# Patient Record
Sex: FEMALE | Race: WHITE | Employment: OTHER | URBAN - NONMETROPOLITAN AREA
[De-identification: names, ages, dates, MRNs, and addresses within clinical notes are randomized per-mention and may not be internally consistent; named-entity substitution may affect disease eponyms.]

---

## 2017-01-10 ENCOUNTER — HOSPITAL ENCOUNTER (OUTPATIENT)
Dept: WOMENS IMAGING | Age: 77
Discharge: HOME | End: 2017-01-10
Payer: MEDICARE

## 2017-01-10 ENCOUNTER — HOSPITAL ENCOUNTER (OUTPATIENT)
Dept: ULTRASOUND IMAGING | Age: 77
Discharge: HOME | End: 2017-01-10
Payer: MEDICARE

## 2017-01-10 DIAGNOSIS — R92.8 ABNORMAL MAMMOGRAM: ICD-10-CM

## 2017-01-10 DIAGNOSIS — R92.8 ABNORMAL MAMMOGRAM, UNSPECIFIED: ICD-10-CM

## 2017-01-10 DIAGNOSIS — Z12.31 VISIT FOR SCREENING MAMMOGRAM: ICD-10-CM

## 2017-01-10 PROCEDURE — G0279 TOMOSYNTHESIS, MAMMO: HCPCS

## 2017-01-10 PROCEDURE — 76642 ULTRASOUND BREAST LIMITED: CPT

## 2017-02-06 ENCOUNTER — LAB REQUISITION (OUTPATIENT)
Dept: LAB | Facility: HOSPITAL | Age: 77
End: 2017-02-06

## 2017-02-06 DIAGNOSIS — Z00.00 ENCOUNTER FOR GENERAL ADULT MEDICAL EXAMINATION WITHOUT ABNORMAL FINDINGS: ICD-10-CM

## 2017-02-06 PROCEDURE — 87086 URINE CULTURE/COLONY COUNT: CPT | Performed by: INTERNAL MEDICINE

## 2017-02-08 LAB — BACTERIA SPEC AEROBE CULT: ABNORMAL

## 2017-05-16 LAB
ALBUMIN SERPL-MCNC: 4.5 G/DL (ref 3.5–5.2)
ALP BLD-CCNC: 58 U/L (ref 35–104)
ALT SERPL-CCNC: 11 U/L (ref 5–33)
ANION GAP SERPL CALCULATED.3IONS-SCNC: 15 MMOL/L (ref 7–19)
AST SERPL-CCNC: 14 U/L (ref 5–32)
BASOPHILS ABSOLUTE: 0.1 K/UL (ref 0–0.2)
BASOPHILS RELATIVE PERCENT: 1.9 % (ref 0–1)
BILIRUB SERPL-MCNC: 0.6 MG/DL (ref 0.2–1.2)
BUN BLDV-MCNC: 30 MG/DL (ref 8–23)
CALCIUM SERPL-MCNC: 10 MG/DL (ref 8.8–10.2)
CHLORIDE BLD-SCNC: 105 MMOL/L (ref 98–111)
CHOLESTEROL, TOTAL: 150 MG/DL (ref 160–199)
CO2: 24 MMOL/L (ref 22–29)
CREAT SERPL-MCNC: 1.5 MG/DL (ref 0.5–0.9)
EOSINOPHILS ABSOLUTE: 0.2 K/UL (ref 0–0.6)
EOSINOPHILS RELATIVE PERCENT: 3 % (ref 0–5)
GFR NON-AFRICAN AMERICAN: 34
GLOBULIN: 2.5 G/DL
GLUCOSE BLD-MCNC: 92 MG/DL (ref 74–109)
HCT VFR BLD CALC: 44.3 % (ref 37–47)
HDLC SERPL-MCNC: 57 MG/DL (ref 65–121)
HEMOGLOBIN: 13.6 G/DL (ref 12–16)
LDL CHOLESTEROL CALCULATED: 65 MG/DL
LYMPHOCYTES ABSOLUTE: 1.7 K/UL (ref 1.1–4.5)
LYMPHOCYTES RELATIVE PERCENT: 26.3 % (ref 20–40)
MCH RBC QN AUTO: 27.8 PG (ref 27–31)
MCHC RBC AUTO-ENTMCNC: 30.7 G/DL (ref 33–37)
MCV RBC AUTO: 90.6 FL (ref 81–99)
MONOCYTES ABSOLUTE: 0.6 K/UL (ref 0–0.9)
MONOCYTES RELATIVE PERCENT: 8.8 % (ref 0–10)
NEUTROPHILS ABSOLUTE: 3.8 K/UL (ref 1.5–7.5)
NEUTROPHILS RELATIVE PERCENT: 59.5 % (ref 50–65)
PDW BLD-RTO: 13.8 % (ref 11.5–14.5)
PLATELET # BLD: 224 K/UL (ref 130–400)
PMV BLD AUTO: 12 FL (ref 7.4–10.4)
POTASSIUM SERPL-SCNC: 5 MMOL/L (ref 3.5–5)
RBC # BLD: 4.89 M/UL (ref 4.2–5.4)
SODIUM BLD-SCNC: 144 MMOL/L (ref 136–145)
TOTAL PROTEIN: 7 G/DL (ref 6.6–8.7)
TRIGL SERPL-MCNC: 141 MG/DL (ref 150–199)
TSH SERPL DL<=0.05 MIU/L-ACNC: 1.45 UIU/ML (ref 0.27–4.2)
VITAMIN D 25-HYDROXY: 45 NG/ML
WBC # BLD: 6.4 K/UL (ref 4.8–10.8)

## 2017-09-28 RX ORDER — PANTOPRAZOLE SODIUM 40 MG/1
TABLET, DELAYED RELEASE ORAL
Qty: 90 TABLET | Refills: 3 | Status: ON HOLD | OUTPATIENT
Start: 2017-09-28 | End: 2018-07-30 | Stop reason: HOSPADM

## 2017-10-04 LAB
ANION GAP SERPL CALCULATED.3IONS-SCNC: 11 MMOL/L (ref 7–19)
BUN BLDV-MCNC: 36 MG/DL (ref 8–23)
CALCIUM SERPL-MCNC: 9.9 MG/DL (ref 8.8–10.2)
CHLORIDE BLD-SCNC: 103 MMOL/L (ref 98–111)
CO2: 27 MMOL/L (ref 22–29)
CREAT SERPL-MCNC: 1.5 MG/DL (ref 0.5–0.9)
GFR NON-AFRICAN AMERICAN: 34
GLUCOSE BLD-MCNC: 89 MG/DL (ref 74–109)
POTASSIUM SERPL-SCNC: 4.3 MMOL/L (ref 3.5–5)
SODIUM BLD-SCNC: 141 MMOL/L (ref 136–145)

## 2017-10-16 RX ORDER — OXYBUTYNIN CHLORIDE 5 MG/1
TABLET ORAL
Qty: 60 TABLET | Refills: 2 | Status: SHIPPED | OUTPATIENT
Start: 2017-10-16 | End: 2018-02-15 | Stop reason: SDUPTHER

## 2017-11-21 DIAGNOSIS — E78.00 HIGH CHOLESTEROL: ICD-10-CM

## 2017-11-21 DIAGNOSIS — Z00.00 ROUTINE GENERAL MEDICAL EXAMINATION AT A HEALTH CARE FACILITY: Primary | ICD-10-CM

## 2017-11-21 DIAGNOSIS — E55.9 AVITAMINOSIS D: ICD-10-CM

## 2017-11-21 DIAGNOSIS — Z00.00 ROUTINE GENERAL MEDICAL EXAMINATION AT A HEALTH CARE FACILITY: ICD-10-CM

## 2017-11-21 LAB
ALBUMIN SERPL-MCNC: 4.5 G/DL (ref 3.5–5.2)
ALP BLD-CCNC: 66 U/L (ref 35–104)
ALT SERPL-CCNC: 11 U/L (ref 5–33)
ANION GAP SERPL CALCULATED.3IONS-SCNC: 12 MMOL/L (ref 7–19)
AST SERPL-CCNC: 16 U/L (ref 5–32)
BACTERIA: ABNORMAL /HPF
BASOPHILS ABSOLUTE: 0.1 K/UL (ref 0–0.2)
BASOPHILS RELATIVE PERCENT: 1.5 % (ref 0–1)
BILIRUB SERPL-MCNC: 0.6 MG/DL (ref 0.2–1.2)
BILIRUBIN URINE: NEGATIVE
BLOOD, URINE: NEGATIVE
BUN BLDV-MCNC: 35 MG/DL (ref 8–23)
CALCIUM SERPL-MCNC: 10 MG/DL (ref 8.8–10.2)
CHLORIDE BLD-SCNC: 100 MMOL/L (ref 98–111)
CHOLESTEROL, TOTAL: 164 MG/DL (ref 160–199)
CLARITY: ABNORMAL
CO2: 28 MMOL/L (ref 22–29)
COLOR: YELLOW
CREAT SERPL-MCNC: 1.5 MG/DL (ref 0.5–0.9)
EOSINOPHILS ABSOLUTE: 0.2 K/UL (ref 0–0.6)
EOSINOPHILS RELATIVE PERCENT: 2.1 % (ref 0–5)
EPITHELIAL CELLS, UA: 12 /HPF (ref 0–5)
GFR NON-AFRICAN AMERICAN: 34
GLUCOSE BLD-MCNC: 109 MG/DL (ref 74–109)
GLUCOSE URINE: NEGATIVE MG/DL
HCT VFR BLD CALC: 45.6 % (ref 37–47)
HDLC SERPL-MCNC: 58 MG/DL (ref 65–121)
HEMOGLOBIN: 14.3 G/DL (ref 12–16)
HYALINE CASTS: 1 /HPF (ref 0–8)
KETONES, URINE: NEGATIVE MG/DL
LDL CHOLESTEROL CALCULATED: 75 MG/DL
LEUKOCYTE ESTERASE, URINE: ABNORMAL
LYMPHOCYTES ABSOLUTE: 1.5 K/UL (ref 1.1–4.5)
LYMPHOCYTES RELATIVE PERCENT: 21.1 % (ref 20–40)
MCH RBC QN AUTO: 28.4 PG (ref 27–31)
MCHC RBC AUTO-ENTMCNC: 31.4 G/DL (ref 33–37)
MCV RBC AUTO: 90.7 FL (ref 81–99)
MONOCYTES ABSOLUTE: 0.5 K/UL (ref 0–0.9)
MONOCYTES RELATIVE PERCENT: 7.4 % (ref 0–10)
NEUTROPHILS ABSOLUTE: 4.9 K/UL (ref 1.5–7.5)
NEUTROPHILS RELATIVE PERCENT: 67.5 % (ref 50–65)
NITRITE, URINE: NEGATIVE
PDW BLD-RTO: 13.7 % (ref 11.5–14.5)
PH UA: 6.5
PLATELET # BLD: 266 K/UL (ref 130–400)
PMV BLD AUTO: 11.9 FL (ref 9.4–12.3)
POTASSIUM SERPL-SCNC: 4.4 MMOL/L (ref 3.5–5)
PROTEIN UA: NEGATIVE MG/DL
RBC # BLD: 5.03 M/UL (ref 4.2–5.4)
RBC UA: 10 /HPF (ref 0–4)
SODIUM BLD-SCNC: 140 MMOL/L (ref 136–145)
SPECIFIC GRAVITY UA: 1.01
TOTAL PROTEIN: 7.3 G/DL (ref 6.6–8.7)
TRIGL SERPL-MCNC: 157 MG/DL (ref 0–149)
TSH SERPL DL<=0.05 MIU/L-ACNC: 2.41 UIU/ML (ref 0.27–4.2)
UROBILINOGEN, URINE: 0.2 E.U./DL
VITAMIN D 25-HYDROXY: 41 NG/ML
WBC # BLD: 7.3 K/UL (ref 4.8–10.8)
WBC UA: 20 /HPF (ref 0–5)

## 2017-11-21 RX ORDER — CEFDINIR 300 MG/1
300 CAPSULE ORAL 2 TIMES DAILY
Qty: 20 CAPSULE | Refills: 0 | Status: SHIPPED | OUTPATIENT
Start: 2017-11-21 | End: 2017-11-30

## 2017-11-22 ENCOUNTER — TELEPHONE (OUTPATIENT)
Dept: INTERNAL MEDICINE | Age: 77
End: 2017-11-22

## 2017-11-23 LAB — URINE CULTURE, ROUTINE: NORMAL

## 2017-11-30 ENCOUNTER — OFFICE VISIT (OUTPATIENT)
Dept: INTERNAL MEDICINE | Age: 77
End: 2017-11-30
Payer: MEDICARE

## 2017-11-30 VITALS
OXYGEN SATURATION: 98 % | HEIGHT: 65 IN | DIASTOLIC BLOOD PRESSURE: 82 MMHG | TEMPERATURE: 97.6 F | BODY MASS INDEX: 29.49 KG/M2 | WEIGHT: 177 LBS | SYSTOLIC BLOOD PRESSURE: 128 MMHG | HEART RATE: 66 BPM

## 2017-11-30 DIAGNOSIS — I10 ESSENTIAL HYPERTENSION: Chronic | ICD-10-CM

## 2017-11-30 DIAGNOSIS — N30.00 ACUTE CYSTITIS WITHOUT HEMATURIA: Chronic | ICD-10-CM

## 2017-11-30 PROBLEM — N39.0 UTI (URINARY TRACT INFECTION): Chronic | Status: ACTIVE | Noted: 2017-11-30

## 2017-11-30 PROCEDURE — 1090F PRES/ABSN URINE INCON ASSESS: CPT | Performed by: NURSE PRACTITIONER

## 2017-11-30 PROCEDURE — 4040F PNEUMOC VAC/ADMIN/RCVD: CPT | Performed by: NURSE PRACTITIONER

## 2017-11-30 PROCEDURE — G8399 PT W/DXA RESULTS DOCUMENT: HCPCS | Performed by: NURSE PRACTITIONER

## 2017-11-30 PROCEDURE — G8419 CALC BMI OUT NRM PARAM NOF/U: HCPCS | Performed by: NURSE PRACTITIONER

## 2017-11-30 PROCEDURE — 99214 OFFICE O/P EST MOD 30 MIN: CPT | Performed by: NURSE PRACTITIONER

## 2017-11-30 PROCEDURE — G8484 FLU IMMUNIZE NO ADMIN: HCPCS | Performed by: NURSE PRACTITIONER

## 2017-11-30 PROCEDURE — G8427 DOCREV CUR MEDS BY ELIG CLIN: HCPCS | Performed by: NURSE PRACTITIONER

## 2017-11-30 PROCEDURE — 1036F TOBACCO NON-USER: CPT | Performed by: NURSE PRACTITIONER

## 2017-11-30 PROCEDURE — 1123F ACP DISCUSS/DSCN MKR DOCD: CPT | Performed by: NURSE PRACTITIONER

## 2017-11-30 RX ORDER — MULTIVIT-MIN/IRON/FOLIC ACID/K 18-600-40
CAPSULE ORAL DAILY
Status: ON HOLD | COMMUNITY
End: 2022-01-01 | Stop reason: HOSPADM

## 2017-11-30 RX ORDER — ASPIRIN 81 MG/1
81 TABLET ORAL DAILY
COMMUNITY
End: 2021-01-01

## 2017-11-30 RX ORDER — OXYBUTYNIN CHLORIDE 5 MG/1
5 TABLET ORAL 2 TIMES DAILY
COMMUNITY
End: 2019-03-19 | Stop reason: SDUPTHER

## 2017-11-30 ASSESSMENT — ENCOUNTER SYMPTOMS
PHOTOPHOBIA: 0
BACK PAIN: 0
COLOR CHANGE: 0
VOMITING: 0
NAUSEA: 0
RHINORRHEA: 0
VOICE CHANGE: 0
SHORTNESS OF BREATH: 0
COUGH: 0

## 2017-11-30 NOTE — PROGRESS NOTES
Jose Catinald INTERNAL MEDICINE  [facility]  [address] [place] [address]  Dept: [phone]  Dept Fax: [phone]  Loc: [phone]    Mitch Bateman is a 65 y.o. female who presents today for her medical conditions/complaints as noted below. Mitch Bateman is c/o of 6 Month Follow-Up        HPI:     HPI   Chief Complaint   Patient presents with    6 Month Follow-Up     Patient presents today for routine follow-up. She recently had labs which showed a UTI. Dr. Karime Diehl called in [place] for her. She does have an elevated creatinine of 1.5 this is stable for her and she sees Dr. Froy Downing for this. She denies complaints today. However, she is very unhappy with our  as she states they told her she had an appointment today and it was rescheduled for Dec 12. Dr. Karime Diehl is not in office today and the patient agreed to see me. Her blood pressure is well controlled, as well as lipids.      Lab Results   Component Value Date     11/21/2017    K 4.4 11/21/2017     11/21/2017    CO2 28 11/21/2017    BUN 35 (H) 11/21/2017    CREATININE 1.5 (H) 11/21/2017    GLUCOSE 109 11/21/2017    CALCIUM 10.0 11/21/2017    PROT 7.3 11/21/2017    LABALBU 4.5 11/21/2017    BILITOT 0.6 11/21/2017    ALKPHOS 66 11/21/2017    AST 16 11/21/2017    ALT 11 11/21/2017    LABGLOM 34 (A) 11/21/2017    GLOB 2.5 05/16/2017       Lab Results   Component Value Date    WBC 7.3 11/21/2017    HGB 14.3 11/21/2017    HCT 45.6 11/21/2017    MCV 90.7 11/21/2017     11/21/2017     Lab Results   Component Value Date    CHOL 164 11/21/2017    CHOL 150 (L) 05/16/2017     Lab Results   Component Value Date    TRIG 157 (H) 11/21/2017    TRIG 141 (L) 05/16/2017     Lab Results   Component Value Date    HDL 58 (L) 11/21/2017    HDL 57 (L) 05/16/2017     Lab Results   Component Value Date    LDLCALC 75 11/21/2017    LDLCALC 65 05/16/2017     Lab Results   Component Value Date    TSH 2.410 11/21/2017 rhinorrhea and voice change. Eyes: Negative for photophobia and visual disturbance. Respiratory: Negative for cough and shortness of breath. Cardiovascular: Negative for chest pain and palpitations. Gastrointestinal: Negative for nausea and vomiting. Endocrine: Negative. Negative for cold intolerance and heat intolerance. Genitourinary: Negative for difficulty urinating and flank pain. Musculoskeletal: Negative for back pain and neck pain. Skin: Negative for color change and rash. Allergic/Immunologic: Negative for environmental allergies and food allergies. Neurological: Negative for dizziness, speech difficulty and headaches. Hematological: Does not bruise/bleed easily. Psychiatric/Behavioral: Negative for sleep disturbance and suicidal ideas. Objective:     Physical Exam   Constitutional: She is oriented to person, place, and time. She appears well-developed and well-nourished. HENT:   Head: Atraumatic. Right Ear: External ear normal.   Left Ear: External ear normal.   Nose: Nose normal.   Mouth/Throat: Oropharynx is clear and moist.   Eyes: Conjunctivae are normal. Pupils are equal, round, and reactive to light. Neck: Normal range of motion. Neck supple. Cardiovascular: Normal rate, regular rhythm, S1 normal, S2 normal and normal heart sounds. Pulmonary/Chest: Effort normal and breath sounds normal.   Abdominal: Soft. Bowel sounds are normal.   Musculoskeletal: Normal range of motion. Neurological: She is alert and oriented to person, place, and time. Skin: Skin is warm and dry. Psychiatric: She has a normal mood and affect. Her behavior is normal.   Nursing note and vitals reviewed. /82 (Site: Left Arm, Position: Sitting)   Pulse 66   Temp 97.6 °F (36.4 °C)   Ht 5' 5\" (1.651 m)   Wt 177 lb (80.3 kg)   SpO2 98%   BMI 29.45 kg/m²     Assessment:      1. Essential hypertension     2. Acute cystitis without hematuria         Plan:     1.  Continue all

## 2018-02-01 RX ORDER — PRAVASTATIN SODIUM 80 MG/1
TABLET ORAL
Qty: 90 TABLET | Refills: 1 | Status: SHIPPED | OUTPATIENT
Start: 2018-02-01 | End: 2018-11-29 | Stop reason: SDUPTHER

## 2018-02-15 RX ORDER — OXYBUTYNIN CHLORIDE 5 MG/1
TABLET ORAL
Qty: 60 TABLET | Refills: 2 | Status: ON HOLD | OUTPATIENT
Start: 2018-02-15 | End: 2018-07-30 | Stop reason: HOSPADM

## 2018-04-12 PROBLEM — N39.0 UTI (URINARY TRACT INFECTION): Chronic | Status: RESOLVED | Noted: 2017-11-30 | Resolved: 2018-04-12

## 2018-06-01 DIAGNOSIS — I10 ESSENTIAL HYPERTENSION: Primary | Chronic | ICD-10-CM

## 2018-06-01 DIAGNOSIS — Z00.00 ROUTINE GENERAL MEDICAL EXAMINATION AT A HEALTH CARE FACILITY: ICD-10-CM

## 2018-06-01 DIAGNOSIS — E78.00 HYPERCHOLESTEREMIA: ICD-10-CM

## 2018-06-01 DIAGNOSIS — I10 ESSENTIAL HYPERTENSION: Chronic | ICD-10-CM

## 2018-06-01 LAB
ALBUMIN SERPL-MCNC: 4.2 G/DL (ref 3.5–5.2)
ALP BLD-CCNC: 61 U/L (ref 35–104)
ALT SERPL-CCNC: 9 U/L (ref 5–33)
ANION GAP SERPL CALCULATED.3IONS-SCNC: 17 MMOL/L (ref 7–19)
AST SERPL-CCNC: 13 U/L (ref 5–32)
BACTERIA: ABNORMAL /HPF
BILIRUB SERPL-MCNC: 0.5 MG/DL (ref 0.2–1.2)
BILIRUBIN URINE: NEGATIVE
BLOOD, URINE: NEGATIVE
BUN BLDV-MCNC: 34 MG/DL (ref 8–23)
CALCIUM SERPL-MCNC: 9.8 MG/DL (ref 8.8–10.2)
CHLORIDE BLD-SCNC: 104 MMOL/L (ref 98–111)
CHOLESTEROL, TOTAL: 154 MG/DL (ref 160–199)
CLARITY: ABNORMAL
CO2: 22 MMOL/L (ref 22–29)
COLOR: YELLOW
CREAT SERPL-MCNC: 1.5 MG/DL (ref 0.5–0.9)
EPITHELIAL CELLS, UA: 7 /HPF (ref 0–5)
GFR NON-AFRICAN AMERICAN: 34
GLUCOSE BLD-MCNC: 93 MG/DL (ref 74–109)
GLUCOSE URINE: NEGATIVE MG/DL
HCT VFR BLD CALC: 43.3 % (ref 37–47)
HDLC SERPL-MCNC: 53 MG/DL (ref 65–121)
HEMOGLOBIN: 13.4 G/DL (ref 12–16)
HYALINE CASTS: 7 /HPF (ref 0–8)
KETONES, URINE: NEGATIVE MG/DL
LDL CHOLESTEROL CALCULATED: 73 MG/DL
LEUKOCYTE ESTERASE, URINE: ABNORMAL
MCH RBC QN AUTO: 27.7 PG (ref 27–31)
MCHC RBC AUTO-ENTMCNC: 30.9 G/DL (ref 33–37)
MCV RBC AUTO: 89.5 FL (ref 81–99)
NITRITE, URINE: NEGATIVE
PDW BLD-RTO: 13.4 % (ref 11.5–14.5)
PH UA: 7.5
PLATELET # BLD: 220 K/UL (ref 130–400)
PMV BLD AUTO: 12.2 FL (ref 9.4–12.3)
POTASSIUM SERPL-SCNC: 4.2 MMOL/L (ref 3.5–5)
PROTEIN UA: NEGATIVE MG/DL
RBC # BLD: 4.84 M/UL (ref 4.2–5.4)
RBC UA: 4 /HPF (ref 0–4)
SODIUM BLD-SCNC: 143 MMOL/L (ref 136–145)
SPECIFIC GRAVITY UA: 1.02
TOTAL PROTEIN: 6.7 G/DL (ref 6.6–8.7)
TRIGL SERPL-MCNC: 139 MG/DL (ref 0–149)
URINE REFLEX TO CULTURE: YES
UROBILINOGEN, URINE: 0.2 E.U./DL
WBC # BLD: 6 K/UL (ref 4.8–10.8)
WBC UA: 9 /HPF (ref 0–5)

## 2018-06-03 LAB — URINE CULTURE, ROUTINE: NORMAL

## 2018-06-04 ENCOUNTER — OFFICE VISIT (OUTPATIENT)
Dept: INTERNAL MEDICINE | Age: 78
End: 2018-06-04
Payer: MEDICARE

## 2018-06-04 VITALS
DIASTOLIC BLOOD PRESSURE: 80 MMHG | OXYGEN SATURATION: 97 % | HEART RATE: 57 BPM | SYSTOLIC BLOOD PRESSURE: 120 MMHG | HEIGHT: 65 IN | BODY MASS INDEX: 30.07 KG/M2 | WEIGHT: 180.5 LBS

## 2018-06-04 DIAGNOSIS — I10 ESSENTIAL HYPERTENSION: Primary | ICD-10-CM

## 2018-06-04 DIAGNOSIS — Z12.31 SCREENING MAMMOGRAM, ENCOUNTER FOR: ICD-10-CM

## 2018-06-04 DIAGNOSIS — G47.00 INSOMNIA, UNSPECIFIED TYPE: ICD-10-CM

## 2018-06-04 DIAGNOSIS — K21.9 GASTROESOPHAGEAL REFLUX DISEASE WITHOUT ESOPHAGITIS: ICD-10-CM

## 2018-06-04 DIAGNOSIS — E78.5 HYPERLIPIDEMIA, UNSPECIFIED HYPERLIPIDEMIA TYPE: ICD-10-CM

## 2018-06-04 DIAGNOSIS — N32.81 OVERACTIVE BLADDER: ICD-10-CM

## 2018-06-04 DIAGNOSIS — R79.89 ELEVATED SERUM CREATININE: ICD-10-CM

## 2018-06-04 PROCEDURE — G8399 PT W/DXA RESULTS DOCUMENT: HCPCS | Performed by: INTERNAL MEDICINE

## 2018-06-04 PROCEDURE — 99214 OFFICE O/P EST MOD 30 MIN: CPT | Performed by: INTERNAL MEDICINE

## 2018-06-04 PROCEDURE — 1090F PRES/ABSN URINE INCON ASSESS: CPT | Performed by: INTERNAL MEDICINE

## 2018-06-04 PROCEDURE — 1036F TOBACCO NON-USER: CPT | Performed by: INTERNAL MEDICINE

## 2018-06-04 PROCEDURE — G8427 DOCREV CUR MEDS BY ELIG CLIN: HCPCS | Performed by: INTERNAL MEDICINE

## 2018-06-04 PROCEDURE — G8417 CALC BMI ABV UP PARAM F/U: HCPCS | Performed by: INTERNAL MEDICINE

## 2018-06-04 PROCEDURE — 4040F PNEUMOC VAC/ADMIN/RCVD: CPT | Performed by: INTERNAL MEDICINE

## 2018-06-04 PROCEDURE — 1123F ACP DISCUSS/DSCN MKR DOCD: CPT | Performed by: INTERNAL MEDICINE

## 2018-06-04 RX ORDER — TETANUS AND DIPHTHERIA TOXOIDS ADSORBED 2; 2 [LF]/.5ML; [LF]/.5ML
0.5 INJECTION INTRAMUSCULAR ONCE
Qty: 0.5 ML | Refills: 0 | Status: CANCELLED | OUTPATIENT
Start: 2018-06-04 | End: 2018-06-04

## 2018-06-04 RX ORDER — TRAZODONE HYDROCHLORIDE 50 MG/1
50 TABLET ORAL NIGHTLY
Qty: 15 TABLET | Refills: 1 | Status: SHIPPED | OUTPATIENT
Start: 2018-06-04 | End: 2018-11-09

## 2018-06-04 ASSESSMENT — ENCOUNTER SYMPTOMS
VOMITING: 0
EYE REDNESS: 0
SINUS PRESSURE: 0
TROUBLE SWALLOWING: 0
ABDOMINAL DISTENTION: 0
PHOTOPHOBIA: 0
WHEEZING: 0
SINUS PAIN: 0
RECTAL PAIN: 0
EYE ITCHING: 0
ABDOMINAL PAIN: 0
COLOR CHANGE: 0
BLOOD IN STOOL: 0
CONSTIPATION: 0
VOICE CHANGE: 0
CHEST TIGHTNESS: 0
COUGH: 0
DIARRHEA: 0
RHINORRHEA: 0
ANAL BLEEDING: 0
EYE DISCHARGE: 0
BACK PAIN: 0
SHORTNESS OF BREATH: 0
EYE PAIN: 0
NAUSEA: 0
SORE THROAT: 0

## 2018-06-04 ASSESSMENT — PATIENT HEALTH QUESTIONNAIRE - PHQ9
2. FEELING DOWN, DEPRESSED OR HOPELESS: 0
SUM OF ALL RESPONSES TO PHQ QUESTIONS 1-9: 0
SUM OF ALL RESPONSES TO PHQ9 QUESTIONS 1 & 2: 0
1. LITTLE INTEREST OR PLEASURE IN DOING THINGS: 0

## 2018-06-07 ENCOUNTER — HOSPITAL ENCOUNTER (OUTPATIENT)
Dept: WOMENS IMAGING | Age: 78
Discharge: HOME | End: 2018-06-07
Payer: MEDICARE

## 2018-06-07 DIAGNOSIS — Z12.31 SCREENING MAMMOGRAM, ENCOUNTER FOR: ICD-10-CM

## 2018-06-07 PROCEDURE — 77067 SCR MAMMO BI INCL CAD: CPT

## 2018-07-24 ENCOUNTER — OFFICE VISIT (OUTPATIENT)
Dept: URGENT CARE | Age: 78
End: 2018-07-24
Payer: MEDICARE

## 2018-07-24 ENCOUNTER — HOSPITAL ENCOUNTER (OUTPATIENT)
Dept: GENERAL RADIOLOGY | Age: 78
Discharge: HOME | End: 2018-07-24
Payer: MEDICARE

## 2018-07-24 VITALS
OXYGEN SATURATION: 98 % | WEIGHT: 169 LBS | HEIGHT: 65 IN | HEART RATE: 72 BPM | DIASTOLIC BLOOD PRESSURE: 79 MMHG | BODY MASS INDEX: 28.16 KG/M2 | RESPIRATION RATE: 22 BRPM | TEMPERATURE: 97.5 F | SYSTOLIC BLOOD PRESSURE: 146 MMHG

## 2018-07-24 DIAGNOSIS — J18.9 PNEUMONIA OF RIGHT UPPER LOBE DUE TO INFECTIOUS ORGANISM: Primary | ICD-10-CM

## 2018-07-24 DIAGNOSIS — R06.02 SHORTNESS OF BREATH: ICD-10-CM

## 2018-07-24 DIAGNOSIS — R11.0 NAUSEA: ICD-10-CM

## 2018-07-24 PROCEDURE — 71046 X-RAY EXAM CHEST 2 VIEWS: CPT

## 2018-07-24 PROCEDURE — 1036F TOBACCO NON-USER: CPT | Performed by: NURSE PRACTITIONER

## 2018-07-24 PROCEDURE — G8399 PT W/DXA RESULTS DOCUMENT: HCPCS | Performed by: NURSE PRACTITIONER

## 2018-07-24 PROCEDURE — 4040F PNEUMOC VAC/ADMIN/RCVD: CPT | Performed by: NURSE PRACTITIONER

## 2018-07-24 PROCEDURE — 1123F ACP DISCUSS/DSCN MKR DOCD: CPT | Performed by: NURSE PRACTITIONER

## 2018-07-24 PROCEDURE — G8417 CALC BMI ABV UP PARAM F/U: HCPCS | Performed by: NURSE PRACTITIONER

## 2018-07-24 PROCEDURE — 1101F PT FALLS ASSESS-DOCD LE1/YR: CPT | Performed by: NURSE PRACTITIONER

## 2018-07-24 PROCEDURE — 99213 OFFICE O/P EST LOW 20 MIN: CPT | Performed by: NURSE PRACTITIONER

## 2018-07-24 PROCEDURE — 1090F PRES/ABSN URINE INCON ASSESS: CPT | Performed by: NURSE PRACTITIONER

## 2018-07-24 PROCEDURE — G8428 CUR MEDS NOT DOCUMENT: HCPCS | Performed by: NURSE PRACTITIONER

## 2018-07-24 RX ORDER — LEVOFLOXACIN 750 MG/1
750 TABLET ORAL DAILY
Qty: 5 TABLET | Refills: 0 | Status: ON HOLD | OUTPATIENT
Start: 2018-07-24 | End: 2018-07-30 | Stop reason: HOSPADM

## 2018-07-24 RX ORDER — ONDANSETRON 4 MG/1
4 TABLET, ORALLY DISINTEGRATING ORAL EVERY 8 HOURS PRN
Qty: 30 TABLET | Refills: 0 | Status: SHIPPED | OUTPATIENT
Start: 2018-07-24 | End: 2018-11-09

## 2018-07-24 ASSESSMENT — ENCOUNTER SYMPTOMS
SORE THROAT: 0
COUGH: 1
ABDOMINAL PAIN: 0
RHINORRHEA: 0
DIARRHEA: 1
NAUSEA: 1
VOMITING: 0
SHORTNESS OF BREATH: 1
WHEEZING: 0

## 2018-07-24 NOTE — PROGRESS NOTES
15 tablet 1    oxybutynin (DITROPAN) 5 MG tablet TAKE 1 TABLET BY MOUTH TWO TIMES A DAY 60 tablet 2    pravastatin (PRAVACHOL) 80 MG tablet TAKE 1 TABLET BY MOUTH AT BEDTIME 90 tablet 1    aspirin 81 MG EC tablet Take 81 mg by mouth daily      Cholecalciferol (VITAMIN D) 2000 units CAPS capsule Take by mouth      oxybutynin (DITROPAN) 5 MG tablet Take 5 mg by mouth 2 times daily      pantoprazole (PROTONIX) 40 MG tablet TAKE 1 TABLET BY MOUTH EVERY DAY 90 tablet 3    pantoprazole (PROTONIX) 40 MG tablet Take 80 mg by mouth daily       triamterene-hydrochlorothiazide (MAXZIDE-25) 37.5-25 MG per tablet Take 1 tablet by mouth daily Indications: 1/2 tablet daily      losartan (COZAAR) 25 MG tablet Take 25 mg by mouth daily      meclizine (ANTIVERT) 25 MG tablet Take 25 mg by mouth 3 times daily as needed        No current facility-administered medications for this visit. No Known Allergies    Health Maintenance   Topic Date Due    DTaP/Tdap/Td vaccine (1 - Tdap) 11/05/1959    Pneumococcal low/med risk (2 of 2 - PPSV23) 07/13/2016    Shingles Vaccine (1 of 2 - 2 Dose Series) 02/09/2017    Flu vaccine (1) 09/01/2018    Potassium monitoring  06/01/2019    Creatinine monitoring  06/01/2019    DEXA (modify frequency per FRAX score)  Completed       Subjective:      Review of Systems   Constitutional: Negative for chills, fatigue and fever. HENT: Positive for congestion. Negative for ear pain, rhinorrhea and sore throat. Respiratory: Positive for cough and shortness of breath. Negative for wheezing. Gastrointestinal: Positive for diarrhea and nausea. Negative for abdominal pain and vomiting. Skin: Negative for rash. Neurological: Negative for light-headedness and headaches. All other systems reviewed and are negative. Objective:     Physical Exam   Constitutional: She is oriented to person, place, and time. She appears well-developed and well-nourished. No distress.    HENT:   Head: Normocephalic and atraumatic. Right Ear: Hearing, tympanic membrane, external ear and ear canal normal.   Left Ear: Hearing, tympanic membrane, external ear and ear canal normal.   Nose: Nose normal.   Mouth/Throat: Uvula is midline, oropharynx is clear and moist and mucous membranes are normal.   Eyes: Pupils are equal, round, and reactive to light. Neck: Normal range of motion. Cardiovascular: Normal rate, regular rhythm and normal heart sounds. No murmur heard. Pulmonary/Chest: Effort normal. No respiratory distress. She has no decreased breath sounds. She has wheezes in the right upper field, the right lower field, the left upper field and the left lower field. Neurological: She is alert and oriented to person, place, and time. Skin: Skin is warm and dry. No rash noted. She is not diaphoretic. Psychiatric: She has a normal mood and affect. Her behavior is normal.   Nursing note and vitals reviewed. BP (!) 146/79   Pulse 72   Temp 97.5 °F (36.4 °C) (Oral)   Resp 22   Ht 5' 5\" (1.651 m)   Wt 169 lb (76.7 kg)   SpO2 98%   BMI 28.12 kg/m²     Assessment:       Diagnosis Orders   1. Pneumonia of right upper lobe due to infectious organism ([address]  levofloxacin (LEVAQUIN) 750 MG tablet   2. Shortness of breath  XR CHEST STANDARD (2 VW)   3. Nausea  ondansetron (ZOFRAN ODT) 4 MG disintegrating tablet       Plan:   Xray:   Patchy right upper lobe airspace disease suggesting a  bronchopneumonia. Lungs are otherwise clear. 1. Take antibiotic as prescribed. Once a day for 5 days. 2. This medication has a risk for tendon rupture. Avoid heavy lifting and strenuous exercise. 3. Zofran as needed for nausea  4. Stop z pack  5. Monitor for fever and worsening symptoms  6. If patient is not improving or developing any new/worsening symptoms then RTC, prn or go to ER. Patient is to follow up with PCP as needed. Patient verbalizes understanding.     Orders Placed This Encounter   Procedures    XR medicines you take. How can you care for yourself at home? · Take your antibiotics exactly as directed. Do not stop taking the medicine just because you are feeling better. You need to take the full course of antibiotics. · Take your medicines exactly as prescribed. Call your doctor if you think you are having a problem with your medicine. · Get plenty of rest and sleep. You may feel weak and tired for a while, but your energy level will improve with time. · To prevent dehydration, drink plenty of fluids, enough so that your urine is light yellow or clear like water. Choose water and other caffeine-free clear liquids until you feel better. If you have kidney, heart, or liver disease and have to limit fluids, talk with your doctor before you increase the amount of fluids you drink. · Take care of your cough so you can rest. A cough that brings up mucus from your lungs is common with pneumonia. It is one way your body gets rid of the infection. But if coughing keeps you from resting or causes severe fatigue and chest-wall pain, talk to your doctor. He or she may suggest that you take a medicine to reduce the cough. · Use a vaporizer or humidifier to add moisture to your bedroom. Follow the directions for cleaning the machine. · Do not smoke or allow others to smoke around you. Smoke will make your cough last longer. If you need help quitting, talk to your doctor about stop-smoking programs and medicines. These can increase your chances of quitting for good. · Take an over-the-counter pain medicine, such as acetaminophen (Tylenol), ibuprofen (Advil, Motrin), or naproxen (Aleve). Read and follow all instructions on the label. · Do not take two or more pain medicines at the same time unless the doctor told you to. Many pain medicines have acetaminophen, which is Tylenol. Too much acetaminophen (Tylenol) can be harmful.   · If you were given a spirometer to measure how well your lungs are working, use it as

## 2018-07-24 NOTE — PATIENT INSTRUCTIONS
infection. But if coughing keeps you from resting or causes severe fatigue and chest-wall pain, talk to your doctor. He or she may suggest that you take a medicine to reduce the cough. · Use a vaporizer or humidifier to add moisture to your bedroom. Follow the directions for cleaning the machine. · Do not smoke or allow others to smoke around you. Smoke will make your cough last longer. If you need help quitting, talk to your doctor about stop-smoking programs and medicines. These can increase your chances of quitting for good. · Take an over-the-counter pain medicine, such as acetaminophen (Tylenol), ibuprofen (Advil, Motrin), or naproxen (Aleve). Read and follow all instructions on the label. · Do not take two or more pain medicines at the same time unless the doctor told you to. Many pain medicines have acetaminophen, which is Tylenol. Too much acetaminophen (Tylenol) can be harmful. · If you were given a spirometer to measure how well your lungs are working, use it as instructed. This can help your doctor tell how your recovery is going. · To prevent pneumonia in the future, talk to your doctor about getting a flu vaccine (once a year) and a pneumococcal vaccine (one time only for most people). When should you call for help? Call 911 anytime you think you may need emergency care. For example, call if:    · You have severe trouble breathing.    Call your doctor now or seek immediate medical care if:    · You cough up dark brown or bloody mucus (sputum).     · You have new or worse trouble breathing.     · You are dizzy or lightheaded, or you feel like you may faint.    Watch closely for changes in your health, and be sure to contact your doctor if:    · You have a new or higher fever.     · You are coughing more deeply or more often.     · You are not getting better after 2 days (48 hours).     · You do not get better as expected. Where can you learn more? Go to [email or web address]://[facility]ovanny[facility]. org and sign in to your [employer] account. Enter D336 in the [employer] box to learn more about \"Pneumonia: Care Instructions. \"     If you do not have an account, please click on the \"Sign Up Now\" link. Current as of: December 6, 2017  Content Version: 11.6  © 3173-6969 [employer]. Care instructions adapted under license by [facility] ([facility]). If you have questions about a medical condition or this instruction, always ask your healthcare professional. [address] any warranty or liability for your use of this information.

## 2018-07-28 ENCOUNTER — APPOINTMENT (OUTPATIENT)
Dept: GENERAL RADIOLOGY | Age: 78
End: 2018-07-28
Payer: MEDICARE

## 2018-07-28 ENCOUNTER — HOSPITAL ENCOUNTER (OUTPATIENT)
Age: 78
Setting detail: OBSERVATION
Discharge: HOME | End: 2018-07-30
Attending: EMERGENCY MEDICINE | Admitting: INTERNAL MEDICINE
Payer: MEDICARE

## 2018-07-28 DIAGNOSIS — N17.9 ACUTE KIDNEY INJURY (HCC): ICD-10-CM

## 2018-07-28 DIAGNOSIS — J18.9 PNEUMONIA DUE TO ORGANISM: Primary | ICD-10-CM

## 2018-07-28 PROBLEM — E87.29 METABOLIC ACIDOSIS, INCREASED ANION GAP (IAG): Status: ACTIVE | Noted: 2018-07-28

## 2018-07-28 LAB
ALBUMIN SERPL-MCNC: 4.4 G/DL (ref 3.5–5.2)
ALP BLD-CCNC: 61 U/L (ref 35–104)
ALT SERPL-CCNC: 10 U/L (ref 5–33)
ANION GAP SERPL CALCULATED.3IONS-SCNC: 22 MMOL/L (ref 7–19)
AST SERPL-CCNC: 16 U/L (ref 5–32)
BACTERIA: NEGATIVE /HPF
BASOPHILS ABSOLUTE: 0.1 K/UL (ref 0–0.2)
BASOPHILS RELATIVE PERCENT: 0.9 % (ref 0–1)
BILIRUB SERPL-MCNC: 0.8 MG/DL (ref 0.2–1.2)
BILIRUBIN URINE: NEGATIVE
BLOOD, URINE: NEGATIVE
BUN BLDV-MCNC: 37 MG/DL (ref 8–23)
CALCIUM SERPL-MCNC: 10.3 MG/DL (ref 8.8–10.2)
CHLORIDE BLD-SCNC: 101 MMOL/L (ref 98–111)
CLARITY: CLEAR
CO2: 19 MMOL/L (ref 22–29)
COLOR: YELLOW
CREAT SERPL-MCNC: 2.1 MG/DL (ref 0.5–0.9)
CREATININE URINE: 165.9 MG/DL (ref 4.2–622)
EOSINOPHILS ABSOLUTE: 0.1 K/UL (ref 0–0.6)
EOSINOPHILS RELATIVE PERCENT: 0.7 % (ref 0–5)
EPITHELIAL CELLS, UA: 8 /HPF (ref 0–5)
GFR NON-AFRICAN AMERICAN: 23
GLUCOSE BLD-MCNC: 103 MG/DL (ref 74–109)
GLUCOSE URINE: NEGATIVE MG/DL
HCT VFR BLD CALC: 44.6 % (ref 37–47)
HEMOGLOBIN: 14.1 G/DL (ref 12–16)
HYALINE CASTS: 1 /HPF (ref 0–8)
KETONES, URINE: 15 MG/DL
LACTIC ACID: 1.7 MMOL/L (ref 0.5–1.9)
LEUKOCYTE ESTERASE, URINE: ABNORMAL
LYMPHOCYTES ABSOLUTE: 1.6 K/UL (ref 1.1–4.5)
LYMPHOCYTES RELATIVE PERCENT: 19 % (ref 20–40)
MCH RBC QN AUTO: 27.2 PG (ref 27–31)
MCHC RBC AUTO-ENTMCNC: 31.6 G/DL (ref 33–37)
MCV RBC AUTO: 85.9 FL (ref 81–99)
MONOCYTES ABSOLUTE: 0.6 K/UL (ref 0–0.9)
MONOCYTES RELATIVE PERCENT: 7.5 % (ref 0–10)
NEUTROPHILS ABSOLUTE: 6.1 K/UL (ref 1.5–7.5)
NEUTROPHILS RELATIVE PERCENT: 71.5 % (ref 50–65)
NITRITE, URINE: NEGATIVE
PDW BLD-RTO: 13.6 % (ref 11.5–14.5)
PH UA: 7
PLATELET # BLD: 235 K/UL (ref 130–400)
PMV BLD AUTO: 12 FL (ref 9.4–12.3)
POTASSIUM SERPL-SCNC: 4.1 MMOL/L (ref 3.5–5)
PROTEIN UA: NEGATIVE MG/DL
RBC # BLD: 5.19 M/UL (ref 4.2–5.4)
RBC UA: 2 /HPF (ref 0–4)
SODIUM BLD-SCNC: 142 MMOL/L (ref 136–145)
SODIUM URINE: 108 MMOL/L
SPECIFIC GRAVITY UA: 1.02
TOTAL PROTEIN: 7.3 G/DL (ref 6.6–8.7)
TROPONIN: <0.01 NG/ML (ref 0–0.03)
URINE REFLEX TO CULTURE: YES
UROBILINOGEN, URINE: 0.2 E.U./DL
WBC # BLD: 8.5 K/UL (ref 4.8–10.8)
WBC UA: 8 /HPF (ref 0–5)

## 2018-07-28 PROCEDURE — 83605 ASSAY OF LACTIC ACID: CPT

## 2018-07-28 PROCEDURE — 94640 AIRWAY INHALATION TREATMENT: CPT

## 2018-07-28 PROCEDURE — G0378 HOSPITAL OBSERVATION PER HR: HCPCS

## 2018-07-28 PROCEDURE — 6360000002 HC RX W HCPCS: Performed by: PHYSICIAN ASSISTANT

## 2018-07-28 PROCEDURE — 87086 URINE CULTURE/COLONY COUNT: CPT

## 2018-07-28 PROCEDURE — 2580000003 HC RX 258: Performed by: PHYSICIAN ASSISTANT

## 2018-07-28 PROCEDURE — 81001 URINALYSIS AUTO W/SCOPE: CPT

## 2018-07-28 PROCEDURE — 84300 ASSAY OF URINE SODIUM: CPT

## 2018-07-28 PROCEDURE — 93005 ELECTROCARDIOGRAM TRACING: CPT

## 2018-07-28 PROCEDURE — 36415 COLL VENOUS BLD VENIPUNCTURE: CPT

## 2018-07-28 PROCEDURE — 96372 THER/PROPH/DIAG INJ SC/IM: CPT

## 2018-07-28 PROCEDURE — 99220 PR INITIAL OBSERVATION CARE/DAY 70 MINUTES: CPT | Performed by: FAMILY MEDICINE

## 2018-07-28 PROCEDURE — 80053 COMPREHEN METABOLIC PANEL: CPT

## 2018-07-28 PROCEDURE — 2580000003 HC RX 258: Performed by: CLINICAL NURSE SPECIALIST

## 2018-07-28 PROCEDURE — 99285 EMERGENCY DEPT VISIT HI MDM: CPT | Performed by: EMERGENCY MEDICINE

## 2018-07-28 PROCEDURE — 6360000002 HC RX W HCPCS: Performed by: CLINICAL NURSE SPECIALIST

## 2018-07-28 PROCEDURE — 99285 EMERGENCY DEPT VISIT HI MDM: CPT

## 2018-07-28 PROCEDURE — 87070 CULTURE OTHR SPECIMN AEROBIC: CPT

## 2018-07-28 PROCEDURE — 2500000003 HC RX 250 WO HCPCS: Performed by: CLINICAL NURSE SPECIALIST

## 2018-07-28 PROCEDURE — 87040 BLOOD CULTURE FOR BACTERIA: CPT

## 2018-07-28 PROCEDURE — 71046 X-RAY EXAM CHEST 2 VIEWS: CPT

## 2018-07-28 PROCEDURE — 6370000000 HC RX 637 (ALT 250 FOR IP): Performed by: CLINICAL NURSE SPECIALIST

## 2018-07-28 PROCEDURE — 84484 ASSAY OF TROPONIN QUANT: CPT

## 2018-07-28 PROCEDURE — 96367 TX/PROPH/DG ADDL SEQ IV INF: CPT

## 2018-07-28 PROCEDURE — 96374 THER/PROPH/DIAG INJ IV PUSH: CPT

## 2018-07-28 PROCEDURE — 96365 THER/PROPH/DIAG IV INF INIT: CPT

## 2018-07-28 PROCEDURE — 85025 COMPLETE CBC W/AUTO DIFF WBC: CPT

## 2018-07-28 PROCEDURE — 82570 ASSAY OF URINE CREATININE: CPT

## 2018-07-28 RX ORDER — SODIUM CHLORIDE 0.9 % (FLUSH) 0.9 %
10 SYRINGE (ML) INJECTION EVERY 12 HOURS SCHEDULED
Status: DISCONTINUED | OUTPATIENT
Start: 2018-07-28 | End: 2018-07-30 | Stop reason: HOSPADM

## 2018-07-28 RX ORDER — ACETAMINOPHEN 325 MG/1
650 TABLET ORAL EVERY 4 HOURS PRN
Status: DISCONTINUED | OUTPATIENT
Start: 2018-07-28 | End: 2018-07-30 | Stop reason: HOSPADM

## 2018-07-28 RX ORDER — AMOXICILLIN 500 MG/1
CAPSULE ORAL
Refills: 0 | Status: ON HOLD | COMMUNITY
Start: 2018-06-15 | End: 2018-07-30 | Stop reason: HOSPADM

## 2018-07-28 RX ORDER — ONDANSETRON 2 MG/ML
4 INJECTION INTRAMUSCULAR; INTRAVENOUS EVERY 6 HOURS PRN
Status: DISCONTINUED | OUTPATIENT
Start: 2018-07-28 | End: 2018-07-30 | Stop reason: HOSPADM

## 2018-07-28 RX ORDER — HEPARIN SODIUM 5000 [USP'U]/ML
5000 INJECTION, SOLUTION INTRAVENOUS; SUBCUTANEOUS EVERY 8 HOURS SCHEDULED
Status: DISCONTINUED | OUTPATIENT
Start: 2018-07-28 | End: 2018-07-30 | Stop reason: HOSPADM

## 2018-07-28 RX ORDER — ASPIRIN 81 MG/1
81 TABLET ORAL DAILY
Status: DISCONTINUED | OUTPATIENT
Start: 2018-07-28 | End: 2018-07-30 | Stop reason: HOSPADM

## 2018-07-28 RX ORDER — SODIUM CHLORIDE 450 MG/100ML
INJECTION, SOLUTION INTRAVENOUS CONTINUOUS
Status: DISCONTINUED | OUTPATIENT
Start: 2018-07-28 | End: 2018-07-30 | Stop reason: HOSPADM

## 2018-07-28 RX ORDER — PANTOPRAZOLE SODIUM 40 MG/1
40 TABLET, DELAYED RELEASE ORAL DAILY
Status: DISCONTINUED | OUTPATIENT
Start: 2018-07-28 | End: 2018-07-30 | Stop reason: HOSPADM

## 2018-07-28 RX ORDER — LOSARTAN POTASSIUM 25 MG/1
25 TABLET ORAL DAILY
Status: DISCONTINUED | OUTPATIENT
Start: 2018-07-28 | End: 2018-07-30 | Stop reason: HOSPADM

## 2018-07-28 RX ORDER — ALBUTEROL SULFATE 2.5 MG/3ML
2.5 SOLUTION RESPIRATORY (INHALATION) ONCE
Status: COMPLETED | OUTPATIENT
Start: 2018-07-28 | End: 2018-07-28

## 2018-07-28 RX ORDER — OXYBUTYNIN CHLORIDE 5 MG/1
5 TABLET ORAL 2 TIMES DAILY
Status: DISCONTINUED | OUTPATIENT
Start: 2018-07-28 | End: 2018-07-30 | Stop reason: HOSPADM

## 2018-07-28 RX ORDER — PRAVASTATIN SODIUM 20 MG
80 TABLET ORAL NIGHTLY
Status: DISCONTINUED | OUTPATIENT
Start: 2018-07-28 | End: 2018-07-30 | Stop reason: HOSPADM

## 2018-07-28 RX ORDER — AZITHROMYCIN 250 MG/1
TABLET, FILM COATED ORAL
Refills: 0 | Status: ON HOLD | COMMUNITY
Start: 2018-07-23 | End: 2018-07-30 | Stop reason: HOSPADM

## 2018-07-28 RX ORDER — MECLIZINE HYDROCHLORIDE 25 MG/1
25 TABLET ORAL 3 TIMES DAILY PRN
Status: DISCONTINUED | OUTPATIENT
Start: 2018-07-28 | End: 2018-07-30 | Stop reason: HOSPADM

## 2018-07-28 RX ORDER — SODIUM CHLORIDE 0.9 % (FLUSH) 0.9 %
10 SYRINGE (ML) INJECTION PRN
Status: DISCONTINUED | OUTPATIENT
Start: 2018-07-28 | End: 2018-07-30 | Stop reason: HOSPADM

## 2018-07-28 RX ORDER — TRAZODONE HYDROCHLORIDE 50 MG/1
50 TABLET ORAL NIGHTLY
Status: DISCONTINUED | OUTPATIENT
Start: 2018-07-28 | End: 2018-07-30 | Stop reason: HOSPADM

## 2018-07-28 RX ORDER — 0.9 % SODIUM CHLORIDE 0.9 %
1000 INTRAVENOUS SOLUTION INTRAVENOUS ONCE
Status: COMPLETED | OUTPATIENT
Start: 2018-07-28 | End: 2018-07-28

## 2018-07-28 RX ORDER — IPRATROPIUM BROMIDE AND ALBUTEROL SULFATE 2.5; .5 MG/3ML; MG/3ML
1 SOLUTION RESPIRATORY (INHALATION)
Status: DISCONTINUED | OUTPATIENT
Start: 2018-07-28 | End: 2018-07-30 | Stop reason: HOSPADM

## 2018-07-28 RX ORDER — ONDANSETRON 4 MG/1
TABLET, FILM COATED ORAL
Refills: 0 | COMMUNITY
Start: 2018-07-24 | End: 2018-08-14

## 2018-07-28 RX ADMIN — OXYBUTYNIN CHLORIDE 5 MG: 5 TABLET ORAL at 21:57

## 2018-07-28 RX ADMIN — HEPARIN SODIUM 5000 UNITS: 5000 INJECTION, SOLUTION INTRAVENOUS; SUBCUTANEOUS at 14:43

## 2018-07-28 RX ADMIN — IPRATROPIUM BROMIDE AND ALBUTEROL SULFATE 1 AMPULE: .5; 3 SOLUTION RESPIRATORY (INHALATION) at 18:15

## 2018-07-28 RX ADMIN — SODIUM CHLORIDE 1000 ML: 9 INJECTION, SOLUTION INTRAVENOUS at 07:17

## 2018-07-28 RX ADMIN — SODIUM CHLORIDE: 4.5 INJECTION, SOLUTION INTRAVENOUS at 12:55

## 2018-07-28 RX ADMIN — PRAVASTATIN SODIUM 80 MG: 20 TABLET ORAL at 20:56

## 2018-07-28 RX ADMIN — IPRATROPIUM BROMIDE AND ALBUTEROL SULFATE 1 AMPULE: .5; 3 SOLUTION RESPIRATORY (INHALATION) at 14:07

## 2018-07-28 RX ADMIN — SODIUM BICARBONATE 50 MEQ: 84 INJECTION INTRAVENOUS at 17:25

## 2018-07-28 RX ADMIN — Medication 10 ML: at 20:58

## 2018-07-28 RX ADMIN — CEFTRIAXONE 1 G: 1 INJECTION, POWDER, FOR SOLUTION INTRAMUSCULAR; INTRAVENOUS at 09:20

## 2018-07-28 RX ADMIN — TRAZODONE HYDROCHLORIDE 50 MG: 50 TABLET ORAL at 21:57

## 2018-07-28 RX ADMIN — ALBUTEROL SULFATE 2.5 MG: 2.5 SOLUTION RESPIRATORY (INHALATION) at 07:22

## 2018-07-28 RX ADMIN — HEPARIN SODIUM 5000 UNITS: 5000 INJECTION, SOLUTION INTRAVENOUS; SUBCUTANEOUS at 20:59

## 2018-07-28 RX ADMIN — AZITHROMYCIN MONOHYDRATE 500 MG: 500 INJECTION, POWDER, LYOPHILIZED, FOR SOLUTION INTRAVENOUS at 14:43

## 2018-07-28 ASSESSMENT — ENCOUNTER SYMPTOMS
EYE PAIN: 0
SINUS PRESSURE: 0
NAUSEA: 0
COUGH: 0
ABDOMINAL DISTENTION: 0
RHINORRHEA: 0
GASTROINTESTINAL NEGATIVE: 1
CONSTIPATION: 0
ABDOMINAL PAIN: 0
VOMITING: 0
DIARRHEA: 0
APNEA: 0
EYES NEGATIVE: 1
CHEST TIGHTNESS: 0
WHEEZING: 0
SHORTNESS OF BREATH: 1
SORE THROAT: 0

## 2018-07-28 NOTE — ED PROVIDER NOTES
eMERGENCY dEPARTMENT eNCOUnter      Pt Name: Raudel Moses  MRN: [id number]  [place] 1940  Date of evaluation: 7/28/2018  Provider: Candida Holloway, [address] [place] [address]       Chief Complaint   Patient presents with    Shortness of Breath     dx with pnuemonia         HISTORY OF PRESENT ILLNESS  (Location/Symptom, Timing/Onset, Context/Setting, Quality, Duration, Modifying Factors, Severity.)   Raudel Moses is a 65 y.o. female who presents to the emergency department With shortness of breath. Patient states that she's been treated for pneumonia for the past one week. She is currently on Levaquin. States she continues to feel badly and short of breath. Denies any chest pain. Shortness of breath with exertion. No significant cough, slight nasal congestion. No fevers at home. No nausea vomiting syncope or diaphoresis. No prior history of MI. No prior history of blood clots. Denies abdominal pain. She quit smoking 15 years ago. Nursing Notes were reviewed and I agree. REVIEW OF SYSTEMS    (2-9 systems for level 4, 10 or more for level 5)     Review of Systems   Constitutional: Negative for activity change, chills, fatigue and fever. HENT: Positive for congestion. Negative for ear pain, hearing loss, postnasal drip, rhinorrhea, sinus pressure and sore throat. Eyes: Negative for pain and visual disturbance. Respiratory: Positive for shortness of breath. Negative for apnea, cough, chest tightness and wheezing. Cardiovascular: Negative for chest pain. Gastrointestinal: Negative for abdominal distention, abdominal pain, constipation, diarrhea, nausea and vomiting. Genitourinary: Negative for difficulty urinating, dysuria, flank pain and hematuria. Musculoskeletal: Negative for arthralgias and joint swelling. Skin: Negative for rash. Neurological: Negative for dizziness, syncope, light-headedness and headaches. Psychiatric/Behavioral: Negative for agitation and confusion. Except as noted above the remainder of the review of systems was reviewed and negative.        PAST MEDICAL HISTORY     Past Medical History:   Diagnosis Date    Acid reflux     HIGH CHOLESTEROL     Hypertension     Urinary incontinence     UTI (urinary tract infection)     Vertigo          SURGICAL HISTORY       Past Surgical History:   Procedure Laterality Date    CHOLECYSTECTOMY      HYSTERECTOMY      VARICOSE VEIN SURGERY      laser          CURRENT MEDICATIONS       Current Discharge Medication List      CONTINUE these medications which have NOT CHANGED    Details   ondansetron (ZOFRAN ODT) 4 MG disintegrating tablet Take 1 tablet by mouth every 8 hours as needed for Nausea or Vomiting  Qty: 30 tablet, Refills: 0    Associated Diagnoses: Nausea      traZODone (DESYREL) 50 MG tablet Take 1 tablet by mouth nightly As needed  Qty: 15 tablet, Refills: 1      !! oxybutynin (DITROPAN) 5 MG tablet TAKE 1 TABLET BY MOUTH TWO TIMES A DAY  Qty: 60 tablet, Refills: 2      pravastatin (PRAVACHOL) 80 MG tablet TAKE 1 TABLET BY MOUTH AT BEDTIME  Qty: 90 tablet, Refills: 1      aspirin 81 MG EC tablet Take 81 mg by mouth daily      Cholecalciferol (VITAMIN D) 2000 units CAPS capsule Take by mouth      !! pantoprazole (PROTONIX) 40 MG tablet Take 80 mg by mouth daily       triamterene-hydrochlorothiazide (MAXZIDE-25) 37.5-25 MG per tablet Take 1 tablet by mouth daily Indications: 1/2 tablet daily      losartan (COZAAR) 25 MG tablet Take 25 mg by mouth daily      amoxicillin (AMOXIL) 500 MG capsule TAKE 1 CAPSULE BY MOUTH EVERY 8 HOURS  Refills: 0      azithromycin (ZITHROMAX) 250 MG tablet TAKE 2 TABLETS TODAY, THEN TAKE 1 TABLET DAILY UNTIL GONE  Refills: 0      ondansetron (ZOFRAN) 4 MG tablet TAKE 1 TABLET BY MOUTH EVERY 8 HOURS AS NEEDED FOR NAUSEA OR VOMITING  Refills: 0      levofloxacin (LEVAQUIN) 750 MG tablet Take 1 tablet by mouth daily for 5 days  Qty: 5 tablet, Refills: 0    Associated Diagnoses: Weight:       Height:               MDM  Number of Diagnoses or Management Options  Acute kidney injury [facility]):   Pneumonia due to organism:   Diagnosis management comments: EKG NSR. Chest x-ray shows decreased right upper lobe opacity compared to previous chest x-ray. Patient does have an AK I, most likely due to her administration of Levaquin for her pneumonia. PSI score is 98. Case discussed with Dr. Virgil Mcgowan as well as Dr. Jeffery Biggs, hospitalist who is agreeable to admission for pneumonia  And LORA. No signs of acute respiratory distress, she is resting comfortably. Stable ready for admission. PROCEDURES:    Procedures      FINAL IMPRESSION      1. Pneumonia due to organism    2.  Acute kidney injury [facility])          DISPOSITION/PLAN   DISPOSITION Admitted 07/28/2018 09:20:35 AM        PATIENT REFERRED TO:  Lottie Pruett MD  [address] [place] [address] Dr Ashlyn Newman [facility] [phone]            DISCHARGE MEDICATIONS:  Current Discharge Medication List          (Please note that portions of this note were completed with a voice recognition program.  Efforts were made to edit the dictations but occasionally words are mis-transcribed.)    ILA [place]  07/28/18 [id number]

## 2018-07-28 NOTE — H&P
Take 1 tablet by mouth daily Indications: 1/2 tablet daily  losartan (COZAAR) 25 MG tablet, Take 25 mg by mouth daily  amoxicillin (AMOXIL) 500 MG capsule, TAKE 1 CAPSULE BY MOUTH EVERY 8 HOURS  azithromycin (ZITHROMAX) 250 MG tablet, TAKE 2 TABLETS TODAY, THEN TAKE 1 TABLET DAILY UNTIL GONE  ondansetron (ZOFRAN) 4 MG tablet, TAKE 1 TABLET BY MOUTH EVERY 8 HOURS AS NEEDED FOR NAUSEA OR VOMITING  levofloxacin (LEVAQUIN) 750 MG tablet, Take 1 tablet by mouth daily for 5 days  oxybutynin (DITROPAN) 5 MG tablet, Take 5 mg by mouth 2 times daily  pantoprazole (PROTONIX) 40 MG tablet, TAKE 1 TABLET BY MOUTH EVERY DAY  meclizine (ANTIVERT) 25 MG tablet, Take 25 mg by mouth 3 times daily as needed       Social History:    reports that she has quit smoking. She has never used smokeless tobacco. She reports that she does not drink alcohol or use drugs. Family History:   Family History   Problem Relation Age of Onset    Cancer Mother     Heart Disease Father     Stroke Brother        REVIEW OF SYSTEMS:    Review of Systems   Constitutional: Positive for malaise/fatigue. HENT: Negative. Eyes: Negative. Respiratory: Positive for shortness of breath. Cardiovascular: Negative. Gastrointestinal: Negative. Genitourinary: Negative. Musculoskeletal: Negative. Skin: Negative for itching and rash. Neurological: Positive for weakness. Endo/Heme/Allergies: Negative. Psychiatric/Behavioral: Negative. PHYSICAL EXAM:  Vital Signs: BP (!) 140/99   Pulse 72   Temp 98 °F (36.7 °C) (Oral)   Resp 18   Ht 5' 5\" (1.651 m)   Wt 160 lb (72.6 kg)   SpO2 96%   BMI 26.63 kg/m²     Physical Exam   Constitutional: She is oriented to person, place, and time. She appears well-developed and well-nourished. She is cooperative. She appears ill. No distress. HENT:   Head: Normocephalic and atraumatic.    Right Ear: External ear normal.   Left Ear: External ear normal.   Nose: Nose normal.   Mouth/Throat:

## 2018-07-28 NOTE — ED PROVIDER NOTES
Attending Supervisory Note/Shared Visit   I have personally performed a face to face diagnostic evaluation on this patient. I have reviewed the mid-levels findings and agree. Ms. Ivy Dash is a 78-year-old female presents emergency department for shortness of breath. Patient states that she began having shortness of breath approximately 9 days ago. She was seen on Monday for a clinic near her home and Tuesday was seen at [facility] urgent care and diagnosed with pneumonia and started on Levaquin. She has had ongoing shortness of breath and mild cough. Nonproductive. She denies chest pain or leg swelling. No history of heart failure. She's had no fevers. Vital signs stable, nontoxic appearing, GCS 15, regular rate and rhythm, lungs clear decreased at bilateral bases, no wheezes or rhonchi or rales noted, abdomen soft and benign, no pitting edema, for full exam please refer to advance providers note    Improving pneumonia on chest x-ray however patient states she still is not improving and does not feel well, also has acute kidney injury, PSI score of 98, feel patient will benefit from fluids and IV antibiotic treatment, Regina Shoemaker spoke with hospitalist service who will admit      FINAL IMPRESSION      1. Pneumonia due to organism    2.  Acute kidney injury ([facility] [address]          Nereyda Pozo MD  Attending Emergency Physician       Gareth Gamez MD  07/28/18 3223

## 2018-07-29 ENCOUNTER — APPOINTMENT (OUTPATIENT)
Dept: GENERAL RADIOLOGY | Age: 78
End: 2018-07-29
Payer: MEDICARE

## 2018-07-29 LAB
ANION GAP SERPL CALCULATED.3IONS-SCNC: 16 MMOL/L (ref 7–19)
BUN BLDV-MCNC: 27 MG/DL (ref 8–23)
CALCIUM SERPL-MCNC: 8.3 MG/DL (ref 8.8–10.2)
CHLORIDE BLD-SCNC: 107 MMOL/L (ref 98–111)
CO2: 21 MMOL/L (ref 22–29)
CREAT SERPL-MCNC: 1.7 MG/DL (ref 0.5–0.9)
GFR NON-AFRICAN AMERICAN: 29
GLUCOSE BLD-MCNC: 93 MG/DL (ref 74–109)
HCT VFR BLD CALC: 39.6 % (ref 37–47)
HEMOGLOBIN: 12.3 G/DL (ref 12–16)
MAGNESIUM: 1.8 MG/DL (ref 1.6–2.4)
MCH RBC QN AUTO: 27.6 PG (ref 27–31)
MCHC RBC AUTO-ENTMCNC: 31.1 G/DL (ref 33–37)
MCV RBC AUTO: 88.8 FL (ref 81–99)
PDW BLD-RTO: 13.7 % (ref 11.5–14.5)
PLATELET # BLD: 180 K/UL (ref 130–400)
PMV BLD AUTO: 12.5 FL (ref 9.4–12.3)
POTASSIUM REFLEX MAGNESIUM: 3 MMOL/L (ref 3.5–5)
RBC # BLD: 4.46 M/UL (ref 4.2–5.4)
SODIUM BLD-SCNC: 144 MMOL/L (ref 136–145)
WBC # BLD: 7.2 K/UL (ref 4.8–10.8)

## 2018-07-29 PROCEDURE — 99225 PR SBSQ OBSERVATION CARE/DAY 25 MINUTES: CPT | Performed by: FAMILY MEDICINE

## 2018-07-29 PROCEDURE — 6370000000 HC RX 637 (ALT 250 FOR IP): Performed by: CLINICAL NURSE SPECIALIST

## 2018-07-29 PROCEDURE — 96376 TX/PRO/DX INJ SAME DRUG ADON: CPT

## 2018-07-29 PROCEDURE — 6360000002 HC RX W HCPCS: Performed by: CLINICAL NURSE SPECIALIST

## 2018-07-29 PROCEDURE — 6370000000 HC RX 637 (ALT 250 FOR IP): Performed by: FAMILY MEDICINE

## 2018-07-29 PROCEDURE — 83735 ASSAY OF MAGNESIUM: CPT

## 2018-07-29 PROCEDURE — 96372 THER/PROPH/DIAG INJ SC/IM: CPT

## 2018-07-29 PROCEDURE — 96366 THER/PROPH/DIAG IV INF ADDON: CPT

## 2018-07-29 PROCEDURE — 71046 X-RAY EXAM CHEST 2 VIEWS: CPT

## 2018-07-29 PROCEDURE — 2580000003 HC RX 258: Performed by: CLINICAL NURSE SPECIALIST

## 2018-07-29 PROCEDURE — 80048 BASIC METABOLIC PNL TOTAL CA: CPT

## 2018-07-29 PROCEDURE — 6360000002 HC RX W HCPCS: Performed by: FAMILY MEDICINE

## 2018-07-29 PROCEDURE — 85027 COMPLETE CBC AUTOMATED: CPT

## 2018-07-29 PROCEDURE — G0378 HOSPITAL OBSERVATION PER HR: HCPCS

## 2018-07-29 PROCEDURE — 96375 TX/PRO/DX INJ NEW DRUG ADDON: CPT

## 2018-07-29 PROCEDURE — 94640 AIRWAY INHALATION TREATMENT: CPT

## 2018-07-29 PROCEDURE — 36415 COLL VENOUS BLD VENIPUNCTURE: CPT

## 2018-07-29 RX ORDER — POTASSIUM CHLORIDE 20 MEQ/1
40 TABLET, EXTENDED RELEASE ORAL ONCE
Status: COMPLETED | OUTPATIENT
Start: 2018-07-29 | End: 2018-07-29

## 2018-07-29 RX ORDER — POTASSIUM CHLORIDE 7.45 MG/ML
10 INJECTION INTRAVENOUS
Status: COMPLETED | OUTPATIENT
Start: 2018-07-29 | End: 2018-07-29

## 2018-07-29 RX ADMIN — IPRATROPIUM BROMIDE AND ALBUTEROL SULFATE 1 AMPULE: .5; 3 SOLUTION RESPIRATORY (INHALATION) at 06:11

## 2018-07-29 RX ADMIN — ASPIRIN 81 MG: 81 TABLET, COATED ORAL at 09:05

## 2018-07-29 RX ADMIN — IPRATROPIUM BROMIDE AND ALBUTEROL SULFATE 1 AMPULE: .5; 3 SOLUTION RESPIRATORY (INHALATION) at 18:42

## 2018-07-29 RX ADMIN — IPRATROPIUM BROMIDE AND ALBUTEROL SULFATE 1 AMPULE: .5; 3 SOLUTION RESPIRATORY (INHALATION) at 10:08

## 2018-07-29 RX ADMIN — AZITHROMYCIN MONOHYDRATE 250 MG: 500 INJECTION, POWDER, LYOPHILIZED, FOR SOLUTION INTRAVENOUS at 18:06

## 2018-07-29 RX ADMIN — POTASSIUM CHLORIDE 40 MEQ: 20 TABLET, EXTENDED RELEASE ORAL at 11:24

## 2018-07-29 RX ADMIN — HEPARIN SODIUM 5000 UNITS: 5000 INJECTION, SOLUTION INTRAVENOUS; SUBCUTANEOUS at 13:49

## 2018-07-29 RX ADMIN — POTASSIUM CHLORIDE 10 MEQ: 7.46 INJECTION, SOLUTION INTRAVENOUS at 11:24

## 2018-07-29 RX ADMIN — OXYBUTYNIN CHLORIDE 5 MG: 5 TABLET ORAL at 21:04

## 2018-07-29 RX ADMIN — PRAVASTATIN SODIUM 80 MG: 20 TABLET ORAL at 21:04

## 2018-07-29 RX ADMIN — PANTOPRAZOLE SODIUM 40 MG: 40 TABLET, DELAYED RELEASE ORAL at 09:06

## 2018-07-29 RX ADMIN — IPRATROPIUM BROMIDE AND ALBUTEROL SULFATE 1 AMPULE: .5; 3 SOLUTION RESPIRATORY (INHALATION) at 15:04

## 2018-07-29 RX ADMIN — Medication 10 ML: at 09:08

## 2018-07-29 RX ADMIN — Medication 10 ML: at 21:04

## 2018-07-29 RX ADMIN — LOSARTAN POTASSIUM 25 MG: 25 TABLET ORAL at 09:06

## 2018-07-29 RX ADMIN — OXYBUTYNIN CHLORIDE 5 MG: 5 TABLET ORAL at 09:06

## 2018-07-29 RX ADMIN — POTASSIUM CHLORIDE 10 MEQ: 7.46 INJECTION, SOLUTION INTRAVENOUS at 11:25

## 2018-07-29 RX ADMIN — HEPARIN SODIUM 5000 UNITS: 5000 INJECTION, SOLUTION INTRAVENOUS; SUBCUTANEOUS at 21:08

## 2018-07-29 RX ADMIN — Medication 1 G: at 09:06

## 2018-07-29 RX ADMIN — VITAMIN D, TAB 1000IU (100/BT) 2000 UNITS: 25 TAB at 09:05

## 2018-07-29 RX ADMIN — HEPARIN SODIUM 5000 UNITS: 5000 INJECTION, SOLUTION INTRAVENOUS; SUBCUTANEOUS at 06:43

## 2018-07-29 RX ADMIN — SODIUM CHLORIDE: 4.5 INJECTION, SOLUTION INTRAVENOUS at 10:31

## 2018-07-29 RX ADMIN — TRAZODONE HYDROCHLORIDE 50 MG: 50 TABLET ORAL at 21:04

## 2018-07-29 ASSESSMENT — ENCOUNTER SYMPTOMS
SHORTNESS OF BREATH: 1
EYES NEGATIVE: 1
COUGH: 1
GASTROINTESTINAL NEGATIVE: 1

## 2018-07-30 VITALS
DIASTOLIC BLOOD PRESSURE: 70 MMHG | TEMPERATURE: 98.6 F | OXYGEN SATURATION: 95 % | BODY MASS INDEX: 26.66 KG/M2 | SYSTOLIC BLOOD PRESSURE: 140 MMHG | RESPIRATION RATE: 18 BRPM | WEIGHT: 160 LBS | HEIGHT: 65 IN | HEART RATE: 80 BPM

## 2018-07-30 LAB
ANION GAP SERPL CALCULATED.3IONS-SCNC: 11 MMOL/L (ref 7–19)
BUN BLDV-MCNC: 14 MG/DL (ref 8–23)
CALCIUM SERPL-MCNC: 8.8 MG/DL (ref 8.8–10.2)
CHLORIDE BLD-SCNC: 112 MMOL/L (ref 98–111)
CO2: 24 MMOL/L (ref 22–29)
CREAT SERPL-MCNC: 1.3 MG/DL (ref 0.5–0.9)
GFR NON-AFRICAN AMERICAN: 40
GLUCOSE BLD-MCNC: 104 MG/DL (ref 74–109)
HCT VFR BLD CALC: 39.9 % (ref 37–47)
HEMOGLOBIN: 12.3 G/DL (ref 12–16)
MCH RBC QN AUTO: 27.5 PG (ref 27–31)
MCHC RBC AUTO-ENTMCNC: 30.8 G/DL (ref 33–37)
MCV RBC AUTO: 89.3 FL (ref 81–99)
PDW BLD-RTO: 14.5 % (ref 11.5–14.5)
PLATELET # BLD: 177 K/UL (ref 130–400)
PMV BLD AUTO: 11.6 FL (ref 9.4–12.3)
POTASSIUM SERPL-SCNC: 3.9 MMOL/L (ref 3.5–5)
RBC # BLD: 4.47 M/UL (ref 4.2–5.4)
SODIUM BLD-SCNC: 147 MMOL/L (ref 136–145)
THROAT CULTURE: NORMAL
URINE CULTURE, ROUTINE: NORMAL
WBC # BLD: 5.8 K/UL (ref 4.8–10.8)

## 2018-07-30 PROCEDURE — 6370000000 HC RX 637 (ALT 250 FOR IP): Performed by: CLINICAL NURSE SPECIALIST

## 2018-07-30 PROCEDURE — G0378 HOSPITAL OBSERVATION PER HR: HCPCS

## 2018-07-30 PROCEDURE — 99217 PR OBSERVATION CARE DISCHARGE MANAGEMENT: CPT | Performed by: INTERNAL MEDICINE

## 2018-07-30 PROCEDURE — 6360000002 HC RX W HCPCS: Performed by: CLINICAL NURSE SPECIALIST

## 2018-07-30 PROCEDURE — 87205 SMEAR GRAM STAIN: CPT

## 2018-07-30 PROCEDURE — 87070 CULTURE OTHR SPECIMN AEROBIC: CPT

## 2018-07-30 PROCEDURE — 80048 BASIC METABOLIC PNL TOTAL CA: CPT

## 2018-07-30 PROCEDURE — 94640 AIRWAY INHALATION TREATMENT: CPT

## 2018-07-30 PROCEDURE — 96372 THER/PROPH/DIAG INJ SC/IM: CPT

## 2018-07-30 PROCEDURE — 36415 COLL VENOUS BLD VENIPUNCTURE: CPT

## 2018-07-30 PROCEDURE — 85027 COMPLETE CBC AUTOMATED: CPT

## 2018-07-30 RX ORDER — DOXYCYCLINE HYCLATE 100 MG
100 TABLET ORAL 2 TIMES DAILY
Qty: 10 TABLET | Refills: 0 | Status: SHIPPED | OUTPATIENT
Start: 2018-07-30 | End: 2018-08-04

## 2018-07-30 RX ADMIN — PANTOPRAZOLE SODIUM 40 MG: 40 TABLET, DELAYED RELEASE ORAL at 08:26

## 2018-07-30 RX ADMIN — HEPARIN SODIUM 5000 UNITS: 5000 INJECTION, SOLUTION INTRAVENOUS; SUBCUTANEOUS at 06:05

## 2018-07-30 RX ADMIN — VITAMIN D, TAB 1000IU (100/BT) 2000 UNITS: 25 TAB at 08:26

## 2018-07-30 RX ADMIN — ASPIRIN 81 MG: 81 TABLET, COATED ORAL at 08:26

## 2018-07-30 RX ADMIN — OXYBUTYNIN CHLORIDE 5 MG: 5 TABLET ORAL at 08:26

## 2018-07-30 RX ADMIN — LOSARTAN POTASSIUM 25 MG: 25 TABLET ORAL at 08:26

## 2018-07-30 RX ADMIN — IPRATROPIUM BROMIDE AND ALBUTEROL SULFATE 1 AMPULE: .5; 3 SOLUTION RESPIRATORY (INHALATION) at 07:02

## 2018-07-30 NOTE — DISCHARGE SUMMARY
Discharge summary      Date of admission: 7/28/18     Date of discharge : 7/30/18     Admitting diagnosis: pneumonia , acute kidney injury     Discharge diagnoses : pneumonia , and acute kidney injury now resolved , hypertension     History: 71-year-old female who presented with complaint of shortness of breath and difficulty taking deep breaths. Please see admitting history and physical for details. Hospital course: patient was treated with intravenous antibiotics including Rocephin and Zithromax. Initial chest x-ray was unimpressive follow-up chest x-ray showed left lower lobe infiltrate. Clinically the patient has improved and wishes to go home. Her mild acute kidney injury on admission resolved with IV fluids. The plan is for her to be discharged on doxycycline and to follow-up with her primary care provider in one week. Laboratories: As above. Also well documented in chart. Disposition: the patient returns to her home. Activity as tolerated. Diet regular. Follow-up will be with her primary care provider in one week. Discharge medications are documented in the discharge medication reconciliation form and are unchanged from prior to admission other than the addition of doxycycline 100 mg twice a day ×5 days.

## 2018-07-30 NOTE — PROGRESS NOTES
Andrzej Mcmahon is a 65 y.o. female patient.     Current Facility-Administered Medications   Medication Dose Route Frequency Provider Last Rate Last Dose    aspirin EC tablet 81 mg  81 mg Oral Daily Mabelene Leonard, APRN   81 mg at 07/30/18 3232    traZODone (DESYREL) tablet 50 mg  50 mg Oral Nightly Mabelene [place], APRN   50 mg at 07/29/18 2104    meclizine (ANTIVERT) tablet 25 mg  25 mg Oral TID PRN Mabelene Leonard, APRN        pravastatin (PRAVACHOL) tablet 80 mg  80 mg Oral Nightly Mabelene [place], APRN   80 mg at 07/29/18 2104    losartan (COZAAR) tablet 25 mg  25 mg Oral Daily Mabelene [place], APRN   25 mg at 07/30/18 0516    pantoprazole (PROTONIX) tablet 40 mg  40 mg Oral Daily Mabelene Leonard, APRN   40 mg at 07/30/18 3242    oxybutynin (DITROPAN) tablet 5 mg  5 mg Oral BID Mabelene Leonard, APRN   5 mg at 07/30/18 6103    vitamin D (CHOLECALCIFEROL) tablet 2,000 Units  2,000 Units Oral Daily Mabelene [place], APRN   2,000 Units at 07/30/18 8735    sodium chloride flush 0.9 % injection 10 mL  10 mL Intravenous 2 times per day Tomáse Leonard, APRN   10 mL at 07/29/18 2104    sodium chloride flush 0.9 % injection 10 mL  10 mL Intravenous PRN Tomáse [place], APRN        magnesium hydroxide (MILK OF MAGNESIA) 400 MG/5ML suspension 30 mL  30 mL Oral Daily PRN Mabelene [place], APRN        ondansetron [facility]) injection 4 mg  4 mg Intravenous Q6H PRN Tomáse [place], APRN        0.45 % sodium chloride infusion   Intravenous Continuous Ava Gallagher MD 50 mL/hr at 07/29/18 1806      ipratropium-albuterol (DUONEB) nebulizer solution 1 ampule  1 ampule Inhalation Q4H WA Tomáse Leonard, APRN   1 ampule at 07/30/18 5520    acetaminophen (TYLENOL) tablet 650 mg  650 mg Oral Q4H PRN Tomáse [place], APRN        azithromycin (ZITHROMAX) 250 mg in dextrose 5 % 250 mL IVPB  250 mg Intravenous Q24H DAVIDSON Mack   Stopped at 07/29/18 1910    And    cefTRIAXone (ROCEPHIN) 1 g in sodium chloride (PF) 10 mL IV syringe  1 g Intravenous Q24H Jonathan Reynolds

## 2018-07-31 ENCOUNTER — TELEPHONE (OUTPATIENT)
Dept: INTERNAL MEDICINE | Age: 78
End: 2018-07-31

## 2018-07-31 DIAGNOSIS — J18.9 PNEUMONIA OF RIGHT UPPER LOBE DUE TO INFECTIOUS ORGANISM: Primary | ICD-10-CM

## 2018-08-02 LAB
BLOOD CULTURE, ROUTINE: NORMAL
CULTURE, BLOOD 2: NORMAL

## 2018-08-06 ENCOUNTER — OFFICE VISIT (OUTPATIENT)
Dept: INTERNAL MEDICINE | Age: 78
End: 2018-08-06
Payer: MEDICARE

## 2018-08-06 VITALS
OXYGEN SATURATION: 98 % | SYSTOLIC BLOOD PRESSURE: 138 MMHG | HEIGHT: 65 IN | BODY MASS INDEX: 28.32 KG/M2 | DIASTOLIC BLOOD PRESSURE: 80 MMHG | WEIGHT: 170 LBS | HEART RATE: 61 BPM

## 2018-08-06 DIAGNOSIS — J18.9 PNEUMONIA OF LEFT LOWER LOBE DUE TO INFECTIOUS ORGANISM: Primary | ICD-10-CM

## 2018-08-06 DIAGNOSIS — N28.9 RENAL INSUFFICIENCY: ICD-10-CM

## 2018-08-06 DIAGNOSIS — I10 ESSENTIAL HYPERTENSION: ICD-10-CM

## 2018-08-06 DIAGNOSIS — J18.9 PNEUMONIA OF LEFT LOWER LOBE DUE TO INFECTIOUS ORGANISM: ICD-10-CM

## 2018-08-06 LAB
ANION GAP SERPL CALCULATED.3IONS-SCNC: 18 MMOL/L (ref 7–19)
BUN BLDV-MCNC: 21 MG/DL (ref 8–23)
CALCIUM SERPL-MCNC: 9.8 MG/DL (ref 8.8–10.2)
CHLORIDE BLD-SCNC: 104 MMOL/L (ref 98–111)
CO2: 22 MMOL/L (ref 22–29)
CREAT SERPL-MCNC: 1.4 MG/DL (ref 0.5–0.9)
GFR NON-AFRICAN AMERICAN: 36
GLUCOSE BLD-MCNC: 75 MG/DL (ref 74–109)
POTASSIUM SERPL-SCNC: 4.3 MMOL/L (ref 3.5–5)
SODIUM BLD-SCNC: 144 MMOL/L (ref 136–145)

## 2018-08-06 PROCEDURE — 99495 TRANSJ CARE MGMT MOD F2F 14D: CPT | Performed by: INTERNAL MEDICINE

## 2018-08-06 PROCEDURE — 1111F DSCHRG MED/CURRENT MED MERGE: CPT | Performed by: INTERNAL MEDICINE

## 2018-08-06 RX ORDER — TETANUS AND DIPHTHERIA TOXOIDS ADSORBED 2; 2 [LF]/.5ML; [LF]/.5ML
0.5 INJECTION INTRAMUSCULAR ONCE
Qty: 0.5 ML | Refills: 0 | Status: CANCELLED | OUTPATIENT
Start: 2018-08-06 | End: 2018-08-06

## 2018-08-06 RX ORDER — DOXYCYCLINE HYCLATE 100 MG
100 TABLET ORAL 2 TIMES DAILY
Qty: 14 TABLET | Refills: 0 | Status: SHIPPED | OUTPATIENT
Start: 2018-08-06 | End: 2018-08-13

## 2018-08-06 NOTE — PATIENT INSTRUCTIONS
license by [facility] ([place] [facility]). If you have questions about a medical condition or this instruction, always ask your healthcare professional. [place] [address] any warranty or liability for your use of this information.

## 2018-08-07 LAB
CULTURE, RESPIRATORY: ABNORMAL
CULTURE, RESPIRATORY: ABNORMAL
GRAM STAIN RESULT: ABNORMAL
ORGANISM: ABNORMAL

## 2018-08-07 ASSESSMENT — ENCOUNTER SYMPTOMS
EYE REDNESS: 0
SORE THROAT: 0
SHORTNESS OF BREATH: 1
BLOOD IN STOOL: 0
BACK PAIN: 0
RECTAL PAIN: 0
VOMITING: 0
SINUS PAIN: 0
VOICE CHANGE: 0
ANAL BLEEDING: 0
ABDOMINAL DISTENTION: 0
CHEST TIGHTNESS: 0
ABDOMINAL PAIN: 0
COUGH: 1
COLOR CHANGE: 0
EYE ITCHING: 0
NAUSEA: 0
SINUS PRESSURE: 0
PHOTOPHOBIA: 0
EYE DISCHARGE: 0
RHINORRHEA: 0
EYE PAIN: 0
DIARRHEA: 0
WHEEZING: 0
CONSTIPATION: 0
TROUBLE SWALLOWING: 0

## 2018-08-07 NOTE — PROGRESS NOTES
Post-Discharge Transitional Care Management Services      Eleni SHAVER [facility]   YOB: 1940    Date of Office Visit:  8/6/2018  Date of Hospital Admission: 7/28/18  Date of Hospital Discharge: 7/30/18  Readmission Risk Score(high >=14%.  Medium >=10%):Readmission Risk Score: 6    Care management risk score Rising risk (score 2-5) and Complex Care (Scores >=6): 0     Non face to face  following discharge, date last encounter closed (first attempt may have been earlier): 7/31/2018  2:37 PM 7/31/2018  2:37 PM    Call initiated 2 business days of discharge: Yes     Patient Active Problem List   Diagnosis    Wrist pain, right    Distal radius fracture    Fall from other slipping, tripping, or stumbling    Hypertension    Pneumonia    [facility]) ([facility] [address]    Metabolic acidosis, increased anion gap (IAG)       No Known Allergies    Medications listed as [facility]   Home Medication Instructions MONIKA:    Printed on:08/07/18 0800   Medication Information                      aspirin 81 MG EC tablet  Take 81 mg by mouth daily             Cholecalciferol (VITAMIN D) 2000 units CAPS capsule  Take by mouth             doxycycline hyclate (VIBRA-TABS) 100 MG tablet  Take 1 tablet by mouth 2 times daily for 7 days             losartan (COZAAR) 25 MG tablet  Take 25 mg by mouth daily             meclizine (ANTIVERT) 25 MG tablet  Take 25 mg by mouth 3 times daily as needed              ondansetron (ZOFRAN ODT) 4 MG disintegrating tablet  Take 1 tablet by mouth every 8 hours as needed for Nausea or Vomiting             ondansetron (ZOFRAN) 4 MG tablet  TAKE 1 TABLET BY MOUTH EVERY 8 HOURS AS NEEDED FOR NAUSEA OR VOMITING             oxybutynin (DITROPAN) 5 MG tablet  Take 5 mg by mouth 2 times daily             pantoprazole (PROTONIX) 40 MG tablet  Take 80 mg by mouth daily              pravastatin (PRAVACHOL) 80 MG tablet  TAKE 1 TABLET BY MOUTH AT

## 2018-08-13 ENCOUNTER — HOSPITAL ENCOUNTER (OUTPATIENT)
Dept: GENERAL RADIOLOGY | Age: 78
Discharge: HOME | End: 2018-08-13
Payer: MEDICARE

## 2018-08-13 DIAGNOSIS — J18.9 PNEUMONIA OF LEFT LOWER LOBE DUE TO INFECTIOUS ORGANISM: ICD-10-CM

## 2018-08-13 PROCEDURE — 71046 X-RAY EXAM CHEST 2 VIEWS: CPT

## 2018-08-14 ENCOUNTER — OFFICE VISIT (OUTPATIENT)
Dept: INTERNAL MEDICINE | Age: 78
End: 2018-08-14
Payer: MEDICARE

## 2018-08-14 VITALS
WEIGHT: 166.8 LBS | BODY MASS INDEX: 27.79 KG/M2 | HEART RATE: 84 BPM | TEMPERATURE: 96.4 F | OXYGEN SATURATION: 97 % | HEIGHT: 65 IN | SYSTOLIC BLOOD PRESSURE: 132 MMHG | DIASTOLIC BLOOD PRESSURE: 80 MMHG

## 2018-08-14 DIAGNOSIS — J18.9 PNEUMONIA OF RIGHT UPPER LOBE DUE TO INFECTIOUS ORGANISM: Primary | ICD-10-CM

## 2018-08-14 DIAGNOSIS — R06.02 SHORTNESS OF BREATH: ICD-10-CM

## 2018-08-14 DIAGNOSIS — I10 ESSENTIAL HYPERTENSION: Chronic | ICD-10-CM

## 2018-08-14 DIAGNOSIS — R05.9 COUGHING: ICD-10-CM

## 2018-08-14 DIAGNOSIS — R11.0 NAUSEA: ICD-10-CM

## 2018-08-14 PROCEDURE — 99213 OFFICE O/P EST LOW 20 MIN: CPT | Performed by: PHYSICIAN ASSISTANT

## 2018-08-14 PROCEDURE — G8399 PT W/DXA RESULTS DOCUMENT: HCPCS | Performed by: PHYSICIAN ASSISTANT

## 2018-08-14 PROCEDURE — 1123F ACP DISCUSS/DSCN MKR DOCD: CPT | Performed by: PHYSICIAN ASSISTANT

## 2018-08-14 PROCEDURE — 1036F TOBACCO NON-USER: CPT | Performed by: PHYSICIAN ASSISTANT

## 2018-08-14 PROCEDURE — G8417 CALC BMI ABV UP PARAM F/U: HCPCS | Performed by: PHYSICIAN ASSISTANT

## 2018-08-14 PROCEDURE — 1101F PT FALLS ASSESS-DOCD LE1/YR: CPT | Performed by: PHYSICIAN ASSISTANT

## 2018-08-14 PROCEDURE — G8427 DOCREV CUR MEDS BY ELIG CLIN: HCPCS | Performed by: PHYSICIAN ASSISTANT

## 2018-08-14 PROCEDURE — 4040F PNEUMOC VAC/ADMIN/RCVD: CPT | Performed by: PHYSICIAN ASSISTANT

## 2018-08-14 PROCEDURE — 1090F PRES/ABSN URINE INCON ASSESS: CPT | Performed by: PHYSICIAN ASSISTANT

## 2018-08-14 ASSESSMENT — ENCOUNTER SYMPTOMS
ABDOMINAL PAIN: 0
EYE REDNESS: 0
COUGH: 1
DIARRHEA: 0
CHEST TIGHTNESS: 0
NAUSEA: 1
EYE PAIN: 0
SINUS PRESSURE: 0
PHOTOPHOBIA: 0
COLOR CHANGE: 0
RHINORRHEA: 0
SORE THROAT: 0
SHORTNESS OF BREATH: 1
VOMITING: 0
WHEEZING: 0
CONSTIPATION: 0

## 2018-08-14 NOTE — PROGRESS NOTES
[facility]  [address]  [address] [place] [address]  Dept: [phone]  Dept Fax: [phone]  Loc: [phone]      [facility] INTERNAL MEDICINE  OFFICE NOTE      Chief Complaint   Patient presents with    Other     1 week f/u , pt states she isnt any better still has nausea, shortness or breath and weakness        Katharine Sharp is a 65y.o. year old female who is seen for A one-week follow-up for pneumonia. Patient states that she still has some shortness of breath at times when she is doing anything. Patient states yesterday she did feel bad but still a little bit better today. Patient states since she went to the hospital she's lost approximately 12 pounds. Patient states she has been eating due to some nausea. Patient states has been taken some Zofran. Patient states she did have a fried egg this morning and was able to use it all. Patient's chest x-ray seems to be improved her to the last one. The infiltrate in the left lung is completely resolve and one on the right is decreasing in size. Patient states she hasn't really had any fever. Patient states she hasn't been coughing anything up. Patient denies any chest pain. Patient states that she's finished all the doxycycline but it did irritate her stomach.       Active Ambulatory Problems     Diagnosis Date Noted    Wrist pain, right 05/16/2011    Distal radius fracture 06/01/2011    Fall from other slipping, tripping, or stumbling 06/01/2011    Hypertension 11/30/2017    Pneumonia 07/28/2018    LORA (acute kidney injury) ([facility] [address] 78/44/6027    Metabolic acidosis, increased anion gap (IAG) 07/28/2018     Resolved Ambulatory Problems     Diagnosis Date Noted    Injury of wrist, right 05/16/2011    UTI (urinary tract infection) 11/30/2017     Past Medical History:   Diagnosis Date    Acid reflux     LORA (acute kidney injury) ([facility] [address] 7/28/2018    HIGH CHOLESTEROL     Hypertension status: Former Smoker     Packs/day: 0.50     Years: 40.00     Types: Cigarettes     Start date: 1964     Quit date: 2003    Smokeless tobacco: Never Used    Alcohol use No    Drug use: No    Sexual activity: Not on file     Other Topics Concern    Not on file     Social History Narrative    No narrative on file       Review of Systems  Review of Systems   Constitutional: Positive for fatigue. Negative for activity change, appetite change, chills and fever. HENT: Negative for congestion, ear pain, postnasal drip, rhinorrhea, sinus pressure, sneezing and sore throat. Eyes: Negative for photophobia, pain and redness. Respiratory: Positive for cough and shortness of breath (periodic). Negative for chest tightness and wheezing. Cardiovascular: Negative for chest pain, palpitations and leg swelling. Gastrointestinal: Positive for nausea. Negative for abdominal pain, constipation, diarrhea and vomiting. Genitourinary: Negative for dysuria, frequency, hematuria and urgency. Musculoskeletal: Negative for arthralgias, gait problem, joint swelling and myalgias. Skin: Negative for color change, pallor and wound. Neurological: Positive for weakness. Negative for dizziness, facial asymmetry, speech difficulty and light-headedness. Hematological: Negative for adenopathy. Does not bruise/bleed easily. Psychiatric/Behavioral: Negative for confusion. The patient is not nervous/anxious.             Current Outpatient Prescriptions   Medication Sig Dispense Refill    ondansetron (ZOFRAN ODT) 4 MG disintegrating tablet Take 1 tablet by mouth every 8 hours as needed for Nausea or Vomiting 30 tablet 0    traZODone (DESYREL) 50 MG tablet Take 1 tablet by mouth nightly As needed 15 tablet 1    pravastatin (PRAVACHOL) 80 MG tablet TAKE 1 TABLET BY MOUTH AT BEDTIME 90 tablet 1    aspirin 81 MG EC tablet Take 81 mg by mouth daily      Cholecalciferol (VITAMIN D) 2000 units CAPS capsule Take by mouth      oxybutynin (DITROPAN) 5 MG tablet Take 5 mg by mouth 2 times daily      pantoprazole (PROTONIX) 40 MG tablet Take 80 mg by mouth daily       triamterene-hydrochlorothiazide (MAXZIDE-25) 37.5-25 MG per tablet Take 1 tablet by mouth daily Indications: 1/2 tablet daily      losartan (COZAAR) 25 MG tablet Take 25 mg by mouth daily      meclizine (ANTIVERT) 25 MG tablet Take 25 mg by mouth 3 times daily as needed        No current facility-administered medications for this visit. /80 Comment: recheck  Pulse 84   Temp 96.4 °F (35.8 °C)   Ht 5' 5\" (1.651 m)   Wt 166 lb 12.8 oz (75.7 kg)   SpO2 97%   BMI 27.76 kg/m²     PHYSICAL EXAM  Physical Exam   Constitutional: Vital signs are normal. She appears well-developed and well-nourished. HENT:   Head: Normocephalic and atraumatic. Right Ear: External ear normal.   Left Ear: External ear normal.   Nose: Nose normal.   Mouth/Throat: Oropharynx is clear and moist. No oropharyngeal exudate. Eyes: Pupils are equal, round, and reactive to light. Right eye exhibits no discharge. Left eye exhibits no discharge. Neck: Normal range of motion. No tracheal deviation present. Cardiovascular: Normal rate, regular rhythm and normal heart sounds. Exam reveals no gallop and no friction rub. No murmur heard. Pulmonary/Chest: Effort normal and breath sounds normal. No respiratory distress. She has no wheezes. She has no rales. She exhibits no tenderness. Abdominal: Soft. There is no tenderness. There is no rebound and no guarding. Musculoskeletal: Normal range of motion. She exhibits no tenderness or deformity. Lymphadenopathy:     She has no cervical adenopathy. Neurological: She is alert. Skin: Skin is warm, dry and intact. No lesion and no rash noted. No erythema. Psychiatric: She has a normal mood and affect. Her mood appears not anxious. She does not exhibit a depressed mood. Nursing note and vitals reviewed.       Exam:   XR CHEST (2 VW)

## 2018-08-20 ENCOUNTER — TELEPHONE (OUTPATIENT)
Dept: INTERNAL MEDICINE | Age: 78
End: 2018-08-20

## 2018-08-20 NOTE — TELEPHONE ENCOUNTER
Patient called wanting to know if you thought she needed to go ahead and follow up sooner than a month she states she is still having symptoms. Brennan Stratton told her at her last visit to just follow up in a month. I let her know she would most likely need to be seen again and she wanted us to ask if she is ok to just wait until her next apt.

## 2018-09-12 ENCOUNTER — HOSPITAL ENCOUNTER (OUTPATIENT)
Dept: GENERAL RADIOLOGY | Age: 78
Discharge: HOME | End: 2018-09-12
Payer: MEDICARE

## 2018-09-12 DIAGNOSIS — J18.9 PNEUMONIA OF RIGHT UPPER LOBE DUE TO INFECTIOUS ORGANISM: ICD-10-CM

## 2018-09-12 PROCEDURE — 71046 X-RAY EXAM CHEST 2 VIEWS: CPT

## 2018-11-07 DIAGNOSIS — I10 ESSENTIAL HYPERTENSION: ICD-10-CM

## 2018-11-07 LAB
ALBUMIN SERPL-MCNC: 4.2 G/DL (ref 3.5–5.2)
ALP BLD-CCNC: 69 U/L (ref 35–104)
ALT SERPL-CCNC: 9 U/L (ref 5–33)
ANION GAP SERPL CALCULATED.3IONS-SCNC: 14 MMOL/L (ref 7–19)
AST SERPL-CCNC: 14 U/L (ref 5–32)
BASOPHILS ABSOLUTE: 0.1 K/UL (ref 0–0.2)
BASOPHILS RELATIVE PERCENT: 1.6 % (ref 0–1)
BILIRUB SERPL-MCNC: 0.5 MG/DL (ref 0.2–1.2)
BUN BLDV-MCNC: 30 MG/DL (ref 8–23)
CALCIUM SERPL-MCNC: 9.9 MG/DL (ref 8.8–10.2)
CHLORIDE BLD-SCNC: 106 MMOL/L (ref 98–111)
CHOLESTEROL, TOTAL: 157 MG/DL (ref 160–199)
CO2: 25 MMOL/L (ref 22–29)
CREAT SERPL-MCNC: 1.3 MG/DL (ref 0.5–0.9)
EOSINOPHILS ABSOLUTE: 0.1 K/UL (ref 0–0.6)
EOSINOPHILS RELATIVE PERCENT: 2.2 % (ref 0–5)
GFR NON-AFRICAN AMERICAN: 40
GLUCOSE BLD-MCNC: 97 MG/DL (ref 74–109)
HCT VFR BLD CALC: 43.9 % (ref 37–47)
HDLC SERPL-MCNC: 60 MG/DL (ref 65–121)
HEMOGLOBIN: 13.4 G/DL (ref 12–16)
LDL CHOLESTEROL CALCULATED: 77 MG/DL
LYMPHOCYTES ABSOLUTE: 1.5 K/UL (ref 1.1–4.5)
LYMPHOCYTES RELATIVE PERCENT: 22.8 % (ref 20–40)
MCH RBC QN AUTO: 28 PG (ref 27–31)
MCHC RBC AUTO-ENTMCNC: 30.5 G/DL (ref 33–37)
MCV RBC AUTO: 91.6 FL (ref 81–99)
MONOCYTES ABSOLUTE: 0.5 K/UL (ref 0–0.9)
MONOCYTES RELATIVE PERCENT: 8 % (ref 0–10)
NEUTROPHILS ABSOLUTE: 4.1 K/UL (ref 1.5–7.5)
NEUTROPHILS RELATIVE PERCENT: 65.1 % (ref 50–65)
PDW BLD-RTO: 14.1 % (ref 11.5–14.5)
PLATELET # BLD: 246 K/UL (ref 130–400)
PMV BLD AUTO: 12.3 FL (ref 9.4–12.3)
POTASSIUM SERPL-SCNC: 4 MMOL/L (ref 3.5–5)
RBC # BLD: 4.79 M/UL (ref 4.2–5.4)
SODIUM BLD-SCNC: 145 MMOL/L (ref 136–145)
TOTAL PROTEIN: 7 G/DL (ref 6.6–8.7)
TRIGL SERPL-MCNC: 100 MG/DL (ref 0–149)
TSH SERPL DL<=0.05 MIU/L-ACNC: 1.7 UIU/ML (ref 0.27–4.2)
WBC # BLD: 6.4 K/UL (ref 4.8–10.8)

## 2018-11-09 ENCOUNTER — OFFICE VISIT (OUTPATIENT)
Dept: INTERNAL MEDICINE | Age: 78
End: 2018-11-09
Payer: MEDICARE

## 2018-11-09 VITALS
DIASTOLIC BLOOD PRESSURE: 60 MMHG | HEIGHT: 65 IN | BODY MASS INDEX: 27.57 KG/M2 | OXYGEN SATURATION: 98 % | WEIGHT: 165.5 LBS | HEART RATE: 65 BPM | SYSTOLIC BLOOD PRESSURE: 104 MMHG

## 2018-11-09 DIAGNOSIS — Z78.0 MENOPAUSE: ICD-10-CM

## 2018-11-09 DIAGNOSIS — K21.9 GASTROESOPHAGEAL REFLUX DISEASE WITHOUT ESOPHAGITIS: ICD-10-CM

## 2018-11-09 DIAGNOSIS — Z12.11 SCREENING FOR COLON CANCER: ICD-10-CM

## 2018-11-09 DIAGNOSIS — I10 ESSENTIAL HYPERTENSION: ICD-10-CM

## 2018-11-09 DIAGNOSIS — N32.81 OAB (OVERACTIVE BLADDER): ICD-10-CM

## 2018-11-09 DIAGNOSIS — Z00.00 ROUTINE ADULT HEALTH MAINTENANCE: Primary | ICD-10-CM

## 2018-11-09 DIAGNOSIS — N28.9 RENAL INSUFFICIENCY: ICD-10-CM

## 2018-11-09 PROCEDURE — 4040F PNEUMOC VAC/ADMIN/RCVD: CPT | Performed by: INTERNAL MEDICINE

## 2018-11-09 PROCEDURE — 90662 IIV NO PRSV INCREASED AG IM: CPT | Performed by: INTERNAL MEDICINE

## 2018-11-09 PROCEDURE — G0439 PPPS, SUBSEQ VISIT: HCPCS | Performed by: INTERNAL MEDICINE

## 2018-11-09 PROCEDURE — G8482 FLU IMMUNIZE ORDER/ADMIN: HCPCS | Performed by: INTERNAL MEDICINE

## 2018-11-09 PROCEDURE — G0008 ADMIN INFLUENZA VIRUS VAC: HCPCS | Performed by: INTERNAL MEDICINE

## 2018-11-09 RX ORDER — TETANUS AND DIPHTHERIA TOXOIDS ADSORBED 2; 2 [LF]/.5ML; [LF]/.5ML
0.5 INJECTION INTRAMUSCULAR ONCE
Qty: 0.5 ML | Refills: 0 | Status: CANCELLED | OUTPATIENT
Start: 2018-11-09 | End: 2018-11-09

## 2018-11-09 RX ORDER — ALLOPURINOL 100 MG/1
100 TABLET ORAL DAILY
Status: ON HOLD | COMMUNITY
End: 2022-01-01 | Stop reason: HOSPADM

## 2018-11-09 ASSESSMENT — ENCOUNTER SYMPTOMS
RHINORRHEA: 0
TROUBLE SWALLOWING: 0
VOMITING: 0
CONSTIPATION: 0
COUGH: 0
EYE DISCHARGE: 0
EYE PAIN: 0
ABDOMINAL DISTENTION: 0
DIARRHEA: 0
CHEST TIGHTNESS: 0
ABDOMINAL PAIN: 0
SINUS PAIN: 0
VOICE CHANGE: 0
SINUS PRESSURE: 0
BACK PAIN: 0
SHORTNESS OF BREATH: 0
NAUSEA: 0
RECTAL PAIN: 0
PHOTOPHOBIA: 0
EYE REDNESS: 0
COLOR CHANGE: 0
ANAL BLEEDING: 0
SORE THROAT: 0
BLOOD IN STOOL: 0
WHEEZING: 0
EYE ITCHING: 0

## 2018-11-09 ASSESSMENT — LIFESTYLE VARIABLES: HOW OFTEN DO YOU HAVE A DRINK CONTAINING ALCOHOL: 0

## 2018-11-09 ASSESSMENT — ANXIETY QUESTIONNAIRES: GAD7 TOTAL SCORE: 0

## 2018-11-09 ASSESSMENT — PATIENT HEALTH QUESTIONNAIRE - PHQ9
SUM OF ALL RESPONSES TO PHQ QUESTIONS 1-9: 0
SUM OF ALL RESPONSES TO PHQ QUESTIONS 1-9: 0

## 2018-11-09 NOTE — PROGRESS NOTES
(PRAVACHOL) 80 MG tablet TAKE 1 TABLET BY MOUTH AT BEDTIME 90 tablet 1    aspirin 81 MG EC tablet Take 81 mg by mouth daily      Cholecalciferol (VITAMIN D) 2000 units CAPS capsule Take by mouth      oxybutynin (DITROPAN) 5 MG tablet Take 5 mg by mouth 2 times daily      pantoprazole (PROTONIX) 40 MG tablet Take 80 mg by mouth daily       triamterene-hydrochlorothiazide (MAXZIDE-25) 37.5-25 MG per tablet Take 1 tablet by mouth daily Indications: 1/2 tablet daily      losartan (COZAAR) 25 MG tablet Take 25 mg by mouth daily       No current facility-administered medications for this visit. Patient Active Problem List   Diagnosis    Wrist pain, right    Distal radius fracture    Fall from other slipping, tripping, or stumbling    Hypertension    Pneumonia    [facility]) ([facility] [address]    Metabolic acidosis, increased anion gap (IAG)        Review of Systems   Constitutional: Negative for activity change, appetite change, chills, diaphoresis, fatigue, fever and unexpected weight change. HENT: Negative for congestion, ear pain, hearing loss, mouth sores, nosebleeds, postnasal drip, rhinorrhea, sinus pain, sinus pressure, sneezing, sore throat, tinnitus, trouble swallowing and voice change. Eyes: Negative for photophobia, pain, discharge, redness, itching and visual disturbance. Respiratory: Negative for cough, chest tightness, shortness of breath and wheezing. Cardiovascular: Negative for chest pain, palpitations and leg swelling. Gastrointestinal: Negative for abdominal distention, abdominal pain, anal bleeding, blood in stool, constipation, diarrhea, nausea, rectal pain and vomiting. Endocrine: Negative for cold intolerance, heat intolerance, polydipsia, polyphagia and polyuria. Genitourinary: Negative for difficulty urinating, dysuria, flank pain, frequency, hematuria and urgency.    Musculoskeletal: Negative for arthralgias, back pain, gait problem, joint swelling, myalgias, Cancel: Blood Occult Stool Screen #1; Future  -     Cancel: Pneumococcal conjugate vaccine 13-valent PCV13  -     Cancel: diptheria-tetanus toxoids (DECAVAC) 2-2 LF/0.5ML injection; Inject 0.5 mLs into the muscle once for 1 dose  -     Cancel: zoster recombinant adjuvanted vaccine (SHINGRIX) 50 MCG/0.5ML SUSR injection; 50 MCG IM then repeat 2-6 months.  -     INFLUENZA, HIGH DOSE, 65 YRS +, IM, PF, PREFILL SYR, 0.5ML (FLUZONE HD)          Return in about 6 months (around 5/9/2019) for hypertension.      Orders Placed This Encounter   Procedures    DEXA BONE DENSITY 2 SITES     Standing Status:   Future     Standing Expiration Date:   11/9/2019    INFLUENZA, HIGH DOSE, 65 YRS +, IM, PF, PREFILL SYR, 0.5ML (FLUZONE HD)    CBC Auto Differential     Fast 12 hours     Standing Status:   Future     Standing Expiration Date:   11/9/2019    Lipid Panel     Standing Status:   Future     Standing Expiration Date:   11/9/2019     Order Specific Question:   Is Patient Fasting?/# of Hours     Answer:   12    TSH without Reflex     Fast 12 hours     Standing Status:   Future     Standing Expiration Date:   11/9/2019    Comprehensive Metabolic Panel     Fasting 12 hours     Standing Status:   Future     Standing Expiration Date:   11/9/2019   [address] [facility]- Rody Little MD     Referral Priority:   Routine     Referral Type:   Eval and Treat     Referral Reason:   Specialty Services Required     Referred to Provider:   Navdeep Vaca MD     Requested Specialty:   Gastroenterology     Number of Visits Requested:   Jeniffer Souza MD

## 2018-11-09 NOTE — PATIENT INSTRUCTIONS
using beans and peas. Add garbanzo or kidney beans to salads. Make burritos and tacos with mashed dang beans or black beans. Where can you learn more? Go to [email or web address]://chluis [email or web address] org and sign in to your kidthing account. Enter Z470 in the Synthox box to learn more about \"DASH Diet: Care Instructions. \"     If you do not have an account, please click on the \"Sign Up Now\" link. Current as of: December 6, 2017  Content Version: 11.8  © 6891-6131 [employer]. Care instructions adapted under license by [facility] ([facility]). If you have questions about a medical condition or this instruction, always ask your healthcare professional. [address] any warranty or liability for your use of this information.

## 2018-11-29 RX ORDER — PANTOPRAZOLE SODIUM 40 MG/1
TABLET, DELAYED RELEASE ORAL
Qty: 90 TABLET | Refills: 3 | Status: SHIPPED | OUTPATIENT
Start: 2018-11-29 | End: 2019-03-19 | Stop reason: SDUPTHER

## 2018-11-29 RX ORDER — PRAVASTATIN SODIUM 80 MG/1
TABLET ORAL
Qty: 90 TABLET | Refills: 2 | Status: SHIPPED | OUTPATIENT
Start: 2018-11-29 | End: 2019-05-17 | Stop reason: SDUPTHER

## 2018-12-03 ENCOUNTER — HOSPITAL ENCOUNTER (OUTPATIENT)
Dept: WOMENS IMAGING | Age: 78
Discharge: HOME | End: 2018-12-03
Payer: MEDICARE

## 2018-12-03 DIAGNOSIS — Z78.0 MENOPAUSE: ICD-10-CM

## 2018-12-03 PROCEDURE — 77080 DXA BONE DENSITY AXIAL: CPT

## 2018-12-04 ENCOUNTER — TELEPHONE (OUTPATIENT)
Dept: INTERNAL MEDICINE | Age: 78
End: 2018-12-04

## 2018-12-27 RX ORDER — OXYBUTYNIN CHLORIDE 5 MG/1
TABLET ORAL
Qty: 60 TABLET | Refills: 3 | Status: SHIPPED | OUTPATIENT
Start: 2018-12-27 | End: 2019-05-17 | Stop reason: SDUPTHER

## 2019-01-02 ENCOUNTER — TELEPHONE (OUTPATIENT)
Dept: INTERNAL MEDICINE | Age: 79
End: 2019-01-02

## 2019-01-02 RX ORDER — LOSARTAN POTASSIUM 25 MG/1
25 TABLET ORAL DAILY
Qty: 30 TABLET | Refills: 3 | Status: SHIPPED | OUTPATIENT
Start: 2019-01-02 | End: 2019-05-17 | Stop reason: SDUPTHER

## 2019-03-19 ENCOUNTER — OFFICE VISIT (OUTPATIENT)
Dept: GASTROENTEROLOGY | Age: 79
End: 2019-03-19
Payer: MEDICARE

## 2019-03-19 VITALS
WEIGHT: 168 LBS | SYSTOLIC BLOOD PRESSURE: 135 MMHG | BODY MASS INDEX: 27.99 KG/M2 | OXYGEN SATURATION: 97 % | HEART RATE: 65 BPM | DIASTOLIC BLOOD PRESSURE: 80 MMHG | HEIGHT: 65 IN

## 2019-03-19 DIAGNOSIS — R13.10 DYSPHAGIA, UNSPECIFIED TYPE: ICD-10-CM

## 2019-03-19 DIAGNOSIS — Z90.49 S/P CHOLECYSTECTOMY: ICD-10-CM

## 2019-03-19 DIAGNOSIS — Z12.11 ENCOUNTER FOR SCREENING COLONOSCOPY: Primary | ICD-10-CM

## 2019-03-19 DIAGNOSIS — R19.7 DIARRHEA, UNSPECIFIED TYPE: ICD-10-CM

## 2019-03-19 DIAGNOSIS — Z80.0 FAMILY HISTORY OF COLON CANCER: ICD-10-CM

## 2019-03-19 DIAGNOSIS — Z86.010 PERSONAL HISTORY OF COLONIC POLYPS: ICD-10-CM

## 2019-03-19 DIAGNOSIS — R12 CHRONIC HEARTBURN: ICD-10-CM

## 2019-03-19 PROCEDURE — G8399 PT W/DXA RESULTS DOCUMENT: HCPCS | Performed by: NURSE PRACTITIONER

## 2019-03-19 PROCEDURE — G8417 CALC BMI ABV UP PARAM F/U: HCPCS | Performed by: NURSE PRACTITIONER

## 2019-03-19 PROCEDURE — G8482 FLU IMMUNIZE ORDER/ADMIN: HCPCS | Performed by: NURSE PRACTITIONER

## 2019-03-19 PROCEDURE — 1123F ACP DISCUSS/DSCN MKR DOCD: CPT | Performed by: NURSE PRACTITIONER

## 2019-03-19 PROCEDURE — G8427 DOCREV CUR MEDS BY ELIG CLIN: HCPCS | Performed by: NURSE PRACTITIONER

## 2019-03-19 PROCEDURE — 99213 OFFICE O/P EST LOW 20 MIN: CPT | Performed by: NURSE PRACTITIONER

## 2019-03-19 PROCEDURE — 4040F PNEUMOC VAC/ADMIN/RCVD: CPT | Performed by: NURSE PRACTITIONER

## 2019-03-19 PROCEDURE — 1101F PT FALLS ASSESS-DOCD LE1/YR: CPT | Performed by: NURSE PRACTITIONER

## 2019-03-19 PROCEDURE — 1036F TOBACCO NON-USER: CPT | Performed by: NURSE PRACTITIONER

## 2019-03-19 PROCEDURE — 1090F PRES/ABSN URINE INCON ASSESS: CPT | Performed by: NURSE PRACTITIONER

## 2019-03-19 ASSESSMENT — ENCOUNTER SYMPTOMS
TROUBLE SWALLOWING: 1
ABDOMINAL PAIN: 0
DIARRHEA: 1
RECTAL PAIN: 0
COUGH: 0
VOMITING: 0
BACK PAIN: 0
SORE THROAT: 0
ANAL BLEEDING: 0
CONSTIPATION: 0
BLOOD IN STOOL: 0
VOICE CHANGE: 0
ABDOMINAL DISTENTION: 0
NAUSEA: 0
SHORTNESS OF BREATH: 0

## 2019-04-05 LAB
ALBUMIN SERPL-MCNC: 4.4 G/DL (ref 3.5–5.2)
ANION GAP SERPL CALCULATED.3IONS-SCNC: 12 MMOL/L (ref 7–19)
BACTERIA: NEGATIVE /HPF
BASOPHILS ABSOLUTE: 0.1 K/UL (ref 0–0.2)
BASOPHILS RELATIVE PERCENT: 1.2 % (ref 0–1)
BILIRUBIN URINE: NEGATIVE
BLOOD, URINE: ABNORMAL
BUN BLDV-MCNC: 30 MG/DL (ref 8–23)
CALCIUM SERPL-MCNC: 10.3 MG/DL (ref 8.8–10.2)
CHLORIDE BLD-SCNC: 104 MMOL/L (ref 98–111)
CLARITY: ABNORMAL
CO2: 27 MMOL/L (ref 22–29)
COLOR: YELLOW
CREAT SERPL-MCNC: 1.4 MG/DL (ref 0.5–0.9)
CREATININE URINE: 122.3 MG/DL (ref 4.2–622)
EOSINOPHILS ABSOLUTE: 0.2 K/UL (ref 0–0.6)
EOSINOPHILS RELATIVE PERCENT: 2.8 % (ref 0–5)
EPITHELIAL CELLS, UA: 8 /HPF (ref 0–5)
GFR NON-AFRICAN AMERICAN: 36
GLUCOSE BLD-MCNC: 91 MG/DL (ref 74–109)
GLUCOSE URINE: NEGATIVE MG/DL
HCT VFR BLD CALC: 46 % (ref 37–47)
HEMOGLOBIN: 14.2 G/DL (ref 12–16)
HYALINE CASTS: 12 /HPF (ref 0–8)
KETONES, URINE: NEGATIVE MG/DL
LEUKOCYTE ESTERASE, URINE: ABNORMAL
LYMPHOCYTES ABSOLUTE: 1.6 K/UL (ref 1.1–4.5)
LYMPHOCYTES RELATIVE PERCENT: 27.8 % (ref 20–40)
MAGNESIUM: 2.3 MG/DL (ref 1.6–2.4)
MCH RBC QN AUTO: 28.4 PG (ref 27–31)
MCHC RBC AUTO-ENTMCNC: 30.9 G/DL (ref 33–37)
MCV RBC AUTO: 92 FL (ref 81–99)
MONOCYTES ABSOLUTE: 0.5 K/UL (ref 0–0.9)
MONOCYTES RELATIVE PERCENT: 8.1 % (ref 0–10)
NEUTROPHILS ABSOLUTE: 3.4 K/UL (ref 1.5–7.5)
NEUTROPHILS RELATIVE PERCENT: 59.9 % (ref 50–65)
NITRITE, URINE: NEGATIVE
PARATHYROID HORMONE INTACT: 41.5 PG/ML (ref 15–65)
PDW BLD-RTO: 13.6 % (ref 11.5–14.5)
PH UA: 7 (ref 5–8)
PHOSPHORUS: 4.1 MG/DL (ref 2.5–4.5)
PLATELET # BLD: 221 K/UL (ref 130–400)
PMV BLD AUTO: 12.2 FL (ref 9.4–12.3)
POTASSIUM SERPL-SCNC: 4.6 MMOL/L (ref 3.5–5)
PROTEIN PROTEIN: 25 MG/DL (ref 15–45)
PROTEIN UA: ABNORMAL MG/DL
RBC # BLD: 5 M/UL (ref 4.2–5.4)
RBC UA: 4 /HPF (ref 0–4)
SODIUM BLD-SCNC: 143 MMOL/L (ref 136–145)
SPECIFIC GRAVITY UA: 1.02 (ref 1–1.03)
URIC ACID, SERUM: 6.4 MG/DL (ref 2.4–5.7)
UROBILINOGEN, URINE: 0.2 E.U./DL
VITAMIN D 25-HYDROXY: 59.7 NG/ML
WBC # BLD: 5.7 K/UL (ref 4.8–10.8)
WBC UA: 128 /HPF (ref 0–5)

## 2019-05-03 DIAGNOSIS — I10 ESSENTIAL HYPERTENSION: ICD-10-CM

## 2019-05-03 LAB
ALBUMIN SERPL-MCNC: 4.4 G/DL (ref 3.5–5.2)
ALP BLD-CCNC: 69 U/L (ref 35–104)
ALT SERPL-CCNC: 9 U/L (ref 5–33)
ANION GAP SERPL CALCULATED.3IONS-SCNC: 15 MMOL/L (ref 7–19)
AST SERPL-CCNC: 14 U/L (ref 5–32)
BASOPHILS ABSOLUTE: 0.1 K/UL (ref 0–0.2)
BASOPHILS RELATIVE PERCENT: 1.8 % (ref 0–1)
BILIRUB SERPL-MCNC: 0.6 MG/DL (ref 0.2–1.2)
BUN BLDV-MCNC: 33 MG/DL (ref 8–23)
CALCIUM SERPL-MCNC: 10.2 MG/DL (ref 8.8–10.2)
CHLORIDE BLD-SCNC: 104 MMOL/L (ref 98–111)
CHOLESTEROL, TOTAL: 183 MG/DL (ref 160–199)
CO2: 26 MMOL/L (ref 22–29)
CREAT SERPL-MCNC: 1.4 MG/DL (ref 0.5–0.9)
EOSINOPHILS ABSOLUTE: 0.2 K/UL (ref 0–0.6)
EOSINOPHILS RELATIVE PERCENT: 3.3 % (ref 0–5)
GFR NON-AFRICAN AMERICAN: 36
GLUCOSE BLD-MCNC: 93 MG/DL (ref 74–109)
HCT VFR BLD CALC: 43.9 % (ref 37–47)
HDLC SERPL-MCNC: 57 MG/DL (ref 65–121)
HEMOGLOBIN: 13.6 G/DL (ref 12–16)
LDL CHOLESTEROL CALCULATED: 99 MG/DL
LYMPHOCYTES ABSOLUTE: 1.5 K/UL (ref 1.1–4.5)
LYMPHOCYTES RELATIVE PERCENT: 26.4 % (ref 20–40)
MCH RBC QN AUTO: 28.2 PG (ref 27–31)
MCHC RBC AUTO-ENTMCNC: 31 G/DL (ref 33–37)
MCV RBC AUTO: 91.1 FL (ref 81–99)
MONOCYTES ABSOLUTE: 0.5 K/UL (ref 0–0.9)
MONOCYTES RELATIVE PERCENT: 9.2 % (ref 0–10)
NEUTROPHILS ABSOLUTE: 3.3 K/UL (ref 1.5–7.5)
NEUTROPHILS RELATIVE PERCENT: 58.8 % (ref 50–65)
PDW BLD-RTO: 13.4 % (ref 11.5–14.5)
PLATELET # BLD: 223 K/UL (ref 130–400)
PMV BLD AUTO: 12.2 FL (ref 9.4–12.3)
POTASSIUM SERPL-SCNC: 4.1 MMOL/L (ref 3.5–5)
RBC # BLD: 4.82 M/UL (ref 4.2–5.4)
SODIUM BLD-SCNC: 145 MMOL/L (ref 136–145)
TOTAL PROTEIN: 7 G/DL (ref 6.6–8.7)
TRIGL SERPL-MCNC: 136 MG/DL (ref 0–149)
TSH SERPL DL<=0.05 MIU/L-ACNC: 2.64 UIU/ML (ref 0.27–4.2)
WBC # BLD: 5.5 K/UL (ref 4.8–10.8)

## 2019-05-09 ENCOUNTER — OFFICE VISIT (OUTPATIENT)
Dept: INTERNAL MEDICINE | Age: 79
End: 2019-05-09
Payer: MEDICARE

## 2019-05-09 VITALS
WEIGHT: 172 LBS | BODY MASS INDEX: 28.66 KG/M2 | SYSTOLIC BLOOD PRESSURE: 150 MMHG | HEART RATE: 62 BPM | HEIGHT: 65 IN | OXYGEN SATURATION: 99 % | DIASTOLIC BLOOD PRESSURE: 80 MMHG

## 2019-05-09 DIAGNOSIS — Z23 NEED FOR PROPHYLACTIC VACCINATION AGAINST STREPTOCOCCUS PNEUMONIAE (PNEUMOCOCCUS): ICD-10-CM

## 2019-05-09 DIAGNOSIS — G47.00 INSOMNIA, UNSPECIFIED TYPE: ICD-10-CM

## 2019-05-09 DIAGNOSIS — N18.30 CHRONIC KIDNEY DISEASE, STAGE III (MODERATE) (HCC): ICD-10-CM

## 2019-05-09 DIAGNOSIS — Z23 NEED FOR PROPHYLACTIC VACCINATION AGAINST DIPHTHERIA-TETANUS-PERTUSSIS (DTP): ICD-10-CM

## 2019-05-09 DIAGNOSIS — Z23 NEED FOR PROPHYLACTIC VACCINATION AND INOCULATION AGAINST VARICELLA: ICD-10-CM

## 2019-05-09 DIAGNOSIS — N32.81 OAB (OVERACTIVE BLADDER): ICD-10-CM

## 2019-05-09 DIAGNOSIS — E78.5 HYPERLIPIDEMIA, UNSPECIFIED HYPERLIPIDEMIA TYPE: ICD-10-CM

## 2019-05-09 DIAGNOSIS — I10 ESSENTIAL HYPERTENSION: Primary | ICD-10-CM

## 2019-05-09 DIAGNOSIS — K21.9 GASTROESOPHAGEAL REFLUX DISEASE WITHOUT ESOPHAGITIS: ICD-10-CM

## 2019-05-09 DIAGNOSIS — N18.9 CHRONIC KIDNEY DISEASE, UNSPECIFIED CKD STAGE: ICD-10-CM

## 2019-05-09 DIAGNOSIS — E55.9 VITAMIN D DEFICIENCY: ICD-10-CM

## 2019-05-09 PROCEDURE — 99214 OFFICE O/P EST MOD 30 MIN: CPT | Performed by: INTERNAL MEDICINE

## 2019-05-09 PROCEDURE — 1090F PRES/ABSN URINE INCON ASSESS: CPT | Performed by: INTERNAL MEDICINE

## 2019-05-09 PROCEDURE — G8427 DOCREV CUR MEDS BY ELIG CLIN: HCPCS | Performed by: INTERNAL MEDICINE

## 2019-05-09 PROCEDURE — 1123F ACP DISCUSS/DSCN MKR DOCD: CPT | Performed by: INTERNAL MEDICINE

## 2019-05-09 PROCEDURE — G0009 ADMIN PNEUMOCOCCAL VACCINE: HCPCS | Performed by: INTERNAL MEDICINE

## 2019-05-09 PROCEDURE — 1036F TOBACCO NON-USER: CPT | Performed by: INTERNAL MEDICINE

## 2019-05-09 PROCEDURE — 4040F PNEUMOC VAC/ADMIN/RCVD: CPT | Performed by: INTERNAL MEDICINE

## 2019-05-09 PROCEDURE — 90732 PPSV23 VACC 2 YRS+ SUBQ/IM: CPT | Performed by: INTERNAL MEDICINE

## 2019-05-09 PROCEDURE — G8399 PT W/DXA RESULTS DOCUMENT: HCPCS | Performed by: INTERNAL MEDICINE

## 2019-05-09 PROCEDURE — G8417 CALC BMI ABV UP PARAM F/U: HCPCS | Performed by: INTERNAL MEDICINE

## 2019-05-09 RX ORDER — TRAZODONE HYDROCHLORIDE 50 MG/1
50 TABLET ORAL NIGHTLY
Qty: 30 TABLET | Refills: 5 | Status: SHIPPED | OUTPATIENT
Start: 2019-05-09 | End: 2020-07-27

## 2019-05-09 RX ORDER — CALCIUM CARBONATE 500(1250)
500 TABLET ORAL DAILY
Status: ON HOLD | COMMUNITY
End: 2022-01-01 | Stop reason: HOSPADM

## 2019-05-09 ASSESSMENT — ENCOUNTER SYMPTOMS
COLOR CHANGE: 0
SORE THROAT: 0
VOMITING: 0
PHOTOPHOBIA: 0
SHORTNESS OF BREATH: 0
EYE DISCHARGE: 0
ANAL BLEEDING: 0
VOICE CHANGE: 0
COUGH: 0
SINUS PRESSURE: 0
ABDOMINAL PAIN: 0
NAUSEA: 0
EYE PAIN: 0
EYE ITCHING: 0
CHEST TIGHTNESS: 0
BLOOD IN STOOL: 0
DIARRHEA: 0
CONSTIPATION: 0
SINUS PAIN: 0
BACK PAIN: 0
TROUBLE SWALLOWING: 0
RHINORRHEA: 0
RECTAL PAIN: 0
WHEEZING: 0
EYE REDNESS: 0
ABDOMINAL DISTENTION: 0

## 2019-05-09 ASSESSMENT — PATIENT HEALTH QUESTIONNAIRE - PHQ9
SUM OF ALL RESPONSES TO PHQ QUESTIONS 1-9: 1
SUM OF ALL RESPONSES TO PHQ QUESTIONS 1-9: 1
SUM OF ALL RESPONSES TO PHQ9 QUESTIONS 1 & 2: 1
1. LITTLE INTEREST OR PLEASURE IN DOING THINGS: 1
2. FEELING DOWN, DEPRESSED OR HOPELESS: 0

## 2019-05-09 NOTE — PATIENT INSTRUCTIONS

## 2019-05-09 NOTE — PROGRESS NOTES
Chief Complaint   Patient presents with    6 Month Follow-Up     Patient does not wish to schedule her mammogram until after her colonoscopy.  Hypertension    Insomnia     Patient states that she does not sleep well. HPI: Beti Perkins is a 77 y.o. female is here for hypertension, overactive bladder, hyperlipidemia, GERD and chronic kidney disease    Her blood pressure is running a little high today. It has been really good recent up until recently. It was 135/80 at her gastroenterologist office not too long ago. Last time we saw her, has not 100/60. She denies a complaints of chest pain, chest pressure or shortness of breath. However, she has had some difficulty sleeping recently. She did take some last night. She reports a 5 year history of difficulty sleeping. She's tried a number of over-the-counter medications, including melatonin. Melatonin caused her to have diarrhea. Cholesterol is 183. She would like a prescription for pneumonia vaccine today. Oxybutynin is working well for her overactive bladder. Her acid reflux is stable. Her renal function is stable.     Past Medical History:   Diagnosis Date    Acid reflux     LORA (acute kidney injury) ([facility] [address] 7/28/2018    HIGH CHOLESTEROL     Hypertension     Urinary incontinence     UTI (urinary tract infection)     Vertigo       Past Surgical History:   Procedure Laterality Date    CHOLECYSTECTOMY      COLONOSCOPY      Dr Kim Montesinos       Social History     Socioeconomic History    Marital status:      Spouse name: None    Number of children: None    Years of education: None    Highest education level: None   Occupational History    None   Social Needs    Financial resource strain: None    Food insecurity:     Worry: None     Inability: None    Transportation needs:     Medical: None     Non-medical: None   Tobacco Use    Smoking status: Former Smoker     Packs/day: 0.50 Years: 40.00     Pack years: 20.00     Types: Cigarettes     Start date:      Last attempt to quit:      Years since quittin.3    Smokeless tobacco: Never Used   Substance and Sexual Activity    Alcohol use: No    Drug use: No    Sexual activity: None   Lifestyle    Physical activity:     Days per week: None     Minutes per session: None    Stress: None   Relationships    Social connections:     Talks on phone: None     Gets together: None     Attends Caodaism service: None     Active member of club or organization: None     Attends meetings of clubs or organizations: None     Relationship status: None    Intimate partner violence:     Fear of current or ex partner: None     Emotionally abused: None     Physically abused: None     Forced sexual activity: None   Other Topics Concern    None   Social History Narrative    None      Family History   Problem Relation Age of Onset    Cancer Mother     Colon Cancer Mother     Heart Disease Father     Stroke Brother     Colon Polyps Neg Hx     Esophageal Cancer Neg Hx     Liver Cancer Neg Hx     Liver Disease Neg Hx     Rectal Cancer Neg Hx     Stomach Cancer Neg Hx         Current Outpatient Medications   Medication Sig Dispense Refill    calcium carbonate (OSCAL) 500 MG TABS tablet Take 500 mg by mouth daily      Tdap (ADACEL) 5-2-15.5 LF-MCG/0.5 injection Inject 0.5 mLs into the muscle once for 1 dose 0.5 mL 0    zoster recombinant adjuvanted vaccine (SHINGRIX) 50 MCG/0.5ML SUSR injection Inject 0.5 mLs into the muscle once for 1 dose 50 MCG IM then repeat 2-6 months.  1 each 1    traZODone (DESYREL) 50 MG tablet Take 1 tablet by mouth nightly 30 tablet 5    losartan (COZAAR) 25 MG tablet Take 1 tablet by mouth daily 30 tablet 3    oxybutynin (DITROPAN) 5 MG tablet TAKE 1 TABLET BY MOUTH TWO TIMES A DAY 60 tablet 3    pravastatin (PRAVACHOL) 80 MG tablet TAKE 1 TABLET BY MOUTH AT BEDTIME 90 tablet 2    allopurinol (ZYLOPRIM) 100 MG tablet Take 100 mg by mouth daily      aspirin 81 MG EC tablet Take 81 mg by mouth daily      pantoprazole (PROTONIX) 40 MG tablet Take 80 mg by mouth daily       triamterene-hydrochlorothiazide (MAXZIDE-25) 37.5-25 MG per tablet Take 0.5 tablets by mouth daily       Cholecalciferol (VITAMIN D) 2000 units CAPS capsule Take by mouth       No current facility-administered medications for this visit. Patient Active Problem List   Diagnosis    Wrist pain, right    Distal radius fracture    Fall from other slipping, tripping, or stumbling    Hypertension    Pneumonia    LORA (acute kidney injury) ([facility] [address]    Metabolic acidosis, increased anion gap (IAG)        Review of Systems   Constitutional: Negative for activity change, appetite change, chills, diaphoresis, fatigue, fever and unexpected weight change. HENT: Negative for congestion, ear pain, hearing loss, mouth sores, nosebleeds, postnasal drip, rhinorrhea, sinus pressure, sinus pain, sneezing, sore throat, tinnitus, trouble swallowing and voice change. Eyes: Negative for photophobia, pain, discharge, redness, itching and visual disturbance. Respiratory: Negative for cough, chest tightness, shortness of breath and wheezing. Cardiovascular: Negative for chest pain, palpitations and leg swelling. Gastrointestinal: Negative for abdominal distention, abdominal pain, anal bleeding, blood in stool, constipation, diarrhea, nausea, rectal pain and vomiting. Endocrine: Negative for cold intolerance, heat intolerance, polydipsia, polyphagia and polyuria. Genitourinary: Negative for difficulty urinating, dysuria, flank pain, frequency, hematuria and urgency. Musculoskeletal: Negative for arthralgias, back pain, gait problem, joint swelling, myalgias, neck pain and neck stiffness. Skin: Negative for color change, pallor, rash and wound. Allergic/Immunologic: Negative for environmental allergies, food allergies and immunocompromised state. Neurological: Negative for dizziness, tremors, seizures, syncope, facial asymmetry, speech difficulty, weakness, light-headedness, numbness and headaches. Hematological: Negative for adenopathy. Does not bruise/bleed easily. Psychiatric/Behavioral: Positive for sleep disturbance. Negative for decreased concentration, dysphoric mood and hallucinations. BP (!) 150/80   Pulse 62   Ht 5' 5\" (1.651 m)   Wt 172 lb (78 kg)   SpO2 99%   BMI 28.62 kg/m²   Physical Exam   Constitutional: She is oriented to person, place, and time. She appears well-developed and well-nourished. No distress. HENT:   Head: Normocephalic and atraumatic. Right Ear: External ear normal.   Left Ear: External ear normal.   Nose: Nose normal.   Mouth/Throat: Oropharynx is clear and moist. No oropharyngeal exudate. Eyes: Pupils are equal, round, and reactive to light. Conjunctivae and EOM are normal. Right eye exhibits no discharge. Left eye exhibits no discharge. No scleral icterus. Neck: Normal range of motion. Neck supple. No JVD present. No tracheal deviation present. No thyromegaly present. Cardiovascular: Normal rate, regular rhythm, normal heart sounds and intact distal pulses. Exam reveals no gallop and no friction rub. No murmur heard. Pulmonary/Chest: Effort normal and breath sounds normal. No respiratory distress. She has no wheezes. She has no rales. She exhibits no tenderness. Abdominal: Soft. Bowel sounds are normal. She exhibits no distension and no mass. There is no tenderness. There is no rebound and no guarding. Musculoskeletal: Normal range of motion. She exhibits no edema, tenderness or deformity. Lymphadenopathy:     She has no cervical adenopathy. Neurological: She is alert and oriented to person, place, and time. She has normal reflexes. She displays normal reflexes. No cranial nerve deficit. She exhibits normal muscle tone. Coordination normal.   Skin: Skin is warm and dry. No rash noted. varicella  -     zoster recombinant adjuvanted vaccine [facility]) 50 MCG/0.5ML SUSR injection; Inject 0.5 mLs into the muscle once for 1 dose 50 MCG IM then repeat 2-6 months. Vitamin D deficiency   -     Vitamin D 25 Hydroxy; Future    Other orders  -     traZODone (DESYREL) 50 MG tablet; Take 1 tablet by mouth nightly          Return in about 6 months (around 11/9/2019), or medicare wellness.      Orders Placed This Encounter   Procedures    Pneumococcal polysaccharide vaccine 23-valent PPSV23    CBC Auto Differential     Fast 12 hours     Standing Status:   Future     Standing Expiration Date:   12/9/2019    Comprehensive Metabolic Panel     Fasting 12 hours     Standing Status:   Future     Standing Expiration Date:   12/9/2019    Lipid Panel     Standing Status:   Future     Standing Expiration Date:   12/9/2019     Order Specific Question:   Is Patient Fasting?/# of Hours     Answer:   12    TSH without Reflex     Fast 12 hours     Standing Status:   Future     Standing Expiration Date:   12/9/2019    Vitamin D 25 Hydroxy     Standing Status:   Future     Standing Expiration Date:   12/9/2019       Ivno Dickerson MD

## 2019-05-17 ENCOUNTER — OFFICE VISIT (OUTPATIENT)
Dept: INTERNAL MEDICINE | Age: 79
End: 2019-05-17
Payer: MEDICARE

## 2019-05-17 VITALS
RESPIRATION RATE: 20 BRPM | DIASTOLIC BLOOD PRESSURE: 62 MMHG | WEIGHT: 174 LBS | BODY MASS INDEX: 28.99 KG/M2 | HEART RATE: 69 BPM | OXYGEN SATURATION: 97 % | HEIGHT: 65 IN | SYSTOLIC BLOOD PRESSURE: 128 MMHG

## 2019-05-17 DIAGNOSIS — G47.00 INSOMNIA, UNSPECIFIED TYPE: ICD-10-CM

## 2019-05-17 DIAGNOSIS — M79.89 FOOT SWELLING: ICD-10-CM

## 2019-05-17 DIAGNOSIS — I10 ESSENTIAL HYPERTENSION: Primary | ICD-10-CM

## 2019-05-17 PROCEDURE — 1090F PRES/ABSN URINE INCON ASSESS: CPT | Performed by: INTERNAL MEDICINE

## 2019-05-17 PROCEDURE — G8427 DOCREV CUR MEDS BY ELIG CLIN: HCPCS | Performed by: INTERNAL MEDICINE

## 2019-05-17 PROCEDURE — 99213 OFFICE O/P EST LOW 20 MIN: CPT | Performed by: INTERNAL MEDICINE

## 2019-05-17 PROCEDURE — G8399 PT W/DXA RESULTS DOCUMENT: HCPCS | Performed by: INTERNAL MEDICINE

## 2019-05-17 PROCEDURE — 4040F PNEUMOC VAC/ADMIN/RCVD: CPT | Performed by: INTERNAL MEDICINE

## 2019-05-17 PROCEDURE — 1036F TOBACCO NON-USER: CPT | Performed by: INTERNAL MEDICINE

## 2019-05-17 PROCEDURE — 1123F ACP DISCUSS/DSCN MKR DOCD: CPT | Performed by: INTERNAL MEDICINE

## 2019-05-17 PROCEDURE — G8417 CALC BMI ABV UP PARAM F/U: HCPCS | Performed by: INTERNAL MEDICINE

## 2019-05-17 RX ORDER — PRAVASTATIN SODIUM 80 MG/1
TABLET ORAL
Qty: 90 TABLET | Refills: 3 | Status: SHIPPED | OUTPATIENT
Start: 2019-05-17 | End: 2020-06-29

## 2019-05-17 RX ORDER — TRIAMTERENE AND HYDROCHLOROTHIAZIDE 37.5; 25 MG/1; MG/1
0.5 TABLET ORAL DAILY
Qty: 45 TABLET | Refills: 3 | Status: ON HOLD | OUTPATIENT
Start: 2019-05-17 | End: 2022-01-01 | Stop reason: HOSPADM

## 2019-05-17 RX ORDER — LOSARTAN POTASSIUM 50 MG/1
50 TABLET ORAL DAILY
Qty: 90 TABLET | Refills: 3 | Status: SHIPPED | OUTPATIENT
Start: 2019-05-17 | End: 2020-07-17

## 2019-05-17 RX ORDER — OXYBUTYNIN CHLORIDE 5 MG/1
TABLET ORAL
Qty: 180 TABLET | Refills: 3 | Status: SHIPPED | OUTPATIENT
Start: 2019-05-17 | End: 2020-08-10

## 2019-05-17 RX ORDER — PANTOPRAZOLE SODIUM 40 MG/1
80 TABLET, DELAYED RELEASE ORAL DAILY
Qty: 180 TABLET | Refills: 3 | Status: SHIPPED
Start: 2019-05-17 | End: 2020-02-21

## 2019-05-17 ASSESSMENT — ENCOUNTER SYMPTOMS
PHOTOPHOBIA: 0
ABDOMINAL DISTENTION: 0
SORE THROAT: 0
VOMITING: 0
BACK PAIN: 0
EYE ITCHING: 0
NAUSEA: 0
EYE DISCHARGE: 0
RECTAL PAIN: 0
CHEST TIGHTNESS: 0
EYE PAIN: 0
CONSTIPATION: 0
RHINORRHEA: 0
ANAL BLEEDING: 0
DIARRHEA: 0
SINUS PRESSURE: 0
COLOR CHANGE: 0
BLOOD IN STOOL: 0
SINUS PAIN: 0
EYE REDNESS: 0
SHORTNESS OF BREATH: 0
TROUBLE SWALLOWING: 0
VOICE CHANGE: 0
ABDOMINAL PAIN: 0
COUGH: 0
WHEEZING: 0

## 2019-05-17 NOTE — PATIENT INSTRUCTIONS

## 2019-05-17 NOTE — PROGRESS NOTES
2003     Years since quittin.3    Smokeless tobacco: Never Used   Substance and Sexual Activity    Alcohol use: No    Drug use: No    Sexual activity: None   Lifestyle    Physical activity:     Days per week: None     Minutes per session: None    Stress: None   Relationships    Social connections:     Talks on phone: None     Gets together: None     Attends Jehovah's witness service: None     Active member of club or organization: None     Attends meetings of clubs or organizations: None     Relationship status: None    Intimate partner violence:     Fear of current or ex partner: None     Emotionally abused: None     Physically abused: None     Forced sexual activity: None   Other Topics Concern    None   Social History Narrative    None      Family History   Problem Relation Age of Onset    Cancer Mother     Colon Cancer Mother     Heart Disease Father     Stroke Brother     Colon Polyps Neg Hx     Esophageal Cancer Neg Hx     Liver Cancer Neg Hx     Liver Disease Neg Hx     Rectal Cancer Neg Hx     Stomach Cancer Neg Hx         Current Outpatient Medications   Medication Sig Dispense Refill    losartan (COZAAR) 50 MG tablet Take 1 tablet by mouth daily 90 tablet 3    oxybutynin (DITROPAN) 5 MG tablet TAKE 1 TABLET BY MOUTH TWO TIMES A  tablet 3    pravastatin (PRAVACHOL) 80 MG tablet TAKE 1 TABLET BY MOUTH AT BEDTIME 90 tablet 3    pantoprazole (PROTONIX) 40 MG tablet Take 2 tablets by mouth daily 180 tablet 3    triamterene-hydrochlorothiazide (MAXZIDE-25) 37.5-25 MG per tablet Take 0.5 tablets by mouth daily 45 tablet 3    calcium carbonate (OSCAL) 500 MG TABS tablet Take 500 mg by mouth daily      traZODone (DESYREL) 50 MG tablet Take 1 tablet by mouth nightly 30 tablet 5    allopurinol (ZYLOPRIM) 100 MG tablet Take 100 mg by mouth daily      aspirin 81 MG EC tablet Take 81 mg by mouth daily      Cholecalciferol (VITAMIN D) 2000 units CAPS capsule Take by mouth       No current facility-administered medications for this visit. Patient Active Problem List   Diagnosis    Wrist pain, right    Distal radius fracture    Fall from other slipping, tripping, or stumbling    Hypertension    Pneumonia    L[facility]A (acute kidney injury) ([facility] [address]    Metabolic acidosis, increased anion gap (IAG)        Review of Systems   Constitutional: Negative for activity change, appetite change, chills, diaphoresis, fatigue, fever and unexpected weight change. HENT: Negative for congestion, ear pain, hearing loss, mouth sores, nosebleeds, postnasal drip, rhinorrhea, sinus pressure, sinus pain, sneezing, sore throat, tinnitus, trouble swallowing and voice change. Eyes: Negative for photophobia, pain, discharge, redness, itching and visual disturbance. Respiratory: Negative for cough, chest tightness, shortness of breath and wheezing. Cardiovascular: Negative for chest pain, palpitations and leg swelling. Gastrointestinal: Negative for abdominal distention, abdominal pain, anal bleeding, blood in stool, constipation, diarrhea, nausea, rectal pain and vomiting. Endocrine: Negative for cold intolerance, heat intolerance, polydipsia, polyphagia and polyuria. Genitourinary: Negative for difficulty urinating, dysuria, flank pain, frequency, hematuria and urgency. Musculoskeletal: Negative for arthralgias, back pain, gait problem, joint swelling, myalgias, neck pain and neck stiffness. Left foot swelling   Skin: Negative for color change, pallor, rash and wound. Allergic/Immunologic: Negative for environmental allergies, food allergies and immunocompromised state. Neurological: Negative for dizziness, tremors, seizures, syncope, facial asymmetry, speech difficulty, weakness, light-headedness, numbness and headaches. Hematological: Negative for adenopathy. Does not bruise/bleed easily. Psychiatric/Behavioral: Positive for sleep disturbance.  Negative for decreased concentration, dysphoric mood and hallucinations. BP (!) 162/74 (Site: Left Upper Arm, Position: Sitting)   Pulse 69   Resp 20   Ht 5' 5\" (1.651 m)   Wt 174 lb (78.9 kg)   SpO2 97%   BMI 28.96 kg/m²   Physical Exam   Constitutional: She is oriented to person, place, and time. She appears well-developed and well-nourished. No distress. HENT:   Head: Normocephalic and atraumatic. Right Ear: External ear normal.   Left Ear: External ear normal.   Nose: Nose normal.   Mouth/Throat: Oropharynx is clear and moist. No oropharyngeal exudate. Eyes: Pupils are equal, round, and reactive to light. Conjunctivae and EOM are normal. Right eye exhibits no discharge. Left eye exhibits no discharge. No scleral icterus. Neck: Normal range of motion. Neck supple. No JVD present. No tracheal deviation present. No thyromegaly present. Cardiovascular: Normal rate, regular rhythm, normal heart sounds and intact distal pulses. Exam reveals no gallop and no friction rub. No murmur heard. Pulmonary/Chest: Effort normal and breath sounds normal. No respiratory distress. She has no wheezes. She has no rales. She exhibits no tenderness. Abdominal: Soft. Bowel sounds are normal. She exhibits no distension and no mass. There is no tenderness. There is no rebound and no guarding. Musculoskeletal: Normal range of motion. She exhibits no edema, tenderness or deformity. Left foot swelling   Lymphadenopathy:     She has no cervical adenopathy. Neurological: She is alert and oriented to person, place, and time. She has normal reflexes. She displays normal reflexes. No cranial nerve deficit. She exhibits normal muscle tone. Coordination normal.   Skin: Skin is warm and dry. No rash noted. She is not diaphoretic. No erythema. No pallor. Psychiatric: She has a normal mood and affect. Her behavior is normal. Judgment and thought content normal.   Nursing note and vitals reviewed.       No diagnosis found.    ASSESSMENT/PLAN:    65-year-old woman here for follow-up    1. Hypertension: Blood pressure still elevated. Increase her Cozaar to 50 mg by mouth daily    2. Insomnia: Continue trazodone    3. Swelling to left foot: Likely soft tissue injury. Patient denies any history of injury. There is nothing there to be concerning for DVT. If symptoms worsen, patient is to call her office        There are no diagnoses linked to this encounter. Return in about 1 month (around 6/17/2019). No orders of the defined types were placed in this encounter.       Sharon Josue MD

## 2019-06-18 ENCOUNTER — OFFICE VISIT (OUTPATIENT)
Dept: INTERNAL MEDICINE | Age: 79
End: 2019-06-18
Payer: MEDICARE

## 2019-06-18 VITALS
HEIGHT: 65 IN | SYSTOLIC BLOOD PRESSURE: 120 MMHG | HEART RATE: 69 BPM | WEIGHT: 174.5 LBS | OXYGEN SATURATION: 96 % | BODY MASS INDEX: 29.07 KG/M2 | DIASTOLIC BLOOD PRESSURE: 80 MMHG

## 2019-06-18 DIAGNOSIS — I10 ESSENTIAL HYPERTENSION: Primary | ICD-10-CM

## 2019-06-18 DIAGNOSIS — G47.00 INSOMNIA, UNSPECIFIED TYPE: ICD-10-CM

## 2019-06-18 PROCEDURE — 1090F PRES/ABSN URINE INCON ASSESS: CPT | Performed by: INTERNAL MEDICINE

## 2019-06-18 PROCEDURE — G8427 DOCREV CUR MEDS BY ELIG CLIN: HCPCS | Performed by: INTERNAL MEDICINE

## 2019-06-18 PROCEDURE — 4040F PNEUMOC VAC/ADMIN/RCVD: CPT | Performed by: INTERNAL MEDICINE

## 2019-06-18 PROCEDURE — 1036F TOBACCO NON-USER: CPT | Performed by: INTERNAL MEDICINE

## 2019-06-18 PROCEDURE — G8417 CALC BMI ABV UP PARAM F/U: HCPCS | Performed by: INTERNAL MEDICINE

## 2019-06-18 PROCEDURE — G8399 PT W/DXA RESULTS DOCUMENT: HCPCS | Performed by: INTERNAL MEDICINE

## 2019-06-18 PROCEDURE — 1123F ACP DISCUSS/DSCN MKR DOCD: CPT | Performed by: INTERNAL MEDICINE

## 2019-06-18 PROCEDURE — 99213 OFFICE O/P EST LOW 20 MIN: CPT | Performed by: INTERNAL MEDICINE

## 2019-06-18 NOTE — PATIENT INSTRUCTIONS

## 2019-06-22 ASSESSMENT — ENCOUNTER SYMPTOMS
ANAL BLEEDING: 0
PHOTOPHOBIA: 0
SORE THROAT: 0
ABDOMINAL PAIN: 0
COLOR CHANGE: 0
COUGH: 0
NAUSEA: 0
VOMITING: 0
BLOOD IN STOOL: 0
WHEEZING: 0
CHEST TIGHTNESS: 0
EYE ITCHING: 0
BACK PAIN: 0
EYE DISCHARGE: 0
EYE PAIN: 0
DIARRHEA: 0
SINUS PAIN: 0
RECTAL PAIN: 0
SINUS PRESSURE: 0
EYE REDNESS: 0
CONSTIPATION: 0
ABDOMINAL DISTENTION: 0
VOICE CHANGE: 0
RHINORRHEA: 0
SHORTNESS OF BREATH: 0
TROUBLE SWALLOWING: 0

## 2019-06-22 NOTE — PROGRESS NOTES
Chief Complaint   Patient presents with    1 Month Follow-Up    Hypertension     She has not been checking her blood pressures at home.  Insomnia     Trazodone did not help her with sleeping. HPI: Tammy Hernandez is a 77 y.o. female is here for ration of hypertension. Her blood pressure is much improved. She denies any complaints of chest pain, chest pressure or shortness of breath. States that trazodone did not help with her insomnia. However, she is trying some over-the-counter supplements, this seemed to work fairly well. She has no major concerns or complaints today.     Past Medical History:   Diagnosis Date    Acid reflux     [facility]    HIGH CHOLESTEROL     Hypertension     Urinary incontinence     UTI (urinary tract infection)     Vertigo       Past Surgical History:   Procedure Laterality Date    CHOLECYSTECTOMY      COLONOSCOPY      Dr Ava Stuart       Social History     Socioeconomic History    Marital status:      Spouse name: Not on file    Number of children: Not on file    Years of education: Not on file    Highest education level: Not on file   Occupational History    Not on file   Social Needs    Financial resource strain: Not on file    Food insecurity:     Worry: Not on file     Inability: Not on file    Transportation needs:     Medical: Not on file     Non-medical: Not on file   Tobacco Use    Smoking status: Former Smoker     Packs/day: 0.50     Years: 40.00     Pack years: 20.00     Types: Cigarettes     Start date:      Last attempt to quit:      Years since quittin.4    Smokeless tobacco: Never Used   Substance and Sexual Activity    Alcohol use: No    Drug use: No    Sexual activity: Not on file   Lifestyle    Physical activity:     Days per week: Not on file     Minutes per session: Not on file    Stress: Not on file   Relationships    Social connections:     Talks on phone: Not on file     Gets together: Not on file     Attends Hoahaoism service: Not on file     Active member of club or organization: Not on file     Attends meetings of clubs or organizations: Not on file     Relationship status: Not on file    Intimate partner violence:     Fear of current or ex partner: Not on file     Emotionally abused: Not on file     Physically abused: Not on file     Forced sexual activity: Not on file   Other Topics Concern    Not on file   Social History Narrative    Not on file      Family History   Problem Relation Age of Onset    Cancer Mother     Colon Cancer Mother     Heart Disease Father     Stroke Brother     Colon Polyps Neg Hx     Esophageal Cancer Neg Hx     Liver Cancer Neg Hx     Liver Disease Neg Hx     Rectal Cancer Neg Hx     Stomach Cancer Neg Hx         Current Outpatient Medications   Medication Sig Dispense Refill    losartan (COZAAR) 50 MG tablet Take 1 tablet by mouth daily 90 tablet 3    oxybutynin (DITROPAN) 5 MG tablet TAKE 1 TABLET BY MOUTH TWO TIMES A  tablet 3    pravastatin (PRAVACHOL) 80 MG tablet TAKE 1 TABLET BY MOUTH AT BEDTIME 90 tablet 3    pantoprazole (PROTONIX) 40 MG tablet Take 2 tablets by mouth daily 180 tablet 3    triamterene-hydrochlorothiazide (MAXZIDE-25) 37.5-25 MG per tablet Take 0.5 tablets by mouth daily 45 tablet 3    calcium carbonate (OSCAL) 500 MG TABS tablet Take 500 mg by mouth daily      allopurinol (ZYLOPRIM) 100 MG tablet Take 100 mg by mouth daily      aspirin 81 MG EC tablet Take 81 mg by mouth daily      Cholecalciferol (VITAMIN D) 2000 units CAPS capsule Take by mouth      traZODone (DESYREL) 50 MG tablet Take 1 tablet by mouth nightly 30 tablet 5     No current facility-administered medications for this visit.          Patient Active Problem List   Diagnosis    Wrist pain, right    Distal radius fracture    Fall from other slipping, tripping, or stumbling    Hypertension    Pneumonia    LORA (acute kidney injury) ([facility] [address]    Metabolic acidosis, increased anion gap (IAG)        Review of Systems   Constitutional: Negative for activity change, appetite change, chills, diaphoresis, fatigue, fever and unexpected weight change. HENT: Negative for congestion, ear pain, hearing loss, mouth sores, nosebleeds, postnasal drip, rhinorrhea, sinus pressure, sinus pain, sneezing, sore throat, tinnitus, trouble swallowing and voice change. Eyes: Negative for photophobia, pain, discharge, redness, itching and visual disturbance. Respiratory: Negative for cough, chest tightness, shortness of breath and wheezing. Cardiovascular: Negative for chest pain, palpitations and leg swelling. Gastrointestinal: Negative for abdominal distention, abdominal pain, anal bleeding, blood in stool, constipation, diarrhea, nausea, rectal pain and vomiting. Endocrine: Negative for cold intolerance, heat intolerance, polydipsia, polyphagia and polyuria. Genitourinary: Negative for difficulty urinating, dysuria, flank pain, frequency, hematuria and urgency. Musculoskeletal: Negative for arthralgias, back pain, gait problem, joint swelling, myalgias, neck pain and neck stiffness. Skin: Negative for color change, pallor, rash and wound. Allergic/Immunologic: Negative for environmental allergies, food allergies and immunocompromised state. Neurological: Negative for dizziness, tremors, seizures, syncope, facial asymmetry, speech difficulty, weakness, light-headedness, numbness and headaches. Hematological: Negative for adenopathy. Does not bruise/bleed easily. Psychiatric/Behavioral: Positive for sleep disturbance. Negative for decreased concentration, dysphoric mood and hallucinations. /80   Pulse 69   Ht 5' 5\" (1.651 m)   Wt 174 lb 8 oz (79.2 kg)   SpO2 96%   BMI 29.04 kg/m²   Physical Exam   Constitutional: She is oriented to person, place, and time.  She appears well-developed and well-nourished. No distress. HENT:   Head: Normocephalic and atraumatic. Right Ear: External ear normal.   Left Ear: External ear normal.   Nose: Nose normal.   Mouth/Throat: Oropharynx is clear and moist. No oropharyngeal exudate. Eyes: Pupils are equal, round, and reactive to light. Conjunctivae and EOM are normal. Right eye exhibits no discharge. Left eye exhibits no discharge. No scleral icterus. Neck: Normal range of motion. Neck supple. No JVD present. No tracheal deviation present. No thyromegaly present. Cardiovascular: Normal rate, regular rhythm, normal heart sounds and intact distal pulses. Exam reveals no gallop and no friction rub. No murmur heard. Pulmonary/Chest: Effort normal and breath sounds normal. No respiratory distress. She has no wheezes. She has no rales. She exhibits no tenderness. Abdominal: Soft. Bowel sounds are normal. She exhibits no distension and no mass. There is no tenderness. There is no rebound and no guarding. Musculoskeletal: Normal range of motion. She exhibits no edema, tenderness or deformity. Lymphadenopathy:     She has no cervical adenopathy. Neurological: She is alert and oriented to person, place, and time. She has normal reflexes. She displays normal reflexes. No cranial nerve deficit. She exhibits normal muscle tone. Coordination normal.   Skin: Skin is warm and dry. No rash noted. She is not diaphoretic. No erythema. No pallor. Psychiatric: She has a normal mood and affect. Her behavior is normal. Judgment and thought content normal.   Nursing note and vitals reviewed. No diagnosis found. ASSESSMENT/PLAN:    50-year-old woman here for follow-up    1. Hypertension: Blood pressure much improved on current medication regimen    2. Insomnia: States trazodone did not work. She is been taking some over-the-counter supplements, that have given her some relief    There are no diagnoses linked to this encounter.       Return

## 2019-06-24 ENCOUNTER — HOSPITAL ENCOUNTER (OUTPATIENT)
Age: 79
Setting detail: SPECIMEN
Discharge: HOME | End: 2019-06-24
Payer: MEDICARE

## 2019-06-24 ENCOUNTER — ANESTHESIA EVENT (OUTPATIENT)
Dept: OPERATING ROOM | Age: 79
End: 2019-06-24

## 2019-06-24 ENCOUNTER — HOSPITAL ENCOUNTER (OUTPATIENT)
Age: 79
Setting detail: OUTPATIENT SURGERY
Discharge: HOME | End: 2019-06-24
Attending: INTERNAL MEDICINE | Admitting: INTERNAL MEDICINE
Payer: MEDICARE

## 2019-06-24 ENCOUNTER — APPOINTMENT (OUTPATIENT)
Dept: OPERATING ROOM | Age: 79
End: 2019-06-24

## 2019-06-24 ENCOUNTER — ANESTHESIA (OUTPATIENT)
Dept: OPERATING ROOM | Age: 79
End: 2019-06-24

## 2019-06-24 VITALS — SYSTOLIC BLOOD PRESSURE: 133 MMHG | DIASTOLIC BLOOD PRESSURE: 64 MMHG | OXYGEN SATURATION: 97 %

## 2019-06-24 VITALS
RESPIRATION RATE: 18 BRPM | HEART RATE: 75 BPM | WEIGHT: 170 LBS | OXYGEN SATURATION: 98 % | BODY MASS INDEX: 28.32 KG/M2 | HEIGHT: 65 IN | SYSTOLIC BLOOD PRESSURE: 132 MMHG | DIASTOLIC BLOOD PRESSURE: 67 MMHG

## 2019-06-24 PROCEDURE — G8918 PT W/O PREOP ORDER IV AB PRO: HCPCS

## 2019-06-24 PROCEDURE — 45385 COLONOSCOPY W/LESION REMOVAL: CPT

## 2019-06-24 PROCEDURE — 88305 TISSUE EXAM BY PATHOLOGIST: CPT

## 2019-06-24 PROCEDURE — G8907 PT DOC NO EVENTS ON DISCHARG: HCPCS

## 2019-06-24 PROCEDURE — 45385 COLONOSCOPY W/LESION REMOVAL: CPT | Performed by: INTERNAL MEDICINE

## 2019-06-24 RX ORDER — SODIUM CHLORIDE 9 MG/ML
INJECTION, SOLUTION INTRAVENOUS CONTINUOUS
Status: DISCONTINUED | OUTPATIENT
Start: 2019-06-24 | End: 2019-06-24 | Stop reason: HOSPADM

## 2019-06-24 RX ORDER — LIDOCAINE HYDROCHLORIDE 10 MG/ML
INJECTION, SOLUTION INFILTRATION; PERINEURAL PRN
Status: DISCONTINUED | OUTPATIENT
Start: 2019-06-24 | End: 2019-06-24 | Stop reason: SDUPTHER

## 2019-06-24 RX ORDER — PROPOFOL 10 MG/ML
INJECTION, EMULSION INTRAVENOUS PRN
Status: DISCONTINUED | OUTPATIENT
Start: 2019-06-24 | End: 2019-06-24 | Stop reason: SDUPTHER

## 2019-06-24 RX ADMIN — LIDOCAINE HYDROCHLORIDE 40 MG: 10 INJECTION, SOLUTION INFILTRATION; PERINEURAL at 08:24

## 2019-06-24 RX ADMIN — PROPOFOL 200 MG: 10 INJECTION, EMULSION INTRAVENOUS at 08:24

## 2019-06-24 RX ADMIN — SODIUM CHLORIDE: 9 INJECTION, SOLUTION INTRAVENOUS at 07:29

## 2019-06-24 NOTE — H&P
Patient Name: Dannielle Cochran  : 1940  MRN: [id number]  DATE: 19    Allergies: No Known Allergies     ENDOSCOPY  History and Physical    Procedure:    [] Diagnostic Colonoscopy       [x] Screening Colonoscopy  [] EGD      [] ERCP      [] EUS       [] Other    [x] Previous office notes/History and Physical reviewed from the patients chart. Please see EMR for further details of HPI. I have examined the patient's status immediately prior to the procedure and:      Indications/HPI:    []Abdominal Pain   []Cancer- GI/Lung     [x]Fhx of colon CA/polyps  []History of Polyps  []Barretts            []Melena  []Abnormal Imaging              []Dysphagia              []Persistent Pneumonia   []Anemia                            []Food Impaction        [x]History of Polyps  [] GI Bleed             []Pulmonary nodule/Mass   []Change in bowel habits []Heartburn/Reflux  []Rectal Bleed (BRBPR)  []Chest Pain - Non Cardiac []Heme (+) Stool []Ulcers  []Constipation  []Hemoptysis  []Varices  []Diarrhea  []Hypoxemia    []Nausea/Vomiting   []Screening   []Crohns/Colitis  []Other:     Anesthesia:   [x] MAC [] Moderate Sedation   [] General   [] None     ROS: 12 pt Review of Symptoms was negative unless mentioned above    Medications:   Prior to Admission medications    Medication Sig Start Date End Date Taking?  Authorizing Provider   losartan (COZAAR) 50 MG tablet Take 1 tablet by mouth daily 19  Yes Fiona Prara MD   oxybutynin (DITROPAN) 5 MG tablet TAKE 1 TABLET BY MOUTH TWO TIMES A DAY 19  Yes Fiona Parra MD   pravastatin (PRAVACHOL) 80 MG tablet TAKE 1 TABLET BY MOUTH AT BEDTIME 19  Yes Fiona Parra MD   pantoprazole (PROTONIX) 40 MG tablet Take 2 tablets by mouth daily 19  Yes Fiona Parra MD   triamterene-hydrochlorothiazide (MAXZIDE-25) 37.5-25 MG per tablet Take 0.5 tablets by mouth daily 19  Yes Fiona Parra MD   calcium carbonate (OSCAL) 500 MG TABS tablet Take 500 at 7:53 AM    I personally performed the services described in this documentation as scribed by Lourdes Knox, and it appears accurate and complete.      Anette Hayes MD  6/24/2019

## 2019-06-24 NOTE — ANESTHESIA PRE PROCEDURE
Hypertension I10    Pneumonia J18.9    LORA (acute kidney injury) ([facility] M03.0    Metabolic acidosis, increased anion gap (IAG) E87.2       Past Medical History:        Diagnosis Date    Acid reflux     LORA (acute kidney injury) ([facility] 2018    HIGH CHOLESTEROL     Hypertension     Urinary incontinence     UTI (urinary tract infection)     Vertigo        Past Surgical History:        Procedure Laterality Date   [facility]      Dr Ki Triplett        Social History:    Social History     Tobacco Use    Smoking status: Former Smoker     Packs/day: 0.50     Years: 40.00     Pack years: 20.00     Types: Cigarettes     Start date:      Last attempt to quit:      Years since quittin.4    Smokeless tobacco: Never Used   Substance Use Topics    Alcohol use: No                                Counseling given: Not Answered      Vital Signs (Current):   Vitals:    19 0723   BP: (!) 121/49   Pulse: 73   Resp: 18   SpO2: 97%   Weight: 170 lb (77.1 kg)   Height: 5' 5\" (1.651 m)                                              BP Readings from Last 3 Encounters:   19 (!) 121/49   19 120/80   19 128/62       NPO Status: Time of last liquid consumption:                         Time of last solid consumption:                         Date of last liquid consumption: 19                        Date of last solid food consumption: 19    BMI:   Wt Readings from Last 3 Encounters:   19 170 lb (77.1 kg)   19 174 lb 8 oz (79.2 kg)   19 174 lb (78.9 kg)     Body mass index is 28.29 kg/m².     CBC:   Lab Results   Component Value Date    WBC 5.5 2019    RBC 4.82 2019    HGB 13.6 2019    HCT 43.9 2019    MCV 91.1 2019    RDW 13.4 2019     2019       CMP:   Lab Results   Component Value Date     2019    K 4.1 2019    K 3.0 07/29/2018     05/03/2019    CO2 26 05/03/2019    BUN 33 05/03/2019    CREATININE 1.4 05/03/2019    LABGLOM 36 05/03/2019    GLUCOSE 93 05/03/2019    PROT 7.0 05/03/2019    CALCIUM 10.2 05/03/2019    BILITOT 0.6 05/03/2019    ALKPHOS 69 05/03/2019    AST 14 05/03/2019    ALT 9 05/03/2019       POC Tests: No results for input(s): POCGLU, POCNA, POCK, POCCL, POCBUN, POCHEMO, POCHCT in the last 72 hours. Coags:   Lab Results   Component Value Date    PROTIME 13.0 08/11/2015    INR 1.01 08/11/2015       HCG (If Applicable): No results found for: PREGTESTUR, PREGSERUM, HCG, HCGQUANT     ABGs: No results found for: PHART, PO2ART, AFK3UQD, KOT2MBG, BEART, G1UAMOZL     Type & Screen (If Applicable):  No results found for: [facility]    Anesthesia Evaluation  Patient summary reviewed and Nursing notes reviewed no history of anesthetic complications:   Airway: Mallampati: II  TM distance: >3 FB   Neck ROM: full  Mouth opening: < 3 FB Dental: normal exam         Pulmonary:normal exam                              ROS comment: Quit 2003   Cardiovascular:    (+) hypertension:, hyperlipidemia         Beta Blocker:  Not on Beta Blocker         Neuro/Psych:   Negative Neuro/Psych ROS              GI/Hepatic/Renal:   (+) GERD:, renal disease: CRI,           Endo/Other: Negative Endo/Other ROS                    Abdominal:           Vascular: negative vascular ROS. Anesthesia Plan      general and TIVA     ASA 2       Induction: intravenous. Anesthetic plan and risks discussed with patient.                       DAVIDSON Underwood - CRNA   6/24/2019

## 2019-06-24 NOTE — ANESTHESIA POSTPROCEDURE EVALUATION
Department of Anesthesiology  Postprocedure Note    Patient: Marybeth Watkins  MRN: [id number]  YOB: 1940  Date of evaluation: 6/24/2019  Time:  8:47 AM     Procedure Summary     Date:  06/24/19 Room / Location:  [facility] ENDO 01 / [facility] OR    Anesthesia Start:  0326 Anesthesia Stop:      Procedure:  COLORECTAL CANCER SCREENING, HIGH RISK (N/A ) Diagnosis:  (SCREEN, HX CLN POLYPS)    Surgeon:  Sandy Ruiz MD Responsible Provider:  DAVIDSON Brown CRNA    Anesthesia Type:  general, TIVA ASA Status:  2          Anesthesia Type: general, TIVA    Thiago Phase I:      Thiago Phase II:      Last vitals: Reviewed and per EMR flowsheets.        Anesthesia Post Evaluation    Patient location during evaluation: bedside  Patient participation: complete - patient participated  Level of consciousness: sleepy but conscious  Pain score: 0  Airway patency: patent  Nausea & Vomiting: no nausea and no vomiting  Complications: no  Cardiovascular status: hemodynamically stable and blood pressure returned to baseline  Respiratory status: acceptable and nasal cannula  Hydration status: stable

## 2019-06-24 NOTE — OP NOTE
Patient: Carletha Osler : 1940  [facility] Rec#: [id number] Acc#: [id number]   Primary Care Provider Isael Sawant MD    Date of Procedure:  2019    Endoscopist: Yg Green MD    Referring Provider: Isael Sawant MD, DAVIDSON Alves    Operation Performed: Colonoscopy with cold snare polypectomy    Indications: screening/hx of polyps    Anesthesia:  Sedation was administered by anesthesia who monitored the patient during the procedure. I met with Carletha Osler prior to procedure. We discussed the procedure itself, and I have discussed the risks of endoscopy (including-- but not limited to-- pain, discomfort, bleeding potentially requiring second endoscopic procedure and/or blood transfusion, organ perforation requiring operative repair, damage to organs near the colon, infection, aspiration, cardiopulmonary/allergic reaction), benefits, indications to endoscopy. Additionally, we discussed options other than colonoscopy. The patient expressed understanding. All questions answered. The patient decided to proceed with the procedure. Signed informed consent was placed on the chart. Blood Loss: minimal    Withdrawal time: > 6 min  Bowel Prep: adequate     Complications: no immediate complications    DESCRIPTION OF PROCEDURE:     A time out was performed. After written informed consent was obtained, the patient was placed in the left lateral position. The perianal area was inspected, and a digital rectal exam was performed. A rectal exam was performed: normal tone, no palpable lesions. At this point, a forward viewing Olympus colonoscope was inserted into the anus and carefully advanced to the cecum. The cecum was identified by the ileocecal valve and the appendiceal orifice. The colonoscope was then slowly withdrawn with careful inspection of the mucosa in a linear and circumferential fashion. The scope was retroflexed in the rectum.  Suction was utilized during the procedure to remove as

## 2019-09-23 RX ORDER — OXYBUTYNIN CHLORIDE 5 MG/1
TABLET ORAL
Qty: 60 TABLET | Refills: 3 | Status: SHIPPED | OUTPATIENT
Start: 2019-09-23 | End: 2019-11-11 | Stop reason: SDUPTHER

## 2019-10-09 DIAGNOSIS — E55.9 VITAMIN D DEFICIENCY: ICD-10-CM

## 2019-10-09 DIAGNOSIS — I10 ESSENTIAL HYPERTENSION: ICD-10-CM

## 2019-10-09 LAB
ALBUMIN SERPL-MCNC: 4.5 G/DL (ref 3.5–5.2)
ALP BLD-CCNC: 76 U/L (ref 35–104)
ALT SERPL-CCNC: 10 U/L (ref 5–33)
ANION GAP SERPL CALCULATED.3IONS-SCNC: 15 MMOL/L (ref 7–19)
AST SERPL-CCNC: 15 U/L (ref 5–32)
BASOPHILS ABSOLUTE: 0.1 K/UL (ref 0–0.2)
BASOPHILS RELATIVE PERCENT: 1.4 % (ref 0–1)
BILIRUB SERPL-MCNC: 0.4 MG/DL (ref 0.2–1.2)
BUN BLDV-MCNC: 38 MG/DL (ref 8–23)
CALCIUM SERPL-MCNC: 10.2 MG/DL (ref 8.8–10.2)
CHLORIDE BLD-SCNC: 106 MMOL/L (ref 98–111)
CHOLESTEROL, TOTAL: 160 MG/DL (ref 160–199)
CO2: 23 MMOL/L (ref 22–29)
CREAT SERPL-MCNC: 1.5 MG/DL (ref 0.5–0.9)
EOSINOPHILS ABSOLUTE: 0.1 K/UL (ref 0–0.6)
EOSINOPHILS RELATIVE PERCENT: 1.8 % (ref 0–5)
GFR NON-AFRICAN AMERICAN: 33
GLUCOSE BLD-MCNC: 98 MG/DL (ref 74–109)
HCT VFR BLD CALC: 45.5 % (ref 37–47)
HDLC SERPL-MCNC: 58 MG/DL (ref 65–121)
HEMOGLOBIN: 14.1 G/DL (ref 12–16)
IMMATURE GRANULOCYTES #: 0 K/UL
LDL CHOLESTEROL CALCULATED: 80 MG/DL
LYMPHOCYTES ABSOLUTE: 1.5 K/UL (ref 1.1–4.5)
LYMPHOCYTES RELATIVE PERCENT: 22.9 % (ref 20–40)
MCH RBC QN AUTO: 28.5 PG (ref 27–31)
MCHC RBC AUTO-ENTMCNC: 31 G/DL (ref 33–37)
MCV RBC AUTO: 91.9 FL (ref 81–99)
MONOCYTES ABSOLUTE: 0.6 K/UL (ref 0–0.9)
MONOCYTES RELATIVE PERCENT: 9.2 % (ref 0–10)
NEUTROPHILS ABSOLUTE: 4.2 K/UL (ref 1.5–7.5)
NEUTROPHILS RELATIVE PERCENT: 64.4 % (ref 50–65)
PDW BLD-RTO: 13.6 % (ref 11.5–14.5)
PLATELET # BLD: 223 K/UL (ref 130–400)
PMV BLD AUTO: 12.4 FL (ref 9.4–12.3)
POTASSIUM SERPL-SCNC: 4.5 MMOL/L (ref 3.5–5)
RBC # BLD: 4.95 M/UL (ref 4.2–5.4)
SODIUM BLD-SCNC: 144 MMOL/L (ref 136–145)
TOTAL PROTEIN: 7.3 G/DL (ref 6.6–8.7)
TRIGL SERPL-MCNC: 111 MG/DL (ref 0–149)
TSH SERPL DL<=0.05 MIU/L-ACNC: 3 UIU/ML (ref 0.27–4.2)
VITAMIN D 25-HYDROXY: 49.6 NG/ML
WBC # BLD: 6.5 K/UL (ref 4.8–10.8)

## 2019-11-11 ENCOUNTER — OFFICE VISIT (OUTPATIENT)
Dept: INTERNAL MEDICINE | Age: 79
End: 2019-11-11
Payer: MEDICARE

## 2019-11-11 VITALS
HEART RATE: 67 BPM | WEIGHT: 179.5 LBS | HEIGHT: 65 IN | SYSTOLIC BLOOD PRESSURE: 134 MMHG | BODY MASS INDEX: 29.91 KG/M2 | DIASTOLIC BLOOD PRESSURE: 70 MMHG

## 2019-11-11 DIAGNOSIS — K21.9 GASTROESOPHAGEAL REFLUX DISEASE WITHOUT ESOPHAGITIS: ICD-10-CM

## 2019-11-11 DIAGNOSIS — N18.9 CHRONIC KIDNEY DISEASE, UNSPECIFIED CKD STAGE: ICD-10-CM

## 2019-11-11 DIAGNOSIS — I10 ESSENTIAL HYPERTENSION: ICD-10-CM

## 2019-11-11 DIAGNOSIS — Z12.31 VISIT FOR SCREENING MAMMOGRAM: ICD-10-CM

## 2019-11-11 DIAGNOSIS — E78.5 HYPERLIPIDEMIA, UNSPECIFIED HYPERLIPIDEMIA TYPE: ICD-10-CM

## 2019-11-11 DIAGNOSIS — N32.81 OAB (OVERACTIVE BLADDER): ICD-10-CM

## 2019-11-11 DIAGNOSIS — Z00.00 ROUTINE ADULT HEALTH MAINTENANCE: Primary | ICD-10-CM

## 2019-11-11 DIAGNOSIS — E55.9 VITAMIN D DEFICIENCY: ICD-10-CM

## 2019-11-11 DIAGNOSIS — G47.00 INSOMNIA, UNSPECIFIED TYPE: ICD-10-CM

## 2019-11-11 DIAGNOSIS — M10.9 GOUT, UNSPECIFIED CAUSE, UNSPECIFIED CHRONICITY, UNSPECIFIED SITE: ICD-10-CM

## 2019-11-11 PROCEDURE — 1123F ACP DISCUSS/DSCN MKR DOCD: CPT | Performed by: INTERNAL MEDICINE

## 2019-11-11 PROCEDURE — 4040F PNEUMOC VAC/ADMIN/RCVD: CPT | Performed by: INTERNAL MEDICINE

## 2019-11-11 PROCEDURE — G0439 PPPS, SUBSEQ VISIT: HCPCS | Performed by: INTERNAL MEDICINE

## 2019-11-11 PROCEDURE — G8482 FLU IMMUNIZE ORDER/ADMIN: HCPCS | Performed by: INTERNAL MEDICINE

## 2019-11-11 RX ORDER — HYDROXYZINE HYDROCHLORIDE 10 MG/1
10 TABLET, FILM COATED ORAL NIGHTLY PRN
Qty: 30 TABLET | Refills: 1 | Status: SHIPPED | OUTPATIENT
Start: 2019-11-11 | End: 2019-11-21

## 2019-11-11 ASSESSMENT — PATIENT HEALTH QUESTIONNAIRE - PHQ9
SUM OF ALL RESPONSES TO PHQ QUESTIONS 1-9: 0
SUM OF ALL RESPONSES TO PHQ QUESTIONS 1-9: 0

## 2019-11-11 ASSESSMENT — LIFESTYLE VARIABLES: HOW OFTEN DO YOU HAVE A DRINK CONTAINING ALCOHOL: 0

## 2019-11-12 ASSESSMENT — ENCOUNTER SYMPTOMS
COLOR CHANGE: 0
SHORTNESS OF BREATH: 0
PHOTOPHOBIA: 0
SINUS PRESSURE: 0
VOICE CHANGE: 0
DIARRHEA: 1
NAUSEA: 0
CHEST TIGHTNESS: 0
WHEEZING: 0
RHINORRHEA: 0
ABDOMINAL DISTENTION: 0
SINUS PAIN: 0
EYE ITCHING: 0
SORE THROAT: 0
BACK PAIN: 0
ANAL BLEEDING: 0
CONSTIPATION: 0
TROUBLE SWALLOWING: 0
RECTAL PAIN: 0
EYE DISCHARGE: 0
EYE REDNESS: 0
COUGH: 0
BLOOD IN STOOL: 0
ABDOMINAL PAIN: 0
VOMITING: 0
EYE PAIN: 0

## 2019-12-04 ENCOUNTER — HOSPITAL ENCOUNTER (OUTPATIENT)
Dept: WOMENS IMAGING | Age: 79
Discharge: HOME | End: 2019-12-04
Payer: MEDICARE

## 2019-12-04 DIAGNOSIS — Z12.31 VISIT FOR SCREENING MAMMOGRAM: ICD-10-CM

## 2019-12-04 PROCEDURE — 77063 BREAST TOMOSYNTHESIS BI: CPT

## 2020-02-20 ENCOUNTER — HOSPITAL ENCOUNTER (OUTPATIENT)
Dept: NON INVASIVE DIAGNOSTICS | Age: 80
Discharge: HOME | End: 2020-02-20
Payer: MEDICARE

## 2020-02-20 ENCOUNTER — OFFICE VISIT (OUTPATIENT)
Dept: URGENT CARE | Age: 80
End: 2020-02-20
Payer: MEDICARE

## 2020-02-20 ENCOUNTER — HOSPITAL ENCOUNTER (OUTPATIENT)
Dept: GENERAL RADIOLOGY | Age: 80
Discharge: HOME | End: 2020-02-20
Payer: MEDICARE

## 2020-02-20 VITALS
TEMPERATURE: 97.8 F | RESPIRATION RATE: 18 BRPM | HEART RATE: 69 BPM | BODY MASS INDEX: 29.09 KG/M2 | WEIGHT: 181 LBS | OXYGEN SATURATION: 97 % | SYSTOLIC BLOOD PRESSURE: 150 MMHG | DIASTOLIC BLOOD PRESSURE: 72 MMHG | HEIGHT: 66 IN

## 2020-02-20 PROCEDURE — 93971 EXTREMITY STUDY: CPT

## 2020-02-20 PROCEDURE — 99213 OFFICE O/P EST LOW 20 MIN: CPT | Performed by: NURSE PRACTITIONER

## 2020-02-20 PROCEDURE — 73630 X-RAY EXAM OF FOOT: CPT

## 2020-02-20 PROCEDURE — 73610 X-RAY EXAM OF ANKLE: CPT

## 2020-02-20 ASSESSMENT — ENCOUNTER SYMPTOMS
VOMITING: 0
NAUSEA: 0
ABDOMINAL PAIN: 0
DIARRHEA: 0

## 2020-02-20 ASSESSMENT — VISUAL ACUITY: OU: 1

## 2020-02-20 NOTE — PROGRESS NOTES
History     Tobacco Use    Smoking status: Former Smoker     Packs/day: 0.50     Years: 40.00     Pack years: 20.00     Types: Cigarettes     Start date:      Last attempt to quit:      Years since quittin.1    Smokeless tobacco: Never Used   Substance Use Topics    Alcohol use: No      Current Outpatient Medications   Medication Sig Dispense Refill    losartan (COZAAR) 50 MG tablet Take 1 tablet by mouth daily 90 tablet 3    oxybutynin (DITROPAN) 5 MG tablet TAKE 1 TABLET BY MOUTH TWO TIMES A  tablet 3    pravastatin (PRAVACHOL) 80 MG tablet TAKE 1 TABLET BY MOUTH AT BEDTIME 90 tablet 3    pantoprazole (PROTONIX) 40 MG tablet Take 2 tablets by mouth daily (Patient taking differently: Take 40 mg by mouth daily ) 180 tablet 3    triamterene-hydrochlorothiazide (MAXZIDE-25) 37.5-25 MG per tablet Take 0.5 tablets by mouth daily 45 tablet 3    calcium carbonate (OSCAL) 500 MG TABS tablet Take 500 mg by mouth daily      traZODone (DESYREL) 50 MG tablet Take 1 tablet by mouth nightly 30 tablet 5    allopurinol (ZYLOPRIM) 100 MG tablet Take 100 mg by mouth daily      aspirin 81 MG EC tablet Take 81 mg by mouth daily      Cholecalciferol (VITAMIN D) 2000 units CAPS capsule Take by mouth       No current facility-administered medications for this visit.       No Known Allergies    Health Maintenance   Topic Date Due    DTaP/Tdap/Td vaccine (1 - Tdap) 1950    Lipid screen  10/09/2020    Potassium monitoring  10/09/2020    Creatinine monitoring  10/09/2020    Annual Wellness Visit (AWV)  2020    DEXA (modify frequency per FRAX score)  2020    Colon cancer screen colonoscopy  2024    Flu vaccine  Completed    Shingles Vaccine  Completed    Pneumococcal 65+ years Vaccine  Completed    Hepatitis A vaccine  Aged Out    Hepatitis B vaccine  Aged Out    Hib vaccine  Aged Out    Meningococcal (ACWY) vaccine  Aged Out       Subjective:     Review of Systems Behavior is cooperative. BP (!) 150/72   Pulse 69   Temp 97.8 °F (36.6 °C)   Resp 18   Ht 5' 6\" (1.676 m)   Wt 181 lb (82.1 kg)   SpO2 97%   BMI 29.21 kg/m²     :Assessment       Diagnosis Orders   1. Edema of left lower extremity  US DUP LOWER EXTREMITY LEFT CB    XR ANKLE LEFT (MIN 3 VIEWS)    XR FOOT LEFT (MIN 3 VIEWS)   2. Acute left ankle pain         :Plan      Orders Placed This Encounter   Procedures    US DUP LOWER EXTREMITY LEFT CB     Standing Status:   Future     Standing Expiration Date:   2/20/2021     Order Specific Question:   Reason for exam:     Answer:   left lower leg edema    XR ANKLE LEFT (MIN 3 VIEWS)     Standing Status:   Future     Number of Occurrences:   1     Standing Expiration Date:   2/20/2021     Order Specific Question:   Reason for exam:     Answer:   left ankle edema    XR FOOT LEFT (MIN 3 VIEWS)     Standing Status:   Future     Number of Occurrences:   1     Standing Expiration Date:   2/20/2021     Order Specific Question:   Reason for exam:     Answer:   left foot pain     Pt is scheduled for Venous doppler at 3 pm today. I have discussed that if US of left leg is normal I would recommend we get x-ray of left leg and ankle. Venous doppler is negative for DVT discussed with pt in office today. Return if symptoms worsen or fail to improve. No orders of the defined types were placed in this encounter. Left foot:   There is no evidence of acute fracture or dislocation calcaneal spurs  noted plantar fascial attachment and Achilles tendon insertion. . The  soft tissues are normal in appearance. The joint spaces are  maintained. IMPRESSION:  1. No acute abnormalities identified in the left foot. 2. Calcaneal spurs. Right ankle: The ankle mortise is congruent. The talar dome is intact. A small  corticated osseous fragment along the distal aspect of the medial  tibia likely represents sequela of remote trauma. No evidence of acute  fracture or dislocation. No suspicious bone lesion or periosteal  reaction. Small calcaneal enthesophyte. Diffuse circumferential soft  tissue swelling throughout the RIGHT lower calf and ankle is present. No soft tissue gas or radiopaque foreign body. IMPRESSION:  1. No acute osseous findings. 2.  Diffuse soft tissue edema/swelling. Patient Instructions   Recommend support stocking to left leg  Keep left foot elevated as much as possible  Make follow-up appt with Dr. Saima Pham regarding edema left leg       Patient given educational materials- see patient instructions. Discussed use, benefit, and side effects of prescribedmedications. All patient questions answered. Pt voiced understanding.        Electronically signed by DAVIDSON Lozoya CNP on 2/20/2020 at 4:10 PM

## 2020-02-20 NOTE — PATIENT INSTRUCTIONS
may include:  ? Sudden chest pain. ? Trouble breathing. ? Coughing up blood.    Call your doctor now or seek immediate medical care if:    · You have signs of a blood clot, such as:  ? Pain in your calf, back of the knee, thigh, or groin. ? Redness and swelling in your leg or groin.     · You have symptoms of infection, such as:  ? Increased pain, swelling, warmth, or redness. ? Red streaks or pus. ? A fever.    Watch closely for changes in your health, and be sure to contact your doctor if:    · Your swelling is getting worse.     · You have new or worsening pain in your legs.     · You do not get better as expected. Where can you learn more? Go to https://[email or web address] [email or web address] org and sign in to your Jumpstarter account. Enter L213 in the CNZZ box to learn more about \"Leg and Ankle Edema: Care Instructions. \"     If you do not have an account, please click on the \"Sign Up Now\" link. Current as of: June 26, 2019  Content Version: 12.3  © 7159-8708 Healthwise, Incorporated. Care instructions adapted under license by [facility] ([facility]). If you have questions about a medical condition or this instruction, always ask your healthcare professional. [address] any warranty or liability for your use of this information.

## 2020-02-21 ENCOUNTER — OFFICE VISIT (OUTPATIENT)
Dept: INTERNAL MEDICINE | Age: 80
End: 2020-02-21
Payer: MEDICARE

## 2020-02-21 ENCOUNTER — TELEPHONE (OUTPATIENT)
Dept: INTERNAL MEDICINE | Age: 80
End: 2020-02-21

## 2020-02-21 VITALS
HEART RATE: 74 BPM | SYSTOLIC BLOOD PRESSURE: 138 MMHG | OXYGEN SATURATION: 96 % | BODY MASS INDEX: 29.25 KG/M2 | RESPIRATION RATE: 20 BRPM | HEIGHT: 66 IN | WEIGHT: 182 LBS | DIASTOLIC BLOOD PRESSURE: 76 MMHG

## 2020-02-21 DIAGNOSIS — M25.572 LEFT ANKLE PAIN, UNSPECIFIED CHRONICITY: ICD-10-CM

## 2020-02-21 LAB — URIC ACID, SERUM: 5.7 MG/DL (ref 2.4–5.7)

## 2020-02-21 PROCEDURE — 4040F PNEUMOC VAC/ADMIN/RCVD: CPT | Performed by: INTERNAL MEDICINE

## 2020-02-21 PROCEDURE — 99213 OFFICE O/P EST LOW 20 MIN: CPT | Performed by: INTERNAL MEDICINE

## 2020-02-21 PROCEDURE — G8482 FLU IMMUNIZE ORDER/ADMIN: HCPCS | Performed by: INTERNAL MEDICINE

## 2020-02-21 PROCEDURE — G8399 PT W/DXA RESULTS DOCUMENT: HCPCS | Performed by: INTERNAL MEDICINE

## 2020-02-21 PROCEDURE — 1090F PRES/ABSN URINE INCON ASSESS: CPT | Performed by: INTERNAL MEDICINE

## 2020-02-21 PROCEDURE — 1036F TOBACCO NON-USER: CPT | Performed by: INTERNAL MEDICINE

## 2020-02-21 PROCEDURE — G8417 CALC BMI ABV UP PARAM F/U: HCPCS | Performed by: INTERNAL MEDICINE

## 2020-02-21 PROCEDURE — 1123F ACP DISCUSS/DSCN MKR DOCD: CPT | Performed by: INTERNAL MEDICINE

## 2020-02-21 PROCEDURE — G8427 DOCREV CUR MEDS BY ELIG CLIN: HCPCS | Performed by: INTERNAL MEDICINE

## 2020-02-21 RX ORDER — METHYLPREDNISOLONE 4 MG/1
TABLET ORAL
Qty: 1 KIT | Refills: 0 | Status: SHIPPED | OUTPATIENT
Start: 2020-02-21 | End: 2020-02-27

## 2020-02-21 RX ORDER — PANTOPRAZOLE SODIUM 40 MG/1
40 TABLET, DELAYED RELEASE ORAL DAILY
COMMUNITY
End: 2020-07-13

## 2020-02-21 ASSESSMENT — ENCOUNTER SYMPTOMS
EYE DISCHARGE: 0
RHINORRHEA: 0
VOMITING: 0
VOICE CHANGE: 0
PHOTOPHOBIA: 0
SINUS PAIN: 0
EYE REDNESS: 0
BACK PAIN: 0
COLOR CHANGE: 0
COUGH: 0
DIARRHEA: 0
CONSTIPATION: 0
NAUSEA: 0
SINUS PRESSURE: 0
EYE PAIN: 0
ABDOMINAL DISTENTION: 0
TROUBLE SWALLOWING: 0
ABDOMINAL PAIN: 0
BLOOD IN STOOL: 0
CHEST TIGHTNESS: 0
ANAL BLEEDING: 0
RECTAL PAIN: 0
SORE THROAT: 0
SHORTNESS OF BREATH: 0
EYE ITCHING: 0
WHEEZING: 0

## 2020-02-21 ASSESSMENT — PATIENT HEALTH QUESTIONNAIRE - PHQ9
SUM OF ALL RESPONSES TO PHQ9 QUESTIONS 1 & 2: 0
2. FEELING DOWN, DEPRESSED OR HOPELESS: 0
SUM OF ALL RESPONSES TO PHQ QUESTIONS 1-9: 0
SUM OF ALL RESPONSES TO PHQ QUESTIONS 1-9: 0
1. LITTLE INTEREST OR PLEASURE IN DOING THINGS: 0

## 2020-02-21 NOTE — PROGRESS NOTES
None    Stress: None   Relationships    Social connections:     Talks on phone: None     Gets together: None     Attends [employer] service: None     Active member of club or organization: None     Attends meetings of clubs or organizations: None     Relationship status: None    Intimate partner violence:     Fear of current or ex partner: None     Emotionally abused: None     Physically abused: None     Forced sexual activity: None   Other Topics Concern    None   Social History Narrative    None      Family History   Problem Relation Age of Onset    Cancer Mother     Colon Cancer Mother     Heart Disease Father     Stroke Brother     Colon Polyps Neg Hx     Esophageal Cancer Neg Hx     Liver Cancer Neg Hx     Liver Disease Neg Hx     Rectal Cancer Neg Hx     Stomach Cancer Neg Hx         Current Outpatient Medications   Medication Sig Dispense Refill    pantoprazole (PROTONIX) 40 MG tablet Take 40 mg by mouth daily      methylPREDNISolone (MEDROL DOSEPACK) 4 MG tablet Take by mouth. 1 kit 0    losartan (COZAAR) 50 MG tablet Take 1 tablet by mouth daily 90 tablet 3    oxybutynin (DITROPAN) 5 MG tablet TAKE 1 TABLET BY MOUTH TWO TIMES A  tablet 3    pravastatin (PRAVACHOL) 80 MG tablet TAKE 1 TABLET BY MOUTH AT BEDTIME 90 tablet 3    triamterene-hydrochlorothiazide (MAXZIDE-25) 37.5-25 MG per tablet Take 0.5 tablets by mouth daily 45 tablet 3    calcium carbonate (OSCAL) 500 MG TABS tablet Take 500 mg by mouth daily      allopurinol (ZYLOPRIM) 100 MG tablet Take 100 mg by mouth daily      aspirin 81 MG EC tablet Take 81 mg by mouth daily      Cholecalciferol (VITAMIN D) 2000 units CAPS capsule Take by mouth      traZODone (DESYREL) 50 MG tablet Take 1 tablet by mouth nightly (Patient not taking: Reported on 2/21/2020) 30 tablet 5     No current facility-administered medications for this visit.          Patient Active Problem List   Diagnosis    Wrist pain, right    Distal radius kg/m²   Physical Exam  Vitals signs and nursing note reviewed. Constitutional:       General: She is not in acute distress. Appearance: Normal appearance. She is well-developed and normal weight. She is not ill-appearing, toxic-appearing or diaphoretic. HENT:      Head: Normocephalic and atraumatic. Right Ear: Tympanic membrane, ear canal and external ear normal. There is no impacted cerumen. Left Ear: Tympanic membrane, ear canal and external ear normal. There is no impacted cerumen. Nose: No congestion. Mouth/Throat:      Mouth: Mucous membranes are moist.      Pharynx: Oropharynx is clear. No oropharyngeal exudate or posterior oropharyngeal erythema. Eyes:      General: No scleral icterus. Right eye: No discharge. Left eye: No discharge. Extraocular Movements: Extraocular movements intact. Conjunctiva/sclera: Conjunctivae normal.      Pupils: Pupils are equal, round, and reactive to light. Neck:      Musculoskeletal: Normal range of motion and neck supple. Thyroid: No thyromegaly. Vascular: No JVD. Trachea: No tracheal deviation. Cardiovascular:      Rate and Rhythm: Normal rate and regular rhythm. Pulses: Normal pulses. Heart sounds: Normal heart sounds. No murmur. No friction rub. No gallop. Pulmonary:      Effort: Pulmonary effort is normal. No respiratory distress. Breath sounds: Normal breath sounds. No wheezing or rales. Chest:      Chest wall: No tenderness. Abdominal:      General: Bowel sounds are normal. There is no distension. Palpations: Abdomen is soft. There is no mass. Tenderness: There is no abdominal tenderness. There is no guarding or rebound. Musculoskeletal: Normal range of motion. General: No swelling, tenderness, deformity or signs of injury. Right lower leg: No edema. Left lower leg: No edema. Comments:  The left ankle is swollen and tender to the touch Lymphadenopathy:      Cervical: No cervical adenopathy. Skin:     General: Skin is warm and dry. Coloration: Skin is not jaundiced or pale. Findings: No bruising, erythema, lesion or rash. Neurological:      General: No focal deficit present. Mental Status: She is alert and oriented to person, place, and time. Mental status is at baseline. Cranial Nerves: No cranial nerve deficit. Sensory: No sensory deficit. Motor: No weakness or abnormal muscle tone. Coordination: Coordination normal.      Gait: Gait normal.      Deep Tendon Reflexes: Reflexes are normal and symmetric. Reflexes normal.   Psychiatric:         Mood and Affect: Mood normal.         Behavior: Behavior normal.         Thought Content: Thought content normal.         Judgment: Judgment normal.         1. Left ankle pain, unspecified chronicity        ASSESSMENT/PLAN:      [de-identified]year-old woman here for evaluation of left ankle pain and swelling    1. Left ankle pain and swelling: Uncertain etiology. Work-up is been negative thus far. We will check a uric acid level. Medrol Dosepak has been prescribed. If symptoms persist, she will need a an MRI of the foot and ankle. Jennifer Anguiano was seen today for ankle pain. Diagnoses and all orders for this visit:    Left ankle pain, unspecified chronicity  -     Uric Acid; Future    Other orders  -     methylPREDNISolone (MEDROL DOSEPACK) 4 MG tablet; Take by mouth. No follow-ups on file.      Orders Placed This Encounter   Procedures    Uric Acid     Standing Status:   Future     Standing Expiration Date:   2/21/2021       Berlin De La Cruz MD

## 2020-02-21 NOTE — PATIENT INSTRUCTIONS
Patient Education        Foot Pain: Care Instructions  Your Care Instructions  Foot injuries that cause pain and swelling are fairly common. Almost all sports or home repair projects can cause a misstep that ends up as foot pain. Normal wear and tear, especially as you get older, also can cause foot pain. Most minor foot injuries will heal on their own, and home treatment is usually all you need to do. If you have a severe injury, you may need tests and treatment. Follow-up care is a key part of your treatment and safety. Be sure to make and go to all appointments, and call your doctor if you are having problems. It's also a good idea to know your test results and keep a list of the medicines you take. How can you care for yourself at home? · Take pain medicines exactly as directed. ? If the doctor gave you a prescription medicine for pain, take it as prescribed. ? If you are not taking a prescription pain medicine, ask your doctor if you can take an over-the-counter medicine. · Rest and protect your foot. Take a break from any activity that may cause pain. · Put ice or a cold pack on your foot for 10 to 20 minutes at a time. Put a thin cloth between the ice and your skin. · Prop up the sore foot on a pillow when you ice it or anytime you sit or lie down during the next 3 days. Try to keep it above the level of your heart. This will help reduce swelling. · Your doctor may recommend that you wrap your foot with an elastic bandage. Keep your foot wrapped for as long as your doctor advises. · If your doctor recommends crutches, use them as directed. · Wear roomy footwear. · As soon as pain and swelling end, begin gentle exercises of your foot. Your doctor can tell you which exercises will help. When should you call for help? Call 911 anytime you think you may need emergency care.  For example, call if:    · Your foot turns pale, white, blue, or cold.    Call your doctor now or seek immediate medical care if:    · You cannot move or stand on your foot.     · Your foot looks twisted or out of its normal position.     · Your foot is not stable when you step down.     · You have signs of infection, such as:  ? Increased pain, swelling, warmth, or redness. ? Red streaks leading from the sore area. ? Pus draining from a place on your foot. ? A fever.     · Your foot is numb or tingly.    Watch closely for changes in your health, and be sure to contact your doctor if:    · You do not get better as expected.     · You have bruises from an injury that last longer than 2 weeks. Where can you learn more? Go to [email or web address].[employer]. org and sign in to your Delivery Club account. Enter C494 in the Invoiceable box to learn more about \"Foot Pain: Care Instructions. \"     If you do not have an account, please click on the \"Sign Up Now\" link. Current as of: June 26, 2019  Content Version: 12.3  © 5050-0085 Healthwise, Incorporated. Care instructions adapted under license by [facility] ([facility]). If you have questions about a medical condition or this instruction, always ask your healthcare professional. [place] [address] any warranty or liability for your use of this information.

## 2020-02-21 NOTE — TELEPHONE ENCOUNTER
Result Notes for Uric Acid     Notes recorded by Bird Grider on 2/21/2020 at 5:14 PM CST  I left a message about her labs.  ------    Notes recorded by Vero Rajan MD on 2/21/2020 at 4:25 PM CST  Uric acid okay

## 2020-03-06 ENCOUNTER — TELEPHONE (OUTPATIENT)
Dept: INTERNAL MEDICINE | Age: 80
End: 2020-03-06

## 2020-03-06 NOTE — TELEPHONE ENCOUNTER
I spoke with the patient and she voiced understanding.  Do you want just an MRI of the ankle without contrast?

## 2020-03-10 ENCOUNTER — TELEPHONE (OUTPATIENT)
Dept: INTERNAL MEDICINE | Age: 80
End: 2020-03-10

## 2020-03-10 NOTE — TELEPHONE ENCOUNTER
Got the patient scheduled for Tuesday the 17th at 12:30 arrival for 1:00 at MRI building behind the hospital.     Patient advised understanding, no questions/concerns at this time

## 2020-03-17 ENCOUNTER — HOSPITAL ENCOUNTER (OUTPATIENT)
Dept: MRI IMAGING | Age: 80
Discharge: HOME | End: 2020-03-17
Payer: MEDICARE

## 2020-03-17 PROCEDURE — 73721 MRI JNT OF LWR EXTRE W/O DYE: CPT

## 2020-03-18 ENCOUNTER — TELEPHONE (OUTPATIENT)
Dept: INTERNAL MEDICINE | Age: 80
End: 2020-03-18

## 2020-03-18 NOTE — TELEPHONE ENCOUNTER
Result Notes for MRI ANKLE LEFT WO CONTRAST     Notes recorded by Bina Calles on 3/18/2020 at 3:32 PM CDT  Patient has been notified. If she has not received a call back in 2 weeks she will call us to follow up on referral.  ------    Notes recorded by Jhonny Pimentel MD on 3/17/2020 at 2:45 PM CDT  MRI shows joint effusion.  Refer to Ortho

## 2020-06-29 RX ORDER — PRAVASTATIN SODIUM 80 MG/1
TABLET ORAL
Qty: 90 TABLET | Refills: 3 | Status: SHIPPED | OUTPATIENT
Start: 2020-06-29 | End: 2021-01-01

## 2020-06-29 NOTE — TELEPHONE ENCOUNTER
Ginette Marsh called requesting a refill of the below medication which has been pended for you:     Requested Prescriptions     Pending Prescriptions Disp Refills    pravastatin (PRAVACHOL) 80 MG tablet [Pharmacy Med Name: PRAVASTATIN 80 MG TAB 80 TAB] 90 tablet 3     Sig: TAKE 1 TABLET BY MOUTH AT BEDTIME       Last Appointment Date: 2/21/2020  Next Appointment Date: 7/27/2020    No Known Allergies

## 2020-07-13 RX ORDER — PANTOPRAZOLE SODIUM 40 MG/1
TABLET, DELAYED RELEASE ORAL
Qty: 180 TABLET | Refills: 3 | Status: SHIPPED | OUTPATIENT
Start: 2020-07-13 | End: 2021-01-01

## 2020-07-13 NOTE — TELEPHONE ENCOUNTER
Heath Steen called requesting a refill of the below medication which has been pended for you:     Requested Prescriptions     Pending Prescriptions Disp Refills    pantoprazole (PROTONIX) 40 MG tablet [Pharmacy Med Name: PANTOPRAZOLE 40 MG T 40 TAB] 180 tablet 3     Sig: TAKE 2 TABLETS BY MOUTH DAILY       Last Appointment Date: 2/21/2020  Next Appointment Date: 7/27/2020    No Known Allergies

## 2020-07-17 DIAGNOSIS — I10 ESSENTIAL HYPERTENSION: ICD-10-CM

## 2020-07-17 DIAGNOSIS — E55.9 VITAMIN D DEFICIENCY: ICD-10-CM

## 2020-07-17 LAB
ALBUMIN SERPL-MCNC: 4.5 G/DL (ref 3.5–5.2)
ALP BLD-CCNC: 70 U/L (ref 35–104)
ALT SERPL-CCNC: 9 U/L (ref 5–33)
ANION GAP SERPL CALCULATED.3IONS-SCNC: 14 MMOL/L (ref 7–19)
AST SERPL-CCNC: 13 U/L (ref 5–32)
BASOPHILS ABSOLUTE: 0.1 K/UL (ref 0–0.2)
BASOPHILS RELATIVE PERCENT: 1.7 % (ref 0–1)
BILIRUB SERPL-MCNC: 0.5 MG/DL (ref 0.2–1.2)
BUN BLDV-MCNC: 33 MG/DL (ref 8–23)
CALCIUM SERPL-MCNC: 9.7 MG/DL (ref 8.8–10.2)
CHLORIDE BLD-SCNC: 107 MMOL/L (ref 98–111)
CHOLESTEROL, TOTAL: 171 MG/DL (ref 160–199)
CO2: 24 MMOL/L (ref 22–29)
CREAT SERPL-MCNC: 1.5 MG/DL (ref 0.5–0.9)
EOSINOPHILS ABSOLUTE: 0.2 K/UL (ref 0–0.6)
EOSINOPHILS RELATIVE PERCENT: 3.7 % (ref 0–5)
GFR AFRICAN AMERICAN: 40
GFR NON-AFRICAN AMERICAN: 33
GLUCOSE BLD-MCNC: 100 MG/DL (ref 74–109)
HCT VFR BLD CALC: 43.1 % (ref 37–47)
HDLC SERPL-MCNC: 50 MG/DL (ref 65–121)
HEMOGLOBIN: 13.6 G/DL (ref 12–16)
IMMATURE GRANULOCYTES #: 0 K/UL
LDL CHOLESTEROL CALCULATED: 85 MG/DL
LYMPHOCYTES ABSOLUTE: 1.6 K/UL (ref 1.1–4.5)
LYMPHOCYTES RELATIVE PERCENT: 24.4 % (ref 20–40)
MCH RBC QN AUTO: 29.1 PG (ref 27–31)
MCHC RBC AUTO-ENTMCNC: 31.6 G/DL (ref 33–37)
MCV RBC AUTO: 92.3 FL (ref 81–99)
MONOCYTES ABSOLUTE: 0.5 K/UL (ref 0–0.9)
MONOCYTES RELATIVE PERCENT: 7.6 % (ref 0–10)
NEUTROPHILS ABSOLUTE: 4 K/UL (ref 1.5–7.5)
NEUTROPHILS RELATIVE PERCENT: 62.1 % (ref 50–65)
PDW BLD-RTO: 13.3 % (ref 11.5–14.5)
PLATELET # BLD: 233 K/UL (ref 130–400)
PMV BLD AUTO: 12.4 FL (ref 9.4–12.3)
POTASSIUM SERPL-SCNC: 4.5 MMOL/L (ref 3.5–5)
RBC # BLD: 4.67 M/UL (ref 4.2–5.4)
SODIUM BLD-SCNC: 145 MMOL/L (ref 136–145)
TOTAL PROTEIN: 6.7 G/DL (ref 6.6–8.7)
TRIGL SERPL-MCNC: 182 MG/DL (ref 0–149)
TSH SERPL DL<=0.05 MIU/L-ACNC: 2.42 UIU/ML (ref 0.27–4.2)
VITAMIN D 25-HYDROXY: 40.5 NG/ML
WBC # BLD: 6.4 K/UL (ref 4.8–10.8)

## 2020-07-17 RX ORDER — LOSARTAN POTASSIUM 50 MG/1
50 TABLET ORAL DAILY
Qty: 90 TABLET | Refills: 3 | Status: SHIPPED | OUTPATIENT
Start: 2020-07-17 | End: 2021-01-01

## 2020-07-27 ENCOUNTER — OFFICE VISIT (OUTPATIENT)
Dept: INTERNAL MEDICINE | Age: 80
End: 2020-07-27
Payer: MEDICARE

## 2020-07-27 VITALS
DIASTOLIC BLOOD PRESSURE: 70 MMHG | HEIGHT: 66 IN | SYSTOLIC BLOOD PRESSURE: 128 MMHG | BODY MASS INDEX: 28.93 KG/M2 | WEIGHT: 180 LBS

## 2020-07-27 PROCEDURE — 1123F ACP DISCUSS/DSCN MKR DOCD: CPT | Performed by: INTERNAL MEDICINE

## 2020-07-27 PROCEDURE — 1090F PRES/ABSN URINE INCON ASSESS: CPT | Performed by: INTERNAL MEDICINE

## 2020-07-27 PROCEDURE — G8427 DOCREV CUR MEDS BY ELIG CLIN: HCPCS | Performed by: INTERNAL MEDICINE

## 2020-07-27 PROCEDURE — 99214 OFFICE O/P EST MOD 30 MIN: CPT | Performed by: INTERNAL MEDICINE

## 2020-07-27 PROCEDURE — 1036F TOBACCO NON-USER: CPT | Performed by: INTERNAL MEDICINE

## 2020-07-27 PROCEDURE — G8417 CALC BMI ABV UP PARAM F/U: HCPCS | Performed by: INTERNAL MEDICINE

## 2020-07-27 PROCEDURE — 4040F PNEUMOC VAC/ADMIN/RCVD: CPT | Performed by: INTERNAL MEDICINE

## 2020-07-27 PROCEDURE — G8399 PT W/DXA RESULTS DOCUMENT: HCPCS | Performed by: INTERNAL MEDICINE

## 2020-07-27 RX ORDER — TOBRAMYCIN 3 MG/ML
1 SOLUTION/ DROPS OPHTHALMIC EVERY 4 HOURS
Qty: 1 BOTTLE | Refills: 0 | Status: SHIPPED | OUTPATIENT
Start: 2020-07-27 | End: 2020-08-03

## 2020-07-27 NOTE — PROGRESS NOTES
Chief Complaint   Patient presents with    6 Month Follow-Up     lab review       HPI: Suzanne Laboy is a [de-identified] y.o. female is here for for follow-up of hypertension, acid reflux, overactive bladder and hyperlipidemia. She also has a history of gout. However, she has not had any gout flares. Her only concern today is that she has had some discharge in her right eye. Her sclera is slightly red. I will call in some Tobrex drops for this. Her blood pressures well controlled. She denies any complaints of chest pain, chest pressure or shortness of breath. She has not had any gout flares. Her reflux is well controlled. Her cholesterol did go up from  60-1 71.     Past Medical History:   Diagnosis Date    Acid reflux     LORA (acute kidney injury) ([address] 2018    HIGH CHOLESTEROL     Hypertension     Urinary incontinence     UTI (urinary tract infection)     Vertigo       Past Surgical History:   Procedure Laterality Date   Coralee Casey COLONOSCOPY      Dr Regina Rosas 2019    Dr SARAH Juarez-Diverticular disease-Tubular AP (-) dysplasia, 5 yr recall    HYSTERECTOMY      VARICOSE VEIN SURGERY      laser       Social History     Socioeconomic History    Marital status:      Spouse name: Radha Casiano Number of children: None    Years of education: None    Highest education level: None   Occupational History     Employer: RETIRED   Social Needs    Financial resource strain: None    Food insecurity     Worry: None     Inability: None    Transportation needs     Medical: None     Non-medical: None   Tobacco Use    Smoking status: Former Smoker     Packs/day: 0.50     Years: 40.00     Pack years: 20.00     Types: Cigarettes     Start date:      Last attempt to quit:      Years since quittin.5    Smokeless tobacco: Never Used   Substance and Sexual Activity    Alcohol use: No    Drug use: No    Sexual activity: None   Lifestyle    Physical activity Days per week: None     Minutes per session: None    Stress: None   Relationships    Social connections     Talks on phone: None     Gets together: None     Attends Sikh service: None     Active member of club or organization: None     Attends meetings of clubs or organizations: None     Relationship status: None    Intimate partner violence     Fear of current or ex partner: None     Emotionally abused: None     Physically abused: None     Forced sexual activity: None   Other Topics Concern    None   Social History Narrative    None      Family History   Problem Relation Age of Onset    Cancer Mother     Colon Cancer Mother     Heart Disease Father     Stroke Brother     Colon Polyps Neg Hx     Esophageal Cancer Neg Hx     Liver Cancer Neg Hx     Liver Disease Neg Hx     Rectal Cancer Neg Hx     Stomach Cancer Neg Hx         Current Outpatient Medications   Medication Sig Dispense Refill    tobramycin (TOBREX) 0.3 % ophthalmic solution Place 1 drop into the right eye every 4 hours for 7 days While awake 1 Bottle 0    losartan (COZAAR) 50 MG tablet TAKE 1 TABLET BY MOUTH DAILY 90 tablet 3    pantoprazole (PROTONIX) 40 MG tablet TAKE 2 TABLETS BY MOUTH DAILY 180 tablet 3    pravastatin (PRAVACHOL) 80 MG tablet TAKE 1 TABLET BY MOUTH AT BEDTIME 90 tablet 3    oxybutynin (DITROPAN) 5 MG tablet TAKE 1 TABLET BY MOUTH TWO TIMES A  tablet 3    triamterene-hydrochlorothiazide (MAXZIDE-25) 37.5-25 MG per tablet Take 0.5 tablets by mouth daily 45 tablet 3    calcium carbonate (OSCAL) 500 MG TABS tablet Take 500 mg by mouth daily      allopurinol (ZYLOPRIM) 100 MG tablet Take 100 mg by mouth daily      aspirin 81 MG EC tablet Take 81 mg by mouth daily      Cholecalciferol (VITAMIN D) 2000 units CAPS capsule Take by mouth       No current facility-administered medications for this visit.          Patient Active Problem List   Diagnosis    Wrist pain, right    Distal radius fracture    Fall from other slipping, tripping, or stumbling    Hypertension    Pneumonia    [facility]OR[facility] ([facility]) [address] [address]    Metabolic acidosis, increased anion gap (IAG)    Colorectal polyps        Review of Systems   Constitutional: Negative for activity change, appetite change, chills, diaphoresis, fatigue, fever and unexpected weight change. HENT: Negative for congestion, ear pain, hearing loss, mouth sores, nosebleeds, postnasal drip, rhinorrhea, sinus pressure, sinus pain, sneezing, sore throat, tinnitus, trouble swallowing and voice change. Eyes: Negative for photophobia, pain, discharge, redness, itching and visual disturbance. Respiratory: Negative for cough, chest tightness, shortness of breath and wheezing. Cardiovascular: Negative for chest pain, palpitations and leg swelling. Gastrointestinal: Negative for abdominal distention, abdominal pain, anal bleeding, blood in stool, constipation, diarrhea, nausea, rectal pain and vomiting. Endocrine: Negative for cold intolerance, heat intolerance, polydipsia, polyphagia and polyuria. Genitourinary: Negative for difficulty urinating, dysuria, flank pain, frequency, hematuria and urgency. Musculoskeletal: Negative for arthralgias, back pain, gait problem, joint swelling, myalgias, neck pain and neck stiffness. Skin: Negative for color change, pallor, rash and wound. Allergic/Immunologic: Negative for environmental allergies, food allergies and immunocompromised state. Neurological: Negative for dizziness, tremors, seizures, syncope, facial asymmetry, speech difficulty, weakness, light-headedness, numbness and headaches. Hematological: Negative for adenopathy. Does not bruise/bleed easily. Psychiatric/Behavioral: Negative for decreased concentration, dysphoric mood, hallucinations and sleep disturbance. /70   Ht 5' 6\" (1.676 m)   Wt 180 lb (81.6 kg)   BMI 29.05 kg/m²   Physical Exam  Vitals signs and nursing note reviewed. Constitutional:       General: She is not in acute distress. Appearance: Normal appearance. She is well-developed and normal weight. She is not ill-appearing, toxic-appearing or diaphoretic. HENT:      Head: Normocephalic and atraumatic. Right Ear: Tympanic membrane, ear canal and external ear normal. There is no impacted cerumen. Left Ear: Tympanic membrane, ear canal and external ear normal. There is no impacted cerumen. Nose: No congestion. Mouth/Throat:      Mouth: Mucous membranes are moist.      Pharynx: Oropharynx is clear. No oropharyngeal exudate or posterior oropharyngeal erythema. Eyes:      General: No scleral icterus. Right eye: No discharge. Left eye: No discharge. Extraocular Movements: Extraocular movements intact. Conjunctiva/sclera: Conjunctivae normal.      Pupils: Pupils are equal, round, and reactive to light. Neck:      Musculoskeletal: Normal range of motion and neck supple. Thyroid: No thyromegaly. Vascular: No JVD. Trachea: No tracheal deviation. Cardiovascular:      Rate and Rhythm: Normal rate and regular rhythm. Pulses: Normal pulses. Heart sounds: Normal heart sounds. No murmur. No friction rub. No gallop. Pulmonary:      Effort: Pulmonary effort is normal. No respiratory distress. Breath sounds: Normal breath sounds. No wheezing or rales. Chest:      Chest wall: No tenderness. Abdominal:      General: Bowel sounds are normal. There is no distension. Palpations: Abdomen is soft. There is no mass. Tenderness: There is no abdominal tenderness. There is no guarding or rebound. Musculoskeletal: Normal range of motion. General: No swelling, tenderness, deformity or signs of injury. Right lower leg: No edema. Left lower leg: No edema. Comments: The left ankle is swollen and tender to the touch   Lymphadenopathy:      Cervical: No cervical adenopathy.    Skin: esophagitis    Hyperlipidemia, unspecified hyperlipidemia type    Conjunctivitis, unspecified conjunctivitis type, unspecified laterality    Other orders  -     tobramycin (TOBREX) 0.3 % ophthalmic solution; Place 1 drop into the right eye every 4 hours for 7 days While awake          Return in about 6 months (around 1/27/2021), or medicare wellness.      Orders Placed This Encounter   Procedures    CBC Auto Differential     Fast 12 hours     Standing Status:   Future     Standing Expiration Date:   7/27/2021    Comprehensive Metabolic Panel     Fasting 12 hours     Standing Status:   Future     Standing Expiration Date:   7/27/2021    Lipid Panel     Standing Status:   Future     Standing Expiration Date:   7/27/2021     Order Specific Question:   Is Patient Fasting?/# of Hours     Answer:   12    TSH without Reflex     Fast 12 hours     Standing Status:   Future     Standing Expiration Date:   7/27/2021    Vitamin D 25 Hydroxy     Standing Status:   Future     Standing Expiration Date:   7/27/2021       Víctor Spain MD

## 2020-07-28 ASSESSMENT — ENCOUNTER SYMPTOMS
RHINORRHEA: 0
EYE DISCHARGE: 0
SINUS PRESSURE: 0
ABDOMINAL DISTENTION: 0
BACK PAIN: 0
NAUSEA: 0
EYE ITCHING: 0
BLOOD IN STOOL: 0
ANAL BLEEDING: 0
VOMITING: 0
RECTAL PAIN: 0
EYE REDNESS: 0
WHEEZING: 0
SHORTNESS OF BREATH: 0
TROUBLE SWALLOWING: 0
COUGH: 0
SINUS PAIN: 0
ABDOMINAL PAIN: 0
COLOR CHANGE: 0
SORE THROAT: 0
DIARRHEA: 0
CONSTIPATION: 0
PHOTOPHOBIA: 0
EYE PAIN: 0
CHEST TIGHTNESS: 0
VOICE CHANGE: 0

## 2020-08-10 RX ORDER — OXYBUTYNIN CHLORIDE 5 MG/1
TABLET ORAL
Qty: 180 TABLET | Refills: 3 | Status: SHIPPED | OUTPATIENT
Start: 2020-08-10 | End: 2021-01-01

## 2020-08-20 ENCOUNTER — TELEPHONE (OUTPATIENT)
Dept: INTERNAL MEDICINE | Age: 80
End: 2020-08-20

## 2020-08-20 DIAGNOSIS — R30.0 DYSURIA: ICD-10-CM

## 2020-08-20 LAB
BACTERIA: ABNORMAL /HPF
BILIRUBIN URINE: NEGATIVE
BLOOD, URINE: ABNORMAL
CLARITY: ABNORMAL
COLOR: ABNORMAL
CRYSTALS, UA: ABNORMAL /HPF
EPITHELIAL CELLS, UA: 3 /HPF (ref 0–5)
GLUCOSE URINE: NEGATIVE MG/DL
HYALINE CASTS: 4 /HPF (ref 0–8)
KETONES, URINE: NEGATIVE MG/DL
LEUKOCYTE ESTERASE, URINE: ABNORMAL
NITRITE, URINE: NEGATIVE
PH UA: 7 (ref 5–8)
PROTEIN UA: NEGATIVE MG/DL
RBC UA: 11 /HPF (ref 0–4)
SPECIFIC GRAVITY UA: 1.02 (ref 1–1.03)
UROBILINOGEN, URINE: 0.2 E.U./DL
WBC UA: 164 /HPF (ref 0–5)

## 2020-08-20 RX ORDER — NITROFURANTOIN 25; 75 MG/1; MG/1
100 CAPSULE ORAL 2 TIMES DAILY
Qty: 14 CAPSULE | Refills: 0 | Status: SHIPPED | OUTPATIENT
Start: 2020-08-20 | End: 2020-08-27

## 2020-08-20 NOTE — TELEPHONE ENCOUNTER
Pt thinks she has a UTI. She c/o urinary frequency and dysuria that started last night. She will come in for a UA today. [address]. Please advise.

## 2020-08-22 LAB
ORGANISM: ABNORMAL
URINE CULTURE, ROUTINE: ABNORMAL
URINE CULTURE, ROUTINE: ABNORMAL

## 2020-08-27 ENCOUNTER — OFFICE VISIT (OUTPATIENT)
Age: 80
End: 2020-08-27

## 2020-08-27 VITALS — TEMPERATURE: 97.1 F | OXYGEN SATURATION: 93 % | HEART RATE: 95 BPM

## 2020-08-27 NOTE — PATIENT INSTRUCTIONS
Preventing the Spread of Coronavirus Disease 2019 in Homes and Residential Communities   For the most recent information go to [email or web address]    Prevention steps for People with confirmed or suspected COVID-19 (including persons under investigation) who do not need to be hospitalized  and   People with confirmed COVID-19 who were hospitalized and determined to be medically stable to go home    Your healthcare provider and public health staff will evaluate whether you can be cared for at home. If it is determined that you do not need to be hospitalized and can be isolated at home, you will be monitored by staff from your local or state health department. You should follow the prevention steps below until a healthcare provider or local or state health department says you can return to your normal activities. Stay home except to get medical care  People who are mildly ill with COVID-19 are able to isolate at home during their illness. You should restrict activities outside your home, except for getting medical care. Do not go to work, school, or public areas. Avoid using public transportation, ride-sharing, or taxis. Separate yourself from other people and animals in your home  People: As much as possible, you should stay in a specific room and away from other people in your home. Also, you should use a separate bathroom, if available. Animals: You should restrict contact with pets and other animals while you are sick with COVID-19, just like you would around other people. Although there have not been reports of pets or other animals becoming sick with COVID-19, it is still recommended that people sick with COVID-19 limit contact with animals until more information is known about the virus. When possible, have another member of your household care for your animals while you are sick.  If you are sick with COVID-19, avoid contact with your pet, including petting, snuggling, being kissed or licked, and sharing food. If you must care for your pet or be around animals while you are sick, wash your hands before and after you interact with pets and wear a facemask. Call ahead before visiting your doctor  If you have a medical appointment, call the healthcare provider and tell them that you have or may have COVID-19. This will help the healthcare providers office take steps to keep other people from getting infected or exposed. Wear a facemask  You should wear a facemask when you are around other people (e.g., sharing a room or vehicle) or pets and before you enter a healthcare providers office. If you are not able to wear a facemask (for example, because it causes trouble breathing), then people who live with you should not stay in the same room with you, or they should wear a facemask if they enter your room. Cover your coughs and sneezes  Cover your mouth and nose with a tissue when you cough or sneeze. Throw used tissues in a lined trash can. Immediately wash your hands with soap and water for at least 20 seconds or, if soap and water are not available, clean your hands with an alcohol-based hand  that contains at least 60% alcohol. Clean your hands often  Wash your hands often with soap and water for at least 20 seconds, especially after blowing your nose, coughing, or sneezing; going to the bathroom; and before eating or preparing food. If soap and water are not readily available, use an alcohol-based hand  with at least 60% alcohol, covering all surfaces of your hands and rubbing them together until they feel dry. Soap and water are the best option if hands are visibly dirty. Avoid touching your eyes, nose, and mouth with unwashed hands. Avoid sharing personal household items  You should not share dishes, drinking glasses, cups, eating utensils, towels, or bedding with other people or pets in your home.  After using these items, they should be washed thoroughly with soap and water. Clean all high-touch surfaces everyday  High touch surfaces include counters, tabletops, doorknobs, bathroom fixtures, toilets, phones, keyboards, tablets, and bedside tables. Also, clean any surfaces that may have blood, stool, or body fluids on them. Use a household cleaning spray or wipe, according to the label instructions. Labels contain instructions for safe and effective use of the cleaning product including precautions you should take when applying the product, such as wearing gloves and making sure you have good ventilation during use of the product. Monitor your symptoms  Seek prompt medical attention if your illness is worsening (e.g., difficulty breathing). Before seeking care, call your healthcare provider and tell them that you have, or are being evaluated for, COVID-19. Put on a facemask before you enter the facility. These steps will help the healthcare providers office to keep other people in the office or waiting room from getting infected or exposed. Ask your healthcare provider to call the local or [facility] health department. Persons who are placed under active monitoring or facilitated self-monitoring should follow instructions provided by their local health department or occupational health professionals, as appropriate. When working with your local health department check their available hours. If you have a medical emergency and need to call 911, notify the dispatch personnel that you have, or are being evaluated for COVID-19. If possible, put on a facemask before emergency medical services arrive. Discontinuing home isolation  Patients with confirmed COVID-19 should remain under home isolation precautions until the risk of secondary transmission to others is thought to be low.  The decision to discontinue home isolation precautions should be made on a case-by-case basis, in consultation with healthcare providers and state and [facility] health departments. [employer] allows you to send messages to your doctor, view your test results, renew your prescriptions, schedule appointments, view visit notes, and more. How Do I Sign Up? 1. In your Internet browser, go to [email or web address]  2. Click on the Sign Up Now link in the Sign In box. You will see the New Member Sign Up page. 3. Enter your [employer] Access Code exactly as it appears below. You will not need to use this code after youve completed the sign-up process. If you do not sign up before the expiration date, you must request a new code. [employer] Access Code: Activation code not generated  Current [employer] Status: Active    4. Enter your Social Security Number ([id number]) and Date of Birth (mm/dd/yyyy) as indicated and click Submit. You will be taken to the next sign-up page. 5. Create a [employer] ID. This will be your [employer] login ID and cannot be changed, so think of one that is secure and easy to remember. 6. Create a [employer] password. You can change your password at any time. 7. Enter your Password Reset Question and Answer. This can be used at a later time if you forget your password. 8. Enter your e-mail address. You will receive e-mail notification when new information is available in [address] 9. Click Sign Up. You can now view your medical record. Additional Information  If you have questions, please contact the physician practice where you receive care. Remember, [employer] is NOT to be used for urgent needs. For medical emergencies, dial 911. For questions regarding your [employer] account call [phone]. If you have a clinical question, please call your doctor's office.

## 2020-08-27 NOTE — PROGRESS NOTES
2020    Christal Montañolo (:  1940) is a [de-identified] y.o. female, here requesting COVID-19 testing    History of Present Illness  Pt is here for COVID swab, not evaluated in this clinic    Vitals:    20 0921   Pulse: 95   Temp: 97.1 °F (36.2 °C)   SpO2: 93%       ASSESSMENT  Screening for COVID-19/ Viral disease    PLAN    COVID-19 sample collected and submitted  Patient given detailed CDC instructions contained within After Visit Summary       An  electronic signature was used to authenticate this note.     --DAVIDSON Payne - CNP on 2020 at 9:22 AM

## 2020-08-28 ENCOUNTER — TELEPHONE (OUTPATIENT)
Dept: INTERNAL MEDICINE | Age: 80
End: 2020-08-28

## 2020-08-28 LAB — SARS-COV-2, NAA: NOT DETECTED

## 2020-08-28 RX ORDER — METHYLPREDNISOLONE 4 MG/1
TABLET ORAL
Qty: 1 KIT | Refills: 0 | Status: SHIPPED | OUTPATIENT
Start: 2020-08-28 | End: 2020-09-03

## 2020-08-28 RX ORDER — DOXYCYCLINE HYCLATE 100 MG
100 TABLET ORAL 2 TIMES DAILY
Qty: 20 TABLET | Refills: 0 | Status: SHIPPED | OUTPATIENT
Start: 2020-08-28 | End: 2020-09-07

## 2020-08-28 NOTE — TELEPHONE ENCOUNTER
Camden Atwood called requesting a refill of the below medication which has been pended for you:     Requested Prescriptions     Pending Prescriptions Disp Refills    doxycycline hyclate (VIBRA-TABS) 100 MG tablet 20 tablet 0     Sig: Take 1 tablet by mouth 2 times daily for 10 days    methylPREDNISolone (MEDROL DOSEPACK) 4 MG tablet 1 kit 0     Sig: Take by mouth.        Last Appointment Date: 7/27/2020  Next Appointment Date: 1/20/2021    No Known Allergies

## 2020-08-28 NOTE — TELEPHONE ENCOUNTER
Pt c/o cough, dyspnea, and loss of appetite for days. She also complains of wheezing. She was tested for COVID yesterday. I advised it would take 3 days to get the results. She would like a CXR because she thinks she has pneumonia. She said they didn't order anything other than test her for COVID. Please advise.

## 2021-01-01 ENCOUNTER — ANESTHESIA EVENT (OUTPATIENT)
Dept: OPERATING ROOM | Age: 81
End: 2021-01-01

## 2021-01-01 ENCOUNTER — HOSPITAL ENCOUNTER (OUTPATIENT)
Dept: INFUSION THERAPY | Age: 81
Discharge: HOME | End: 2021-12-01
Payer: MEDICARE

## 2021-01-01 ENCOUNTER — OFFICE VISIT (OUTPATIENT)
Dept: HEMATOLOGY | Age: 81
End: 2021-01-01
Payer: MEDICARE

## 2021-01-01 ENCOUNTER — TELEPHONE (OUTPATIENT)
Dept: INFUSION THERAPY | Age: 81
End: 2021-01-01

## 2021-01-01 ENCOUNTER — OFFICE VISIT (OUTPATIENT)
Dept: INTERNAL MEDICINE | Age: 81
End: 2021-01-01
Payer: MEDICARE

## 2021-01-01 ENCOUNTER — CLINICAL DOCUMENTATION (OUTPATIENT)
Dept: HEMATOLOGY | Age: 81
End: 2021-01-01

## 2021-01-01 ENCOUNTER — OFFICE VISIT (OUTPATIENT)
Dept: SURGERY | Age: 81
End: 2021-01-01
Payer: MEDICARE

## 2021-01-01 ENCOUNTER — HOSPITAL ENCOUNTER (OUTPATIENT)
Dept: INFUSION THERAPY | Age: 81
Discharge: HOME | End: 2021-12-09
Payer: MEDICARE

## 2021-01-01 ENCOUNTER — HOSPITAL ENCOUNTER (OUTPATIENT)
Dept: INFUSION THERAPY | Age: 81
End: 2021-01-01
Payer: MEDICARE

## 2021-01-01 ENCOUNTER — HOSPITAL ENCOUNTER (OUTPATIENT)
Dept: INFUSION THERAPY | Age: 81
Discharge: HOME | End: 2021-12-30
Payer: MEDICARE

## 2021-01-01 ENCOUNTER — HOSPITAL ENCOUNTER (EMERGENCY)
Age: 81
Discharge: HOME | End: 2021-10-14
Attending: EMERGENCY MEDICINE
Payer: MEDICARE

## 2021-01-01 ENCOUNTER — HOSPITAL ENCOUNTER (OUTPATIENT)
Dept: WOMENS IMAGING | Age: 81
Discharge: HOME | End: 2021-08-13
Payer: MEDICARE

## 2021-01-01 ENCOUNTER — HOSPITAL ENCOUNTER (OUTPATIENT)
Dept: INFUSION THERAPY | Age: 81
Discharge: HOME | End: 2021-10-18
Payer: MEDICARE

## 2021-01-01 ENCOUNTER — HOSPITAL ENCOUNTER (OUTPATIENT)
Dept: INFUSION THERAPY | Age: 81
Discharge: HOME | End: 2021-11-18
Payer: MEDICARE

## 2021-01-01 ENCOUNTER — TELEPHONE (OUTPATIENT)
Dept: INTERNAL MEDICINE | Age: 81
End: 2021-01-01

## 2021-01-01 ENCOUNTER — HOSPITAL ENCOUNTER (OUTPATIENT)
Dept: INFUSION THERAPY | Age: 81
Discharge: HOME | End: 2021-11-11
Payer: MEDICARE

## 2021-01-01 ENCOUNTER — HOSPITAL ENCOUNTER (OUTPATIENT)
Dept: GENERAL RADIOLOGY | Age: 81
Discharge: HOME | End: 2021-08-30
Payer: MEDICARE

## 2021-01-01 ENCOUNTER — HOSPITAL ENCOUNTER (OUTPATIENT)
Dept: INFUSION THERAPY | Age: 81
Discharge: HOME | End: 2021-10-21
Payer: MEDICARE

## 2021-01-01 ENCOUNTER — OFFICE VISIT (OUTPATIENT)
Dept: URGENT CARE | Age: 81
End: 2021-01-01
Payer: MEDICARE

## 2021-01-01 ENCOUNTER — ANESTHESIA (OUTPATIENT)
Dept: OPERATING ROOM | Age: 81
End: 2021-01-01

## 2021-01-01 ENCOUNTER — HOSPITAL ENCOUNTER (OUTPATIENT)
Dept: NUCLEAR MEDICINE | Age: 81
Discharge: HOME | End: 2021-10-14
Payer: MEDICARE

## 2021-01-01 ENCOUNTER — HOSPITAL ENCOUNTER (OUTPATIENT)
Dept: INFUSION THERAPY | Age: 81
Discharge: HOME | End: 2021-12-10
Payer: MEDICARE

## 2021-01-01 ENCOUNTER — APPOINTMENT (OUTPATIENT)
Dept: INFUSION THERAPY | Age: 81
End: 2021-01-01
Payer: MEDICARE

## 2021-01-01 ENCOUNTER — HOSPITAL ENCOUNTER (OUTPATIENT)
Dept: INFUSION THERAPY | Age: 81
Discharge: HOME | End: 2021-12-23
Payer: MEDICARE

## 2021-01-01 ENCOUNTER — HOSPITAL ENCOUNTER (OUTPATIENT)
Dept: INFUSION THERAPY | Age: 81
Discharge: HOME | End: 2021-10-28
Payer: MEDICARE

## 2021-01-01 ENCOUNTER — HOSPITAL ENCOUNTER (OUTPATIENT)
Dept: INFUSION THERAPY | Age: 81
Discharge: HOME | End: 2021-09-20
Payer: MEDICARE

## 2021-01-01 ENCOUNTER — PREP FOR PROCEDURE (OUTPATIENT)
Dept: SURGERY | Age: 81
End: 2021-01-01

## 2021-01-01 ENCOUNTER — HOSPITAL ENCOUNTER (OUTPATIENT)
Dept: INFUSION THERAPY | Age: 81
Discharge: HOME | End: 2021-09-28
Payer: MEDICARE

## 2021-01-01 ENCOUNTER — HOSPITAL ENCOUNTER (OUTPATIENT)
Dept: INFUSION THERAPY | Age: 81
Discharge: HOME | End: 2021-11-04
Payer: MEDICARE

## 2021-01-01 ENCOUNTER — OFFICE VISIT (OUTPATIENT)
Age: 81
End: 2021-01-01

## 2021-01-01 ENCOUNTER — HOSPITAL ENCOUNTER (OUTPATIENT)
Dept: INFUSION THERAPY | Age: 81
Discharge: HOME | End: 2021-10-14
Payer: MEDICARE

## 2021-01-01 ENCOUNTER — HOSPITAL ENCOUNTER (OUTPATIENT)
Dept: GENERAL RADIOLOGY | Age: 81
Setting detail: OUTPATIENT SURGERY
Discharge: HOME | End: 2021-10-04
Payer: MEDICARE

## 2021-01-01 ENCOUNTER — HOSPITAL ENCOUNTER (OUTPATIENT)
Dept: INFUSION THERAPY | Age: 81
Discharge: HOME | End: 2021-10-12
Payer: MEDICARE

## 2021-01-01 ENCOUNTER — HOSPITAL ENCOUNTER (OUTPATIENT)
Age: 81
Setting detail: OUTPATIENT SURGERY
Discharge: HOME | End: 2021-10-04
Attending: SURGERY | Admitting: SURGERY
Payer: MEDICARE

## 2021-01-01 ENCOUNTER — HOSPITAL ENCOUNTER (OUTPATIENT)
Dept: INFUSION THERAPY | Age: 81
Discharge: HOME | End: 2021-10-07
Payer: MEDICARE

## 2021-01-01 ENCOUNTER — HOSPITAL ENCOUNTER (OUTPATIENT)
Dept: INFUSION THERAPY | Age: 81
Discharge: HOME | End: 2021-12-16
Payer: MEDICARE

## 2021-01-01 ENCOUNTER — TELEPHONE (OUTPATIENT)
Dept: HEMATOLOGY | Age: 81
End: 2021-01-01

## 2021-01-01 ENCOUNTER — HOSPITAL ENCOUNTER (OUTPATIENT)
Dept: INFUSION THERAPY | Age: 81
Discharge: HOME | End: 2021-11-29
Payer: MEDICARE

## 2021-01-01 VITALS
HEART RATE: 68 BPM | SYSTOLIC BLOOD PRESSURE: 142 MMHG | BODY MASS INDEX: 27.32 KG/M2 | WEIGHT: 170 LBS | RESPIRATION RATE: 18 BRPM | OXYGEN SATURATION: 99 % | HEIGHT: 66 IN | DIASTOLIC BLOOD PRESSURE: 90 MMHG | TEMPERATURE: 98.2 F

## 2021-01-01 VITALS
OXYGEN SATURATION: 99 % | BODY MASS INDEX: 26.05 KG/M2 | DIASTOLIC BLOOD PRESSURE: 73 MMHG | SYSTOLIC BLOOD PRESSURE: 128 MMHG | TEMPERATURE: 98.1 F | HEIGHT: 66 IN | RESPIRATION RATE: 18 BRPM | HEART RATE: 72 BPM

## 2021-01-01 VITALS
BODY MASS INDEX: 28.67 KG/M2 | DIASTOLIC BLOOD PRESSURE: 74 MMHG | WEIGHT: 172.1 LBS | OXYGEN SATURATION: 97 % | SYSTOLIC BLOOD PRESSURE: 124 MMHG | HEART RATE: 72 BPM | HEIGHT: 65 IN

## 2021-01-01 VITALS
SYSTOLIC BLOOD PRESSURE: 121 MMHG | DIASTOLIC BLOOD PRESSURE: 57 MMHG | HEIGHT: 65 IN | WEIGHT: 154.5 LBS | OXYGEN SATURATION: 99 % | HEART RATE: 67 BPM | TEMPERATURE: 98.2 F | RESPIRATION RATE: 20 BRPM | BODY MASS INDEX: 25.74 KG/M2

## 2021-01-01 VITALS
DIASTOLIC BLOOD PRESSURE: 59 MMHG | HEIGHT: 66 IN | SYSTOLIC BLOOD PRESSURE: 133 MMHG | RESPIRATION RATE: 16 BRPM | TEMPERATURE: 97.9 F | WEIGHT: 161.4 LBS | OXYGEN SATURATION: 97 % | HEART RATE: 71 BPM | BODY MASS INDEX: 25.94 KG/M2

## 2021-01-01 VITALS
SYSTOLIC BLOOD PRESSURE: 120 MMHG | DIASTOLIC BLOOD PRESSURE: 66 MMHG | TEMPERATURE: 97.6 F | BODY MASS INDEX: 25.71 KG/M2 | RESPIRATION RATE: 18 BRPM | OXYGEN SATURATION: 98 % | HEART RATE: 75 BPM | HEIGHT: 66 IN | WEIGHT: 160 LBS

## 2021-01-01 VITALS
SYSTOLIC BLOOD PRESSURE: 126 MMHG | DIASTOLIC BLOOD PRESSURE: 82 MMHG | OXYGEN SATURATION: 92 % | HEIGHT: 66 IN | BODY MASS INDEX: 26.65 KG/M2 | WEIGHT: 165.8 LBS | HEART RATE: 81 BPM

## 2021-01-01 VITALS
HEIGHT: 65 IN | OXYGEN SATURATION: 93 % | SYSTOLIC BLOOD PRESSURE: 143 MMHG | DIASTOLIC BLOOD PRESSURE: 67 MMHG | HEART RATE: 71 BPM | TEMPERATURE: 98.7 F | WEIGHT: 153.7 LBS | BODY MASS INDEX: 25.61 KG/M2

## 2021-01-01 VITALS
OXYGEN SATURATION: 83 % | SYSTOLIC BLOOD PRESSURE: 127 MMHG | RESPIRATION RATE: 16 BRPM | DIASTOLIC BLOOD PRESSURE: 57 MMHG

## 2021-01-01 VITALS
DIASTOLIC BLOOD PRESSURE: 88 MMHG | BODY MASS INDEX: 25.71 KG/M2 | TEMPERATURE: 97 F | WEIGHT: 160 LBS | HEIGHT: 66 IN | SYSTOLIC BLOOD PRESSURE: 124 MMHG

## 2021-01-01 VITALS
WEIGHT: 156 LBS | SYSTOLIC BLOOD PRESSURE: 110 MMHG | HEIGHT: 65 IN | DIASTOLIC BLOOD PRESSURE: 70 MMHG | BODY MASS INDEX: 25.99 KG/M2 | OXYGEN SATURATION: 99 % | HEART RATE: 89 BPM

## 2021-01-01 VITALS
RESPIRATION RATE: 18 BRPM | HEART RATE: 73 BPM | OXYGEN SATURATION: 99 % | SYSTOLIC BLOOD PRESSURE: 128 MMHG | DIASTOLIC BLOOD PRESSURE: 75 MMHG | TEMPERATURE: 98.4 F

## 2021-01-01 VITALS
BODY MASS INDEX: 26.51 KG/M2 | HEART RATE: 71 BPM | SYSTOLIC BLOOD PRESSURE: 138 MMHG | TEMPERATURE: 98.3 F | HEIGHT: 65 IN | WEIGHT: 159.1 LBS | DIASTOLIC BLOOD PRESSURE: 66 MMHG

## 2021-01-01 VITALS
RESPIRATION RATE: 20 BRPM | OXYGEN SATURATION: 99 % | HEIGHT: 65 IN | BODY MASS INDEX: 25.47 KG/M2 | HEART RATE: 76 BPM | WEIGHT: 152.9 LBS | DIASTOLIC BLOOD PRESSURE: 67 MMHG | SYSTOLIC BLOOD PRESSURE: 130 MMHG | TEMPERATURE: 98.1 F

## 2021-01-01 VITALS
HEIGHT: 65 IN | RESPIRATION RATE: 18 BRPM | SYSTOLIC BLOOD PRESSURE: 151 MMHG | OXYGEN SATURATION: 98 % | TEMPERATURE: 98.6 F | DIASTOLIC BLOOD PRESSURE: 70 MMHG | WEIGHT: 150 LBS | BODY MASS INDEX: 24.99 KG/M2 | HEART RATE: 68 BPM

## 2021-01-01 VITALS
SYSTOLIC BLOOD PRESSURE: 124 MMHG | WEIGHT: 167 LBS | DIASTOLIC BLOOD PRESSURE: 68 MMHG | BODY MASS INDEX: 26.84 KG/M2 | OXYGEN SATURATION: 97 % | HEIGHT: 66 IN | HEART RATE: 78 BPM

## 2021-01-01 VITALS
OXYGEN SATURATION: 97 % | DIASTOLIC BLOOD PRESSURE: 69 MMHG | BODY MASS INDEX: 25.52 KG/M2 | WEIGHT: 153.2 LBS | SYSTOLIC BLOOD PRESSURE: 124 MMHG | TEMPERATURE: 98.4 F | HEIGHT: 65 IN | HEART RATE: 87 BPM

## 2021-01-01 VITALS
BODY MASS INDEX: 26.34 KG/M2 | OXYGEN SATURATION: 94 % | HEART RATE: 74 BPM | DIASTOLIC BLOOD PRESSURE: 70 MMHG | WEIGHT: 163.9 LBS | HEIGHT: 66 IN | SYSTOLIC BLOOD PRESSURE: 160 MMHG

## 2021-01-01 DIAGNOSIS — Z00.00 ROUTINE ADULT HEALTH MAINTENANCE: ICD-10-CM

## 2021-01-01 DIAGNOSIS — R59.0 ADENOPATHY, HILAR: ICD-10-CM

## 2021-01-01 DIAGNOSIS — C34.90 PRIMARY MALIGNANT NEOPLASM OF LUNG METASTATIC TO OTHER SITE, UNSPECIFIED LATERALITY (HCC): Primary | ICD-10-CM

## 2021-01-01 DIAGNOSIS — R91.8 LUNG MASS: Primary | ICD-10-CM

## 2021-01-01 DIAGNOSIS — M25.559 HIP PAIN: ICD-10-CM

## 2021-01-01 DIAGNOSIS — C34.90 MALIGNANT NEOPLASM OF LUNG, UNSPECIFIED LATERALITY, UNSPECIFIED PART OF LUNG (HCC): ICD-10-CM

## 2021-01-01 DIAGNOSIS — R11.2 CHEMOTHERAPY INDUCED NAUSEA AND VOMITING: Primary | ICD-10-CM

## 2021-01-01 DIAGNOSIS — M89.9 LYTIC BONE LESION OF LEFT FEMUR: Primary | ICD-10-CM

## 2021-01-01 DIAGNOSIS — C34.90 PRIMARY MALIGNANT NEOPLASM OF LUNG METASTATIC TO OTHER SITE, UNSPECIFIED LATERALITY (HCC): ICD-10-CM

## 2021-01-01 DIAGNOSIS — K21.9 GASTROESOPHAGEAL REFLUX DISEASE WITHOUT ESOPHAGITIS: ICD-10-CM

## 2021-01-01 DIAGNOSIS — R91.8 LUNG MASS: ICD-10-CM

## 2021-01-01 DIAGNOSIS — R91.8 RIGHT LOWER LOBE LUNG MASS: ICD-10-CM

## 2021-01-01 DIAGNOSIS — M89.9 LYTIC BONE LESION OF LEFT FEMUR: ICD-10-CM

## 2021-01-01 DIAGNOSIS — E03.2 HYPOTHYROIDISM DUE TO MEDICATION: Primary | ICD-10-CM

## 2021-01-01 DIAGNOSIS — N18.30 CHRONIC RENAL FAILURE, STAGE 3 (MODERATE), UNSPECIFIED WHETHER STAGE 3A OR 3B CKD (HCC): ICD-10-CM

## 2021-01-01 DIAGNOSIS — T45.1X5A CHEMOTHERAPY-INDUCED NAUSEA AND VOMITING: ICD-10-CM

## 2021-01-01 DIAGNOSIS — R91.8 RIGHT LOWER LOBE LUNG MASS: Primary | ICD-10-CM

## 2021-01-01 DIAGNOSIS — S32.592D CLOSED FRACTURE OF RAMUS OF LEFT PUBIS WITH ROUTINE HEALING, SUBSEQUENT ENCOUNTER: ICD-10-CM

## 2021-01-01 DIAGNOSIS — Z78.0 POST-MENOPAUSAL: ICD-10-CM

## 2021-01-01 DIAGNOSIS — M25.559 HIP PAIN: Primary | ICD-10-CM

## 2021-01-01 DIAGNOSIS — T45.1X5A CHEMOTHERAPY INDUCED NAUSEA AND VOMITING: Primary | ICD-10-CM

## 2021-01-01 DIAGNOSIS — E55.9 VITAMIN D DEFICIENCY: ICD-10-CM

## 2021-01-01 DIAGNOSIS — Z12.31 ENCOUNTER FOR SCREENING MAMMOGRAM FOR MALIGNANT NEOPLASM OF BREAST: ICD-10-CM

## 2021-01-01 DIAGNOSIS — Z00.00 HEALTH CARE MAINTENANCE: ICD-10-CM

## 2021-01-01 DIAGNOSIS — Z11.59 SCREENING FOR VIRAL DISEASE: Primary | ICD-10-CM

## 2021-01-01 DIAGNOSIS — L50.9 URTICARIA: Primary | ICD-10-CM

## 2021-01-01 DIAGNOSIS — Z01.818 PREOP TESTING: Primary | ICD-10-CM

## 2021-01-01 DIAGNOSIS — M10.9 GOUT, UNSPECIFIED CAUSE, UNSPECIFIED CHRONICITY, UNSPECIFIED SITE: ICD-10-CM

## 2021-01-01 DIAGNOSIS — E78.5 HYPERLIPIDEMIA, UNSPECIFIED HYPERLIPIDEMIA TYPE: ICD-10-CM

## 2021-01-01 DIAGNOSIS — R11.2 CHEMOTHERAPY-INDUCED NAUSEA AND VOMITING: ICD-10-CM

## 2021-01-01 DIAGNOSIS — E03.2 HYPOTHYROIDISM DUE TO MEDICATION: ICD-10-CM

## 2021-01-01 DIAGNOSIS — R30.0 DYSURIA: Primary | ICD-10-CM

## 2021-01-01 DIAGNOSIS — Z51.11 ENCOUNTER FOR CHEMOTHERAPY MANAGEMENT: ICD-10-CM

## 2021-01-01 DIAGNOSIS — M79.652 LEFT THIGH PAIN: ICD-10-CM

## 2021-01-01 DIAGNOSIS — N32.81 OAB (OVERACTIVE BLADDER): ICD-10-CM

## 2021-01-01 DIAGNOSIS — I10 ESSENTIAL HYPERTENSION: Primary | ICD-10-CM

## 2021-01-01 LAB
ALBUMIN SERPL-MCNC: 3.1 G/DL (ref 3.5–5.2)
ALBUMIN SERPL-MCNC: 3.3 G/DL (ref 3.5–5.2)
ALBUMIN SERPL-MCNC: 3.6 G/DL (ref 3.5–5.2)
ALBUMIN SERPL-MCNC: 3.7 G/DL (ref 3.5–5.2)
ALBUMIN SERPL-MCNC: 3.9 G/DL (ref 3.5–5.2)
ALBUMIN SERPL-MCNC: 3.9 G/DL (ref 3.5–5.2)
ALBUMIN SERPL-MCNC: 4 G/DL (ref 3.5–5.2)
ALBUMIN SERPL-MCNC: 4 G/DL (ref 3.5–5.2)
ALBUMIN SERPL-MCNC: 4.1 G/DL (ref 3.5–5.2)
ALBUMIN SERPL-MCNC: 4.2 G/DL (ref 3.5–5.2)
ALBUMIN SERPL-MCNC: 4.3 G/DL (ref 3.5–5.2)
ALBUMIN SERPL-MCNC: 4.4 G/DL (ref 3.5–5.2)
ALBUMIN SERPL-MCNC: 4.5 G/DL (ref 3.5–5.2)
ALBUMIN SERPL-MCNC: 4.5 G/DL (ref 3.5–5.2)
ALP BLD-CCNC: 124 U/L (ref 35–104)
ALP BLD-CCNC: 125 U/L (ref 35–104)
ALP BLD-CCNC: 131 U/L (ref 35–104)
ALP BLD-CCNC: 136 U/L (ref 35–104)
ALP BLD-CCNC: 73 U/L (ref 35–104)
ALP BLD-CCNC: 74 U/L (ref 35–104)
ALP BLD-CCNC: 74 U/L (ref 35–104)
ALP BLD-CCNC: 76 U/L (ref 35–104)
ALP BLD-CCNC: 76 U/L (ref 35–104)
ALP BLD-CCNC: 79 U/L (ref 35–104)
ALP BLD-CCNC: 81 U/L (ref 35–104)
ALP BLD-CCNC: 81 U/L (ref 35–104)
ALP BLD-CCNC: 82 U/L (ref 35–104)
ALP BLD-CCNC: 87 U/L (ref 35–104)
ALP BLD-CCNC: 90 U/L (ref 35–104)
ALP BLD-CCNC: 90 U/L (ref 35–104)
ALT SERPL-CCNC: 10 U/L (ref 5–33)
ALT SERPL-CCNC: 12 U/L (ref 9–52)
ALT SERPL-CCNC: 12 U/L (ref 9–52)
ALT SERPL-CCNC: 13 U/L (ref 9–52)
ALT SERPL-CCNC: 135 U/L (ref 9–52)
ALT SERPL-CCNC: 14 U/L (ref 9–52)
ALT SERPL-CCNC: 14 U/L (ref 9–52)
ALT SERPL-CCNC: 15 U/L (ref 5–33)
ALT SERPL-CCNC: 15 U/L (ref 9–52)
ALT SERPL-CCNC: 17 U/L (ref 9–52)
ALT SERPL-CCNC: 17 U/L (ref 9–52)
ALT SERPL-CCNC: 19 U/L (ref 5–33)
ALT SERPL-CCNC: 19 U/L (ref 9–52)
ALT SERPL-CCNC: 20 U/L (ref 9–52)
ALT SERPL-CCNC: 20 U/L (ref 9–52)
ALT SERPL-CCNC: 21 U/L (ref 9–52)
ANION GAP SERPL CALCULATED.3IONS-SCNC: 10 MMOL/L (ref 7–19)
ANION GAP SERPL CALCULATED.3IONS-SCNC: 10 MMOL/L (ref 7–19)
ANION GAP SERPL CALCULATED.3IONS-SCNC: 11 MMOL/L (ref 7–19)
ANION GAP SERPL CALCULATED.3IONS-SCNC: 12 MMOL/L (ref 7–19)
ANION GAP SERPL CALCULATED.3IONS-SCNC: 12 MMOL/L (ref 7–19)
ANION GAP SERPL CALCULATED.3IONS-SCNC: 14 MMOL/L (ref 7–19)
ANION GAP SERPL CALCULATED.3IONS-SCNC: 16 MMOL/L (ref 7–19)
ANION GAP SERPL CALCULATED.3IONS-SCNC: 5 MMOL/L (ref 7–19)
ANION GAP SERPL CALCULATED.3IONS-SCNC: 5 MMOL/L (ref 7–19)
ANION GAP SERPL CALCULATED.3IONS-SCNC: 6 MMOL/L (ref 7–19)
ANION GAP SERPL CALCULATED.3IONS-SCNC: 7 MMOL/L (ref 7–19)
ANION GAP SERPL CALCULATED.3IONS-SCNC: 7 MMOL/L (ref 7–19)
ANION GAP SERPL CALCULATED.3IONS-SCNC: 9 MMOL/L (ref 7–19)
AST SERPL-CCNC: 133 U/L (ref 14–36)
AST SERPL-CCNC: 15 U/L (ref 5–32)
AST SERPL-CCNC: 20 U/L (ref 14–36)
AST SERPL-CCNC: 20 U/L (ref 14–36)
AST SERPL-CCNC: 20 U/L (ref 5–32)
AST SERPL-CCNC: 21 U/L (ref 14–36)
AST SERPL-CCNC: 21 U/L (ref 14–36)
AST SERPL-CCNC: 21 U/L (ref 5–32)
AST SERPL-CCNC: 22 U/L (ref 14–36)
AST SERPL-CCNC: 23 U/L (ref 14–36)
AST SERPL-CCNC: 24 U/L (ref 14–36)
AST SERPL-CCNC: 26 U/L (ref 14–36)
AST SERPL-CCNC: 29 U/L (ref 14–36)
AST SERPL-CCNC: 32 U/L (ref 14–36)
AST SERPL-CCNC: 33 U/L (ref 14–36)
AST SERPL-CCNC: 50 U/L (ref 14–36)
BACTERIA: ABNORMAL /HPF
BASOPHILS ABSOLUTE: 0 K/UL (ref 0–0.2)
BASOPHILS ABSOLUTE: 0.01 K/UL (ref 0.01–0.08)
BASOPHILS ABSOLUTE: 0.02 K/UL (ref 0.01–0.08)
BASOPHILS ABSOLUTE: 0.03 K/UL (ref 0.01–0.08)
BASOPHILS ABSOLUTE: 0.04 K/UL (ref 0.01–0.08)
BASOPHILS ABSOLUTE: 0.05 K/UL (ref 0.01–0.08)
BASOPHILS ABSOLUTE: 0.05 K/UL (ref 0.01–0.08)
BASOPHILS ABSOLUTE: 0.09 K/UL (ref 0.01–0.08)
BASOPHILS ABSOLUTE: 0.1 K/UL (ref 0–0.2)
BASOPHILS ABSOLUTE: 0.1 K/UL (ref 0–0.2)
BASOPHILS RELATIVE PERCENT: 0.1 % (ref 0.1–1.2)
BASOPHILS RELATIVE PERCENT: 0.3 % (ref 0.1–1.2)
BASOPHILS RELATIVE PERCENT: 0.3 % (ref 0–1)
BASOPHILS RELATIVE PERCENT: 0.4 % (ref 0.1–1.2)
BASOPHILS RELATIVE PERCENT: 0.4 % (ref 0.1–1.2)
BASOPHILS RELATIVE PERCENT: 0.5 % (ref 0.1–1.2)
BASOPHILS RELATIVE PERCENT: 0.6 % (ref 0.1–1.2)
BASOPHILS RELATIVE PERCENT: 0.7 % (ref 0.1–1.2)
BASOPHILS RELATIVE PERCENT: 0.9 % (ref 0.1–1.2)
BASOPHILS RELATIVE PERCENT: 1.1 % (ref 0.1–1.2)
BASOPHILS RELATIVE PERCENT: 1.1 % (ref 0.1–1.2)
BASOPHILS RELATIVE PERCENT: 1.2 % (ref 0.1–1.2)
BASOPHILS RELATIVE PERCENT: 1.2 % (ref 0–1)
BASOPHILS RELATIVE PERCENT: 1.5 % (ref 0–1)
BASOPHILS RELATIVE PERCENT: 1.6 % (ref 0.1–1.2)
BILIRUB SERPL-MCNC: 0.3 MG/DL (ref 0.2–1.2)
BILIRUB SERPL-MCNC: 0.6 MG/DL (ref 0.2–1.2)
BILIRUB SERPL-MCNC: 0.7 MG/DL (ref 0.2–1.2)
BILIRUB SERPL-MCNC: 0.7 MG/DL (ref 0.2–1.3)
BILIRUB SERPL-MCNC: 0.7 MG/DL (ref 0.2–1.3)
BILIRUB SERPL-MCNC: 0.8 MG/DL (ref 0.2–1.3)
BILIRUB SERPL-MCNC: 0.9 MG/DL (ref 0.2–1.3)
BILIRUB SERPL-MCNC: 0.9 MG/DL (ref 0.2–1.3)
BILIRUB SERPL-MCNC: 1 MG/DL (ref 0.2–1.3)
BILIRUB SERPL-MCNC: 1.5 MG/DL (ref 0.2–1.3)
BILIRUB SERPL-MCNC: 1.8 MG/DL (ref 0.2–1.3)
BILIRUBIN URINE: NEGATIVE
BLOOD, URINE: NEGATIVE
BUN BLDV-MCNC: 20 MG/DL (ref 7–17)
BUN BLDV-MCNC: 20 MG/DL (ref 7–17)
BUN BLDV-MCNC: 20 MG/DL (ref 8–23)
BUN BLDV-MCNC: 21 MG/DL (ref 7–17)
BUN BLDV-MCNC: 26 MG/DL (ref 7–17)
BUN BLDV-MCNC: 27 MG/DL (ref 8–23)
BUN BLDV-MCNC: 28 MG/DL (ref 7–17)
BUN BLDV-MCNC: 28 MG/DL (ref 7–17)
BUN BLDV-MCNC: 30 MG/DL (ref 7–17)
BUN BLDV-MCNC: 31 MG/DL (ref 7–17)
BUN BLDV-MCNC: 32 MG/DL (ref 7–17)
BUN BLDV-MCNC: 32 MG/DL (ref 7–17)
BUN BLDV-MCNC: 34 MG/DL (ref 7–17)
BUN BLDV-MCNC: 39 MG/DL (ref 7–17)
BUN BLDV-MCNC: 41 MG/DL (ref 8–23)
BUN BLDV-MCNC: 53 MG/DL (ref 7–17)
CALCIUM SERPL-MCNC: 10 MG/DL (ref 8.8–10.2)
CALCIUM SERPL-MCNC: 10.1 MG/DL (ref 8.4–10.2)
CALCIUM SERPL-MCNC: 10.4 MG/DL (ref 8.4–10.2)
CALCIUM SERPL-MCNC: 10.5 MG/DL (ref 8.4–10.2)
CALCIUM SERPL-MCNC: 11 MG/DL (ref 8.4–10.2)
CALCIUM SERPL-MCNC: 11 MG/DL (ref 8.4–10.2)
CALCIUM SERPL-MCNC: 8.3 MG/DL (ref 8.4–10.2)
CALCIUM SERPL-MCNC: 8.7 MG/DL (ref 8.4–10.2)
CALCIUM SERPL-MCNC: 8.8 MG/DL (ref 8.8–10.2)
CALCIUM SERPL-MCNC: 8.9 MG/DL (ref 8.4–10.2)
CALCIUM SERPL-MCNC: 9.2 MG/DL (ref 8.4–10.2)
CALCIUM SERPL-MCNC: 9.2 MG/DL (ref 8.4–10.2)
CALCIUM SERPL-MCNC: 9.5 MG/DL (ref 8.4–10.2)
CALCIUM SERPL-MCNC: 9.7 MG/DL (ref 8.4–10.2)
CALCIUM SERPL-MCNC: 9.7 MG/DL (ref 8.8–10.2)
CALCIUM SERPL-MCNC: 9.9 MG/DL (ref 8.4–10.2)
CHLORIDE BLD-SCNC: 100 MMOL/L (ref 98–111)
CHLORIDE BLD-SCNC: 102 MMOL/L (ref 98–111)
CHLORIDE BLD-SCNC: 103 MMOL/L (ref 98–111)
CHLORIDE BLD-SCNC: 105 MMOL/L (ref 98–111)
CHLORIDE BLD-SCNC: 106 MMOL/L (ref 98–111)
CHLORIDE BLD-SCNC: 107 MMOL/L (ref 98–111)
CHLORIDE BLD-SCNC: 108 MMOL/L (ref 98–111)
CHLORIDE BLD-SCNC: 112 MMOL/L (ref 98–111)
CHLORIDE BLD-SCNC: 112 MMOL/L (ref 98–111)
CHOLESTEROL, TOTAL: 160 MG/DL (ref 160–199)
CLARITY: CLEAR
CO2: 21 MMOL/L (ref 22–29)
CO2: 22 MMOL/L (ref 22–29)
CO2: 22 MMOL/L (ref 22–29)
CO2: 23 MMOL/L (ref 22–29)
CO2: 24 MMOL/L (ref 22–29)
CO2: 25 MMOL/L (ref 22–29)
CO2: 27 MMOL/L (ref 22–29)
CO2: 27 MMOL/L (ref 22–29)
COLOR: YELLOW
CREAT SERPL-MCNC: 1.3 MG/DL (ref 0.5–0.9)
CREAT SERPL-MCNC: 1.4 MG/DL (ref 0.5–1)
CREAT SERPL-MCNC: 1.5 MG/DL (ref 0.5–1)
CREAT SERPL-MCNC: 1.6 MG/DL (ref 0.5–0.9)
CREAT SERPL-MCNC: 1.6 MG/DL (ref 0.5–1)
CREAT SERPL-MCNC: 1.7 MG/DL (ref 0.5–1)
CREAT SERPL-MCNC: 1.8 MG/DL (ref 0.5–1)
CREAT SERPL-MCNC: 1.9 MG/DL (ref 0.5–0.9)
CREAT SERPL-MCNC: 1.9 MG/DL (ref 0.5–1)
CREAT SERPL-MCNC: 2 MG/DL (ref 0.5–1)
CREAT SERPL-MCNC: 2.2 MG/DL (ref 0.5–1)
CREAT SERPL-MCNC: 2.7 MG/DL (ref 0.5–1)
CREATININE URINE: 203.5 MG/DL (ref 4.2–622)
CRYSTALS, UA: ABNORMAL /HPF
EOSINOPHILS ABSOLUTE: 0 K/UL (ref 0.04–0.54)
EOSINOPHILS ABSOLUTE: 0.07 K/UL (ref 0.04–0.54)
EOSINOPHILS ABSOLUTE: 0.07 K/UL (ref 0.04–0.54)
EOSINOPHILS ABSOLUTE: 0.1 K/UL (ref 0–0.6)
EOSINOPHILS ABSOLUTE: 0.1 K/UL (ref 0–0.6)
EOSINOPHILS ABSOLUTE: 0.11 K/UL (ref 0.04–0.54)
EOSINOPHILS ABSOLUTE: 0.11 K/UL (ref 0.04–0.54)
EOSINOPHILS ABSOLUTE: 0.14 K/UL (ref 0.04–0.54)
EOSINOPHILS ABSOLUTE: 0.14 K/UL (ref 0.04–0.54)
EOSINOPHILS ABSOLUTE: 0.15 K/UL (ref 0.04–0.54)
EOSINOPHILS ABSOLUTE: 0.18 K/UL (ref 0.04–0.54)
EOSINOPHILS ABSOLUTE: 0.19 K/UL (ref 0.04–0.54)
EOSINOPHILS ABSOLUTE: 0.2 K/UL (ref 0–0.6)
EOSINOPHILS ABSOLUTE: 0.22 K/UL (ref 0.04–0.54)
EOSINOPHILS ABSOLUTE: 0.22 K/UL (ref 0.04–0.54)
EOSINOPHILS ABSOLUTE: 0.26 K/UL (ref 0.04–0.54)
EOSINOPHILS ABSOLUTE: 0.27 K/UL (ref 0.04–0.54)
EOSINOPHILS RELATIVE PERCENT: 0 % (ref 0.7–7)
EOSINOPHILS RELATIVE PERCENT: 0.7 % (ref 0.7–7)
EOSINOPHILS RELATIVE PERCENT: 1.7 % (ref 0.7–7)
EOSINOPHILS RELATIVE PERCENT: 1.7 % (ref 0.7–7)
EOSINOPHILS RELATIVE PERCENT: 1.8 % (ref 0.7–7)
EOSINOPHILS RELATIVE PERCENT: 14.8 % (ref 0.7–7)
EOSINOPHILS RELATIVE PERCENT: 2.1 % (ref 0.7–7)
EOSINOPHILS RELATIVE PERCENT: 2.1 % (ref 0–5)
EOSINOPHILS RELATIVE PERCENT: 2.6 % (ref 0–5)
EOSINOPHILS RELATIVE PERCENT: 2.7 % (ref 0.7–7)
EOSINOPHILS RELATIVE PERCENT: 3.2 % (ref 0.7–7)
EOSINOPHILS RELATIVE PERCENT: 3.3 % (ref 0.7–7)
EOSINOPHILS RELATIVE PERCENT: 3.6 % (ref 0.7–7)
EOSINOPHILS RELATIVE PERCENT: 4.3 % (ref 0.7–7)
EOSINOPHILS RELATIVE PERCENT: 5 % (ref 0.7–7)
EOSINOPHILS RELATIVE PERCENT: 5.6 % (ref 0.7–7)
EOSINOPHILS RELATIVE PERCENT: 6.4 % (ref 0–5)
EPITHELIAL CELLS, UA: 4 /HPF (ref 0–5)
GFR AFRICAN AMERICAN: 31
GFR AFRICAN AMERICAN: 37
GFR AFRICAN AMERICAN: 47
GFR NON-AFRICAN AMERICAN: 17
GFR NON-AFRICAN AMERICAN: 21
GFR NON-AFRICAN AMERICAN: 24
GFR NON-AFRICAN AMERICAN: 25
GFR NON-AFRICAN AMERICAN: 25
GFR NON-AFRICAN AMERICAN: 27
GFR NON-AFRICAN AMERICAN: 29
GFR NON-AFRICAN AMERICAN: 31
GFR NON-AFRICAN AMERICAN: 31
GFR NON-AFRICAN AMERICAN: 33
GFR NON-AFRICAN AMERICAN: 36
GFR NON-AFRICAN AMERICAN: 39
GLOBULIN: 2 G/DL
GLOBULIN: 2.1 G/DL
GLOBULIN: 2.2 G/DL
GLOBULIN: 2.2 G/DL
GLOBULIN: 2.3 G/DL
GLOBULIN: 2.3 G/DL
GLOBULIN: 2.4 G/DL
GLOBULIN: 2.5 G/DL
GLOBULIN: 2.5 G/DL
GLOBULIN: 2.7 G/DL
GLOBULIN: 2.7 G/DL
GLOBULIN: 2.8 G/DL
GLUCOSE BLD-MCNC: 102 MG/DL (ref 74–106)
GLUCOSE BLD-MCNC: 105 MG/DL (ref 74–106)
GLUCOSE BLD-MCNC: 112 MG/DL (ref 74–106)
GLUCOSE BLD-MCNC: 114 MG/DL (ref 74–106)
GLUCOSE BLD-MCNC: 115 MG/DL (ref 70–99)
GLUCOSE BLD-MCNC: 120 MG/DL (ref 74–106)
GLUCOSE BLD-MCNC: 121 MG/DL (ref 74–109)
GLUCOSE BLD-MCNC: 122 MG/DL (ref 74–106)
GLUCOSE BLD-MCNC: 126 MG/DL (ref 74–106)
GLUCOSE BLD-MCNC: 186 MG/DL (ref 74–106)
GLUCOSE BLD-MCNC: 192 MG/DL (ref 74–106)
GLUCOSE BLD-MCNC: 78 MG/DL (ref 74–109)
GLUCOSE BLD-MCNC: 84 MG/DL (ref 74–106)
GLUCOSE BLD-MCNC: 90 MG/DL (ref 74–109)
GLUCOSE BLD-MCNC: 98 MG/DL (ref 74–106)
GLUCOSE BLD-MCNC: 99 MG/DL (ref 74–106)
GLUCOSE BLD-MCNC: 99 MG/DL (ref 74–106)
GLUCOSE URINE: NEGATIVE MG/DL
HCT VFR BLD CALC: 29.9 % (ref 34.1–44.9)
HCT VFR BLD CALC: 30.8 % (ref 34.1–44.9)
HCT VFR BLD CALC: 31.3 % (ref 34.1–44.9)
HCT VFR BLD CALC: 31.9 % (ref 34.1–44.9)
HCT VFR BLD CALC: 31.9 % (ref 34.1–44.9)
HCT VFR BLD CALC: 32.1 % (ref 34.1–44.9)
HCT VFR BLD CALC: 32.1 % (ref 34.1–44.9)
HCT VFR BLD CALC: 32.2 % (ref 34.1–44.9)
HCT VFR BLD CALC: 33 % (ref 34.1–44.9)
HCT VFR BLD CALC: 33.1 % (ref 37–47)
HCT VFR BLD CALC: 33.4 % (ref 34.1–44.9)
HCT VFR BLD CALC: 33.5 % (ref 37–47)
HCT VFR BLD CALC: 35 % (ref 34.1–44.9)
HCT VFR BLD CALC: 35.1 % (ref 34.1–44.9)
HCT VFR BLD CALC: 36 % (ref 34.1–44.9)
HCT VFR BLD CALC: 37.1 % (ref 34.1–44.9)
HCT VFR BLD CALC: 38.7 % (ref 34.1–44.9)
HCT VFR BLD CALC: 43.8 % (ref 37–47)
HCT VFR BLD CALC: 44.7 % (ref 34.1–44.9)
HDLC SERPL-MCNC: 60 MG/DL (ref 65–121)
HEMOGLOBIN: 10.1 G/DL (ref 11.2–15.7)
HEMOGLOBIN: 10.1 G/DL (ref 12–16)
HEMOGLOBIN: 10.2 G/DL (ref 11.2–15.7)
HEMOGLOBIN: 10.4 G/DL (ref 11.2–15.7)
HEMOGLOBIN: 10.4 G/DL (ref 11.2–15.7)
HEMOGLOBIN: 10.5 G/DL (ref 11.2–15.7)
HEMOGLOBIN: 10.5 G/DL (ref 12–16)
HEMOGLOBIN: 10.6 G/DL (ref 11.2–15.7)
HEMOGLOBIN: 11.3 G/DL (ref 11.2–15.7)
HEMOGLOBIN: 11.6 G/DL (ref 11.2–15.7)
HEMOGLOBIN: 11.9 G/DL (ref 11.2–15.7)
HEMOGLOBIN: 13.5 G/DL (ref 12–16)
HEMOGLOBIN: 14.1 G/DL (ref 11.2–15.7)
HEMOGLOBIN: 9.5 G/DL (ref 11.2–15.7)
HEMOGLOBIN: 9.9 G/DL (ref 11.2–15.7)
HYALINE CASTS: 12 /HPF (ref 0–8)
IMMATURE GRANULOCYTES #: 0 K/UL
KETONES, URINE: ABNORMAL MG/DL
LDL CHOLESTEROL CALCULATED: 75 MG/DL
LEUKOCYTE ESTERASE, URINE: ABNORMAL
LIPASE: 41 U/L (ref 13–60)
LYMPHOCYTES ABSOLUTE: 0.51 K/UL (ref 1.18–3.74)
LYMPHOCYTES ABSOLUTE: 0.55 K/UL (ref 1.18–3.74)
LYMPHOCYTES ABSOLUTE: 0.56 K/UL (ref 1.18–3.74)
LYMPHOCYTES ABSOLUTE: 0.59 K/UL (ref 1.18–3.74)
LYMPHOCYTES ABSOLUTE: 0.62 K/UL (ref 1.18–3.74)
LYMPHOCYTES ABSOLUTE: 0.68 K/UL (ref 1.18–3.74)
LYMPHOCYTES ABSOLUTE: 0.78 K/UL (ref 1.18–3.74)
LYMPHOCYTES ABSOLUTE: 0.8 K/UL (ref 1.1–4.5)
LYMPHOCYTES ABSOLUTE: 0.86 K/UL (ref 1.18–3.74)
LYMPHOCYTES ABSOLUTE: 0.87 K/UL (ref 1.18–3.74)
LYMPHOCYTES ABSOLUTE: 0.89 K/UL (ref 1.18–3.74)
LYMPHOCYTES ABSOLUTE: 0.99 K/UL (ref 1.18–3.74)
LYMPHOCYTES ABSOLUTE: 1 K/UL (ref 1.1–4.5)
LYMPHOCYTES ABSOLUTE: 1.06 K/UL (ref 1.18–3.74)
LYMPHOCYTES ABSOLUTE: 1.07 K/UL (ref 1.18–3.74)
LYMPHOCYTES ABSOLUTE: 1.1 K/UL (ref 1.18–3.74)
LYMPHOCYTES ABSOLUTE: 1.25 K/UL (ref 1.18–3.74)
LYMPHOCYTES ABSOLUTE: 1.35 K/UL (ref 1.18–3.74)
LYMPHOCYTES ABSOLUTE: 1.7 K/UL (ref 1.1–4.5)
LYMPHOCYTES RELATIVE PERCENT: 13 % (ref 19.3–53.1)
LYMPHOCYTES RELATIVE PERCENT: 13.5 % (ref 19.3–53.1)
LYMPHOCYTES RELATIVE PERCENT: 15.3 % (ref 19.3–53.1)
LYMPHOCYTES RELATIVE PERCENT: 15.3 % (ref 19.3–53.1)
LYMPHOCYTES RELATIVE PERCENT: 16.1 % (ref 19.3–53.1)
LYMPHOCYTES RELATIVE PERCENT: 17.2 % (ref 19.3–53.1)
LYMPHOCYTES RELATIVE PERCENT: 17.5 % (ref 19.3–53.1)
LYMPHOCYTES RELATIVE PERCENT: 21.3 % (ref 19.3–53.1)
LYMPHOCYTES RELATIVE PERCENT: 21.4 % (ref 20–40)
LYMPHOCYTES RELATIVE PERCENT: 22.2 % (ref 20–40)
LYMPHOCYTES RELATIVE PERCENT: 25.3 % (ref 20–40)
LYMPHOCYTES RELATIVE PERCENT: 26.2 % (ref 19.3–53.1)
LYMPHOCYTES RELATIVE PERCENT: 31.3 % (ref 19.3–53.1)
LYMPHOCYTES RELATIVE PERCENT: 4.4 % (ref 19.3–53.1)
LYMPHOCYTES RELATIVE PERCENT: 4.5 % (ref 19.3–53.1)
LYMPHOCYTES RELATIVE PERCENT: 43.1 % (ref 19.3–53.1)
LYMPHOCYTES RELATIVE PERCENT: 8.9 % (ref 19.3–53.1)
LYMPHOCYTES RELATIVE PERCENT: 9.3 % (ref 19.3–53.1)
LYMPHOCYTES RELATIVE PERCENT: 9.6 % (ref 19.3–53.1)
MAGNESIUM: 1.8 MG/DL (ref 1.6–2.3)
MAGNESIUM: 1.8 MG/DL (ref 1.6–2.4)
MAGNESIUM: 2 MG/DL (ref 1.6–2.3)
MCH RBC QN AUTO: 28.3 PG (ref 27–31)
MCH RBC QN AUTO: 28.7 PG (ref 27–31)
MCH RBC QN AUTO: 29 PG (ref 25.6–32.2)
MCH RBC QN AUTO: 29.1 PG (ref 25.6–32.2)
MCH RBC QN AUTO: 29.1 PG (ref 25.6–32.2)
MCH RBC QN AUTO: 29.2 PG (ref 25.6–32.2)
MCH RBC QN AUTO: 29.4 PG (ref 25.6–32.2)
MCH RBC QN AUTO: 29.7 PG (ref 25.6–32.2)
MCH RBC QN AUTO: 29.8 PG (ref 25.6–32.2)
MCH RBC QN AUTO: 30.4 PG (ref 25.6–32.2)
MCH RBC QN AUTO: 30.5 PG (ref 25.6–32.2)
MCH RBC QN AUTO: 30.6 PG (ref 27–31)
MCH RBC QN AUTO: 30.8 PG (ref 25.6–32.2)
MCH RBC QN AUTO: 31 PG (ref 25.6–32.2)
MCH RBC QN AUTO: 31.1 PG (ref 25.6–32.2)
MCH RBC QN AUTO: 31.3 PG (ref 25.6–32.2)
MCH RBC QN AUTO: 31.4 PG (ref 25.6–32.2)
MCHC RBC AUTO-ENTMCNC: 30 G/DL (ref 32.3–35.5)
MCHC RBC AUTO-ENTMCNC: 30.5 G/DL (ref 33–37)
MCHC RBC AUTO-ENTMCNC: 30.8 G/DL (ref 33–37)
MCHC RBC AUTO-ENTMCNC: 31.3 G/DL (ref 33–37)
MCHC RBC AUTO-ENTMCNC: 31.4 G/DL (ref 32.3–35.5)
MCHC RBC AUTO-ENTMCNC: 31.5 G/DL (ref 32.3–35.5)
MCHC RBC AUTO-ENTMCNC: 31.7 G/DL (ref 32.3–35.5)
MCHC RBC AUTO-ENTMCNC: 31.7 G/DL (ref 32.3–35.5)
MCHC RBC AUTO-ENTMCNC: 31.8 G/DL (ref 32.3–35.5)
MCHC RBC AUTO-ENTMCNC: 32.1 G/DL (ref 32.3–35.5)
MCHC RBC AUTO-ENTMCNC: 32.1 G/DL (ref 32.3–35.5)
MCHC RBC AUTO-ENTMCNC: 32.2 G/DL (ref 32.3–35.5)
MCHC RBC AUTO-ENTMCNC: 32.3 G/DL (ref 32.3–35.5)
MCHC RBC AUTO-ENTMCNC: 32.3 G/DL (ref 32.3–35.5)
MCHC RBC AUTO-ENTMCNC: 32.6 G/DL (ref 32.3–35.5)
MCHC RBC AUTO-ENTMCNC: 32.9 G/DL (ref 32.3–35.5)
MCV RBC AUTO: 100.3 FL (ref 81–99)
MCV RBC AUTO: 90.3 FL (ref 81–99)
MCV RBC AUTO: 90.7 FL (ref 79.4–94.8)
MCV RBC AUTO: 92 FL (ref 79.4–94.8)
MCV RBC AUTO: 92.4 FL (ref 79.4–94.8)
MCV RBC AUTO: 93.2 FL (ref 81–99)
MCV RBC AUTO: 93.3 FL (ref 79.4–94.8)
MCV RBC AUTO: 94.1 FL (ref 79.4–94.8)
MCV RBC AUTO: 94.2 FL (ref 79.4–94.8)
MCV RBC AUTO: 94.4 FL (ref 79.4–94.8)
MCV RBC AUTO: 94.9 FL (ref 79.4–94.8)
MCV RBC AUTO: 95.4 FL (ref 79.4–94.8)
MCV RBC AUTO: 96.1 FL (ref 79.4–94.8)
MCV RBC AUTO: 96.4 FL (ref 79.4–94.8)
MCV RBC AUTO: 96.9 FL (ref 79.4–94.8)
MCV RBC AUTO: 97 FL (ref 79.4–94.8)
MCV RBC AUTO: 97.1 FL (ref 79.4–94.8)
MCV RBC AUTO: 97.2 FL (ref 79.4–94.8)
MCV RBC AUTO: 98.1 FL (ref 79.4–94.8)
MONOCYTES ABSOLUTE: 0.08 K/UL (ref 0.24–0.82)
MONOCYTES ABSOLUTE: 0.1 K/UL (ref 0–0.9)
MONOCYTES ABSOLUTE: 0.14 K/UL (ref 0.24–0.82)
MONOCYTES ABSOLUTE: 0.23 K/UL (ref 0.24–0.82)
MONOCYTES ABSOLUTE: 0.3 K/UL (ref 0.24–0.82)
MONOCYTES ABSOLUTE: 0.32 K/UL (ref 0.24–0.82)
MONOCYTES ABSOLUTE: 0.37 K/UL (ref 0.24–0.82)
MONOCYTES ABSOLUTE: 0.41 K/UL (ref 0.24–0.82)
MONOCYTES ABSOLUTE: 0.47 K/UL (ref 0.24–0.82)
MONOCYTES ABSOLUTE: 0.47 K/UL (ref 0.24–0.82)
MONOCYTES ABSOLUTE: 0.49 K/UL (ref 0.24–0.82)
MONOCYTES ABSOLUTE: 0.5 K/UL (ref 0–0.9)
MONOCYTES ABSOLUTE: 0.57 K/UL (ref 0.24–0.82)
MONOCYTES ABSOLUTE: 0.6 K/UL (ref 0–0.9)
MONOCYTES ABSOLUTE: 0.62 K/UL (ref 0.24–0.82)
MONOCYTES ABSOLUTE: 0.69 K/UL (ref 0.24–0.82)
MONOCYTES ABSOLUTE: 0.78 K/UL (ref 0.24–0.82)
MONOCYTES ABSOLUTE: 0.88 K/UL (ref 0.24–0.82)
MONOCYTES ABSOLUTE: 1.08 K/UL (ref 0.24–0.82)
MONOCYTES RELATIVE PERCENT: 1 % (ref 4.7–12.5)
MONOCYTES RELATIVE PERCENT: 1 % (ref 4.7–12.5)
MONOCYTES RELATIVE PERCENT: 10.4 % (ref 4.7–12.5)
MONOCYTES RELATIVE PERCENT: 11.6 % (ref 4.7–12.5)
MONOCYTES RELATIVE PERCENT: 12.6 % (ref 4.7–12.5)
MONOCYTES RELATIVE PERCENT: 12.7 % (ref 0–10)
MONOCYTES RELATIVE PERCENT: 12.9 % (ref 4.7–12.5)
MONOCYTES RELATIVE PERCENT: 14.8 % (ref 4.7–12.5)
MONOCYTES RELATIVE PERCENT: 18.2 % (ref 4.7–12.5)
MONOCYTES RELATIVE PERCENT: 2.6 % (ref 0–10)
MONOCYTES RELATIVE PERCENT: 4.2 % (ref 4.7–12.5)
MONOCYTES RELATIVE PERCENT: 4.8 % (ref 4.7–12.5)
MONOCYTES RELATIVE PERCENT: 6.5 % (ref 4.7–12.5)
MONOCYTES RELATIVE PERCENT: 7.8 % (ref 0–10)
MONOCYTES RELATIVE PERCENT: 8.5 % (ref 4.7–12.5)
MONOCYTES RELATIVE PERCENT: 8.5 % (ref 4.7–12.5)
MONOCYTES RELATIVE PERCENT: 9.2 % (ref 4.7–12.5)
MONOCYTES RELATIVE PERCENT: 9.3 % (ref 4.7–12.5)
MONOCYTES RELATIVE PERCENT: 9.7 % (ref 4.7–12.5)
NEUTROPHILS ABSOLUTE: 0.61 K/UL (ref 1.56–6.13)
NEUTROPHILS ABSOLUTE: 1 K/UL (ref 1.56–6.13)
NEUTROPHILS ABSOLUTE: 12.3 K/UL (ref 1.56–6.13)
NEUTROPHILS ABSOLUTE: 13.1 K/UL (ref 1.56–6.13)
NEUTROPHILS ABSOLUTE: 2.3 K/UL (ref 1.5–7.5)
NEUTROPHILS ABSOLUTE: 2.36 K/UL (ref 1.56–6.13)
NEUTROPHILS ABSOLUTE: 2.56 K/UL (ref 1.56–6.13)
NEUTROPHILS ABSOLUTE: 2.57 K/UL (ref 1.56–6.13)
NEUTROPHILS ABSOLUTE: 2.8 K/UL (ref 1.5–7.5)
NEUTROPHILS ABSOLUTE: 2.95 K/UL (ref 1.56–6.13)
NEUTROPHILS ABSOLUTE: 3.5 K/UL (ref 1.56–6.13)
NEUTROPHILS ABSOLUTE: 4.19 K/UL (ref 1.56–6.13)
NEUTROPHILS ABSOLUTE: 4.2 K/UL (ref 1.5–7.5)
NEUTROPHILS ABSOLUTE: 4.22 K/UL (ref 1.56–6.13)
NEUTROPHILS ABSOLUTE: 5.39 K/UL (ref 1.56–6.13)
NEUTROPHILS ABSOLUTE: 5.91 K/UL (ref 1.56–6.13)
NEUTROPHILS ABSOLUTE: 7.37 K/UL (ref 1.56–6.13)
NEUTROPHILS ABSOLUTE: 7.66 K/UL (ref 1.56–6.13)
NEUTROPHILS ABSOLUTE: 8.31 K/UL (ref 1.56–6.13)
NEUTROPHILS RELATIVE PERCENT: 34.6 % (ref 34–71.1)
NEUTROPHILS RELATIVE PERCENT: 40.4 % (ref 34–71.1)
NEUTROPHILS RELATIVE PERCENT: 56.1 % (ref 34–71.1)
NEUTROPHILS RELATIVE PERCENT: 61.4 % (ref 50–65)
NEUTROPHILS RELATIVE PERCENT: 63.3 % (ref 50–65)
NEUTROPHILS RELATIVE PERCENT: 63.7 % (ref 34–71.1)
NEUTROPHILS RELATIVE PERCENT: 67.3 % (ref 50–65)
NEUTROPHILS RELATIVE PERCENT: 67.5 % (ref 34–71.1)
NEUTROPHILS RELATIVE PERCENT: 67.8 % (ref 34–71.1)
NEUTROPHILS RELATIVE PERCENT: 68.6 % (ref 34–71.1)
NEUTROPHILS RELATIVE PERCENT: 69.9 % (ref 34–71.1)
NEUTROPHILS RELATIVE PERCENT: 72.5 % (ref 34–71.1)
NEUTROPHILS RELATIVE PERCENT: 74 % (ref 34–71.1)
NEUTROPHILS RELATIVE PERCENT: 74.3 % (ref 34–71.1)
NEUTROPHILS RELATIVE PERCENT: 80.3 % (ref 34–71.1)
NEUTROPHILS RELATIVE PERCENT: 86 % (ref 34–71.1)
NEUTROPHILS RELATIVE PERCENT: 87.9 % (ref 34–71.1)
NEUTROPHILS RELATIVE PERCENT: 91.3 % (ref 34–71.1)
NEUTROPHILS RELATIVE PERCENT: 94.4 % (ref 34–71.1)
NITRITE, URINE: NEGATIVE
PARATHYROID HORMONE INTACT: 36.6 PG/ML (ref 15–65)
PDW BLD-RTO: 13.2 % (ref 11.5–14.5)
PDW BLD-RTO: 14.4 % (ref 11.7–14.4)
PDW BLD-RTO: 14.4 % (ref 11.7–14.4)
PDW BLD-RTO: 14.6 % (ref 11.5–14.5)
PDW BLD-RTO: 14.7 % (ref 11.7–14.4)
PDW BLD-RTO: 14.7 % (ref 11.7–14.4)
PDW BLD-RTO: 14.8 % (ref 11.7–14.4)
PDW BLD-RTO: 15.2 % (ref 11.7–14.4)
PDW BLD-RTO: 15.3 % (ref 11.7–14.4)
PDW BLD-RTO: 15.8 % (ref 11.7–14.4)
PDW BLD-RTO: 16 % (ref 11.7–14.4)
PDW BLD-RTO: 16 % (ref 11.7–14.4)
PDW BLD-RTO: 16.1 % (ref 11.5–14.5)
PDW BLD-RTO: 16.1 % (ref 11.7–14.4)
PDW BLD-RTO: 16.2 % (ref 11.7–14.4)
PDW BLD-RTO: 16.6 % (ref 11.7–14.4)
PDW BLD-RTO: 17.9 % (ref 11.7–14.4)
PDW BLD-RTO: 18.1 % (ref 11.7–14.4)
PDW BLD-RTO: 18.1 % (ref 11.7–14.4)
PERFORMED ON: ABNORMAL
PH UA: 6 (ref 5–8)
PHOSPHORUS: 2.6 MG/DL (ref 2.5–4.5)
PHOSPHORUS: 3.3 MG/DL (ref 2.5–4.5)
PHOSPHORUS: 4 MG/DL (ref 2.5–4.5)
PLATELET # BLD: 103 K/UL (ref 182–369)
PLATELET # BLD: 118 K/UL (ref 182–369)
PLATELET # BLD: 153 K/UL (ref 182–369)
PLATELET # BLD: 199 K/UL (ref 182–369)
PLATELET # BLD: 202 K/UL (ref 130–400)
PLATELET # BLD: 216 K/UL (ref 182–369)
PLATELET # BLD: 218 K/UL (ref 130–400)
PLATELET # BLD: 236 K/UL (ref 182–369)
PLATELET # BLD: 248 K/UL (ref 182–369)
PLATELET # BLD: 256 K/UL (ref 182–369)
PLATELET # BLD: 261 K/UL (ref 130–400)
PLATELET # BLD: 283 K/UL (ref 182–369)
PLATELET # BLD: 293 K/UL (ref 182–369)
PLATELET # BLD: 325 K/UL (ref 182–369)
PLATELET # BLD: 337 K/UL (ref 182–369)
PLATELET # BLD: 372 K/UL (ref 182–369)
PLATELET # BLD: 379 K/UL (ref 182–369)
PLATELET # BLD: 472 K/UL (ref 182–369)
PLATELET # BLD: 96 K/UL (ref 182–369)
PMV BLD AUTO: 10.1 FL (ref 7.4–10.4)
PMV BLD AUTO: 10.2 FL (ref 7.4–10.4)
PMV BLD AUTO: 10.3 FL (ref 7.4–10.4)
PMV BLD AUTO: 10.5 FL (ref 7.4–10.4)
PMV BLD AUTO: 10.5 FL (ref 7.4–10.4)
PMV BLD AUTO: 10.6 FL (ref 7.4–10.4)
PMV BLD AUTO: 10.6 FL (ref 7.4–10.4)
PMV BLD AUTO: 10.9 FL (ref 7.4–10.4)
PMV BLD AUTO: 11 FL (ref 7.4–10.4)
PMV BLD AUTO: 11.1 FL (ref 7.4–10.4)
PMV BLD AUTO: 11.1 FL (ref 7.4–10.4)
PMV BLD AUTO: 11.1 FL (ref 9.4–12.3)
PMV BLD AUTO: 11.3 FL (ref 9.4–12.3)
PMV BLD AUTO: 11.5 FL (ref 7.4–10.4)
PMV BLD AUTO: 11.6 FL (ref 7.4–10.4)
PMV BLD AUTO: 11.9 FL (ref 7.4–10.4)
PMV BLD AUTO: 12.3 FL (ref 9.4–12.3)
PMV BLD AUTO: 12.4 FL (ref 7.4–10.4)
PMV BLD AUTO: 9.9 FL (ref 7.4–10.4)
POTASSIUM SERPL-SCNC: 3.5 MMOL/L (ref 3.5–5.1)
POTASSIUM SERPL-SCNC: 3.9 MMOL/L (ref 3.5–5.1)
POTASSIUM SERPL-SCNC: 4 MMOL/L (ref 3.5–5.1)
POTASSIUM SERPL-SCNC: 4.1 MMOL/L (ref 3.5–5)
POTASSIUM SERPL-SCNC: 4.1 MMOL/L (ref 3.5–5)
POTASSIUM SERPL-SCNC: 4.2 MMOL/L (ref 3.5–5.1)
POTASSIUM SERPL-SCNC: 4.2 MMOL/L (ref 3.5–5.1)
POTASSIUM SERPL-SCNC: 4.4 MMOL/L (ref 3.5–5.1)
POTASSIUM SERPL-SCNC: 4.4 MMOL/L (ref 3.5–5.1)
POTASSIUM SERPL-SCNC: 4.5 MMOL/L (ref 3.5–5)
POTASSIUM SERPL-SCNC: 4.5 MMOL/L (ref 3.5–5.1)
POTASSIUM SERPL-SCNC: 4.5 MMOL/L (ref 3.5–5.1)
POTASSIUM SERPL-SCNC: 4.6 MMOL/L (ref 3.5–5.1)
POTASSIUM SERPL-SCNC: 4.6 MMOL/L (ref 3.5–5.1)
POTASSIUM SERPL-SCNC: 4.7 MMOL/L (ref 3.5–5.1)
POTASSIUM SERPL-SCNC: 5.1 MMOL/L (ref 3.5–5.1)
PROTEIN PROTEIN: 24 MG/DL (ref 15–45)
PROTEIN UA: ABNORMAL MG/DL
RBC # BLD: 3.11 M/UL (ref 3.93–5.22)
RBC # BLD: 3.18 M/UL (ref 3.93–5.22)
RBC # BLD: 3.28 M/UL (ref 3.93–5.22)
RBC # BLD: 3.3 M/UL (ref 4.2–5.4)
RBC # BLD: 3.31 M/UL (ref 3.93–5.22)
RBC # BLD: 3.39 M/UL (ref 3.93–5.22)
RBC # BLD: 3.4 M/UL (ref 3.93–5.22)
RBC # BLD: 3.42 M/UL (ref 3.93–5.22)
RBC # BLD: 3.44 M/UL (ref 3.93–5.22)
RBC # BLD: 3.44 M/UL (ref 3.93–5.22)
RBC # BLD: 3.57 M/UL (ref 3.93–5.22)
RBC # BLD: 3.61 M/UL (ref 3.93–5.22)
RBC # BLD: 3.67 M/UL (ref 3.93–5.22)
RBC # BLD: 3.71 M/UL (ref 4.2–5.4)
RBC # BLD: 3.87 M/UL (ref 3.93–5.22)
RBC # BLD: 3.91 M/UL (ref 3.93–5.22)
RBC # BLD: 3.98 M/UL (ref 3.93–5.22)
RBC # BLD: 4.7 M/UL (ref 4.2–5.4)
RBC # BLD: 4.86 M/UL (ref 3.93–5.22)
RBC UA: 2 /HPF (ref 0–4)
REASON FOR REJECTION: NORMAL
REJECTED TEST: NORMAL
SARS-COV-2, PCR: NOT DETECTED
SODIUM BLD-SCNC: 137 MMOL/L (ref 137–145)
SODIUM BLD-SCNC: 137 MMOL/L (ref 137–145)
SODIUM BLD-SCNC: 138 MMOL/L (ref 137–145)
SODIUM BLD-SCNC: 139 MMOL/L (ref 137–145)
SODIUM BLD-SCNC: 140 MMOL/L (ref 136–145)
SODIUM BLD-SCNC: 140 MMOL/L (ref 136–145)
SODIUM BLD-SCNC: 140 MMOL/L (ref 137–145)
SODIUM BLD-SCNC: 141 MMOL/L (ref 137–145)
SODIUM BLD-SCNC: 142 MMOL/L (ref 136–145)
SODIUM BLD-SCNC: 142 MMOL/L (ref 137–145)
SPECIFIC GRAVITY UA: 1.02 (ref 1–1.03)
TOTAL PROTEIN: 5.3 G/DL (ref 6.3–8.2)
TOTAL PROTEIN: 5.3 G/DL (ref 6.6–8.7)
TOTAL PROTEIN: 5.4 G/DL (ref 6.3–8.2)
TOTAL PROTEIN: 5.4 G/DL (ref 6.3–8.2)
TOTAL PROTEIN: 5.7 G/DL (ref 6.3–8.2)
TOTAL PROTEIN: 5.7 G/DL (ref 6.3–8.2)
TOTAL PROTEIN: 6.3 G/DL (ref 6.3–8.2)
TOTAL PROTEIN: 6.4 G/DL (ref 6.6–8.7)
TOTAL PROTEIN: 6.5 G/DL (ref 6.3–8.2)
TOTAL PROTEIN: 6.6 G/DL (ref 6.3–8.2)
TOTAL PROTEIN: 6.6 G/DL (ref 6.3–8.2)
TOTAL PROTEIN: 6.7 G/DL (ref 6.3–8.2)
TOTAL PROTEIN: 6.8 G/DL (ref 6.3–8.2)
TOTAL PROTEIN: 6.8 G/DL (ref 6.3–8.2)
TOTAL PROTEIN: 7.2 G/DL (ref 6.3–8.2)
TOTAL PROTEIN: 7.2 G/DL (ref 6.6–8.7)
TRIGL SERPL-MCNC: 123 MG/DL (ref 0–149)
TSH SERPL DL<=0.05 MIU/L-ACNC: 0.4 UIU/ML (ref 0.47–4.68)
TSH SERPL DL<=0.05 MIU/L-ACNC: 0.52 UIU/ML (ref 0.47–4.68)
TSH SERPL DL<=0.05 MIU/L-ACNC: 1.62 UIU/ML (ref 0.27–4.2)
URIC ACID, SERUM: 4 MG/DL (ref 2.4–5.7)
UROBILINOGEN, URINE: 0.2 E.U./DL
VITAMIN D 25-HYDROXY: 44.7 NG/ML
VITAMIN D 25-HYDROXY: 54.3 NG/ML
WBC # BLD: 1.76 K/UL (ref 3.98–10.04)
WBC # BLD: 10.34 K/UL (ref 3.98–10.04)
WBC # BLD: 10.36 K/UL (ref 3.98–10.04)
WBC # BLD: 13.48 K/UL (ref 3.98–10.04)
WBC # BLD: 13.88 K/UL (ref 3.98–10.04)
WBC # BLD: 2.48 K/UL (ref 3.98–10.04)
WBC # BLD: 3.4 K/UL (ref 4.8–10.8)
WBC # BLD: 3.79 K/UL (ref 3.98–10.04)
WBC # BLD: 4.04 K/UL (ref 3.98–10.04)
WBC # BLD: 4.2 K/UL (ref 3.98–10.04)
WBC # BLD: 4.22 K/UL (ref 3.98–10.04)
WBC # BLD: 4.6 K/UL (ref 4.8–10.8)
WBC # BLD: 5.1 K/UL (ref 3.98–10.04)
WBC # BLD: 5.68 K/UL (ref 3.98–10.04)
WBC # BLD: 6.18 K/UL (ref 3.98–10.04)
WBC # BLD: 6.27 K/UL (ref 3.98–10.04)
WBC # BLD: 6.7 K/UL (ref 4.8–10.8)
WBC # BLD: 8.15 K/UL (ref 3.98–10.04)
WBC # BLD: 8.38 K/UL (ref 3.98–10.04)
WBC UA: 33 /HPF (ref 0–5)

## 2021-01-01 PROCEDURE — 96361 HYDRATE IV INFUSION ADD-ON: CPT

## 2021-01-01 PROCEDURE — 85025 COMPLETE CBC W/AUTO DIFF WBC: CPT

## 2021-01-01 PROCEDURE — 6360000002 HC RX W HCPCS: Performed by: INTERNAL MEDICINE

## 2021-01-01 PROCEDURE — 99212 OFFICE O/P EST SF 10 MIN: CPT

## 2021-01-01 PROCEDURE — 2580000003 HC RX 258: Performed by: INTERNAL MEDICINE

## 2021-01-01 PROCEDURE — 84443 ASSAY THYROID STIM HORMONE: CPT

## 2021-01-01 PROCEDURE — 1036F TOBACCO NON-USER: CPT | Performed by: INTERNAL MEDICINE

## 2021-01-01 PROCEDURE — 96375 TX/PRO/DX INJ NEW DRUG ADDON: CPT

## 2021-01-01 PROCEDURE — 99214 OFFICE O/P EST MOD 30 MIN: CPT | Performed by: INTERNAL MEDICINE

## 2021-01-01 PROCEDURE — 1123F ACP DISCUSS/DSCN MKR DOCD: CPT | Performed by: INTERNAL MEDICINE

## 2021-01-01 PROCEDURE — G8399 PT W/DXA RESULTS DOCUMENT: HCPCS | Performed by: INTERNAL MEDICINE

## 2021-01-01 PROCEDURE — 96365 THER/PROPH/DIAG IV INF INIT: CPT

## 2021-01-01 PROCEDURE — G8484 FLU IMMUNIZE NO ADMIN: HCPCS | Performed by: NURSE PRACTITIONER

## 2021-01-01 PROCEDURE — G8428 CUR MEDS NOT DOCUMENT: HCPCS | Performed by: NURSE PRACTITIONER

## 2021-01-01 PROCEDURE — 77001 FLUOROGUIDE FOR VEIN DEVICE: CPT | Performed by: SURGERY

## 2021-01-01 PROCEDURE — 4040F PNEUMOC VAC/ADMIN/RCVD: CPT | Performed by: NURSE PRACTITIONER

## 2021-01-01 PROCEDURE — 80053 COMPREHEN METABOLIC PANEL: CPT

## 2021-01-01 PROCEDURE — 1123F ACP DISCUSS/DSCN MKR DOCD: CPT | Performed by: NURSE PRACTITIONER

## 2021-01-01 PROCEDURE — 36415 COLL VENOUS BLD VENIPUNCTURE: CPT

## 2021-01-01 PROCEDURE — 1090F PRES/ABSN URINE INCON ASSESS: CPT | Performed by: INTERNAL MEDICINE

## 2021-01-01 PROCEDURE — G8417 CALC BMI ABV UP PARAM F/U: HCPCS | Performed by: INTERNAL MEDICINE

## 2021-01-01 PROCEDURE — 99213 OFFICE O/P EST LOW 20 MIN: CPT

## 2021-01-01 PROCEDURE — 82947 ASSAY GLUCOSE BLOOD QUANT: CPT

## 2021-01-01 PROCEDURE — 36561 INSERT TUNNELED CV CATH: CPT | Performed by: SURGERY

## 2021-01-01 PROCEDURE — 2580000003 HC RX 258: Performed by: NURSE PRACTITIONER

## 2021-01-01 PROCEDURE — 83735 ASSAY OF MAGNESIUM: CPT

## 2021-01-01 PROCEDURE — 96360 HYDRATION IV INFUSION INIT: CPT

## 2021-01-01 PROCEDURE — 4040F PNEUMOC VAC/ADMIN/RCVD: CPT | Performed by: INTERNAL MEDICINE

## 2021-01-01 PROCEDURE — 84100 ASSAY OF PHOSPHORUS: CPT

## 2021-01-01 PROCEDURE — G8484 FLU IMMUNIZE NO ADMIN: HCPCS | Performed by: INTERNAL MEDICINE

## 2021-01-01 PROCEDURE — G8427 DOCREV CUR MEDS BY ELIG CLIN: HCPCS | Performed by: SURGERY

## 2021-01-01 PROCEDURE — 96417 CHEMO IV INFUS EACH ADDL SEQ: CPT

## 2021-01-01 PROCEDURE — 99215 OFFICE O/P EST HI 40 MIN: CPT | Performed by: INTERNAL MEDICINE

## 2021-01-01 PROCEDURE — 1036F TOBACCO NON-USER: CPT | Performed by: NURSE PRACTITIONER

## 2021-01-01 PROCEDURE — 1123F ACP DISCUSS/DSCN MKR DOCD: CPT | Performed by: SURGERY

## 2021-01-01 PROCEDURE — 1036F TOBACCO NON-USER: CPT | Performed by: SURGERY

## 2021-01-01 PROCEDURE — 99282 EMERGENCY DEPT VISIT SF MDM: CPT

## 2021-01-01 PROCEDURE — G8428 CUR MEDS NOT DOCUMENT: HCPCS | Performed by: INTERNAL MEDICINE

## 2021-01-01 PROCEDURE — G8427 DOCREV CUR MEDS BY ELIG CLIN: HCPCS | Performed by: INTERNAL MEDICINE

## 2021-01-01 PROCEDURE — 2580000003 HC RX 258: Performed by: EMERGENCY MEDICINE

## 2021-01-01 PROCEDURE — 96523 IRRIG DRUG DELIVERY DEVICE: CPT

## 2021-01-01 PROCEDURE — G8417 CALC BMI ABV UP PARAM F/U: HCPCS | Performed by: NURSE PRACTITIONER

## 2021-01-01 PROCEDURE — 99202 OFFICE O/P NEW SF 15 MIN: CPT | Performed by: SURGERY

## 2021-01-01 PROCEDURE — C1788 PORT, INDWELLING, IMP: HCPCS | Performed by: SURGERY

## 2021-01-01 PROCEDURE — A9552 F18 FDG: HCPCS | Performed by: INTERNAL MEDICINE

## 2021-01-01 PROCEDURE — G8399 PT W/DXA RESULTS DOCUMENT: HCPCS | Performed by: NURSE PRACTITIONER

## 2021-01-01 PROCEDURE — 96372 THER/PROPH/DIAG INJ SC/IM: CPT | Performed by: INTERNAL MEDICINE

## 2021-01-01 PROCEDURE — G8399 PT W/DXA RESULTS DOCUMENT: HCPCS | Performed by: SURGERY

## 2021-01-01 PROCEDURE — 4040F PNEUMOC VAC/ADMIN/RCVD: CPT | Performed by: SURGERY

## 2021-01-01 PROCEDURE — 2500000003 HC RX 250 WO HCPCS

## 2021-01-01 PROCEDURE — G8417 CALC BMI ABV UP PARAM F/U: HCPCS | Performed by: SURGERY

## 2021-01-01 PROCEDURE — 96374 THER/PROPH/DIAG INJ IV PUSH: CPT

## 2021-01-01 PROCEDURE — 96367 TX/PROPH/DG ADDL SEQ IV INF: CPT

## 2021-01-01 PROCEDURE — 77080 DXA BONE DENSITY AXIAL: CPT

## 2021-01-01 PROCEDURE — 36561 INSERT TUNNELED CV CATH: CPT

## 2021-01-01 PROCEDURE — 76937 US GUIDE VASCULAR ACCESS: CPT | Performed by: SURGERY

## 2021-01-01 PROCEDURE — 1090F PRES/ABSN URINE INCON ASSESS: CPT | Performed by: NURSE PRACTITIONER

## 2021-01-01 PROCEDURE — 73502 X-RAY EXAM HIP UNI 2-3 VIEWS: CPT

## 2021-01-01 PROCEDURE — 99214 OFFICE O/P EST MOD 30 MIN: CPT | Performed by: NURSE PRACTITIONER

## 2021-01-01 PROCEDURE — 78815 PET IMAGE W/CT SKULL-THIGH: CPT

## 2021-01-01 PROCEDURE — 1090F PRES/ABSN URINE INCON ASSESS: CPT | Performed by: SURGERY

## 2021-01-01 PROCEDURE — 73552 X-RAY EXAM OF FEMUR 2/>: CPT

## 2021-01-01 PROCEDURE — 96413 CHEMO IV INFUSION 1 HR: CPT

## 2021-01-01 PROCEDURE — 3430000000 HC RX DIAGNOSTIC RADIOPHARMACEUTICAL: Performed by: INTERNAL MEDICINE

## 2021-01-01 PROCEDURE — 3209999900 FLUORO FOR SURGICAL PROCEDURES

## 2021-01-01 PROCEDURE — 6360000002 HC RX W HCPCS: Performed by: NURSE PRACTITIONER

## 2021-01-01 PROCEDURE — 99205 OFFICE O/P NEW HI 60 MIN: CPT | Performed by: INTERNAL MEDICINE

## 2021-01-01 PROCEDURE — 77067 SCR MAMMO BI INCL CAD: CPT

## 2021-01-01 PROCEDURE — 99213 OFFICE O/P EST LOW 20 MIN: CPT | Performed by: INTERNAL MEDICINE

## 2021-01-01 PROCEDURE — 6360000002 HC RX W HCPCS: Performed by: EMERGENCY MEDICINE

## 2021-01-01 PROCEDURE — 83690 ASSAY OF LIPASE: CPT

## 2021-01-01 PROCEDURE — 96372 THER/PROPH/DIAG INJ SC/IM: CPT | Performed by: NURSE PRACTITIONER

## 2021-01-01 PROCEDURE — 99999 PR OFFICE/OUTPT VISIT,PROCEDURE ONLY: CPT | Performed by: NURSE PRACTITIONER

## 2021-01-01 PROCEDURE — 99213 OFFICE O/P EST LOW 20 MIN: CPT | Performed by: NURSE PRACTITIONER

## 2021-01-01 DEVICE — PORT INFUS PLAS SGL LUMN W/ 9.6FR SIL CATH AIRGUARD VLV: Type: IMPLANTABLE DEVICE | Site: SUBCLAVIAN | Status: FUNCTIONAL

## 2021-01-01 RX ORDER — METHYLPREDNISOLONE SODIUM SUCCINATE 125 MG/2ML
125 INJECTION, POWDER, LYOPHILIZED, FOR SOLUTION INTRAMUSCULAR; INTRAVENOUS ONCE
Status: CANCELLED | OUTPATIENT
Start: 2021-01-01 | End: 2021-01-01

## 2021-01-01 RX ORDER — HEPARIN SODIUM (PORCINE) LOCK FLUSH IV SOLN 100 UNIT/ML 100 UNIT/ML
500 SOLUTION INTRAVENOUS PRN
Status: DISCONTINUED | OUTPATIENT
Start: 2021-01-01 | End: 2021-01-01 | Stop reason: HOSPADM

## 2021-01-01 RX ORDER — MELOXICAM 7.5 MG/1
7.5 TABLET ORAL DAILY
Qty: 30 TABLET | Refills: 1 | Status: SHIPPED | OUTPATIENT
Start: 2021-01-01 | End: 2021-01-01

## 2021-01-01 RX ORDER — ONDANSETRON 4 MG/1
4 TABLET, FILM COATED ORAL EVERY 8 HOURS PRN
COMMUNITY
Start: 2021-01-01 | End: 2021-01-01

## 2021-01-01 RX ORDER — 0.9 % SODIUM CHLORIDE 0.9 %
500 INTRAVENOUS SOLUTION INTRAVENOUS ONCE
Status: CANCELLED | OUTPATIENT
Start: 2021-01-01 | End: 2021-01-01

## 2021-01-01 RX ORDER — 0.9 % SODIUM CHLORIDE 0.9 %
1000 INTRAVENOUS SOLUTION INTRAVENOUS ONCE
Status: COMPLETED | OUTPATIENT
Start: 2021-01-01 | End: 2021-01-01

## 2021-01-01 RX ORDER — SODIUM CHLORIDE 9 MG/ML
25 INJECTION, SOLUTION INTRAVENOUS PRN
Status: DISCONTINUED | OUTPATIENT
Start: 2021-01-01 | End: 2021-01-01 | Stop reason: HOSPADM

## 2021-01-01 RX ORDER — SODIUM CHLORIDE 9 MG/ML
25 INJECTION, SOLUTION INTRAVENOUS PRN
Status: CANCELLED | OUTPATIENT
Start: 2021-01-01

## 2021-01-01 RX ORDER — PALONOSETRON 0.05 MG/ML
0.25 INJECTION, SOLUTION INTRAVENOUS ONCE
Status: COMPLETED | OUTPATIENT
Start: 2021-01-01 | End: 2021-01-01

## 2021-01-01 RX ORDER — SODIUM CHLORIDE 0.9 % (FLUSH) 0.9 %
5-40 SYRINGE (ML) INJECTION EVERY 12 HOURS SCHEDULED
Status: CANCELLED | OUTPATIENT
Start: 2021-01-01

## 2021-01-01 RX ORDER — EPINEPHRINE 1 MG/ML
0.3 INJECTION, SOLUTION, CONCENTRATE INTRAVENOUS PRN
Status: CANCELLED | OUTPATIENT
Start: 2021-01-01

## 2021-01-01 RX ORDER — HEPARIN SODIUM (PORCINE) LOCK FLUSH IV SOLN 100 UNIT/ML 100 UNIT/ML
500 SOLUTION INTRAVENOUS PRN
Status: CANCELLED | OUTPATIENT
Start: 2021-01-01

## 2021-01-01 RX ORDER — SODIUM CHLORIDE 0.9 % (FLUSH) 0.9 %
10 SYRINGE (ML) INJECTION PRN
Status: DISCONTINUED | OUTPATIENT
Start: 2021-01-01 | End: 2021-01-01 | Stop reason: HOSPADM

## 2021-01-01 RX ORDER — CEFAZOLIN SODIUM 1 G/3ML
INJECTION, POWDER, FOR SOLUTION INTRAMUSCULAR; INTRAVENOUS PRN
Status: DISCONTINUED | OUTPATIENT
Start: 2021-01-01 | End: 2021-01-01 | Stop reason: SDUPTHER

## 2021-01-01 RX ORDER — SODIUM CHLORIDE 0.9 % (FLUSH) 0.9 %
5-40 SYRINGE (ML) INJECTION PRN
Status: CANCELLED | OUTPATIENT
Start: 2021-01-01

## 2021-01-01 RX ORDER — LABETALOL HYDROCHLORIDE 5 MG/ML
5 INJECTION, SOLUTION INTRAVENOUS EVERY 10 MIN PRN
Status: DISCONTINUED | OUTPATIENT
Start: 2021-01-01 | End: 2021-01-01 | Stop reason: HOSPADM

## 2021-01-01 RX ORDER — 0.9 % SODIUM CHLORIDE 0.9 %
1000 INTRAVENOUS SOLUTION INTRAVENOUS ONCE
Status: CANCELLED | OUTPATIENT
Start: 2021-01-01 | End: 2021-01-01

## 2021-01-01 RX ORDER — 0.9 % SODIUM CHLORIDE 0.9 %
500 INTRAVENOUS SOLUTION INTRAVENOUS ONCE
Status: COMPLETED | OUTPATIENT
Start: 2021-01-01 | End: 2021-01-01

## 2021-01-01 RX ORDER — HEPARIN SODIUM (PORCINE) LOCK FLUSH IV SOLN 100 UNIT/ML 100 UNIT/ML
SOLUTION INTRAVENOUS PRN
Status: DISCONTINUED | OUTPATIENT
Start: 2021-01-01 | End: 2021-01-01 | Stop reason: ALTCHOICE

## 2021-01-01 RX ORDER — METOCLOPRAMIDE 10 MG/1
10 TABLET ORAL 4 TIMES DAILY
Qty: 120 TABLET | Refills: 1 | Status: ON HOLD | OUTPATIENT
Start: 2021-01-01 | End: 2022-01-01 | Stop reason: HOSPADM

## 2021-01-01 RX ORDER — ONDANSETRON 2 MG/ML
4 INJECTION INTRAMUSCULAR; INTRAVENOUS
Status: DISCONTINUED | OUTPATIENT
Start: 2021-01-01 | End: 2021-01-01 | Stop reason: HOSPADM

## 2021-01-01 RX ORDER — SODIUM CHLORIDE, SODIUM LACTATE, POTASSIUM CHLORIDE, AND CALCIUM CHLORIDE .6; .31; .03; .02 G/100ML; G/100ML; G/100ML; G/100ML
1000 INJECTION, SOLUTION INTRAVENOUS ONCE
Status: COMPLETED | OUTPATIENT
Start: 2021-01-01 | End: 2021-01-01

## 2021-01-01 RX ORDER — SODIUM CHLORIDE 9 MG/ML
20 INJECTION, SOLUTION INTRAVENOUS ONCE
Status: CANCELLED | OUTPATIENT
Start: 2021-01-01 | End: 2021-01-01

## 2021-01-01 RX ORDER — MORPHINE SULFATE 10 MG/ML
2 INJECTION, SOLUTION INTRAMUSCULAR; INTRAVENOUS
Status: DISCONTINUED | OUTPATIENT
Start: 2021-01-01 | End: 2021-01-01 | Stop reason: HOSPADM

## 2021-01-01 RX ORDER — DIPHENHYDRAMINE HYDROCHLORIDE 50 MG/ML
50 INJECTION INTRAMUSCULAR; INTRAVENOUS ONCE
Status: CANCELLED | OUTPATIENT
Start: 2021-01-01 | End: 2021-01-01

## 2021-01-01 RX ORDER — MEPERIDINE HYDROCHLORIDE 50 MG/ML
12.5 INJECTION INTRAMUSCULAR; INTRAVENOUS; SUBCUTANEOUS ONCE
Status: CANCELLED | OUTPATIENT
Start: 2021-01-01 | End: 2021-01-01

## 2021-01-01 RX ORDER — DEXAMETHASONE SODIUM PHOSPHATE 10 MG/ML
10 INJECTION, SOLUTION INTRAMUSCULAR; INTRAVENOUS ONCE
Status: COMPLETED | OUTPATIENT
Start: 2021-01-01 | End: 2021-01-01

## 2021-01-01 RX ORDER — METHYLPREDNISOLONE 4 MG/1
TABLET ORAL
Qty: 1 KIT | Refills: 0 | Status: SHIPPED | OUTPATIENT
Start: 2021-01-01 | End: 2021-01-01

## 2021-01-01 RX ORDER — FLUDEOXYGLUCOSE F 18 200 MCI/ML
10 INJECTION, SOLUTION INTRAVENOUS
Status: COMPLETED | OUTPATIENT
Start: 2021-01-01 | End: 2021-01-01

## 2021-01-01 RX ORDER — SODIUM CHLORIDE 0.9 % (FLUSH) 0.9 %
5-40 SYRINGE (ML) INJECTION PRN
Status: DISCONTINUED | OUTPATIENT
Start: 2021-01-01 | End: 2021-01-01 | Stop reason: HOSPADM

## 2021-01-01 RX ORDER — SODIUM CHLORIDE 0.9 % (FLUSH) 0.9 %
10 SYRINGE (ML) INJECTION PRN
Status: CANCELLED | OUTPATIENT
Start: 2021-01-01

## 2021-01-01 RX ORDER — PREDNISONE 10 MG/1
TABLET ORAL
Qty: 30 TABLET | Refills: 0 | Status: SHIPPED | OUTPATIENT
Start: 2021-01-01 | End: 2021-01-01

## 2021-01-01 RX ORDER — 0.9 % SODIUM CHLORIDE 0.9 %
1000 INTRAVENOUS SOLUTION INTRAVENOUS ONCE
Status: DISCONTINUED | OUTPATIENT
Start: 2021-01-01 | End: 2021-01-01 | Stop reason: HOSPADM

## 2021-01-01 RX ORDER — ONDANSETRON 2 MG/ML
4 INJECTION INTRAMUSCULAR; INTRAVENOUS EVERY 6 HOURS PRN
Status: CANCELLED | OUTPATIENT
Start: 2021-01-01

## 2021-01-01 RX ORDER — PANTOPRAZOLE SODIUM 40 MG/1
40 TABLET, DELAYED RELEASE ORAL DAILY
Qty: 90 TABLET | Refills: 1 | Status: SHIPPED | OUTPATIENT
Start: 2021-01-01 | End: 2022-01-01

## 2021-01-01 RX ORDER — 0.9 % SODIUM CHLORIDE 0.9 %
500 INTRAVENOUS SOLUTION INTRAVENOUS
Status: DISCONTINUED | OUTPATIENT
Start: 2021-01-01 | End: 2021-01-01 | Stop reason: HOSPADM

## 2021-01-01 RX ORDER — MELOXICAM 7.5 MG/1
7.5 TABLET ORAL DAILY
Qty: 30 TABLET | Refills: 3 | Status: CANCELLED | OUTPATIENT
Start: 2021-01-01

## 2021-01-01 RX ORDER — DEXAMETHASONE 4 MG/1
4 TABLET ORAL SEE ADMIN INSTRUCTIONS
Qty: 24 TABLET | Refills: 0 | Status: SHIPPED | OUTPATIENT
Start: 2021-01-01 | End: 2021-01-01 | Stop reason: ALTCHOICE

## 2021-01-01 RX ORDER — SODIUM CHLORIDE 9 MG/ML
20 INJECTION, SOLUTION INTRAVENOUS ONCE
Status: DISCONTINUED | OUTPATIENT
Start: 2021-01-01 | End: 2021-01-01 | Stop reason: HOSPADM

## 2021-01-01 RX ORDER — SODIUM CHLORIDE 9 MG/ML
INJECTION, SOLUTION INTRAVENOUS CONTINUOUS
Status: CANCELLED | OUTPATIENT
Start: 2021-01-01

## 2021-01-01 RX ORDER — ONDANSETRON 4 MG/1
4 TABLET, ORALLY DISINTEGRATING ORAL EVERY 8 HOURS PRN
Status: CANCELLED | OUTPATIENT
Start: 2021-01-01

## 2021-01-01 RX ORDER — METHYLPREDNISOLONE ACETATE 40 MG/ML
40 INJECTION, SUSPENSION INTRA-ARTICULAR; INTRALESIONAL; INTRAMUSCULAR; SOFT TISSUE ONCE
Qty: 1 ML | Refills: 0 | Status: SHIPPED | OUTPATIENT
Start: 2021-01-01 | End: 2021-01-01 | Stop reason: CLARIF

## 2021-01-01 RX ORDER — SODIUM CHLORIDE 9 MG/ML
INJECTION, SOLUTION INTRAVENOUS ONCE
Status: COMPLETED | OUTPATIENT
Start: 2021-01-01 | End: 2021-01-01

## 2021-01-01 RX ORDER — FOLIC ACID 1 MG/1
1 TABLET ORAL DAILY
Qty: 90 TABLET | Refills: 0 | Status: SHIPPED | OUTPATIENT
Start: 2021-01-01 | End: 2022-01-01

## 2021-01-01 RX ORDER — DIPHENHYDRAMINE HYDROCHLORIDE 50 MG/ML
12.5 INJECTION INTRAMUSCULAR; INTRAVENOUS
Status: DISCONTINUED | OUTPATIENT
Start: 2021-01-01 | End: 2021-01-01 | Stop reason: HOSPADM

## 2021-01-01 RX ORDER — CAPMATINIB 200 MG/1
400 TABLET, FILM COATED ORAL 2 TIMES DAILY
Qty: 120 TABLET | Refills: 3 | Status: ON HOLD | OUTPATIENT
Start: 2021-01-01 | End: 2022-01-01 | Stop reason: HOSPADM

## 2021-01-01 RX ORDER — LIDOCAINE HYDROCHLORIDE 10 MG/ML
INJECTION, SOLUTION EPIDURAL; INFILTRATION; INTRACAUDAL; PERINEURAL PRN
Status: DISCONTINUED | OUTPATIENT
Start: 2021-01-01 | End: 2021-01-01 | Stop reason: SDUPTHER

## 2021-01-01 RX ORDER — ONDANSETRON 2 MG/ML
4 INJECTION INTRAMUSCULAR; INTRAVENOUS ONCE
Status: COMPLETED | OUTPATIENT
Start: 2021-01-01 | End: 2021-01-01

## 2021-01-01 RX ORDER — METHYLPREDNISOLONE ACETATE 40 MG/ML
40 INJECTION, SUSPENSION INTRA-ARTICULAR; INTRALESIONAL; INTRAMUSCULAR; SOFT TISSUE ONCE
Status: COMPLETED | OUTPATIENT
Start: 2021-01-01 | End: 2021-01-01

## 2021-01-01 RX ORDER — LOSARTAN POTASSIUM 50 MG/1
50 TABLET ORAL DAILY
Qty: 90 TABLET | Refills: 3 | Status: ON HOLD | OUTPATIENT
Start: 2021-01-01 | End: 2022-01-01 | Stop reason: HOSPADM

## 2021-01-01 RX ORDER — PRAVASTATIN SODIUM 80 MG/1
TABLET ORAL
Qty: 90 TABLET | Refills: 3 | Status: ON HOLD | OUTPATIENT
Start: 2021-01-01 | End: 2022-01-01 | Stop reason: HOSPADM

## 2021-01-01 RX ORDER — BUPIVACAINE HYDROCHLORIDE AND EPINEPHRINE 2.5; 5 MG/ML; UG/ML
INJECTION, SOLUTION INFILTRATION; PERINEURAL PRN
Status: DISCONTINUED | OUTPATIENT
Start: 2021-01-01 | End: 2021-01-01 | Stop reason: ALTCHOICE

## 2021-01-01 RX ORDER — PALONOSETRON 0.05 MG/ML
0.25 INJECTION, SOLUTION INTRAVENOUS ONCE
Status: CANCELLED | OUTPATIENT
Start: 2021-01-01

## 2021-01-01 RX ORDER — HYDROCODONE BITARTRATE AND ACETAMINOPHEN 5; 325 MG/1; MG/1
1 TABLET ORAL EVERY 4 HOURS PRN
Status: DISCONTINUED | OUTPATIENT
Start: 2021-01-01 | End: 2021-01-01 | Stop reason: HOSPADM

## 2021-01-01 RX ORDER — HYDROCODONE BITARTRATE AND ACETAMINOPHEN 10; 325 MG/1; MG/1
1 TABLET ORAL EVERY 4 HOURS PRN
Status: ON HOLD | COMMUNITY
Start: 2021-01-01 | End: 2022-01-01 | Stop reason: HOSPADM

## 2021-01-01 RX ORDER — ONDANSETRON 8 MG/1
8 TABLET, ORALLY DISINTEGRATING ORAL EVERY 8 HOURS PRN
Qty: 30 TABLET | Refills: 2 | Status: ON HOLD | OUTPATIENT
Start: 2021-01-01 | End: 2022-01-01 | Stop reason: HOSPADM

## 2021-01-01 RX ORDER — TRIAMCINOLONE ACETONIDE 40 MG/ML
40 INJECTION, SUSPENSION INTRA-ARTICULAR; INTRAMUSCULAR ONCE
Status: COMPLETED | OUTPATIENT
Start: 2021-01-01 | End: 2021-01-01

## 2021-01-01 RX ORDER — PROMETHAZINE HYDROCHLORIDE 25 MG/ML
6.25 INJECTION, SOLUTION INTRAMUSCULAR; INTRAVENOUS
Status: DISCONTINUED | OUTPATIENT
Start: 2021-01-01 | End: 2021-01-01 | Stop reason: HOSPADM

## 2021-01-01 RX ORDER — SODIUM CHLORIDE, SODIUM LACTATE, POTASSIUM CHLORIDE, CALCIUM CHLORIDE 600; 310; 30; 20 MG/100ML; MG/100ML; MG/100ML; MG/100ML
INJECTION, SOLUTION INTRAVENOUS CONTINUOUS
Status: DISCONTINUED | OUTPATIENT
Start: 2021-01-01 | End: 2021-01-01 | Stop reason: HOSPADM

## 2021-01-01 RX ORDER — PROMETHAZINE HYDROCHLORIDE 25 MG/1
25 TABLET ORAL EVERY 8 HOURS PRN
Qty: 30 TABLET | Refills: 2 | Status: SHIPPED | OUTPATIENT
Start: 2021-01-01 | End: 2022-01-01

## 2021-01-01 RX ORDER — HYDRALAZINE HYDROCHLORIDE 20 MG/ML
5 INJECTION INTRAMUSCULAR; INTRAVENOUS EVERY 10 MIN PRN
Status: DISCONTINUED | OUTPATIENT
Start: 2021-01-01 | End: 2021-01-01 | Stop reason: HOSPADM

## 2021-01-01 RX ORDER — LIDOCAINE AND PRILOCAINE 25; 25 MG/G; MG/G
CREAM TOPICAL
Qty: 30 G | Refills: 3 | Status: ON HOLD | OUTPATIENT
Start: 2021-01-01 | End: 2022-01-01 | Stop reason: HOSPADM

## 2021-01-01 RX ORDER — PROPOFOL 10 MG/ML
INJECTION, EMULSION INTRAVENOUS PRN
Status: DISCONTINUED | OUTPATIENT
Start: 2021-01-01 | End: 2021-01-01 | Stop reason: SDUPTHER

## 2021-01-01 RX ORDER — CEPHALEXIN 500 MG/1
500 CAPSULE ORAL 4 TIMES DAILY
Qty: 28 CAPSULE | Refills: 0 | Status: SHIPPED | OUTPATIENT
Start: 2021-01-01 | End: 2021-01-01

## 2021-01-01 RX ORDER — HYDROCODONE BITARTRATE AND ACETAMINOPHEN 5; 325 MG/1; MG/1
2 TABLET ORAL EVERY 4 HOURS PRN
Status: DISCONTINUED | OUTPATIENT
Start: 2021-01-01 | End: 2021-01-01 | Stop reason: HOSPADM

## 2021-01-01 RX ORDER — OXYBUTYNIN CHLORIDE 5 MG/1
TABLET ORAL
Qty: 180 TABLET | Refills: 3 | Status: ON HOLD | OUTPATIENT
Start: 2021-01-01 | End: 2022-01-01 | Stop reason: HOSPADM

## 2021-01-01 RX ORDER — MORPHINE SULFATE 10 MG/ML
4 INJECTION, SOLUTION INTRAMUSCULAR; INTRAVENOUS
Status: DISCONTINUED | OUTPATIENT
Start: 2021-01-01 | End: 2021-01-01 | Stop reason: HOSPADM

## 2021-01-01 RX ADMIN — DEXAMETHASONE SODIUM PHOSPHATE 10 MG: 10 INJECTION, SOLUTION INTRAMUSCULAR; INTRAVENOUS at 14:14

## 2021-01-01 RX ADMIN — FLUDEOXYGLUCOSE F 18 10 MILLICURIE: 200 INJECTION, SOLUTION INTRAVENOUS at 13:51

## 2021-01-01 RX ADMIN — SODIUM CHLORIDE, PRESERVATIVE FREE 10 ML: 5 INJECTION INTRAVENOUS at 16:24

## 2021-01-01 RX ADMIN — SODIUM CHLORIDE 1000 ML: 9 INJECTION, SOLUTION INTRAVENOUS at 14:07

## 2021-01-01 RX ADMIN — PALONOSETRON HYDROCHLORIDE 0.25 MG: 0.25 INJECTION, SOLUTION INTRAVENOUS at 14:13

## 2021-01-01 RX ADMIN — HEPARIN 500 UNITS: 100 SYRINGE at 16:23

## 2021-01-01 RX ADMIN — SODIUM CHLORIDE, POTASSIUM CHLORIDE, SODIUM LACTATE AND CALCIUM CHLORIDE 1000 ML: 600; 310; 30; 20 INJECTION, SOLUTION INTRAVENOUS at 01:03

## 2021-01-01 RX ADMIN — SODIUM CHLORIDE 1000 ML: 9 INJECTION, SOLUTION INTRAVENOUS at 09:45

## 2021-01-01 RX ADMIN — HEPARIN 500 UNITS: 100 SYRINGE at 16:02

## 2021-01-01 RX ADMIN — PROPOFOL 200 MG: 10 INJECTION, EMULSION INTRAVENOUS at 12:24

## 2021-01-01 RX ADMIN — SODIUM CHLORIDE 1000 ML: 9 INJECTION, SOLUTION INTRAVENOUS at 15:16

## 2021-01-01 RX ADMIN — SODIUM CHLORIDE 1000 ML: 9 INJECTION, SOLUTION INTRAVENOUS at 15:20

## 2021-01-01 RX ADMIN — SODIUM CHLORIDE, PRESERVATIVE FREE 10 ML: 5 INJECTION INTRAVENOUS at 12:53

## 2021-01-01 RX ADMIN — SODIUM CHLORIDE, SODIUM LACTATE, POTASSIUM CHLORIDE, CALCIUM CHLORIDE: 600; 310; 30; 20 INJECTION, SOLUTION INTRAVENOUS at 10:49

## 2021-01-01 RX ADMIN — HEPARIN 500 UNITS: 100 SYRINGE at 15:01

## 2021-01-01 RX ADMIN — HEPARIN 500 UNITS: 100 SYRINGE at 11:10

## 2021-01-01 RX ADMIN — SODIUM CHLORIDE 200 MG: 9 INJECTION, SOLUTION INTRAVENOUS at 14:46

## 2021-01-01 RX ADMIN — ONDANSETRON 4 MG: 2 INJECTION INTRAMUSCULAR; INTRAVENOUS at 00:55

## 2021-01-01 RX ADMIN — CARBOPLATIN 275 MG: 10 INJECTION, SOLUTION INTRAVENOUS at 15:37

## 2021-01-01 RX ADMIN — TRIAMCINOLONE ACETONIDE 40 MG: 40 INJECTION, SUSPENSION INTRA-ARTICULAR; INTRAMUSCULAR at 11:41

## 2021-01-01 RX ADMIN — FAMOTIDINE 20 MG: 10 INJECTION, SOLUTION INTRAVENOUS at 13:38

## 2021-01-01 RX ADMIN — Medication 500 UNITS: at 12:53

## 2021-01-01 RX ADMIN — SODIUM CHLORIDE 500 ML: 9 INJECTION, SOLUTION INTRAVENOUS at 15:43

## 2021-01-01 RX ADMIN — SODIUM CHLORIDE, PRESERVATIVE FREE 10 ML: 5 INJECTION INTRAVENOUS at 11:10

## 2021-01-01 RX ADMIN — SODIUM CHLORIDE 1000 ML: 9 INJECTION, SOLUTION INTRAVENOUS at 13:54

## 2021-01-01 RX ADMIN — HEPARIN 500 UNITS: 100 SYRINGE at 15:14

## 2021-01-01 RX ADMIN — SODIUM CHLORIDE: 9 INJECTION, SOLUTION INTRAVENOUS at 13:38

## 2021-01-01 RX ADMIN — SODIUM CHLORIDE, PRESERVATIVE FREE 10 ML: 5 INJECTION INTRAVENOUS at 16:02

## 2021-01-01 RX ADMIN — METHYLPREDNISOLONE ACETATE 40 MG: 40 INJECTION, SUSPENSION INTRA-ARTICULAR; INTRALESIONAL; INTRAMUSCULAR; SOFT TISSUE at 12:17

## 2021-01-01 RX ADMIN — SODIUM CHLORIDE, PRESERVATIVE FREE 10 ML: 5 INJECTION INTRAVENOUS at 16:18

## 2021-01-01 RX ADMIN — FOSAPREPITANT 150 MG: 150 INJECTION, POWDER, LYOPHILIZED, FOR SOLUTION INTRAVENOUS at 14:15

## 2021-01-01 RX ADMIN — CEFAZOLIN SODIUM 2000 MG: 1 INJECTION, POWDER, FOR SOLUTION INTRAMUSCULAR; INTRAVENOUS at 12:31

## 2021-01-01 RX ADMIN — HEPARIN 500 UNITS: 100 SYRINGE at 16:31

## 2021-01-01 RX ADMIN — SODIUM CHLORIDE 935 MG: 9 INJECTION, SOLUTION INTRAVENOUS at 15:19

## 2021-01-01 RX ADMIN — LIDOCAINE HYDROCHLORIDE 30 MG: 10 INJECTION, SOLUTION EPIDURAL; INFILTRATION; INTRACAUDAL; PERINEURAL at 12:24

## 2021-01-01 RX ADMIN — SODIUM CHLORIDE, PRESERVATIVE FREE 10 ML: 5 INJECTION INTRAVENOUS at 16:31

## 2021-01-01 RX ADMIN — HEPARIN 500 UNITS: 100 SYRINGE at 16:18

## 2021-01-01 RX ADMIN — HEPARIN 500 UNITS: 100 SYRINGE at 16:37

## 2021-01-01 RX ADMIN — ONDANSETRON 16 MG: 2 INJECTION INTRAMUSCULAR; INTRAVENOUS at 13:54

## 2021-01-01 ASSESSMENT — ENCOUNTER SYMPTOMS
COUGH: 0
EYE REDNESS: 0
BACK PAIN: 0
VOMITING: 0
EYE REDNESS: 0
SHORTNESS OF BREATH: 1
COUGH: 0
RECTAL PAIN: 0
EYE PAIN: 0
DIARRHEA: 0
BACK PAIN: 0
COUGH: 0
ABDOMINAL PAIN: 0
TROUBLE SWALLOWING: 0
EYE ITCHING: 0
SINUS PAIN: 0
RHINORRHEA: 0
ABDOMINAL DISTENTION: 0
RHINORRHEA: 0
ANAL BLEEDING: 0
EYE PAIN: 0
VOMITING: 0
NAUSEA: 0
NAUSEA: 1
COUGH: 0
VOICE CHANGE: 0
ABDOMINAL PAIN: 0
WHEEZING: 0
WHEEZING: 0
VOMITING: 1
EYE ITCHING: 0
SHORTNESS OF BREATH: 0
SHORTNESS OF BREATH: 0
CHEST TIGHTNESS: 0
SINUS PAIN: 0
RECTAL PAIN: 0
ANAL BLEEDING: 0
EYE REDNESS: 0
PHOTOPHOBIA: 0
TROUBLE SWALLOWING: 0
SORE THROAT: 0
EYE DISCHARGE: 0
PHOTOPHOBIA: 0
SHORTNESS OF BREATH: 0
NAUSEA: 0
VOICE CHANGE: 0
EYE PAIN: 0
SHORTNESS OF BREATH: 0
BLOOD IN STOOL: 0
SINUS PRESSURE: 0
ABDOMINAL PAIN: 0
CONSTIPATION: 0
COLOR CHANGE: 0
CHEST TIGHTNESS: 0
ABDOMINAL DISTENTION: 0
EYE DISCHARGE: 0
SHORTNESS OF BREATH: 0
CONSTIPATION: 0
COLOR CHANGE: 0
BLOOD IN STOOL: 0
TROUBLE SWALLOWING: 0
SORE THROAT: 0
RHINORRHEA: 0
COUGH: 0
ABDOMINAL DISTENTION: 0
SORE THROAT: 0
SORE THROAT: 0
SINUS PRESSURE: 0
ABDOMINAL PAIN: 0
DIARRHEA: 0

## 2021-01-01 ASSESSMENT — PAIN SCALES - GENERAL: PAINLEVEL_OUTOF10: 0

## 2021-01-11 DIAGNOSIS — E55.9 VITAMIN D DEFICIENCY: ICD-10-CM

## 2021-01-11 DIAGNOSIS — I10 ESSENTIAL HYPERTENSION: ICD-10-CM

## 2021-01-11 LAB
ALBUMIN SERPL-MCNC: 4.6 G/DL (ref 3.5–5.2)
ALP BLD-CCNC: 76 U/L (ref 35–104)
ALT SERPL-CCNC: 10 U/L (ref 5–33)
ANION GAP SERPL CALCULATED.3IONS-SCNC: 12 MMOL/L (ref 7–19)
AST SERPL-CCNC: 14 U/L (ref 5–32)
BASOPHILS ABSOLUTE: 0.1 K/UL (ref 0–0.2)
BASOPHILS RELATIVE PERCENT: 2 % (ref 0–1)
BILIRUB SERPL-MCNC: 0.5 MG/DL (ref 0.2–1.2)
BUN BLDV-MCNC: 27 MG/DL (ref 8–23)
CALCIUM SERPL-MCNC: 9.7 MG/DL (ref 8.8–10.2)
CHLORIDE BLD-SCNC: 107 MMOL/L (ref 98–111)
CHOLESTEROL, TOTAL: 173 MG/DL (ref 160–199)
CO2: 24 MMOL/L (ref 22–29)
CREAT SERPL-MCNC: 1.4 MG/DL (ref 0.5–0.9)
EOSINOPHILS ABSOLUTE: 0.2 K/UL (ref 0–0.6)
EOSINOPHILS RELATIVE PERCENT: 2.6 % (ref 0–5)
GFR AFRICAN AMERICAN: 44
GFR NON-AFRICAN AMERICAN: 36
GLUCOSE BLD-MCNC: 104 MG/DL (ref 74–109)
HCT VFR BLD CALC: 45.1 % (ref 37–47)
HDLC SERPL-MCNC: 65 MG/DL (ref 65–121)
HEMOGLOBIN: 13.8 G/DL (ref 12–16)
IMMATURE GRANULOCYTES #: 0 K/UL
LDL CHOLESTEROL CALCULATED: 81 MG/DL
LYMPHOCYTES ABSOLUTE: 2 K/UL (ref 1.1–4.5)
LYMPHOCYTES RELATIVE PERCENT: 28.5 % (ref 20–40)
MCH RBC QN AUTO: 28.3 PG (ref 27–31)
MCHC RBC AUTO-ENTMCNC: 30.6 G/DL (ref 33–37)
MCV RBC AUTO: 92.6 FL (ref 81–99)
MONOCYTES ABSOLUTE: 0.5 K/UL (ref 0–0.9)
MONOCYTES RELATIVE PERCENT: 7 % (ref 0–10)
NEUTROPHILS ABSOLUTE: 4.2 K/UL (ref 1.5–7.5)
NEUTROPHILS RELATIVE PERCENT: 59.5 % (ref 50–65)
PDW BLD-RTO: 13.5 % (ref 11.5–14.5)
PLATELET # BLD: 233 K/UL (ref 130–400)
PMV BLD AUTO: 12 FL (ref 9.4–12.3)
POTASSIUM SERPL-SCNC: 4.5 MMOL/L (ref 3.5–5)
RBC # BLD: 4.87 M/UL (ref 4.2–5.4)
SODIUM BLD-SCNC: 143 MMOL/L (ref 136–145)
TOTAL PROTEIN: 7.2 G/DL (ref 6.6–8.7)
TRIGL SERPL-MCNC: 136 MG/DL (ref 0–149)
TSH SERPL DL<=0.05 MIU/L-ACNC: 1.91 UIU/ML (ref 0.27–4.2)
VITAMIN D 25-HYDROXY: 40.2 NG/ML
WBC # BLD: 7 K/UL (ref 4.8–10.8)

## 2021-01-20 ENCOUNTER — OFFICE VISIT (OUTPATIENT)
Dept: INTERNAL MEDICINE | Age: 81
End: 2021-01-20
Payer: MEDICARE

## 2021-01-20 VITALS
HEIGHT: 65 IN | OXYGEN SATURATION: 98 % | DIASTOLIC BLOOD PRESSURE: 72 MMHG | SYSTOLIC BLOOD PRESSURE: 124 MMHG | WEIGHT: 180 LBS | BODY MASS INDEX: 29.99 KG/M2 | HEART RATE: 78 BPM

## 2021-01-20 DIAGNOSIS — E55.9 VITAMIN D DEFICIENCY: ICD-10-CM

## 2021-01-20 DIAGNOSIS — K21.9 GASTROESOPHAGEAL REFLUX DISEASE WITHOUT ESOPHAGITIS: ICD-10-CM

## 2021-01-20 DIAGNOSIS — E78.5 HYPERLIPIDEMIA, UNSPECIFIED HYPERLIPIDEMIA TYPE: ICD-10-CM

## 2021-01-20 DIAGNOSIS — N18.9 CHRONIC KIDNEY DISEASE, UNSPECIFIED CKD STAGE: ICD-10-CM

## 2021-01-20 DIAGNOSIS — I10 ESSENTIAL HYPERTENSION: ICD-10-CM

## 2021-01-20 DIAGNOSIS — Z00.00 ROUTINE ADULT HEALTH MAINTENANCE: Primary | ICD-10-CM

## 2021-01-20 DIAGNOSIS — N18.30 CHRONIC RENAL FAILURE, STAGE 3 (MODERATE), UNSPECIFIED WHETHER STAGE 3A OR 3B CKD (HCC): ICD-10-CM

## 2021-01-20 DIAGNOSIS — M10.9 GOUT, UNSPECIFIED CAUSE, UNSPECIFIED CHRONICITY, UNSPECIFIED SITE: ICD-10-CM

## 2021-01-20 DIAGNOSIS — N32.81 OAB (OVERACTIVE BLADDER): ICD-10-CM

## 2021-01-20 PROCEDURE — G8482 FLU IMMUNIZE ORDER/ADMIN: HCPCS | Performed by: INTERNAL MEDICINE

## 2021-01-20 PROCEDURE — 1123F ACP DISCUSS/DSCN MKR DOCD: CPT | Performed by: INTERNAL MEDICINE

## 2021-01-20 PROCEDURE — 4040F PNEUMOC VAC/ADMIN/RCVD: CPT | Performed by: INTERNAL MEDICINE

## 2021-01-20 PROCEDURE — G0439 PPPS, SUBSEQ VISIT: HCPCS | Performed by: INTERNAL MEDICINE

## 2021-01-20 SDOH — ECONOMIC STABILITY: FOOD INSECURITY: WITHIN THE PAST 12 MONTHS, THE FOOD YOU BOUGHT JUST DIDN'T LAST AND YOU DIDN'T HAVE MONEY TO GET MORE.: NEVER TRUE

## 2021-01-20 SDOH — ECONOMIC STABILITY: TRANSPORTATION INSECURITY
IN THE PAST 12 MONTHS, HAS THE LACK OF TRANSPORTATION KEPT YOU FROM MEDICAL APPOINTMENTS OR FROM GETTING MEDICATIONS?: NO

## 2021-01-20 SDOH — ECONOMIC STABILITY: INCOME INSECURITY: HOW HARD IS IT FOR YOU TO PAY FOR THE VERY BASICS LIKE FOOD, HOUSING, MEDICAL CARE, AND HEATING?: NOT HARD AT ALL

## 2021-01-20 ASSESSMENT — ENCOUNTER SYMPTOMS
SHORTNESS OF BREATH: 0
CHEST TIGHTNESS: 0
BLOOD IN STOOL: 0
COUGH: 0
ABDOMINAL PAIN: 0
EYE REDNESS: 0
TROUBLE SWALLOWING: 0
EYE PAIN: 0
RECTAL PAIN: 0
ABDOMINAL DISTENTION: 0
BACK PAIN: 0
ANAL BLEEDING: 0
SINUS PRESSURE: 0
WHEEZING: 0
RHINORRHEA: 0
SINUS PAIN: 0
PHOTOPHOBIA: 0
VOICE CHANGE: 0
VOMITING: 0
EYE DISCHARGE: 0
NAUSEA: 0
DIARRHEA: 0
SORE THROAT: 0
COLOR CHANGE: 0
EYE ITCHING: 0
CONSTIPATION: 0

## 2021-01-20 ASSESSMENT — PATIENT HEALTH QUESTIONNAIRE - PHQ9
1. LITTLE INTEREST OR PLEASURE IN DOING THINGS: 0
SUM OF ALL RESPONSES TO PHQ9 QUESTIONS 1 & 2: 1
SUM OF ALL RESPONSES TO PHQ QUESTIONS 1-9: 1
SUM OF ALL RESPONSES TO PHQ QUESTIONS 1-9: 1
2. FEELING DOWN, DEPRESSED OR HOPELESS: 1

## 2021-01-20 NOTE — PATIENT INSTRUCTIONS
Patient Education        Learning About COVID-19 and Social Distancing  What is it? Social distancing means putting space between yourself and other people. The recommended distance is 6 feet, or about 2 meters. This also means staying away from any place where people may gather, such as stokes or other public gathering places. Why is it important? Social distancing is the best way to reduce the spread of COVID-19. This virus seems to spread from person to person through droplets from coughing and sneezing. So if you keep your distance from others, you're less likely to get it or spread it. And social distancing is important for everyone, not just those who are at high risk of infection, like older people. You might have the virus but not have symptoms. You could then give the infection to someone you come into contact with. How is it done? Putting 6 feet, or about 2 meters, between you and other people is the recommended distance. Also stay away from any place where people may gather, such as stokes or other public gathering places. So if possible:  · Work from home, and keep your kids at home. · Don't travel if you don't have to. And avoid public transportation, ride-shares, and taxis unless you have no choice. · Limit shopping to essentials, like food and medicines. · Wear a cloth face cover if you have to go to a public place like the grocery store or pharmacy. · Don't eat in restaurants. (You can still get takeout or food deliveries.)  · Avoid crowds and busy places. Follow stay-at-home orders or other directions for your area. Where can you learn more? Go to https://geovanny.healthvMobo. org and sign in to your [employer] account. Enter A133 in the [facility] box to learn more about \"Learning About COVID-19 and Social Distancing. \"     If you do not have an account, please click on the \"Sign Up Now\" link.   Current as of: December 18, 2020               Content Version: 12.7 © 0711-6899 Healthwise, Incorporated. Care instructions adapted under license by [facility] ([facility]). If you have questions about a medical condition or this instruction, always ask your healthcare professional. [address] any warranty or liability for your use of this information.

## 2021-01-20 NOTE — PROGRESS NOTES
Chief Complaint   Patient presents with    Medicare AWV       HPI: Jasson Peña is a 79 y.o. female is here for her annual physical exam.  Her functional status is good. She denies any history of falls. She has no concerns in regards to safety, hearing, or cognition. She does see Dr. Ivana Patino for chronic kidney disease, which has been stable. She has a history of overactive bladder. Oxybutynin works well. Her blood pressure is well controlled. She denies any complaints of chest pain, chest pressure or shortness of breath. She has not had any gout flares. Her acid reflux is well controlled. Her cholesterol is 173. Sometimes, she has issues with insomnia. Tylenol PM does seem to help. Routine screening is as follows:  Eye exam yearly  Flu vaccine up to date  Pap declined  Mammogram: Wants to hold on this for now  Colonoscopy 2019  Pneumovax up to date  DEXA\" 2018.  Wants to wait    Past Medical History:   Diagnosis Date    Acid reflux     LORA (acute kidney injury) ([facility] [address] 7/28/2018    HIGH CHOLESTEROL     Hypertension     Urinary incontinence     UTI (urinary tract infection)     Vertigo       Past Surgical History:   Procedure Laterality Date   [facility] COLONOSCOPY      Dr Ysabel Manriquez 6/24/2019    Dr SARAH Juarez-Diverticular disease-Tubular AP (-) dysplasia, 5 yr recall    HYSTERECTOMY      VARICOSE VEIN SURGERY      laser       Social History     Socioeconomic History    Marital status:      Spouse name: Vidal Elsie Number of children: None    Years of education: None    Highest education level: None   Occupational History     Employer: RETIRED   Social Needs    Financial resource strain: Not hard at all   Tiburcio-Raulito insecurity     Worry: Never true     Inability: Never true    Transportation needs     Medical: No     Non-medical: No   Tobacco Use    Smoking status: Former Smoker     Packs/day: 0.50     Years: 40.00     Pack years: 20.00     Types: Cigarettes     Start date:      Quit date:      Years since quittin.0    Smokeless tobacco: Never Used   Substance and Sexual Activity    Alcohol use: No    Drug use: No    Sexual activity: None   Lifestyle    Physical activity     Days per week: None     Minutes per session: None    Stress: None   Relationships    Social connections     Talks on phone: None     Gets together: None     Attends Pentecostalism service: None     Active member of club or organization: None     Attends meetings of clubs or organizations: None     Relationship status: None    Intimate partner violence     Fear of current or ex partner: None     Emotionally abused: None     Physically abused: None     Forced sexual activity: None   Other Topics Concern    None   Social History Narrative    None      Family History   Problem Relation Age of Onset    Cancer Mother     Colon Cancer Mother     Heart Disease Father     Stroke Brother     Colon Polyps Neg Hx     Esophageal Cancer Neg Hx     Liver Cancer Neg Hx     Liver Disease Neg Hx     Rectal Cancer Neg Hx     Stomach Cancer Neg Hx         Current Outpatient Medications   Medication Sig Dispense Refill    oxybutynin (DITROPAN) 5 MG tablet TAKE 1 TABLET BY MOUTH TWO TIMES A  tablet 3    losartan (COZAAR) 50 MG tablet TAKE 1 TABLET BY MOUTH DAILY 90 tablet 3    pantoprazole (PROTONIX) 40 MG tablet TAKE 2 TABLETS BY MOUTH DAILY 180 tablet 3    pravastatin (PRAVACHOL) 80 MG tablet TAKE 1 TABLET BY MOUTH AT BEDTIME 90 tablet 3    triamterene-hydrochlorothiazide (MAXZIDE-25) 37.5-25 MG per tablet Take 0.5 tablets by mouth daily 45 tablet 3    calcium carbonate (OSCAL) 500 MG TABS tablet Take 500 mg by mouth daily      allopurinol (ZYLOPRIM) 100 MG tablet Take 100 mg by mouth daily      aspirin 81 MG EC tablet Take 81 mg by mouth daily      Cholecalciferol (VITAMIN D) 2000 units CAPS capsule Take by mouth       No current facility-administered medications for this visit. Patient Active Problem List   Diagnosis    Wrist pain, right    Distal radius fracture    Fall from other slipping, tripping, or stumbling    Hypertension    Pneumonia    LORA (acute kidney injury) ([address]    Metabolic acidosis, increased anion gap (IAG)    Colorectal polyps        Review of Systems   Constitutional: Negative for activity change, appetite change, chills, diaphoresis, fatigue, fever and unexpected weight change. HENT: Negative for congestion, ear pain, hearing loss, mouth sores, nosebleeds, postnasal drip, rhinorrhea, sinus pressure, sinus pain, sneezing, sore throat, tinnitus, trouble swallowing and voice change. Eyes: Negative for photophobia, pain, discharge, redness, itching and visual disturbance. Respiratory: Negative for cough, chest tightness, shortness of breath and wheezing. Cardiovascular: Negative for chest pain, palpitations and leg swelling. Gastrointestinal: Negative for abdominal distention, abdominal pain, anal bleeding, blood in stool, constipation, diarrhea, nausea, rectal pain and vomiting. Endocrine: Negative for cold intolerance, heat intolerance, polydipsia, polyphagia and polyuria. Genitourinary: Negative for difficulty urinating, dysuria, flank pain, frequency, hematuria and urgency. Musculoskeletal: Negative for arthralgias, back pain, gait problem, joint swelling, myalgias, neck pain and neck stiffness. Skin: Negative for color change, pallor, rash and wound. Allergic/Immunologic: Negative for environmental allergies, food allergies and immunocompromised state. Neurological: Negative for dizziness, tremors, seizures, syncope, facial asymmetry, speech difficulty, weakness, light-headedness, numbness and headaches. Hematological: Negative for adenopathy. Does not bruise/bleed easily. Psychiatric/Behavioral: Negative for decreased concentration, dysphoric mood, hallucinations and sleep disturbance.        /72 Pulse 78   Ht 5' 5\" (1.651 m)   Wt 180 lb (81.6 kg)   SpO2 98%   BMI 29.95 kg/m²   Physical Exam  Vitals signs and nursing note reviewed. Constitutional:       General: She is not in acute distress. Appearance: Normal appearance. She is well-developed and normal weight. She is not ill-appearing, toxic-appearing or diaphoretic. HENT:      Head: Normocephalic and atraumatic. Right Ear: Tympanic membrane, ear canal and external ear normal. There is no impacted cerumen. Left Ear: Tympanic membrane, ear canal and external ear normal. There is no impacted cerumen. Nose: Nose normal. No congestion or rhinorrhea. Mouth/Throat:      Mouth: Mucous membranes are dry. Pharynx: Oropharynx is clear. No oropharyngeal exudate or posterior oropharyngeal erythema. Eyes:      General: No scleral icterus. Right eye: No discharge. Left eye: No discharge. Extraocular Movements: Extraocular movements intact. Conjunctiva/sclera: Conjunctivae normal.      Pupils: Pupils are equal, round, and reactive to light. Neck:      Musculoskeletal: Normal range of motion and neck supple. Thyroid: No thyromegaly. Vascular: No JVD. Trachea: No tracheal deviation. Cardiovascular:      Rate and Rhythm: Normal rate and regular rhythm. Pulses: Normal pulses. Heart sounds: Normal heart sounds. No murmur. No friction rub. No gallop. Pulmonary:      Effort: Pulmonary effort is normal. No respiratory distress. Breath sounds: Normal breath sounds. No wheezing or rales. Chest:      Chest wall: No tenderness. Abdominal:      General: Bowel sounds are normal. There is no distension. Palpations: Abdomen is soft. There is no mass. Tenderness: There is no abdominal tenderness. There is no guarding or rebound. Musculoskeletal: Normal range of motion. General: No swelling, tenderness, deformity or signs of injury. Right lower leg: No edema. Left lower leg: No edema. Lymphadenopathy:      Cervical: No cervical adenopathy. Skin:     General: Skin is warm and dry. Coloration: Skin is not jaundiced or pale. Findings: No bruising, erythema, lesion or rash. Neurological:      General: No focal deficit present. Mental Status: She is alert and oriented to person, place, and time. Mental status is at baseline. Cranial Nerves: No cranial nerve deficit. Sensory: No sensory deficit. Motor: No weakness or abnormal muscle tone. Coordination: Coordination normal.      Gait: Gait normal.      Deep Tendon Reflexes: Reflexes are normal and symmetric. Reflexes normal.   Psychiatric:         Mood and Affect: Mood normal.         Behavior: Behavior normal.         Thought Content: Thought content normal.         Judgment: Judgment normal.         No diagnosis found. ASSESSMENT/PLAN:    79-year-old woman here for her annual physical exam    1. Health maintenance: Routine screening is as per [facility]. Labs discussed with patient today    2. Overactive bladder: Continue oxybutynin as prescribed    3. Hypertension: Blood pressure well controlled on losartan and triamterene hydrochlorothiazide    4. Gout: No flares on allopurinol    5. Chronic kidney disease: Stable    6. Vitamin D deficiency: Continue cholecalciferol as prescribed    7. Acid reflux: Stable on proton    8. Hyperlipidemia: Continue pravastatin as prescribed        There are no diagnoses linked to this encounter. No follow-ups on file. No orders of the defined types were placed in this encounter. Jag Isabel MD       Medicare Annual Wellness Visit - Subsequent    Name: Andrey Hillman Date: 2021   MRN: [id number] Sex: Female   Age: 79 y.o.  Ethnicity: Non-/Non    : 1940 Race: Dee Dee Age is here for   Chief Complaint   Patient presents with   [facility]        Screenings for behavioral, psychosocial and functional/safety risks, and cognitive dysfunction are all negative except as indicated below. These results, as well as other patient data from the [address] form, are documented in Flowsheets linked to this Encounter. No Known Allergies    Prior to Visit Medications    Medication Sig Taking?  Authorizing Provider   oxybutynin (DITROPAN) 5 MG tablet TAKE 1 TABLET BY MOUTH TWO TIMES A DAY Yes Gege Finley MD   losartan (COZAAR) 50 MG tablet TAKE 1 TABLET BY MOUTH DAILY Yes Gege Finley MD   pantoprazole (PROTONIX) 40 MG tablet TAKE 2 TABLETS BY MOUTH DAILY Yes Gege Finley MD   pravastatin (PRAVACHOL) 80 MG tablet TAKE 1 TABLET BY MOUTH AT BEDTIME Yes Gege Finley MD   triamterene-hydrochlorothiazide (MAXZIDE-25) 37.5-25 MG per tablet Take 0.5 tablets by mouth daily Yes Gege Finley MD   calcium carbonate (OSCAL) 500 MG TABS tablet Take 500 mg by mouth daily Yes Historical Provider, MD   allopurinol (ZYLOPRIM) 100 MG tablet Take 100 mg by mouth daily Yes Historical Provider, MD   aspirin 81 MG EC tablet Take 81 mg by mouth daily Yes Historical Provider, MD   Cholecalciferol (VITAMIN D) 2000 units CAPS capsule Take by mouth Yes Historical Provider, MD       Past Medical History:   Diagnosis Date    Acid reflux     LORA (acute kidney injury) ([facility] [address] 7/28/2018    HIGH CHOLESTEROL     Hypertension     Urinary incontinence     UTI (urinary tract infection)     Vertigo        Past Surgical History:   Procedure Laterality Date   Simeon Hugoen COLONOSCOPY      Dr Yg Cerda 6/24/2019    Dr SARAH Juarez-Diverticular disease-Tubular AP (-) dysplasia, 5 yr recall    HYSTERECTOMY      VARICOSE VEIN SURGERY      laser        Family History   Problem Relation Age of Onset    Cancer Mother     Colon Cancer Mother     Heart Disease Father     Stroke Brother     Colon Polyps Neg Hx     Esophageal Cancer Neg Hx     Liver Cancer Neg Hx     Liver Disease Neg Hx     Rectal Cancer Neg Hx     Stomach Cancer Neg Hx        CareTeam (Including outside providers/suppliers regularly involved in providing care):   Patient Care Team:  Etha Boxer, MD as PCP - General (Family Medicine)  Etha Boxer, MD as PCP - [facility] Empaneled Provider    Wt Readings from Last 3 Encounters:   21 180 lb (81.6 kg)   20 180 lb (81.6 kg)   20 182 lb (82.6 kg)     Vitals:    21 0857   BP: 124/72   Pulse: 78   SpO2: 98%   Weight: 180 lb (81.6 kg)   Height: 5' 5\" (1.651 m)           The following problems were reviewed today and where indicated follow up appointments were made and/or referrals ordered. Risk Factor Screenings with Interventions     Fall Risk:  Timed Up and Go Test > 12 seconds?  (Complete if either Fall Risk answers are Yes): no  2 or more falls in past year?: no  Fall with injury in past year?: no  Fall Risk Interventions:    · Home safety tips provided    Depression:  PHQ-2 Score: 1  Depression Interventions:  · Relaxation techniques discussed    Anxiety:     Anxiety Interventions:  · Relaxation techniques discussed    Cognitive:  Clock Drawing Test (CDT) Score: Normal  Cognitive Impairment Interventions:  · Patient declines any further evaluation/treatment for cognitive impairment    Substance Abuse:  Social History     Socioeconomic History    Marital status:      Spouse name: Vidal Elsie Number of children: Not on file    Years of education: Not on file    Highest education level: Not on file   Occupational History     Employer: RETIRED   Social Needs    Financial resource strain: Not hard at all   Tiburcio-Raulito insecurity     Worry: Never true     Inability: Never true   [employer] needs     Medical: No     Non-medical: No   Tobacco Use    Smoking status: Former Smoker     Packs/day: 0.50     Years: 40.00     Pack years: 20.00     Types: Cigarettes     Start date:      Quit date:      Years since quittin.0    Smokeless tobacco: Never Used   Substance and Sexual Activity    Alcohol use: No    Drug use: No    Sexual activity: Not on file   Lifestyle    Physical activity     Days per week: Not on file     Minutes per session: Not on file    Stress: Not on file   Relationships    Social connections     Talks on phone: Not on file     Gets together: Not on file     Attends Jain service: Not on file     Active member of club or organization: Not on file     Attends meetings of clubs or organizations: Not on file     Relationship status: Not on file    Intimate partner violence     Fear of current or ex partner: Not on file     Emotionally abused: Not on file     Physically abused: Not on file     Forced sexual activity: Not on file   Other Topics Concern    Not on file   Social History Narrative    Not on file     Audit Questionnaire: Screen for Alcohol Misuse  How often do you have a drink containing alcohol?: Never  Substance Abuse Interventions:  · none needed    Health Risk Assessment:     General  In general, how would you say your health is?: Very Good  In the past 7 days, have you experienced any of the following? New or Increased Pain, New or Increased Fatigue, Loneliness, Social Isolation, Stress or Anger?: None of These  Do you get the social and emotional support that you need?: Yes  Do you have a Living Will?: (!) No  General Health Risk Interventions:  · none needed    Health Habits/Nutrition  Do you exercise for at least 20 minutes 2-3 times per week?: (!) No  Have you lost any weight without trying in the past 3 months?: No  Do you eat fewer than 2 meals per day?: No  Have you seen a dentist within the past year?: Yes  Body mass index is 29.95 kg/m².   Health Habits/Nutrition Interventions:  · no intervention needed    Hearing/Vision  Do you or your family notice any trouble with your hearing?: No  Do you have difficulty driving, watching TV, or doing any of your daily activities because of your eyesight?: No  Have you had an eye exam within the past year?: (!) No  Hearing/Vision Interventions:  · no concerns    Safety  Do you have working smoke detectors?: Yes  Have all throw rugs been removed or fastened?: Yes  Do you have non-slip mats or surfaces in all bathtubs/showers?: Yes  Do all of your stairways have a railing or banister?: Yes  Are your doorways, halls and stairs free of clutter?: Yes  Do you always fasten your seatbelt when you are in a car?: Yes  Safety Interventions:  · Patient declines any further evaluation/treatment for this issue    ADLs  In the past 7 days, did you need help from others to perform any of the following everyday activities? Eating, dressing, grooming, bathing, toileting, or walking/balance?: None  In the past 7 days, did you need help from others to take care of any of the following?  Laundry, housekeeping, banking/finances, shopping, telephone use, food preparation, transportation, or taking medications?: None  ADL Interventions:  · Patient declines any further evaluation/treatment for this issue    Personalized Preventive Plan   Current Health Maintenance Status  Immunization History   Administered Date(s) Administered    Influenza, High Dose (Fluzone 65 yrs and older) 11/09/2018, 10/11/2019, 09/10/2020    Pneumococcal Conjugate 13-valent ([id number]) 07/13/2015    Pneumococcal Polysaccharide ([id number]) 05/09/2019    Zoster Live (Zostavax) 12/09/2016    Zoster Recombinant (Shingrix) 05/09/2019, 09/25/2019        Health Maintenance   Topic Date Due    COVID-19 Vaccine (1 of 2) 11/05/1956    DTaP/Tdap/Td vaccine (1 - Tdap) 11/05/1958    Annual Wellness Visit (AWV)  05/29/2019    DEXA (modify frequency per FRAX score)  12/03/2020    Lipid screen  01/11/2022    Potassium monitoring  01/11/2022    Creatinine monitoring  01/11/2022    Colon cancer screen colonoscopy  06/24/2024    Flu vaccine  Completed    Shingles Vaccine  Completed    Pneumococcal 65+ years Vaccine  Completed    Hepatitis A vaccine  Aged Out    Hepatitis B vaccine  Aged Out    Hib vaccine  Aged Out    Meningococcal (ACWY) vaccine  Aged Out       Recommendations for [place] Due: see orders.   Recommended screening schedule for the next 5-10 years is provided to the patient in written form: see Patient Instructions/AVS.

## 2021-02-22 ENCOUNTER — IMMUNIZATION (OUTPATIENT)
Age: 81
End: 2021-02-22
Payer: MEDICARE

## 2021-02-22 PROCEDURE — 91300 COVID-19, PFIZER VACCINE 30MCG/0.3ML DOSE: CPT | Performed by: FAMILY MEDICINE

## 2021-02-22 PROCEDURE — 0001A PR IMM ADMN SARSCOV2 30MCG/0.3ML DIL RECON 1ST DOSE: CPT | Performed by: FAMILY MEDICINE

## 2021-03-15 ENCOUNTER — IMMUNIZATION (OUTPATIENT)
Age: 81
End: 2021-03-15
Payer: MEDICARE

## 2021-03-15 PROCEDURE — 91300 COVID-19, PFIZER VACCINE 30MCG/0.3ML DOSE: CPT | Performed by: FAMILY MEDICINE

## 2021-03-15 PROCEDURE — 0002A COVID-19, PFIZER VACCINE 30MCG/0.3ML DOSE: CPT | Performed by: FAMILY MEDICINE

## 2021-05-21 NOTE — TELEPHONE ENCOUNTER
Carmita Lopez called requesting a refill of the below medication which has been pended for you:     Requested Prescriptions     Pending Prescriptions Disp Refills    losartan (COZAAR) 50 MG tablet [Pharmacy Med Name: LOSARTAN 50 MG TA 50 Tablet] 90 tablet 3     Sig: TAKE 1 TABLET BY MOUTH DAILY       Last Appointment Date: 1/20/2021  Next Appointment Date: 7/23/2021    No Known Allergies

## 2021-07-23 NOTE — PROGRESS NOTES
Chief Complaint   Patient presents with    6 Month Follow-Up    Hypertension       HPI: Mira Ahumada is a 79 y.o. female is here for follow-up of acid reflux, hypertension, gout, overactive bladder and hyperlipidemia. Her  passed away recently. She is also under a lot of stress because her son was diagnosed with multiple myeloma. She does have a history of overactive bladder. She feels like Ditropan works fairly well. Her cholesterol is 160. She is tolerating her statin without side effects. Her blood pressure is well controlled. She denies any complaints of chest pain, chest pressure or shortness of breath. Her medications are working well for her acid reflux. She has not had any gout flares on allopurinol. She is due for bone density and mammogram, which we will order.     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, 5 yr recall    HYSTERECTOMY      VARICOSE VEIN SURGERY      laser       Social History     Socioeconomic History    Marital status:      Spouse name: Breana Delgado Number of children: None    Years of education: None    Highest education level: None   Occupational History     Employer: RETIRED   Tobacco Use    Smoking status: Former Smoker     Packs/day: 0.50     Years: 40.00     Pack years: 20.00     Types: Cigarettes     Start date:      Quit date:      Years since quittin.5    Smokeless tobacco: Never Used   Vaping Use    Vaping Use: Never used   Substance and Sexual Activity    Alcohol use: No    Drug use: No    Sexual activity: None   Other Topics Concern    None   Social History Narrative    None     Social Determinants of Health     Financial Resource Strain: Low Risk     Difficulty of Paying Living Expenses: Not hard at all   Food Insecurity: No Food Insecurity    Worried About Running Out of Food in the Last Year: Never true    Светлана of Food in the Last Year: Never true   Transportation Needs: No Transportation Needs    Lack of Transportation (Medical): No    Lack of Transportation (Non-Medical):  No   Physical Activity:     Days of Exercise per Week:     Minutes of Exercise per Session:    Stress:     Feeling of Stress :    Social Connections:     Frequency of Communication with Friends and Family:     Frequency of Social Gatherings with Friends and Family:     Attends [employer] Services:     Active Member of Clubs or Organizations:     Attends Club or Organization Meetings:     Marital Status:    Intimate Partner Violence:     Fear of Current or Ex-Partner:     Emotionally Abused:     Physically Abused:     Sexually Abused:       Family History   Problem Relation Age of Onset    Cancer Mother     Colon Cancer Mother     Heart Disease Father     Stroke Brother     Colon Polyps Neg Hx     Esophageal Cancer Neg Hx     Liver Cancer Neg Hx     Liver Disease Neg Hx     Rectal Cancer Neg Hx     Stomach Cancer Neg Hx         Current Outpatient Medications   Medication Sig Dispense Refill    pravastatin (PRAVACHOL) 80 MG tablet TAKE 1 TABLET BY MOUTH AT BEDTIME 90 tablet 3    losartan (COZAAR) 50 MG tablet TAKE 1 TABLET BY MOUTH DAILY 90 tablet 3    oxybutynin (DITROPAN) 5 MG tablet TAKE 1 TABLET BY MOUTH TWO TIMES A  tablet 3    pantoprazole (PROTONIX) 40 MG tablet TAKE 2 TABLETS BY MOUTH DAILY (Patient taking differently: Take 40 mg by mouth daily ) 180 tablet 3    triamterene-hydrochlorothiazide (MAXZIDE-25) 37.5-25 MG per tablet Take 0.5 tablets by mouth daily 45 tablet 3    calcium carbonate (OSCAL) 500 MG TABS tablet Take 500 mg by mouth daily  allopurinol (ZYLOPRIM) 100 MG tablet Take 100 mg by mouth daily      aspirin 81 MG EC tablet Take 81 mg by mouth daily      Cholecalciferol (VITAMIN D) 2000 units CAPS capsule Take by mouth       No current facility-administered medications for this visit. Patient Active Problem List   Diagnosis    Wrist pain, right    Distal radius fracture    Fall from other slipping, tripping, or stumbling    Hypertension    Pneumonia    [facility]) ([facility] [address]    Metabolic acidosis, increased anion gap (IAG)    Colorectal polyps    Chronic kidney disease        Review of Systems   Constitutional: Negative for activity change, appetite change, chills, diaphoresis, fatigue, fever and unexpected weight change. HENT: Negative for congestion, ear pain, hearing loss, mouth sores, nosebleeds, postnasal drip, rhinorrhea, sinus pressure, sinus pain, sneezing, sore throat, tinnitus, trouble swallowing and voice change. Eyes: Negative for photophobia, pain, discharge, redness, itching and visual disturbance. Respiratory: Negative for cough, chest tightness, shortness of breath and wheezing. Cardiovascular: Negative for chest pain, palpitations and leg swelling. Gastrointestinal: Negative for abdominal distention, abdominal pain, anal bleeding, blood in stool, constipation, diarrhea, nausea, rectal pain and vomiting. Endocrine: Negative for cold intolerance, heat intolerance, polydipsia, polyphagia and polyuria. Genitourinary: Negative for difficulty urinating, dysuria, flank pain, frequency, hematuria and urgency. Musculoskeletal: Negative for arthralgias, back pain, gait problem, joint swelling, myalgias, neck pain and neck stiffness. Skin: Negative for color change, pallor, rash and wound. Allergic/Immunologic: Negative for environmental allergies, food allergies and immunocompromised state.    Neurological: Negative for dizziness, tremors, seizures, syncope, facial asymmetry, speech difficulty, weakness, light-headedness, numbness and headaches. Hematological: Negative for adenopathy. Does not bruise/bleed easily. Psychiatric/Behavioral: Negative for decreased concentration, dysphoric mood, hallucinations and sleep disturbance. /74   Pulse 72   Ht 5' 5\" (1.651 m)   Wt 172 lb 1.6 oz (78.1 kg)   SpO2 97%   BMI 28.64 kg/m²   Physical Exam  Vitals and nursing note reviewed. Constitutional:       General: She is not in acute distress. Appearance: Normal appearance. She is well-developed and normal weight. She is not ill-appearing, toxic-appearing or diaphoretic. HENT:      Head: Normocephalic and atraumatic. Right Ear: Tympanic membrane, ear canal and external ear normal. There is no impacted cerumen. Left Ear: Tympanic membrane, ear canal and external ear normal. There is no impacted cerumen. Nose: Nose normal. No congestion or rhinorrhea. Mouth/Throat:      Mouth: Mucous membranes are dry. Pharynx: Oropharynx is clear. No oropharyngeal exudate or posterior oropharyngeal erythema. Eyes:      General: No scleral icterus. Right eye: No discharge. Left eye: No discharge. Extraocular Movements: Extraocular movements intact. Conjunctiva/sclera: Conjunctivae normal.      Pupils: Pupils are equal, round, and reactive to light. Neck:      Thyroid: No thyromegaly. Vascular: No JVD. Trachea: No tracheal deviation. Cardiovascular:      Rate and Rhythm: Normal rate and regular rhythm. Pulses: Normal pulses. Heart sounds: Normal heart sounds. No murmur heard. No friction rub. No gallop. Pulmonary:      Effort: Pulmonary effort is normal. No respiratory distress. Breath sounds: Normal breath sounds. No wheezing or rales. Chest:      Chest wall: No tenderness. Abdominal:      General: Bowel sounds are normal. There is no distension. Palpations: Abdomen is soft. There is no mass. Tenderness: There is no abdominal tenderness. There is no guarding or rebound. Musculoskeletal:         General: No swelling, tenderness, deformity or signs of injury. Normal range of motion. Cervical back: Normal range of motion and neck supple. Right lower leg: No edema. Left lower leg: No edema. Lymphadenopathy:      Cervical: No cervical adenopathy. Skin:     General: Skin is warm and dry. Coloration: Skin is not jaundiced or pale. Findings: No bruising, erythema, lesion or rash. Neurological:      General: No focal deficit present. Mental Status: She is alert and oriented to person, place, and time. Mental status is at baseline. Cranial Nerves: No cranial nerve deficit. Sensory: No sensory deficit. Motor: No weakness or abnormal muscle tone. Coordination: Coordination normal.      Gait: Gait normal.      Deep Tendon Reflexes: Reflexes are normal and symmetric. Reflexes normal.   Psychiatric:         Mood and Affect: Mood normal.         Behavior: Behavior normal.         Thought Content: Thought content normal.         Judgment: Judgment normal.      Comments: Somewhat tearful         1. Essential hypertension    2. Post-menopausal    3. Encounter for screening mammogram for malignant neoplasm of breast    4. Vitamin D deficiency    5. Hyperlipidemia, unspecified hyperlipidemia type    6. OAB (overactive bladder)    7. Gastroesophageal reflux disease without esophagitis    8. Gout, unspecified cause, unspecified chronicity, unspecified site        ASSESSMENT/PLAN:    58-year-old woman here for follow-up    1. Hypertension: Blood pressure well controlled on Maxide and losartan. 2.  Hyperlipidemia: Continue pravastatin as prescribed    3. Overactive bladder: Doing well with oxybutynin    4. Acid reflux: Continue Protonix as prescribed    5. Vitamin D deficiency: Continue cholecalciferol    6. Gout: No flares on allopurinol    7.   Bone density and mammogram have been ordered    Agustin Leone was seen today for 6 month follow-up and hypertension. Diagnoses and all orders for this visit:    Essential hypertension  -     CBC Auto Differential; Future  -     Comprehensive Metabolic Panel; Future  -     Lipid Panel; Future  -     TSH without Reflex; Future    Post-menopausal  -     DEXA Bone Density Axial Skeleton; Future    Encounter for screening mammogram for malignant neoplasm of breast  -     RADHA DIGITAL SCREEN W OR WO CAD BILATERAL; Future    Vitamin D deficiency  -     Vitamin D 25 Hydroxy; Future    Hyperlipidemia, unspecified hyperlipidemia type    OAB (overactive bladder)    Gastroesophageal reflux disease without esophagitis    Gout, unspecified cause, unspecified chronicity, unspecified site          Return in about 6 months (around 1/23/2022). Orders Placed This Encounter   Procedures    DEXA Bone Density Axial Skeleton     Standing Status:   Future     Standing Expiration Date:   7/23/2022     Order Specific Question:   Reason for exam:     Answer:   post menopausal    RADHA DIGITAL SCREEN W OR WO CAD BILATERAL     Standing Status:   Future     Standing Expiration Date:   9/23/2022     Order Specific Question:   Reason for exam:     Answer:   screening breast cancer     Order Specific Question:   Does this patient have implants? Answer:   No     Order Specific Question:   Does this patient have a personal history of breast cancer? Answer:   No     Order Specific Question:   Where was last mammogram performed? Answer:   [facility] Specific Question:   When was last mammogram performed?      Answer:   7/23/2019    CBC Auto Differential     Fast 12 hours     Standing Status:   Future     Standing Expiration Date:   7/23/2022    Comprehensive Metabolic Panel     Fasting 12 hours     Standing Status:   Future     Standing Expiration Date:   7/23/2022    Lipid Panel     Standing Status:   Future     Standing Expiration Date:   7/23/2022 Order Specific Question:   Is Patient Fasting?/# of Hours     Answer:   12    TSH without Reflex     Fast 12 hours     Standing Status:   Future     Standing Expiration Date:   7/23/2022    Vitamin D 25 Hydroxy     Standing Status:   Future     Standing Expiration Date:   7/23/2022       Angelica Burnette MD

## 2021-08-06 NOTE — TELEPHONE ENCOUNTER
Pt c/o of (L) groin pain in the mornings and then radiates down the leg late in the afternoons. She denies any swelling or redness. Symptoms started 10 days. She usually takes Tylenol for the pain. It was worse yesterday because she was on it more. Please advise.

## 2021-08-15 NOTE — PROGRESS NOTES
[address] [place] [address]   [address], [id number]     Phone:  [phone]  Fax:  [phone]      Varsha Lewis is a 79 y.o. female who presents today for her medical conditions/complaints as noted below. Varsha Lewis is c/o of Urticaria and Rash      Chief Complaint   Patient presents with    Urticaria    Rash       HPI:     Varsha Lewis presents today for rash on face x 2 days. Rash  This is a new problem. The current episode started in the past 7 days (2 days). The problem has been gradually worsening since onset. The affected locations include the chest, face and abdomen. The rash is characterized by burning, itchiness and redness. She was exposed to nothing. Associated symptoms include facial edema. Pertinent negatives include no congestion, cough, fatigue, fever, rhinorrhea, shortness of breath or sore throat. Past treatments include anti-itch cream. The treatment provided mild relief. There is no history of allergies, asthma, eczema or varicella. Eder is primarily on her face and right earlobe. This has also spread lightly to her chest and abdomen. She denies any new facial cleansers, lotions, masks, or other chemicals. She has not been in contact with any new animals or recently traveled. She has not started any new medications.      Past Medical History:   Diagnosis Date    Acid reflux     LORA [facility]acute [facility]) ([facility] [address] 7/28/2018    HIGH CHOLESTEROL     Hypertension     Urinary incontinence     UTI (urinary tract infection)     Vertigo         Past Surgical History:   Procedure Laterality Date   Jasmyne Alicia COLONOSCOPY      Dr Monique Shelton 6/24/2019    Dr SARAH Juarez-Diverticular disease-Tubular AP (-) dysplasia, 5 yr recall    HYSTERECTOMY      VARICOSE VEIN SURGERY      laser        Social History     Tobacco Use    Smoking status: Former Smoker     Packs/day: 0.50     Years: 40.00     Pack years: 20.00     Types: Cigarettes     Start date: 1964 Quit date:      Years since quittin.6    Smokeless tobacco: Never Used   Substance Use Topics    Alcohol use: No        Current Outpatient Medications   Medication Sig Dispense Refill    predniSONE (DELTASONE) 10 MG tablet Take 5 tablets by mouth daily for 2 days, THEN 4 tablets daily for 2 days, THEN 3 tablets daily for 2 days, THEN 2 tablets daily for 2 days, THEN 1 tablet daily for 2 days. 30 tablet 0    meloxicam (MOBIC) 7.5 MG tablet Take 1 tablet by mouth daily 30 tablet 1    pravastatin (PRAVACHOL) 80 MG tablet TAKE 1 TABLET BY MOUTH AT BEDTIME 90 tablet 3    losartan (COZAAR) 50 MG tablet TAKE 1 TABLET BY MOUTH DAILY 90 tablet 3    oxybutynin (DITROPAN) 5 MG tablet TAKE 1 TABLET BY MOUTH TWO TIMES A  tablet 3    pantoprazole (PROTONIX) 40 MG tablet TAKE 2 TABLETS BY MOUTH DAILY (Patient taking differently: Take 40 mg by mouth daily ) 180 tablet 3    triamterene-hydrochlorothiazide (MAXZIDE-25) 37.5-25 MG per tablet Take 0.5 tablets by mouth daily 45 tablet 3    calcium carbonate (OSCAL) 500 MG TABS tablet Take 500 mg by mouth daily      allopurinol (ZYLOPRIM) 100 MG tablet Take 100 mg by mouth daily      aspirin 81 MG EC tablet Take 81 mg by mouth daily      Cholecalciferol (VITAMIN D) 2000 units CAPS capsule Take by mouth       Current Facility-Administered Medications   Medication Dose Route Frequency Provider Last Rate Last Admin    triamcinolone acetonide (KENALOG-40) injection 40 mg  40 mg Intramuscular Once Naren Body, APRN           No Known Allergies    Family History   Problem Relation Age of Onset    Cancer Mother     Colon Cancer Mother     Heart Disease Father     Stroke Brother     Colon Polyps Neg Hx     Esophageal Cancer Neg Hx     Liver Cancer Neg Hx     Liver Disease Neg Hx     Rectal Cancer Neg Hx     Stomach Cancer Neg Hx                Review of Systems   Constitutional: Negative for fatigue and fever.    HENT: Negative for congestion, rhinorrhea, sore throat and trouble swallowing. Respiratory: Negative for cough and shortness of breath. Skin: Positive for rash. Objective:     Physical Exam  Vitals and nursing note reviewed. Constitutional:       General: She is not in acute distress. Appearance: Normal appearance. She is well-developed. She is not ill-appearing, toxic-appearing or diaphoretic. HENT:      Head: Normocephalic and atraumatic. Right Ear: External ear normal.      Left Ear: External ear normal.      Nose: Nose normal.      Mouth/Throat:      Dentition: Normal dentition. Eyes:      General:         Right eye: No discharge. Left eye: No discharge. Conjunctiva/sclera: Conjunctivae normal.      Pupils: Pupils are equal, round, and reactive to light. Cardiovascular:      Rate and Rhythm: Normal rate and regular rhythm. Pulmonary:      Effort: Pulmonary effort is normal. No respiratory distress. Breath sounds: Normal breath sounds. No stridor. No wheezing, rhonchi or rales. Musculoskeletal:         General: Normal range of motion. Cervical back: Normal range of motion and neck supple. Lumbar back: Normal range of motion. Right lower leg: No edema. Left lower leg: No edema. Lymphadenopathy:      Cervical: No cervical adenopathy. Skin:     General: Skin is warm and dry. Capillary Refill: Capillary refill takes less than 2 seconds. Findings: Erythema and rash present. Neurological:      Mental Status: She is alert and oriented to person, place, and time. Psychiatric:         Mood and Affect: Mood normal.         Behavior: Behavior normal.         BP (!) 142/90   Pulse 68   Temp 98.2 °F (36.8 °C) (Temporal)   Resp 18   Ht 5' 6\" (1.676 m)   Wt 170 lb (77.1 kg)   SpO2 99%   BMI 27.44 kg/m²     Assessment:      Diagnosis Orders   1.  Urticaria  triamcinolone acetonide (KENALOG-40) injection 40 mg    predniSONE (DELTASONE) 10 MG tablet       No results found for this visit on 08/15/21. Plan: This rash looks to be a reaction to chemicals or fabrics- she does wear a mask, but not often to the grocery store. She denies any new chemicals for cleaning. Will treat with steroid injection, prednisone, and benadryl. She is to go to the ER with issues swallowing or breathing. She states understanding. Return if symptoms worsen or fail to improve. No orders of the defined types were placed in this encounter. Orders Placed This Encounter   Medications    triamcinolone acetonide (KENALOG-40) injection 40 mg    predniSONE (DELTASONE) 10 MG tablet     Sig: Take 5 tablets by mouth daily for 2 days, THEN 4 tablets daily for 2 days, THEN 3 tablets daily for 2 days, THEN 2 tablets daily for 2 days, THEN 1 tablet daily for 2 days. Dispense:  30 tablet     Refill:  0        Patient offered educational materials - see patient instructions for any instruction needed. Discussed use, benefit, and side effects of prescribed medications. All patient questions answered. Instructed to continue current medications, diet and exercise. Patient agreed with treatment plan. Follow up as directed. Patient was advised to go to the ED if condition ever becomes emergent.        Electronically signed by DAVIDSON Montanez on 8/15/2021 at 11:39 AM

## 2021-08-15 NOTE — PATIENT INSTRUCTIONS
You had a steroid injection in the office    Start steroid pack tomorrow    Use benadryl cream on face as needed    Take benadryl at night and every 4-6 hours as needed    Return as needed or go to the ER with any issues breathing/swallowing    Watch for triggers

## 2021-08-27 NOTE — TELEPHONE ENCOUNTER
Pt called and fill she has a UTI. Pt will come in and give a urine sample. We will call with the results.

## 2021-08-30 NOTE — PROGRESS NOTES
After obtaining consent, and per orders of Dr. Jesi Wilson, injection of 40mg Depo Medrol given in (L) gluteal area  by Concepción Martinez. Patient instructed to remain in clinic for 20 minutes afterwards, and to report any adverse reaction to me immediately. [facility] [address] #[phone] [facility] [place] [address]  #[id number] EXP 1/2022.

## 2021-08-30 NOTE — PROGRESS NOTES
Chief Complaint   Patient presents with    Hip Pain     left hip into upper thigh and groin area. HPI: Mira Ahumada is a 79 y.o. female is here for evaluation of a several week history of left hip pain. It starts in the left groin and runs down to the left thigh. She is having a lot of difficulty walking today. She is using a cane to ambulate. She denies any history of injury to the area. She was taking Mobic for pain. However, she stopped taking this. However, she is willing to retry the Mobic again. She has not had any x-rays done. She denies any complaints of fevers, chills or erythema at the site.     Past Medical History:   Diagnosis Date    Acid reflux     LORA (acute kidney injury) ([facility] [address] 2018    HIGH CHOLESTEROL     Hypertension     Urinary incontinence     UTI (urinary tract infection)     Vertigo       Past Surgical History:   Procedure Laterality Date   Nancy Mendez COLONOSCOPY      Dr Linda Farrell 2019    Dr SARAH Juarez-Diverticular disease-Tubular AP (-) dysplasia, 5 yr recall    HYSTERECTOMY      VARICOSE VEIN SURGERY      laser       Social History     Socioeconomic History    Marital status:      Spouse name: Breana Delgado Number of children: None    Years of education: None    Highest education level: None   Occupational History     Employer: RETIRED   Tobacco Use    Smoking status: Former Smoker     Packs/day: 0.50     Years: 40.00     Pack years: 20.00     Types: Cigarettes     Start date:      Quit date:      Years since quittin.6    Smokeless tobacco: Never Used   Vaping Use    Vaping Use: Never used   Substance and Sexual Activity    Alcohol use: No    Drug use: No    Sexual activity: None   Other Topics Concern    None   Social History Narrative    None     Social Determinants of Health     Financial Resource Strain: Low Risk     Difficulty of Paying Living Expenses: Not hard at all   Food Insecurity: No Food Insecurity    Worried About Running Out of Food in the Last Year: Never true    Светлана of Food in the Last Year: Never true   Transportation Needs: No Transportation Needs    Lack of Transportation (Medical): No    Lack of Transportation (Non-Medical): No   Physical Activity:     Days of Exercise per Week:     Minutes of Exercise per Session:    Stress:     Feeling of Stress :    Social Connections:     Frequency of Communication with Friends and Family:     Frequency of Social Gatherings with Friends and Family:     Attends Oriental orthodox Services:     Active Member of Clubs or Organizations:     Attends Club or Organization Meetings:     Marital Status:    Intimate Partner Violence:     Fear of Current or Ex-Partner:     Emotionally Abused:     Physically Abused:     Sexually Abused:       Family History   Problem Relation Age of Onset    Cancer Mother     Colon Cancer Mother     Heart Disease Father     Stroke Brother     Colon Polyps Neg Hx     Esophageal Cancer Neg Hx     Liver Cancer Neg Hx     Liver Disease Neg Hx     Rectal Cancer Neg Hx     Stomach Cancer Neg Hx         Current Outpatient Medications   Medication Sig Dispense Refill    methylPREDNISolone (MEDROL DOSEPACK) 4 MG tablet Take by mouth.  1 kit 0    pravastatin (PRAVACHOL) 80 MG tablet TAKE 1 TABLET BY MOUTH AT BEDTIME 90 tablet 3    losartan (COZAAR) 50 MG tablet TAKE 1 TABLET BY MOUTH DAILY 90 tablet 3    oxybutynin (DITROPAN) 5 MG tablet TAKE 1 TABLET BY MOUTH TWO TIMES A  tablet 3    pantoprazole (PROTONIX) 40 MG tablet TAKE 2 TABLETS BY MOUTH DAILY (Patient taking differently: Take 40 mg by mouth daily ) 180 tablet 3    triamterene-hydrochlorothiazide (MAXZIDE-25) 37.5-25 MG per tablet Take 0.5 tablets by mouth daily 45 tablet 3    calcium carbonate (OSCAL) 500 MG TABS tablet Take 500 mg by mouth daily      allopurinol (ZYLOPRIM) 100 MG tablet Take 100 mg by mouth daily      aspirin 81 MG EC tablet Take 81 mg by mouth daily      Cholecalciferol (VITAMIN D) 2000 units CAPS capsule Take by mouth      meloxicam (MOBIC) 7.5 MG tablet Take 1 tablet by mouth daily (Patient not taking: Reported on 8/30/2021) 30 tablet 1     No current facility-administered medications for this visit. Patient Active Problem List   Diagnosis    Wrist pain, right    Distal radius fracture    Fall from other slipping, tripping, or stumbling    Hypertension    Pneumonia    [facility]acute [facility] injury) ([facility] [address]    Metabolic acidosis, increased anion gap (IAG)    Colorectal polyps    Chronic kidney disease        Review of Systems   Constitutional: Negative for activity change, appetite change, chills, diaphoresis, fatigue, fever and unexpected weight change. HENT: Negative for congestion, ear pain, hearing loss, mouth sores, nosebleeds, postnasal drip, rhinorrhea, sinus pressure, sinus pain, sneezing, sore throat, tinnitus, trouble swallowing and voice change. Eyes: Negative for photophobia, pain, discharge, redness, itching and visual disturbance. Respiratory: Negative for cough, chest tightness, shortness of breath and wheezing. Cardiovascular: Negative for chest pain, palpitations and leg swelling. Gastrointestinal: Negative for abdominal distention, abdominal pain, anal bleeding, blood in stool, constipation, diarrhea, nausea, rectal pain and vomiting. Endocrine: Negative for cold intolerance, heat intolerance, polydipsia, polyphagia and polyuria. Genitourinary: Negative for difficulty urinating, dysuria, flank pain, frequency, hematuria and urgency. Musculoskeletal: Positive for arthralgias. Negative for back pain, gait problem, joint swelling, myalgias, neck pain and neck stiffness. Left hip pain. Skin: Negative for color change, pallor, rash and wound. Allergic/Immunologic: Negative for environmental allergies, food allergies and immunocompromised state.    Neurological: Negative for dizziness, tremors, seizures, syncope, facial asymmetry, speech difficulty, weakness, light-headedness, numbness and headaches. Hematological: Negative for adenopathy. Does not bruise/bleed easily. Psychiatric/Behavioral: Positive for dysphoric mood. Negative for decreased concentration, hallucinations and sleep disturbance. The patient is nervous/anxious. She is under a great deal of stress. Her  recently passed away. Furthermore, her son is being diagnosed with multiple myeloma and she is trying to take care of him. /68   Pulse 78   Ht 5' 6\" (1.676 m)   Wt 167 lb (75.8 kg)   SpO2 97%   BMI 26.95 kg/m²   Physical Exam  Vitals and nursing note reviewed. Constitutional:       General: She is not in acute distress. Appearance: Normal appearance. She is well-developed and normal weight. She is not ill-appearing, toxic-appearing or diaphoretic. HENT:      Head: Normocephalic and atraumatic. Right Ear: Tympanic membrane, ear canal and external ear normal. There is no impacted cerumen. Left Ear: Tympanic membrane, ear canal and external ear normal. There is no impacted cerumen. Nose: Nose normal. No congestion or rhinorrhea. Mouth/Throat:      Mouth: Mucous membranes are dry. Pharynx: Oropharynx is clear. No oropharyngeal exudate or posterior oropharyngeal erythema. Eyes:      General: No scleral icterus. Right eye: No discharge. Left eye: No discharge. Extraocular Movements: Extraocular movements intact. Conjunctiva/sclera: Conjunctivae normal.      Pupils: Pupils are equal, round, and reactive to light. Neck:      Thyroid: No thyromegaly. Vascular: No JVD. Trachea: No tracheal deviation. Cardiovascular:      Rate and Rhythm: Normal rate and regular rhythm. Pulses: Normal pulses. Heart sounds: Normal heart sounds. No murmur heard. No friction rub. No gallop.     Pulmonary:      Effort: Pulmonary effort is normal. No respiratory distress. Breath sounds: Normal breath sounds. No wheezing or rales. Chest:      Chest wall: No tenderness. Abdominal:      General: Bowel sounds are normal. There is no distension. Palpations: Abdomen is soft. There is no mass. Tenderness: There is no abdominal tenderness. There is no guarding or rebound. Musculoskeletal:         General: Tenderness present. No swelling, deformity or signs of injury. Normal range of motion. Cervical back: Normal range of motion and neck supple. Right lower leg: No edema. Left lower leg: No edema. Comments: Gait is antalgic. She does have quite a bit of pain on palpation of the left groin site. Lymphadenopathy:      Cervical: No cervical adenopathy. Skin:     General: Skin is warm and dry. Coloration: Skin is not jaundiced or pale. Findings: No bruising, erythema, lesion or rash. Neurological:      General: No focal deficit present. Mental Status: She is alert and oriented to person, place, and time. Mental status is at baseline. Cranial Nerves: No cranial nerve deficit. Sensory: No sensory deficit. Motor: No weakness or abnormal muscle tone. Coordination: Coordination normal.      Gait: Gait normal.      Deep Tendon Reflexes: Reflexes are normal and symmetric. Reflexes normal.   Psychiatric:         Mood and Affect: Mood normal.         Behavior: Behavior normal.         Thought Content: Thought content normal.         Judgment: Judgment normal.      Comments: Somewhat tearful         1. Hip pain    2. Left thigh pain        ASSESSMENT/PLAN:    79-year-old woman here for follow-up    1. Left hip and groin pain: X-ray of left hip and femur ordered. Depo-Medrol 40 mg IM x1. Medrol Dosepak prescribed. Mobic prescribed. Further plans based on x-ray reports. Eligha Cowden was seen today for hip pain.     Diagnoses and all orders for this visit:    Hip pain  -     XR HIP 2-3 VW W PELVIS LEFT; Future  -     methylPREDNISolone acetate (DEPO-MEDROL) injection 40 mg    Left thigh pain  -     XR FEMUR LEFT 1 VW; Future  -     methylPREDNISolone acetate (DEPO-MEDROL) injection 40 mg    Other orders  -     methylPREDNISolone (MEDROL DOSEPACK) 4 MG tablet; Take by mouth.  -     Discontinue: methylPREDNISolone acetate (DEPO-MEDROL) 40 MG/ML injection; Inject 1 mL into the muscle once for 1 dose          No follow-ups on file.      Orders Placed This Encounter   Procedures    XR HIP 2-3 VW W PELVIS LEFT     Standing Status:   Future     Standing Expiration Date:   8/30/2022     Order Specific Question:   Reason for exam:     Answer:   hip pain    XR FEMUR LEFT 1 VW     Standing Status:   Future     Standing Expiration Date:   8/30/2022     Order Specific Question:   Reason for exam:     Answer:   left thigh pain       Rudy Palencia MD

## 2021-08-31 NOTE — TELEPHONE ENCOUNTER
----- Message from Justin Blanchard MD sent at 8/30/2021 12:51 PM CDT -----  Femur x-ray is okay. However, her hip x-ray does show some arthritic bone spurs. We can try referring her to Ortho they might be able to do an injection in the hip to see if we will get her some relief.

## 2021-09-09 ENCOUNTER — TRANSCRIBE ORDERS (OUTPATIENT)
Dept: ADMINISTRATIVE | Facility: HOSPITAL | Age: 81
End: 2021-09-09

## 2021-09-09 ENCOUNTER — HOSPITAL ENCOUNTER (OUTPATIENT)
Dept: CT IMAGING | Facility: HOSPITAL | Age: 81
Discharge: HOME | End: 2021-09-09

## 2021-09-09 DIAGNOSIS — M89.9 DISORDER OF BONE: ICD-10-CM

## 2021-09-09 DIAGNOSIS — M89.9 DISORDER OF BONE: Primary | ICD-10-CM

## 2021-09-09 PROCEDURE — 71250 CT THORAX DX C-: CPT

## 2021-09-09 PROCEDURE — 74176 CT ABD & PELVIS W/O CONTRAST: CPT

## 2021-09-14 ENCOUNTER — TRANSCRIBE ORDERS (OUTPATIENT)
Dept: ADMINISTRATIVE | Facility: HOSPITAL | Age: 81
End: 2021-09-14

## 2021-09-14 ENCOUNTER — LAB (OUTPATIENT)
Dept: LAB | Facility: HOSPITAL | Age: 81
End: 2021-09-14

## 2021-09-14 DIAGNOSIS — M25.552 PAIN IN LEFT HIP: Primary | ICD-10-CM

## 2021-09-14 DIAGNOSIS — M79.662 PAIN IN LEFT LOWER LEG: ICD-10-CM

## 2021-09-14 DIAGNOSIS — M89.9 DISORDER OF BONE, UNSPECIFIED: ICD-10-CM

## 2021-09-14 LAB
ALBUMIN SERPL-MCNC: 4.4 G/DL (ref 3.5–5)
ALBUMIN/GLOB SERPL: 1.4 G/DL (ref 1.1–2.5)
ALP SERPL-CCNC: 110 U/L (ref 24–120)
ALT SERPL W P-5'-P-CCNC: 21 U/L (ref 0–35)
ANION GAP SERPL CALCULATED.3IONS-SCNC: 9 MMOL/L (ref 4–13)
AST SERPL-CCNC: 23 U/L (ref 7–45)
AUTO MIXED CELLS #: 0.7 10*3/MM3 (ref 0.1–2.6)
AUTO MIXED CELLS %: 6.9 % (ref 0.1–24)
BILIRUB SERPL-MCNC: 0.4 MG/DL (ref 0.1–1)
BUN SERPL-MCNC: 34 MG/DL (ref 5–21)
BUN/CREAT SERPL: 20.1
CALCIUM SPEC-SCNC: 10.5 MG/DL (ref 8.4–10.4)
CHLORIDE SERPL-SCNC: 111 MMOL/L (ref 98–110)
CO2 SERPL-SCNC: 22 MMOL/L (ref 24–31)
CREAT SERPL-MCNC: 1.69 MG/DL (ref 0.5–1.4)
ERYTHROCYTE [DISTWIDTH] IN BLOOD BY AUTOMATED COUNT: 14 % (ref 12.3–15.4)
GFR SERPL CREATININE-BSD FRML MDRD: 29 ML/MIN/1.73
GLOBULIN UR ELPH-MCNC: 3.1 GM/DL
GLUCOSE SERPL-MCNC: 115 MG/DL (ref 70–100)
HCT VFR BLD AUTO: 43.6 % (ref 34–46.6)
HGB BLD-MCNC: 13.6 G/DL (ref 12–15.9)
LYMPHOCYTES # BLD AUTO: 1.2 10*3/MM3 (ref 0.7–3.1)
LYMPHOCYTES NFR BLD AUTO: 11.4 % (ref 19.6–45.3)
MCH RBC QN AUTO: 28.2 PG (ref 26.6–33)
MCHC RBC AUTO-ENTMCNC: 31.2 G/DL (ref 31.5–35.7)
MCV RBC AUTO: 90.3 FL (ref 79–97)
NEUTROPHILS NFR BLD AUTO: 8.5 10*3/MM3 (ref 1.7–7)
NEUTROPHILS NFR BLD AUTO: 81.7 % (ref 42.7–76)
PLATELET # BLD AUTO: 290 10*3/MM3 (ref 140–450)
PMV BLD AUTO: 10.6 FL (ref 6–12)
POTASSIUM SERPL-SCNC: 4.7 MMOL/L (ref 3.5–5.3)
PROT SERPL-MCNC: 7.5 G/DL (ref 6.3–8.7)
RBC # BLD AUTO: 4.83 10*6/MM3 (ref 3.77–5.28)
SODIUM SERPL-SCNC: 142 MMOL/L (ref 135–145)
WBC # BLD AUTO: 10.4 10*3/MM3 (ref 3.4–10.8)

## 2021-09-14 PROCEDURE — 80053 COMPREHEN METABOLIC PANEL: CPT | Performed by: ORTHOPAEDIC SURGERY

## 2021-09-14 PROCEDURE — 36415 COLL VENOUS BLD VENIPUNCTURE: CPT | Performed by: ORTHOPAEDIC SURGERY

## 2021-09-14 PROCEDURE — 85025 COMPLETE CBC W/AUTO DIFF WBC: CPT | Performed by: ORTHOPAEDIC SURGERY

## 2021-09-17 NOTE — PROGRESS NOTES
Patient:  Gunner Corado  YOB: 1940  Date of Service: 9/20/2021  MRN: [id number]    Primary Care Physician: Justin Blanchard MD    Chief Complaint   Patient presents with    New Patient     Lung mass       Patient Seen, Chart, Consults notes, Labs, Radiology studies reviewed. Subjective:  Justine Acosta is an 79year old female referred by Dr Little Mtz with a nondisplaced fracture of the left superior pubic ramus, expansile lytic lesion of the distal third shaft of the left femur and a 4.1 x 3.6 x 3.5 cm  lung mass suspicious for primary malignancy with metastasis. TARGET LESIONS:  · 4.1 x 3.6 cm spiculated superior segment right lower lobe lung mass suspicious for primary malignancy. · Right hilar adenopathy. · Nondisplaced fracture at the left acetabulum and at the root of the left superior pubic ramus  · Expansile lytic lesion involving the more distal shaft of the left femur with extraosseous extension    TUMOR HISTORY:  Justine Acosta was seen in initial oncology consultation on 9/20/2021 referred by Dr Little Mtz with a nondisplaced fracture of the left superior pubic ramus, expansile lytic lesion of the distal third shaft of the left femur and a 4.1 x 3.6 x 3.5 cm  lung mass suspicious for primary malignancy with metastasis. Justine Acosta had an office visit with PCP, Cammy Henderson on 8/20/21 with complaints of left hip pain radiating from groin down left thigh. Over the next several weeks, the pain progressed down the leg to the distal left thigh causing difficulty walking, utilizing a cane, and eventually a wheelchair. She denies any trauma. Plain film imaging was ordered and orthopaedic referral was made. XR HIP 2-3 VW W PELVIS LEFT on 8/30/2021 at [facility] documented:  · No acute bony abnormality. · Mild acetabular and femoral head spurring on the left side    XR FEMUR LEFT (MIN 2 VIEWS) 8/30/2021 at [facility] documented:  · No acute bony abnormality.     Orthopaedic consultation with Dr Little Mtz on 9/7/2021 to address left hip and groin pain over 1 month period, ambulating with a walker. Denies any injury and describes a stabbing pain going down her left leg when standing. MRI PELVIS on 9/8/2021 at [facility] documented:  Along the  Helder-medial aspect of the left acetabulum and at the root of the left superior pubic ramus, there is definite evidence of a nondisplaced fracture. It should be noted that there is expansion of the cortex in this region and some eqjlbmdieqro-rx-gfnccuxiv STIR signal with diminished T1 signal in the marrow at this location, as well as possible expansion of the cortex. The degree of marrow edema surrounding the fracture site is very abruptly cut off between normal signal, and I am suspicious that there may be an underlying bone lesion with pathologic fracture rather than a simple fracture. Dr. Tom Garcia called me with the above information desiring consultation. Further imaging was recommended including a CT scan of the chest abdomen and pelvis. MRI LEFT FEMUR WO CONTRAST on 9/14/21 at [facility] documented: There is an expansile lytic lesion involving the more distal shaft of the left femur at the juncture of the proximal two-thirds with the distal one-third. This is creating permeative changes within the more lateral cortex of the femur at this level and on T1 sequencing there appears to be extraosseous extension of the process. There is surrounding edema associated with this lesion. Given the permeative appearance of the more lateral cortex I feel this patient would certainly be at a high risk for impending pathologic fracture. No additional lesions are present. CT CHEST WO CONTRAST DIAGNOSTIC on  9/9/2021 at [facility] documented:  · 4.1 x 3.6 cm spiculated superior segment right lower lobe lung mass suspicious for primary malignancy. · Questionable right hilar adenopathy. · Emphysema.      CT ABDOMEN PELVIS WO CONTRAST on 9/9/2021 at [facility] Cancer Mother     Colon Cancer Mother     Heart Disease Father     Stroke Brother     Colon Polyps Neg Hx     Esophageal Cancer Neg Hx     Liver Cancer Neg Hx     Liver Disease Neg Hx     Rectal Cancer Neg Hx     Stomach Cancer Neg Hx         Social History  Social History     Tobacco Use    Smoking status: Former Smoker     Packs/day: 0.50     Years: 40.00     Pack years: 20.00     Types: Cigarettes     Start date:      Quit date:      Years since quittin.7    Smokeless tobacco: Never Used   Vaping Use    Vaping Use: Never used   Substance Use Topics    Alcohol use: No    Drug use: No             Wt Readings from Last 3 Encounters:   21 163 lb 14.4 oz (74.3 kg)   21 167 lb (75.8 kg)   08/15/21 170 lb (77.1 kg)        Objective:  Vital Signs: Blood pressure (!) 160/70, pulse 74, height 5' 6\" (1.676 m), weight 163 lb 14.4 oz (74.3 kg), SpO2 94 %. Labs:  BMP: No results for input(s): NA, K, CL, CO2, PHOS, BUN, CREATININE, CALCIUM in the last 72 hours. CBC:   Recent Labs     21  1341   WBC 8.15   HGB 14.1   HCT 44.7   MCV 92.0        PT/INR: No results for input(s): PROTIME, INR in the last 72 hours. APTT: No results for input(s): APTT in the last 72 hours. Magnesium:No results for input(s): MG in the last 72 hours. Phosphorus:No results for input(s): PHOS in the last 72 hours. Hepatic: No results for input(s): ALKPHOS, ALT, AST, PROT, BILITOT, BILIDIR, LABALBU in the last 72 hours. Cultures:   No results for input(s): CULTURE in the last 72 hours. Radiology reports as per the Radiologist  Radiology:        ASSESSMENT AND PLAN:    #1   Rony Amin is an 79year old female referred by Dr Mary Beth Holliday with a nondisplaced fracture of the left superior pubic ramus, expansile lytic lesion of the distal third shaft of the left femur and a 4.1 x 3.6 x 3.5 cm  lung mass suspicious for primary malignancy with metastasis.       Rony Amin had an office visit with PCP, [address] [place] [address] Anaid Peña on 8/20/21 with complaints of left hip pain radiating from groin down left thigh. Over the next several weeks, the pain progressed down the leg to the distal left thigh causing difficulty walking, utilizing a cane, and eventually a wheelchair. She denies any trauma. Plain film imaging was ordered and orthopaedic referral was made. XR HIP 2-3 VW W PELVIS LEFT on 8/30/2021 at [facility] documented:  · No acute bony abnormality. · Mild acetabular and femoral head spurring on the left side    XR FEMUR LEFT (MIN 2 VIEWS) 8/30/2021 at [facility] documented:  · No acute bony abnormality. Orthopaedic consultation with Dr Nidhi Aj on 9/7/2021 to address left hip and groin pain over 1 month period, ambulating with a walker. Denies any injury and describes a stabbing pain going down her left leg when standing. MRI PELVIS on 9/8/2021 at [address] [place] [address] documented:  Along the  Helder-medial aspect of the left acetabulum and at the root of the left superior pubic ramus, there is definite evidence of a nondisplaced fracture. It should be noted that there is expansion of the cortex in this region and some tglkfrdpelkv-sk-owftojrnf STIR signal with diminished T1 signal in the marrow at this location, as well as possible expansion of the cortex. The degree of marrow edema surrounding the fracture site is very abruptly cut off between normal signal, and I am suspicious that there may be an underlying bone lesion with pathologic fracture rather than a simple fracture. Dr. Jayden Lund called me with the above information desiring consultation. Further imaging was recommended including a CT scan of the chest abdomen and pelvis. MRI LEFT FEMUR WO CONTRAST on 9/14/21 at [facility] documented: There is an expansile lytic lesion involving the more distal shaft of the left femur at the juncture of the proximal two-thirds with the distal one-third.   This is creating permeative changes within the more lateral cortex of the femur at this level and on T1 sequencing there appears to be extraosseous extension of the process. There is surrounding edema associated with this lesion. Given the permeative appearance of the more lateral cortex I feel this patient would certainly be at a high risk for impending pathologic fracture. No additional lesions are present. CT CHEST WO CONTRAST DIAGNOSTIC on  9/9/2021 at \[facility]\"" documented:  · 4.1 x 3.6 cm spiculated superior segment right lower lobe lung mass suspicious for primary malignancy. · Questionable right hilar adenopathy. · Emphysema. CT ABDOMEN PELVIS WO CONTRAST on 9/9/2021 at \[facility]\"" documented:  · Nonenhanced imaging of the abdomen and pelvis reduces assessment of the visceral organs. No convincing intra-abdominal or pelvic metastasis. Probable left renal cyst. Colonic diverticulosis. · Left perineural sacral cyst. No pathologic fractures. TARGET LESIONS:  · 4.1 x 3.6 cm spiculated superior segment right lower lobe lung mass suspicious for primary malignancy. · Right hilar adenopathy. · Nondisplaced fracture at the left acetabulum and at the root of the left superior pubic ramus  · Expansile lytic lesion involving the more distal shaft of the left femur with extraosseous extension    Physical examination today, 9/20/2021 is performed in the wheelchair due to redoes difficulty and pain. She is accompanied by her son Melania Lynn. HEENT is without asymmetry extraocular movements are intact. Neck is supple no JVD no palpable lymphadenopathy, specifically no supraclavicular lymphadenopathy on the left or the right. Lungs are clear heart is regular normal S1-S2, no S3. Abdominal exam is benign extremities no edema. There is no mass-effect on the distal left femur that I can appreciate on palpation. Neurological exam is grossly intact, gait is not tested. CBC today 9/20/21 reveals a WBC of 8.15 . Hgb is 14.1  with an MCV of 92.0  and platelet count of 127,727. 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 · I called and spoke to Dr. Yoli Dickerson who agreed to see [facility] [place] tomorrow morning in the clinic at 9 AM to schedule placement of a susan in the left femur with reamings to be done during the procedure and a small open incision on the left for biopsy. If adequate tissue can be obtained, molecular studies would be preferable to document actionable mutation for targeted agents. The procedure will be diagnostic as well as therapeutic in hopes of preventing a pathological fracture of the left femur. · Upon confirmation of malignant tissue, guardant 360 serology will be sent off for further testing. · PET scan will be done  · Follow-up appointment is given. In addition to placing a call and discussing the case with Dr. Yoli Dickerson today. I also had an extended discussion with [facility] [place] and her son Giovanna Clark. The ramifications of the extent of disease were all discussed in detail brief superficial discussions regarding systemic chemoimmunotherapy were discussed. These will be discussed further upon confirmation of tissue cell type and potentially molecular markers if able to be obtained. I also discussed care the need to be taken for prevention of a potential pathological fracture of the left femur. #2  TUMOR SCREENING AND HEALTH MAINTENANCE    Bone Health  - DEXA BONE DENSITY AXIAL SKELETON on 8/13/2021 revealed Osteopenia. Patient takes Calcium + Vitamin D as indicated. Breast cancer screening - RADHA DIGITAL SCREEN W OR WO CAD BILATERAL on  8/13/2021 was BI-RADS category 1     GI cancer screening - Colonoscopy per Dr Omar Nageotte 6/24/19 with a benign 6mm sessile polyp in the the descending colon. 5 year recall.     GYN cancer screening - JUAN ALBERTO & BSO 1970s      #3  Immunization History   COVID-19, Pfizer, PF, 30mcg/0.3mL 02/22/2021, 03/15/2021   Influenza, High Dose (Fluzone 65 yrs and older) 11/09/2018, 10/11/2019, 09/10/2020   Pneumococcal Conjugate 13-valent ([id number]) 07/13/2015   Pneumococcal Polysaccharide ([id number]) 05/09/2019   Zoster Live (Zostavax) 12/09/2016   Zoster Recombinant (Shingrix) 05/09/2019, 09/25/2019          Sarabjit Alvares was seen today for new patient. Diagnoses and all orders for this visit:    Right lower lobe lung mass    Lytic bone lesion of left femur  -     Verenice Macedo MD, Orthopaedic Surgery, [place]    Adenopathy, hilar    Closed fracture of ramus of left pubis with routine healing, subsequent encounter  -     Verenice Macedo MD, Orthopaedic Surgery, [place]        Orders Placed This Encounter   Procedures   [facility]     Referral Priority:   Routine     Referral Type:   Eval and Treat     Referral Reason:   Specialty Services Required     Referred to Provider:   Kamlesh Lara MD     Requested Specialty:   Orthopedic Surgery     Number of Visits Requested:   1       No orders of the defined types were placed in this encounter. Return in about 8 days (around 9/28/2021) for follow-up with .

## 2021-09-21 ENCOUNTER — LAB (OUTPATIENT)
Dept: LAB | Facility: HOSPITAL | Age: 81
End: 2021-09-21

## 2021-09-21 ENCOUNTER — PRE-ADMISSION TESTING (OUTPATIENT)
Dept: PREADMISSION TESTING | Facility: HOSPITAL | Age: 81
End: 2021-09-21

## 2021-09-21 ENCOUNTER — TRANSCRIBE ORDERS (OUTPATIENT)
Dept: LAB | Facility: HOSPITAL | Age: 81
End: 2021-09-21

## 2021-09-21 VITALS
DIASTOLIC BLOOD PRESSURE: 65 MMHG | HEIGHT: 64 IN | RESPIRATION RATE: 16 BRPM | OXYGEN SATURATION: 100 % | BODY MASS INDEX: 28.3 KG/M2 | WEIGHT: 165.79 LBS | SYSTOLIC BLOOD PRESSURE: 136 MMHG | HEART RATE: 61 BPM

## 2021-09-21 DIAGNOSIS — Z01.818 PREOP TESTING: Primary | ICD-10-CM

## 2021-09-21 LAB — SARS-COV-2 ORF1AB RESP QL NAA+PROBE: NOT DETECTED

## 2021-09-21 PROCEDURE — 93010 ELECTROCARDIOGRAM REPORT: CPT | Performed by: INTERNAL MEDICINE

## 2021-09-21 PROCEDURE — C9803 HOPD COVID-19 SPEC COLLECT: HCPCS | Performed by: ORTHOPAEDIC SURGERY

## 2021-09-21 PROCEDURE — 93005 ELECTROCARDIOGRAM TRACING: CPT

## 2021-09-21 PROCEDURE — U0005 INFEC AGEN DETEC AMPLI PROBE: HCPCS | Performed by: ORTHOPAEDIC SURGERY

## 2021-09-21 PROCEDURE — U0004 COV-19 TEST NON-CDC HGH THRU: HCPCS | Performed by: ORTHOPAEDIC SURGERY

## 2021-09-21 RX ORDER — OXYBUTYNIN CHLORIDE 5 MG/1
2.5 TABLET ORAL DAILY
COMMUNITY

## 2021-09-21 RX ORDER — ALLOPURINOL 100 MG/1
100 TABLET ORAL DAILY
COMMUNITY

## 2021-09-21 RX ORDER — LOSARTAN POTASSIUM 50 MG/1
50 TABLET ORAL DAILY
COMMUNITY

## 2021-09-21 RX ORDER — PRAVASTATIN SODIUM 40 MG
80 TABLET ORAL DAILY
COMMUNITY

## 2021-09-21 RX ORDER — TRIAMTERENE AND HYDROCHLOROTHIAZIDE 37.5; 25 MG/1; MG/1
1 CAPSULE ORAL EVERY MORNING
COMMUNITY

## 2021-09-21 RX ORDER — PANTOPRAZOLE SODIUM 40 MG/1
40 TABLET, DELAYED RELEASE ORAL DAILY
COMMUNITY

## 2021-09-21 NOTE — DISCHARGE INSTRUCTIONS
DAY OF SURGERY INSTRUCTIONS        YOUR SURGEON: ***edison valle    PROCEDURE: ***tumor on femu    DATE OF SURGERY: ***09/23/2021    ARRIVAL TIME: AS DIRECTED BY OFFICE    YOU MAY TAKE THE FOLLOWING MEDICATION(S) THE MORNING OF SURGERY WITH A SIP OF WATER: ***      ALL OTHER HOME MEDICATION CHECK WITH YOUR PHYSICIAN      DO NOT TAKE ANY ERECTILE DYSFUNCTION MEDICATIONS (EX: CIALIS, VIAGRA) 24 HOURS PRIOR TO SURGERY                      MANAGING PAIN AFTER SURGERY    We know you are probably wondering what your pain will be like after surgery.  Following surgery it is unrealistic to expect you will not have pain.   Pain is how our bodies let us know that something is wrong or cautions us to be careful.  That said, our goal is to make your pain tolerable.    Methods we may use to treat your pain include (oral or IV medications, PCAs, epidurals, nerve blocks, etc.)   While some procedures require IV pain medications for a short time after surgery, transitioning to pain medications by mouth allows for better management of pain.   Your nurse will encourage you to take oral pain medications whenever possible.  IV medications work almost immediately, but only last a short while.  Taking medications by mouth allows for a more constant level of medication in your blood stream for a longer period of time.      Once your pain is out of control it is harder to get back under control.  It is important you are aware when your next dose of pain medication is due.  If you are admitted, your nurse may write the time of your next dose on the white board in your room to help you remember.      We are interested in your pain and encourage you to inform us about aggravating factors during your visit.   Many times a simple repositioning every few hours can make a big difference.    If your physician says it is okay, do not let your pain prevent you from getting out of bed. Be sure to call your nurse for assistance prior to getting up so  you do not fall.      Before surgery, please decide your tolerable pain goal.  These faces help describe the pain ratings we use on a 0-10 scale.   Be prepared to tell us your goal and whether or not you take pain or anxiety medications at home.          BEFORE YOU COME TO THE HOSPITAL  (Pre-op instructions)  • Do not eat, drink, smoke or chew gum after midnight the night before surgery.  This also includes no mints.  • Morning of surgery take only the medicines you have been instructed with a sip of water unless otherwise instructed  by your physician.  • Do not shave, wear makeup or dark nail polish.  • Remove all jewelry including rings.  • Leave anything you consider valuable at home.  • Leave your suitcase in the car until after your surgery.  • Bring the following with you if applicable:  o Picture ID and insurance, Medicare or Medicaid cards  o Co-pay/deductible required by insurance (cash, check, credit card)  o Copy of advance directive, living will or power-of- documents if not brought to PAT  o CPAP or BIPAP mask and tubing  o Relaxation aids ( book, magazine), etc.  o Hearing aids                        ON THE DAY OF SURGERY  · On the day of surgery check in at registration located at the main entrance of the hospital.   ? You will be registered and given a beeper with instructions where to wait in the main lobby.  ? When your beeper lights up and vibrates a member of the Outpatient Surgery staff will meet you at the double doors under the stair steps and escort you to your preoperative room.   · You may have cloth compression devices placed on your legs. These help to prevent blood clots and reduce swelling in your legs.  · An IV may be inserted into one of your veins.  · In the operating room, you may be given one or more of the following:  ? A medicine to help you relax (sedative).  ? A medicine to numb the area (local anesthetic).  ? A medicine to make you fall asleep (general  "anesthetic).  ? A medicine that is injected into an area of your body to numb everything below the injection site (regional anesthetic).  · Your surgical site will be marked or identified.  · You may be given an antibiotic through your IV to help prevent infection.  Contact a health care provider if you:  · Develop a fever of more than 100.4°F (38°C) or other feelings of illness during the 48 hours before your surgery.  · Have symptoms that get worse.  Have questions or concerns about your surgery    General Anesthesia/Surgery, Adult  General anesthesia is the use of medicines to make a person \"go to sleep\" (unconscious) for a medical procedure. General anesthesia must be used for certain procedures, and is often recommended for procedures that:  · Last a long time.  · Require you to be still or in an unusual position.  · Are major and can cause blood loss.  The medicines used for general anesthesia are called general anesthetics. As well as making you unconscious for a certain amount of time, these medicines:  · Prevent pain.  · Control your blood pressure.  · Relax your muscles.  Tell a health care provider about:  · Any allergies you have.  · All medicines you are taking, including vitamins, herbs, eye drops, creams, and over-the-counter medicines.  · Any problems you or family members have had with anesthetic medicines.  · Types of anesthetics you have had in the past.  · Any blood disorders you have.  · Any surgeries you have had.  · Any medical conditions you have.  · Any recent upper respiratory, chest, or ear infections.  · Any history of:  ? Heart or lung conditions, such as heart failure, sleep apnea, asthma, or chronic obstructive pulmonary disease (COPD).  ?  service.  ? Depression or anxiety.  · Any tobacco or drug use, including marijuana or alcohol use.  · Whether you are pregnant or may be pregnant.  What are the risks?  Generally, this is a safe procedure. However, problems may occur, " including:  · Allergic reaction.  · Lung and heart problems.  · Inhaling food or liquid from the stomach into the lungs (aspiration).  · Nerve injury.  · Air in the bloodstream, which can lead to stroke.  · Extreme agitation or confusion (delirium) when you wake up from the anesthetic.  · Waking up during your procedure and being unable to move. This is rare.  These problems are more likely to develop if you are having a major surgery or if you have an advanced or serious medical condition. You can prevent some of these complications by answering all of your health care provider's questions thoroughly and by following all instructions before your procedure.  General anesthesia can cause side effects, including:  · Nausea or vomiting.  · A sore throat from the breathing tube.  · Hoarseness.  · Wheezing or coughing.  · Shaking chills.  · Tiredness.  · Body aches.  · Anxiety.  · Sleepiness or drowsiness.  · Confusion or agitation.  RISKS AND COMPLICATIONS OF SURGERY  Your health care provider will discuss possible risks and complications with you before surgery. Common risks and complications include:    · Problems due to the use of anesthetics.  · Blood loss and replacement (does not apply to minor surgical procedures).  · Temporary increase in pain due to surgery.  · Uncorrected pain or problems that the surgery was meant to correct.  · Infection.  · New damage.    What happens before the procedure?    Medicines  Ask your health care provider about:  · Changing or stopping your regular medicines. This is especially important if you are taking diabetes medicines or blood thinners.  · Taking medicines such as aspirin and ibuprofen. These medicines can thin your blood. Do not take these medicines unless your health care provider tells you to take them.  · Taking over-the-counter medicines, vitamins, herbs, and supplements. Do not take these during the week before your procedure unless your health care provider approves  them.  General instructions  · Starting 3-6 weeks before the procedure, do not use any products that contain nicotine or tobacco, such as cigarettes and e-cigarettes. If you need help quitting, ask your health care provider.  · If you brush your teeth on the morning of the procedure, make sure to spit out all of the toothpaste.  · Tell your health care provider if you become ill or develop a cold, cough, or fever.  · If instructed by your health care provider, bring your sleep apnea device with you on the day of your surgery (if applicable).  · Ask your health care provider if you will be going home the same day, the following day, or after a longer hospital stay.  ? Plan to have someone take you home from the hospital or clinic.  ? Plan to have a responsible adult care for you for at least 24 hours after you leave the hospital or clinic. This is important.  What happens during the procedure?  · You will be given anesthetics through both of the following:  ? A mask placed over your nose and mouth.  ? An IV in one of your veins.  · You may receive a medicine to help you relax (sedative).  · After you are unconscious, a breathing tube may be inserted down your throat to help you breathe. This will be removed before you wake up.  · An anesthesia specialist will stay with you throughout your procedure. He or she will:  ? Keep you comfortable and safe by continuing to give you medicines and adjusting the amount of medicine that you get.  ? Monitor your blood pressure, pulse, and oxygen levels to make sure that the anesthetics do not cause any problems.  The procedure may vary among health care providers and hospitals.  What happens after the procedure?  · Your blood pressure, temperature, heart rate, breathing rate, and blood oxygen level will be monitored until the medicines you were given have worn off.  · You will wake up in a recovery area. You may wake up slowly.  · If you feel anxious or agitated, you may be given  medicine to help you calm down.  · If you will be going home the same day, your health care provider may check to make sure you can walk, drink, and urinate.  · Your health care provider will treat any pain or side effects you have before you go home.  · Do not drive for 24 hours if you were given a sedative.  Summary  · General anesthesia is used to keep you still and prevent pain during a procedure.  · It is important to tell your healthcare provider about your medical history and any surgeries you have had, and previous experience with anesthesia.  · Follow your healthcare provider’s instructions about when to stop eating, drinking, or taking certain medicines before your procedure.  · Plan to have someone take you home from the hospital or clinic.  This information is not intended to replace advice given to you by your health care provider. Make sure you discuss any questions you have with your health care provider.  Document Released: 03/26/2009 Document Revised: 08/03/2018 Document Reviewed: 08/03/2018  [employer] Interactive Patient Education © 2019 [employer]       Fall Prevention in Hospitals, Adult  As a hospital patient, your condition and the treatments you receive can increase your risk for falls. Some additional risk factors for falls in a hospital include:  · Being in an unfamiliar environment.  · Being on bed rest.  · Your surgery.  · Taking certain medicines.  · Your tubing requirements, such as intravenous (IV) therapy or catheters.  It is important that you learn how to decrease fall risks while at the hospital. Below are important tips that can help prevent falls.  SAFETY TIPS FOR PREVENTING FALLS  Talk about your risk of falling.  · Ask your health care provider why you are at risk for falling. Is it your medicine, illness, tubing placement, or something else?  · Make a plan with your health care provider to keep you safe from falls.  · Ask your health care provider or pharmacist about side  effects of your medicines. Some medicines can make you dizzy or affect your coordination.  Ask for help.  · Ask for help before getting out of bed. You may need to press your call button.  · Ask for assistance in getting safely to the toilet.  · Ask for a walker or cane to be put at your bedside. Ask that most of the side rails on your bed be placed up before your health care provider leaves the room.  · Ask family or friends to sit with you.  · Ask for things that are out of your reach, such as your glasses, hearing aids, telephone, bedside table, or call button.  Follow these tips to avoid falling:  · Stay lying or seated, rather than standing, while waiting for help.  · Wear rubber-soled slippers or shoes whenever you walk in the hospital.  · Avoid quick, sudden movements.  ¨ Change positions slowly.  ¨ Sit on the side of your bed before standing.  ¨ Stand up slowly and wait before you start to walk.  · Let your health care provider know if there is a spill on the floor.  · Pay careful attention to the medical equipment, electrical cords, and tubes around you.  · When you need help, use your call button by your bed or in the bathroom. Wait for one of your health care providers to help you.  · If you feel dizzy or unsure of your footing, return to bed and wait for assistance.  · Avoid being distracted by the TV, telephone, or another person in your room.  · Do not lean or support yourself on rolling objects, such as IV poles or bedside tables.     This information is not intended to replace advice given to you by your health care provider. Make sure you discuss any questions you have with your health care provider.     Document Released: 12/15/2001 Document Revised: 01/08/2016 Document Reviewed: 08/25/2013  [employer] Interactive Patient Education ©2016 [employer]       [facility]  CHG 4% Patient Instruction Sheet    Chlorhexidine Before Surgery  Chlorhexidine gluconate (CHG) is a germ-killing  (antiseptic) solution that is used to clean the skin. It gets rid of the bacteria that normally live on the skin. Cleaning your skin with CHG before surgery helps lower the risk for infection after surgery.    How to use CHG solution  · You will take 2 showers, one shower the night before surgery, the second shower the morning of surgery before coming to the hospital.  · Use CHG only as told by your health care provider, and follow the instructions on the label.  · Use CHG solution while taking a shower. Follow these steps when using CHG solution (unless your health care provider gives you different instructions):  1. Start the shower.  2. Use your normal soap and shampoo to wash your face and hair.  3. Turn off the shower or move out of the shower stream.  4. Pour the CHG onto a clean washcloth. Do not use any type of brush or rough-edged sponge.  5. Starting at your neck, lather your body down to your toes. Make sure you:  6. Pay special attention to the part of your body where you will be having surgery. Scrub this area for at least 1 minute.  7. Use the full amount of CHG as directed. Usually, this is one half bottle for each shower.  8. Do not use CHG on your head or face. If the solution gets into your ears or eyes, rinse them well with water.  9. Avoid your genital area.  10. Avoid any areas of skin that have broken skin, cuts, or scrapes.  11. Scrub your back and under your arms. Make sure to wash skin folds.  12. Let the lather sit on your skin for 1-2 minutes or as long as told by your health care  provider.  13. Thoroughly rinse your entire body in the shower. Make sure that all body creases and crevices are rinsed well.  14. Dry off with a clean towel. Do not put any substances on your body afterward, such as powder, lotion, or perfume.  15. Put on clean clothes or pajamas.  16. If it is the night before your surgery, sleep in clean sheets.    What are the risks?  Risks of using CHG include:  · A skin  reaction.  · Hearing loss, if CHG gets in your ears.  · Eye injury, if CHG gets in your eyes and is not rinsed out.  · The CHG product catching fire.  Make sure that you avoid smoking and flames after applying CHG to your skin.  Do not use CHG:  · If you have a chlorhexidine allergy or have previously reacted to chlorhexidine.  · On babies younger than 2 months of age.      On the day of surgery, when you are taken to your room in Outpatient Surgery you will be given a CHG prepackaged cloth to wipe the site for your surgery.  How to use CHG prepackaged cloths  · Follow the instructions on the label.  · Use the CHG cloth on clean, dry skin. Follow these steps when using a CHG cloth (unless your health care provider gives you different instructions):  1. Using the CHG cloth, vigorously scrub the part of your body where you will be having surgery. Scrub using a back-and-forth motion for 3 minutes. The area on your body should be completely wet with CHG when you are finished scrubbing.  2. Do not rinse. Discard the cloth and let the area air-dry for 1 minute. Do not put any substances on your body afterward, such as powder, lotion, or perfume.  Contact a health care provider if:  · Your skin gets irritated after scrubbing.  · You have questions about using your solution or cloth.  Get help right away if:  · Your eyes become very red or swollen.  · Your eyes itch badly.  · Your skin itches badly and is red or swollen.  · Your hearing changes.  · You have trouble seeing.  · You have swelling or tingling in your mouth or throat.  · You have trouble breathing.  · You swallow any chlorhexidine.  Summary  · Chlorhexidine gluconate (CHG) is a germ-killing (antiseptic) solution that is used to clean the skin. Cleaning your skin with CHG before surgery helps lower the risk for infection after surgery.  · You may be given CHG to use at home. It may be in a bottle or in a prepackaged cloth to use on your skin. Carefully follow  your health care provider's instructions and the instructions on the product label.  · Do not use CHG if you have a chlorhexidine allergy.  · Contact your health care provider if your skin gets irritated after scrubbing.  This information is not intended to replace advice given to you by your health care provider. Make sure you discuss any questions you have with your health care provider.  Document Released: 09/11/2013 Document Revised: 11/15/2018 Document Reviewed: 11/15/2018  [employer] Interactive Patient Education © 2019 [employer]          PATIENT/FAMILY/RESPONSIBLE PARTY VERBALIZES UNDERSTANDING OF ABOVE EDUCATION.  COPY OF PAIN SCALE GIVEN AND REVIEWED WITH VERBALIZED UNDERSTANDING.

## 2021-09-23 ENCOUNTER — ANESTHESIA EVENT (OUTPATIENT)
Dept: PERIOP | Facility: HOSPITAL | Age: 81
End: 2021-09-23

## 2021-09-23 ENCOUNTER — APPOINTMENT (OUTPATIENT)
Dept: GENERAL RADIOLOGY | Facility: HOSPITAL | Age: 81
End: 2021-09-23

## 2021-09-23 ENCOUNTER — HOSPITAL ENCOUNTER (OUTPATIENT)
Facility: HOSPITAL | Age: 81
Discharge: HOME | End: 2021-09-24
Attending: ORTHOPAEDIC SURGERY | Admitting: ORTHOPAEDIC SURGERY

## 2021-09-23 ENCOUNTER — ANESTHESIA (OUTPATIENT)
Dept: PERIOP | Facility: HOSPITAL | Age: 81
End: 2021-09-23

## 2021-09-23 DIAGNOSIS — S72.92XA FEMUR FRACTURE, LEFT (HCC): ICD-10-CM

## 2021-09-23 DIAGNOSIS — Z74.09 IMPAIRED MOBILITY: ICD-10-CM

## 2021-09-23 LAB
QT INTERVAL: 420 MS
QTC INTERVAL: 426 MS

## 2021-09-23 PROCEDURE — C1713 ANCHOR/SCREW BN/BN,TIS/BN: HCPCS | Performed by: ORTHOPAEDIC SURGERY

## 2021-09-23 PROCEDURE — A9270 NON-COVERED ITEM OR SERVICE: HCPCS | Performed by: ORTHOPAEDIC SURGERY

## 2021-09-23 PROCEDURE — 25010000002 HYDROMORPHONE PER 4 MG: Performed by: ANESTHESIOLOGY

## 2021-09-23 PROCEDURE — 88307 TISSUE EXAM BY PATHOLOGIST: CPT | Performed by: ORTHOPAEDIC SURGERY

## 2021-09-23 PROCEDURE — 63710000001 OXYBUTYNIN 5 MG TABLET: Performed by: ORTHOPAEDIC SURGERY

## 2021-09-23 PROCEDURE — 88342 IMHCHEM/IMCYTCHM 1ST ANTB: CPT | Performed by: ORTHOPAEDIC SURGERY

## 2021-09-23 PROCEDURE — 88381 MICRODISSECTION MANUAL: CPT

## 2021-09-23 PROCEDURE — 25010000002 FENTANYL CITRATE (PF) 100 MCG/2ML SOLUTION: Performed by: NURSE ANESTHETIST, CERTIFIED REGISTERED

## 2021-09-23 PROCEDURE — 88360 TUMOR IMMUNOHISTOCHEM/MANUAL: CPT

## 2021-09-23 PROCEDURE — 25010000003 CEFAZOLIN PER 500 MG: Performed by: ORTHOPAEDIC SURGERY

## 2021-09-23 PROCEDURE — 88311 DECALCIFY TISSUE: CPT | Performed by: ORTHOPAEDIC SURGERY

## 2021-09-23 PROCEDURE — 88341 IMHCHEM/IMCYTCHM EA ADD ANTB: CPT | Performed by: ORTHOPAEDIC SURGERY

## 2021-09-23 PROCEDURE — 76000 FLUOROSCOPY <1 HR PHYS/QHP: CPT

## 2021-09-23 PROCEDURE — 88341 IMHCHEM/IMCYTCHM EA ADD ANTB: CPT

## 2021-09-23 PROCEDURE — 25010000002 ONDANSETRON PER 1 MG: Performed by: ANESTHESIOLOGY

## 2021-09-23 PROCEDURE — 25010000002 HYDROMORPHONE 1 MG/ML SOLUTION: Performed by: NURSE ANESTHETIST, CERTIFIED REGISTERED

## 2021-09-23 PROCEDURE — 25010000002 PROPOFOL 10 MG/ML EMULSION: Performed by: NURSE ANESTHETIST, CERTIFIED REGISTERED

## 2021-09-23 PROCEDURE — 25010000002 ONDANSETRON PER 1 MG: Performed by: NURSE ANESTHETIST, CERTIFIED REGISTERED

## 2021-09-23 PROCEDURE — 63710000001 PRAVASTATIN 20 MG TABLET: Performed by: ORTHOPAEDIC SURGERY

## 2021-09-23 PROCEDURE — 88342 IMHCHEM/IMCYTCHM 1ST ANTB: CPT

## 2021-09-23 PROCEDURE — 73552 X-RAY EXAM OF FEMUR 2/>: CPT

## 2021-09-23 DEVICE — SCRW LK STRDRV TI 5X46MM STRL: Type: IMPLANTABLE DEVICE | Site: FEMUR | Status: FUNCTIONAL

## 2021-09-23 DEVICE — SCRW LK STRDRV TI 5X42MM STRL: Type: IMPLANTABLE DEVICE | Site: FEMUR | Status: FUNCTIONAL

## 2021-09-23 DEVICE — IMPLANTABLE DEVICE: Type: IMPLANTABLE DEVICE | Site: FEMUR | Status: FUNCTIONAL

## 2021-09-23 DEVICE — BLD FEM FIX HELI TFN ADV 90MM STRL: Type: IMPLANTABLE DEVICE | Site: FEMUR | Status: FUNCTIONAL

## 2021-09-23 DEVICE — SCRW LK STRDRV TI 5X52MM STRL: Type: IMPLANTABLE DEVICE | Site: FEMUR | Status: FUNCTIONAL

## 2021-09-23 RX ORDER — PANTOPRAZOLE SODIUM 40 MG/1
40 TABLET, DELAYED RELEASE ORAL
Status: DISCONTINUED | OUTPATIENT
Start: 2021-09-24 | End: 2021-09-24 | Stop reason: HOSPADM

## 2021-09-23 RX ORDER — HYDROCODONE BITARTRATE AND ACETAMINOPHEN 7.5; 325 MG/1; MG/1
1 TABLET ORAL EVERY 6 HOURS PRN
Status: DISCONTINUED | OUTPATIENT
Start: 2021-09-23 | End: 2021-09-24 | Stop reason: HOSPADM

## 2021-09-23 RX ORDER — PROPOFOL 10 MG/ML
VIAL (ML) INTRAVENOUS AS NEEDED
Status: DISCONTINUED | OUTPATIENT
Start: 2021-09-23 | End: 2021-09-23 | Stop reason: SURG

## 2021-09-23 RX ORDER — LIDOCAINE HYDROCHLORIDE 10 MG/ML
0.5 INJECTION, SOLUTION EPIDURAL; INFILTRATION; INTRACAUDAL; PERINEURAL ONCE AS NEEDED
Status: DISCONTINUED | OUTPATIENT
Start: 2021-09-23 | End: 2021-09-23 | Stop reason: HOSPADM

## 2021-09-23 RX ORDER — SODIUM CHLORIDE 0.9 % (FLUSH) 0.9 %
3 SYRINGE (ML) INJECTION EVERY 12 HOURS SCHEDULED
Status: DISCONTINUED | OUTPATIENT
Start: 2021-09-23 | End: 2021-09-23 | Stop reason: HOSPADM

## 2021-09-23 RX ORDER — ONDANSETRON 4 MG/1
4 TABLET, FILM COATED ORAL ONCE AS NEEDED
Status: DISCONTINUED | OUTPATIENT
Start: 2021-09-23 | End: 2021-09-23

## 2021-09-23 RX ORDER — FLUMAZENIL 0.1 MG/ML
0.2 INJECTION INTRAVENOUS AS NEEDED
Status: DISCONTINUED | OUTPATIENT
Start: 2021-09-23 | End: 2021-09-23 | Stop reason: HOSPADM

## 2021-09-23 RX ORDER — SODIUM CHLORIDE, SODIUM LACTATE, POTASSIUM CHLORIDE, CALCIUM CHLORIDE 600; 310; 30; 20 MG/100ML; MG/100ML; MG/100ML; MG/100ML
100 INJECTION, SOLUTION INTRAVENOUS CONTINUOUS PRN
Status: DISCONTINUED | OUTPATIENT
Start: 2021-09-23 | End: 2021-09-23 | Stop reason: HOSPADM

## 2021-09-23 RX ORDER — HYDROCODONE BITARTRATE AND ACETAMINOPHEN 7.5; 325 MG/1; MG/1
1 TABLET ORAL ONCE AS NEEDED
Status: DISCONTINUED | OUTPATIENT
Start: 2021-09-23 | End: 2021-09-23

## 2021-09-23 RX ORDER — MAGNESIUM HYDROXIDE 1200 MG/15ML
LIQUID ORAL AS NEEDED
Status: DISCONTINUED | OUTPATIENT
Start: 2021-09-23 | End: 2021-09-23 | Stop reason: HOSPADM

## 2021-09-23 RX ORDER — LIDOCAINE HYDROCHLORIDE 20 MG/ML
INJECTION, SOLUTION EPIDURAL; INFILTRATION; INTRACAUDAL; PERINEURAL AS NEEDED
Status: DISCONTINUED | OUTPATIENT
Start: 2021-09-23 | End: 2021-09-23 | Stop reason: SURG

## 2021-09-23 RX ORDER — HYDROMORPHONE HYDROCHLORIDE 1 MG/ML
0.5 INJECTION, SOLUTION INTRAMUSCULAR; INTRAVENOUS; SUBCUTANEOUS
Status: DISCONTINUED | OUTPATIENT
Start: 2021-09-23 | End: 2021-09-23 | Stop reason: HOSPADM

## 2021-09-23 RX ORDER — ACETAMINOPHEN 325 MG/1
650 TABLET ORAL EVERY 6 HOURS PRN
Status: DISCONTINUED | OUTPATIENT
Start: 2021-09-23 | End: 2021-09-24 | Stop reason: HOSPADM

## 2021-09-23 RX ORDER — SODIUM CHLORIDE 0.9 % (FLUSH) 0.9 %
10 SYRINGE (ML) INJECTION EVERY 12 HOURS SCHEDULED
Status: DISCONTINUED | OUTPATIENT
Start: 2021-09-23 | End: 2021-09-23 | Stop reason: HOSPADM

## 2021-09-23 RX ORDER — PROMETHAZINE HYDROCHLORIDE 25 MG/1
12.5 TABLET ORAL ONCE AS NEEDED
Status: DISCONTINUED | OUTPATIENT
Start: 2021-09-23 | End: 2021-09-23

## 2021-09-23 RX ORDER — TRIAMTERENE AND HYDROCHLOROTHIAZIDE 37.5; 25 MG/1; MG/1
1 CAPSULE ORAL DAILY
Status: DISCONTINUED | OUTPATIENT
Start: 2021-09-24 | End: 2021-09-24

## 2021-09-23 RX ORDER — NALOXONE HCL 0.4 MG/ML
0.04 VIAL (ML) INJECTION AS NEEDED
Status: DISCONTINUED | OUTPATIENT
Start: 2021-09-23 | End: 2021-09-23 | Stop reason: HOSPADM

## 2021-09-23 RX ORDER — FENTANYL CITRATE 50 UG/ML
25 INJECTION, SOLUTION INTRAMUSCULAR; INTRAVENOUS
Status: DISCONTINUED | OUTPATIENT
Start: 2021-09-23 | End: 2021-09-23 | Stop reason: HOSPADM

## 2021-09-23 RX ORDER — OXYBUTYNIN CHLORIDE 5 MG/1
2.5 TABLET ORAL 3 TIMES DAILY
Status: DISCONTINUED | OUTPATIENT
Start: 2021-09-23 | End: 2021-09-24 | Stop reason: HOSPADM

## 2021-09-23 RX ORDER — SODIUM CHLORIDE 0.9 % (FLUSH) 0.9 %
10 SYRINGE (ML) INJECTION AS NEEDED
Status: DISCONTINUED | OUTPATIENT
Start: 2021-09-23 | End: 2021-09-23 | Stop reason: HOSPADM

## 2021-09-23 RX ORDER — HYDROCODONE BITARTRATE AND ACETAMINOPHEN 10; 325 MG/1; MG/1
1 TABLET ORAL EVERY 4 HOURS PRN
Qty: 42 TABLET | Refills: 0 | Status: SHIPPED | OUTPATIENT
Start: 2021-09-23

## 2021-09-23 RX ORDER — ACETAMINOPHEN 500 MG
500 TABLET ORAL ONCE
Status: COMPLETED | OUTPATIENT
Start: 2021-09-23 | End: 2021-09-23

## 2021-09-23 RX ORDER — PRAVASTATIN SODIUM 20 MG
10 TABLET ORAL NIGHTLY
Status: DISCONTINUED | OUTPATIENT
Start: 2021-09-23 | End: 2021-09-24 | Stop reason: HOSPADM

## 2021-09-23 RX ORDER — ONDANSETRON 2 MG/ML
4 INJECTION INTRAMUSCULAR; INTRAVENOUS AS NEEDED
Status: DISCONTINUED | OUTPATIENT
Start: 2021-09-23 | End: 2021-09-23 | Stop reason: HOSPADM

## 2021-09-23 RX ORDER — ONDANSETRON 4 MG/1
4 TABLET, FILM COATED ORAL EVERY 8 HOURS PRN
Qty: 20 TABLET | Refills: 0 | Status: SHIPPED | OUTPATIENT
Start: 2021-09-23

## 2021-09-23 RX ORDER — OMEGA-3S/DHA/EPA/FISH OIL/D3 300MG-1000
400 CAPSULE ORAL DAILY
Status: DISCONTINUED | OUTPATIENT
Start: 2021-09-24 | End: 2021-09-24 | Stop reason: HOSPADM

## 2021-09-23 RX ORDER — ONDANSETRON 2 MG/ML
4 INJECTION INTRAMUSCULAR; INTRAVENOUS EVERY 6 HOURS PRN
Status: DISCONTINUED | OUTPATIENT
Start: 2021-09-23 | End: 2021-09-24 | Stop reason: HOSPADM

## 2021-09-23 RX ORDER — SODIUM CHLORIDE, SODIUM LACTATE, POTASSIUM CHLORIDE, CALCIUM CHLORIDE 600; 310; 30; 20 MG/100ML; MG/100ML; MG/100ML; MG/100ML
100 INJECTION, SOLUTION INTRAVENOUS CONTINUOUS
Status: DISCONTINUED | OUTPATIENT
Start: 2021-09-23 | End: 2021-09-24 | Stop reason: HOSPADM

## 2021-09-23 RX ORDER — ONDANSETRON 2 MG/ML
INJECTION INTRAMUSCULAR; INTRAVENOUS AS NEEDED
Status: DISCONTINUED | OUTPATIENT
Start: 2021-09-23 | End: 2021-09-23 | Stop reason: SURG

## 2021-09-23 RX ORDER — LOSARTAN POTASSIUM 50 MG/1
50 TABLET ORAL
Status: DISCONTINUED | OUTPATIENT
Start: 2021-09-24 | End: 2021-09-24 | Stop reason: HOSPADM

## 2021-09-23 RX ORDER — FENTANYL CITRATE 50 UG/ML
INJECTION, SOLUTION INTRAMUSCULAR; INTRAVENOUS AS NEEDED
Status: DISCONTINUED | OUTPATIENT
Start: 2021-09-23 | End: 2021-09-23 | Stop reason: SURG

## 2021-09-23 RX ORDER — ONDANSETRON 2 MG/ML
4 INJECTION INTRAMUSCULAR; INTRAVENOUS ONCE AS NEEDED
Status: DISCONTINUED | OUTPATIENT
Start: 2021-09-23 | End: 2021-09-23

## 2021-09-23 RX ORDER — EPHEDRINE SULFATE 50 MG/ML
INJECTION, SOLUTION INTRAVENOUS AS NEEDED
Status: DISCONTINUED | OUTPATIENT
Start: 2021-09-23 | End: 2021-09-23 | Stop reason: SURG

## 2021-09-23 RX ORDER — OXYCODONE AND ACETAMINOPHEN 10; 325 MG/1; MG/1
1 TABLET ORAL ONCE AS NEEDED
Status: COMPLETED | OUTPATIENT
Start: 2021-09-23 | End: 2021-09-23

## 2021-09-23 RX ORDER — LABETALOL HYDROCHLORIDE 5 MG/ML
5 INJECTION, SOLUTION INTRAVENOUS
Status: DISCONTINUED | OUTPATIENT
Start: 2021-09-23 | End: 2021-09-23 | Stop reason: HOSPADM

## 2021-09-23 RX ORDER — SODIUM CHLORIDE 0.9 % (FLUSH) 0.9 %
3-10 SYRINGE (ML) INJECTION AS NEEDED
Status: DISCONTINUED | OUTPATIENT
Start: 2021-09-23 | End: 2021-09-23 | Stop reason: HOSPADM

## 2021-09-23 RX ADMIN — LABETALOL HYDROCHLORIDE 5 MG: 5 INJECTION INTRAVENOUS at 16:33

## 2021-09-23 RX ADMIN — SODIUM CHLORIDE, POTASSIUM CHLORIDE, SODIUM LACTATE AND CALCIUM CHLORIDE: 600; 310; 30; 20 INJECTION, SOLUTION INTRAVENOUS at 14:53

## 2021-09-23 RX ADMIN — ONDANSETRON 4 MG: 2 INJECTION INTRAMUSCULAR; INTRAVENOUS at 16:41

## 2021-09-23 RX ADMIN — FENTANYL CITRATE 50 MCG: 50 INJECTION, SOLUTION INTRAMUSCULAR; INTRAVENOUS at 14:27

## 2021-09-23 RX ADMIN — SODIUM CHLORIDE 1 G: 9 INJECTION, SOLUTION INTRAVENOUS at 13:53

## 2021-09-23 RX ADMIN — HYDROMORPHONE HYDROCHLORIDE 0.5 MG: 1 INJECTION, SOLUTION INTRAMUSCULAR; INTRAVENOUS; SUBCUTANEOUS at 16:38

## 2021-09-23 RX ADMIN — SODIUM CHLORIDE, POTASSIUM CHLORIDE, SODIUM LACTATE AND CALCIUM CHLORIDE 100 ML/HR: 600; 310; 30; 20 INJECTION, SOLUTION INTRAVENOUS at 09:35

## 2021-09-23 RX ADMIN — ONDANSETRON 4 MG: 2 INJECTION INTRAMUSCULAR; INTRAVENOUS at 15:19

## 2021-09-23 RX ADMIN — VASOPRESSIN 1 ML: 20 INJECTION INTRAVENOUS at 14:52

## 2021-09-23 RX ADMIN — ACETAMINOPHEN 500 MG: 500 TABLET, FILM COATED ORAL at 11:39

## 2021-09-23 RX ADMIN — EPHEDRINE SULFATE 10 MG: 50 INJECTION INTRAVENOUS at 14:06

## 2021-09-23 RX ADMIN — VASOPRESSIN 1 ML: 20 INJECTION INTRAVENOUS at 15:50

## 2021-09-23 RX ADMIN — FENTANYL CITRATE 100 MCG: 50 INJECTION, SOLUTION INTRAMUSCULAR; INTRAVENOUS at 14:01

## 2021-09-23 RX ADMIN — HYDROMORPHONE HYDROCHLORIDE 1 MG: 1 INJECTION, SOLUTION INTRAMUSCULAR; INTRAVENOUS; SUBCUTANEOUS at 14:35

## 2021-09-23 RX ADMIN — OXYCODONE AND ACETAMINOPHEN 1 TABLET: 325; 10 TABLET ORAL at 17:51

## 2021-09-23 RX ADMIN — HYDROMORPHONE HYDROCHLORIDE 0.5 MG: 1 INJECTION, SOLUTION INTRAMUSCULAR; INTRAVENOUS; SUBCUTANEOUS at 16:48

## 2021-09-23 RX ADMIN — FENTANYL CITRATE 50 MCG: 50 INJECTION, SOLUTION INTRAMUSCULAR; INTRAVENOUS at 14:32

## 2021-09-23 RX ADMIN — LIDOCAINE HYDROCHLORIDE 100 MG: 20 INJECTION, SOLUTION EPIDURAL; INFILTRATION; INTRACAUDAL; PERINEURAL at 13:45

## 2021-09-23 RX ADMIN — PRAVASTATIN SODIUM 10 MG: 20 TABLET ORAL at 22:32

## 2021-09-23 RX ADMIN — OXYBUTYNIN CHLORIDE 2.5 MG: 5 TABLET ORAL at 22:31

## 2021-09-23 RX ADMIN — PROPOFOL 200 MG: 10 INJECTION, EMULSION INTRAVENOUS at 13:45

## 2021-09-23 RX ADMIN — HYDROMORPHONE HYDROCHLORIDE 1 MG: 1 INJECTION, SOLUTION INTRAMUSCULAR; INTRAVENOUS; SUBCUTANEOUS at 15:39

## 2021-09-23 RX ADMIN — HYDROMORPHONE HYDROCHLORIDE 0.5 MG: 1 INJECTION, SOLUTION INTRAMUSCULAR; INTRAVENOUS; SUBCUTANEOUS at 17:07

## 2021-09-23 RX ADMIN — FENTANYL CITRATE 100 MCG: 50 INJECTION, SOLUTION INTRAMUSCULAR; INTRAVENOUS at 13:45

## 2021-09-23 NOTE — ANESTHESIA PROCEDURE NOTES
Airway  Urgency: elective    Date/Time: 9/23/2021 1:45 PM  Airway not difficult    General Information and Staff    Patient location during procedure: OR  CRNA: Alex Holley CRNA    Indications and Patient Condition  Indications for airway management: airway protection    Preoxygenated: yes  MILS maintained throughout  Mask difficulty assessment: 1 - vent by mask    Final Airway Details  Final airway type: supraglottic airway      Successful airway: unique  Size 4    Number of attempts at approach: 1  Assessment: lips, teeth, and gum same as pre-op and atraumatic intubation

## 2021-09-23 NOTE — ANESTHESIA PREPROCEDURE EVALUATION
Anesthesia Evaluation     Patient summary reviewed   NPO Solid Status: > 8 hours  NPO Liquid Status: > 8 hours           Airway   Mallampati: II  Dental      Pulmonary    (+) a smoker Former, COPD, sleep apnea,   Cardiovascular   Exercise tolerance: good (4-7 METS)    (+) hypertension,   (-) pacemaker, past MI, cardiac stents, CABG      Neuro/Psych  (-) seizures, CVA  GI/Hepatic/Renal/Endo    (+)  GERD,  renal disease CRI,   (-) liver disease, diabetes, no thyroid disorder    Musculoskeletal     Abdominal    Substance History      OB/GYN          Other      history of cancer      Other Comment: 1. 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                Anesthesia Plan    ASA 3     general     intravenous induction     Anesthetic plan, all risks, benefits, and alternatives have been provided, discussed and informed consent has been obtained with: patient.

## 2021-09-23 NOTE — DISCHARGE INSTRUCTIONS

## 2021-09-23 NOTE — ANESTHESIA POSTPROCEDURE EVALUATION
Patient: Stefani Quintero    Procedure Summary     Date: 09/23/21 Room / Location:  [facility] OR 09 /  [facility] OR    Anesthesia Start: 1343 Anesthesia Stop: 1602    Procedure: CEPHALOMEDULLARY NAILING OF LEFT PATHOLOGIC FEMUR FRACTURE, BIOPSY OF BONE AND SOFT TISSUE TUMOR (Left Thigh) Diagnosis: (LEFT FEMUR FRACTURE)    Surgeons: Johnny Coleman MD Provider: Mae Whittington CRNA    Anesthesia Type: general ASA Status: 3          Anesthesia Type: general    Vitals  Vitals Value Taken Time   /67 09/23/21 1745   Temp 98.9 °F (37.2 °C) 09/23/21 1745   Pulse 86 09/23/21 1752   Resp 18 09/23/21 1745   SpO2 95 % 09/23/21 1752   Vitals shown include unvalidated device data.        Post Anesthesia Care and Evaluation    Patient location during evaluation: PACU  Patient participation: complete - patient participated  Level of consciousness: awake and alert  Pain management: adequate  Airway patency: patent  Anesthetic complications: No anesthetic complications    Cardiovascular status: acceptable  Respiratory status: acceptable  Hydration status: acceptable    Comments: Blood pressure 158/73, pulse 85, temperature 98.9 °F (37.2 °C), temperature source Temporal, resp. rate 18, SpO2 90 %, not currently breastfeeding.    Pt discharged from PACU based on mica score >8  No anesthesia care post op

## 2021-09-23 NOTE — H&P
Orthopedic History and Physical    Pt Name: Stefani Quintero  MRN: [id number]  YOB: 1940  Date: 9/23/2021      HPI: 80-year-old female presents today for cephalomedullary nailing of a left pathologic femur fracture as well as biopsy of the lesion.  The patient presented our office approximately 2 weeks ago with pelvic pain as well as some thigh pain.  An MRI and x-rays of the patient's pelvis demonstrated a lesion in the pubic rami on the left it appeared erosive consistent with metastatic cancer.  Patient also underwent x-rays and MRI of the femur which demonstrated a concerning lesion in the metaphyseal diaphyseal junction of the femur.  Based on this we elected to bring her to the operating room for surgical stabilization of the pathologic femur fracture with a tissue biopsy for definitive diagnosis of cancer type.      Past Medical/Surgical History:   Past Medical History:   Diagnosis Date   • Bell's palsy     history of   • GERD (gastroesophageal reflux disease)    • Hypertension    • Kidney disease    • Shingles     history of   • Sleep apnea      Past Surgical History:   Procedure Laterality Date   • CHOLECYSTECTOMY     • HYSTERECTOMY           Social History:   Social History     Socioeconomic History   • Marital status:      Spouse name: Not on file   • Number of children: Not on file   • Years of education: Not on file   • Highest education level: Not on file   Tobacco Use   • Smoking status: Former Smoker   • Tobacco comment: quit smoking 20 years ago   Vaping Use   • Vaping Use: Never used   Substance and Sexual Activity   • Alcohol use: Never   • Drug use: Never   • Sexual activity: Defer            Medications:   Current Facility-Administered Medications:   •  atropine sulfate injection 0.5 mg, 0.5 mg, Intravenous, Once PRN, Yoni Velásquez MD  •  fentaNYL citrate (PF) (SUBLIMAZE) injection 25 mcg, 25 mcg, Intravenous, Q5 Min PRN, Yoni Velásquez MD  •  flumazenil (ROMAZICON)  injection 0.2 mg, 0.2 mg, Intravenous, PRN, Yoni Velásquez MD  •  HYDROcodone-acetaminophen (NORCO) 7.5-325 MG per tablet 1 tablet, 1 tablet, Oral, Once PRN, Johnny Coleman MD  •  HYDROmorphone (DILAUDID) injection 0.5 mg, 0.5 mg, Intravenous, Q10 Min PRN, Yoni Velásquez MD  •  labetalol (NORMODYNE,TRANDATE) injection 5 mg, 5 mg, Intravenous, Q5 Min PRN, Yoni Velásquez MD  •  lactated ringers infusion, 100 mL/hr, Intravenous, Continuous PRN, Johnny Coleman MD, Last Rate: 100 mL/hr at 09/23/21 1343, New Bag at 09/23/21 1453  •  lactated ringers infusion, 100 mL/hr, Intravenous, Continuous, Yoni Velásquez MD  •  naloxone (NARCAN) injection 0.04 mg, 0.04 mg, Intravenous, PRN, Yoni Velásquez MD  •  ondansetron (ZOFRAN) injection 4 mg, 4 mg, Intravenous, PRN, Yoni Velásquez MD  •  ondansetron (ZOFRAN) injection 4 mg, 4 mg, Intravenous, Once PRN, Johnny Coleman MD  •  ondansetron (ZOFRAN) tablet 4 mg, 4 mg, Oral, Once PRN, Johnny Coleman MD  •  oxyCODONE-acetaminophen (PERCOCET)  MG per tablet 1 tablet, 1 tablet, Oral, Once PRN, Yoni Velásquez MD  •  promethazine (PHENERGAN) tablet 12.5 mg, 12.5 mg, Oral, Once PRN, Johnny Coleman MD  •  sodium chloride (NS) irrigation solution, , , PRN, Johnny Coleman MD, 1,000 mL at 09/23/21 1521  •  sterile water irrigation solution, , , PRN, Johnny Coleman MD, 1,000 mL at 09/23/21 1449    Facility-Administered Medications Ordered in Other Encounters:   •  ePHEDrine injection, , Intravenous, PRN, Alex Holley CRNA, 10 mg at 09/23/21 1406  •  fentaNYL citrate (PF) (SUBLIMAZE) injection, , Intravenous, PRN, Alex Holley CRNA, 50 mcg at 09/23/21 1432  •  HYDROmorphone (DILAUDID) injection, , Intravenous, PRN, Mae Whittington CRNA, 1 mg at 09/23/21 1539  •  lidocaine PF 2% (XYLOCAINE) injection, , Intravenous, PRN, Alex Holley CRNA, 100 mg at 09/23/21 1345  •  ondansetron (ZOFRAN) injection, , Intravenous, PRN, Mae Whittington CRNA, 4 mg at 09/23/21 1519  •  Propofol  (DIPRIVAN) injection, , Intravenous, PRN, Alex Holley, CRNA, 200 mg at 09/23/21 1345  •  vasopressin injection solution, , Intravenous, PRN, Mae Whittington, CRNA, 1 mL at 09/23/21 1550    Allergies: No Known Allergies       Review of Systems   Constitutional: Negative.    HENT: Negative.    Eyes: Negative.    Respiratory: Negative.    Cardiovascular: Negative.    Gastrointestinal: Negative.    Genitourinary: Negative.    Skin: Negative.    Allergic/Immunologic: Negative.    Neurological: Negative.    Hematological: Negative.    Psychiatric/Behavioral: Negative.           Physical Exam  Constitutional:       Appearance: She is well-developed.   HENT:      Head: Normocephalic and atraumatic.   Eyes:      Conjunctiva/sclera: Conjunctivae normal.   Pulmonary:      Effort: Pulmonary effort is normal.      Breath sounds: Normal breath sounds.   Abdominal:      Palpations: Abdomen is soft.   Musculoskeletal:         General: Tenderness present.      Cervical back: Normal range of motion and neck supple.   Skin:     General: Skin is warm and dry.   Neurological:      Mental Status: She is alert and oriented to person, place, and time.   Psychiatric:         Behavior: Behavior normal.         Thought Content: Thought content normal.         Judgment: Judgment normal.         Ortho Exam:  Left groin and femur tenderness to palpation.      Imaging:  Imaging Results (Last 72 Hours)     Procedure Component Value Units Date/Time    XR Femur 2 View Left [[id number]] Collected: 09/23/21 1539     Updated: 09/23/21 1543    Narrative:      EXAMINATION: XR FEMUR 2 VW LEFT-     9/23/2021 2:00 PM CDT     HISTORY: fracture; S72.92XA-Unspecified fracture of left femur, initial  encounter for closed fracture     Number of fluoroscopic spot images obtained = 4.     Fluoroscopy dose in Air Kerma mGy = 12.553.     Fluoroscopy total exposure time = 1:42 minutes.     Spot images document intramedullary gurmeet placement within the left femur.         This report was finalized on 09/23/2021 15:40 by Dr. Farhat Reyes MD.    FL C Arm During Surgery [[id number]] Resulted: 09/23/21 1521     Updated: 09/23/21 1522          Labs:   Lab Results (last 24 hours)     ** No results found for the last 24 hours. **          Impression: Left femur pathologic fracture      Surgical Plan: Intramedullary nailing of left femur pathologic fracture with tissue biopsy.      Electronically signed by Johnny Coleman MD on 9/23/2021 at 16:01 CDT

## 2021-09-24 VITALS
HEIGHT: 66 IN | OXYGEN SATURATION: 96 % | HEART RATE: 84 BPM | DIASTOLIC BLOOD PRESSURE: 52 MMHG | BODY MASS INDEX: 26.65 KG/M2 | RESPIRATION RATE: 18 BRPM | SYSTOLIC BLOOD PRESSURE: 143 MMHG | WEIGHT: 165.8 LBS | TEMPERATURE: 97.9 F

## 2021-09-24 PROCEDURE — 63710000001 LOSARTAN 50 MG TABLET: Performed by: ORTHOPAEDIC SURGERY

## 2021-09-24 PROCEDURE — 63710000001 HYDROCODONE-ACETAMINOPHEN 7.5-325 MG TABLET: Performed by: ORTHOPAEDIC SURGERY

## 2021-09-24 PROCEDURE — 63710000001 APIXABAN 5 MG TABLET: Performed by: PHYSICIAN ASSISTANT

## 2021-09-24 PROCEDURE — A9270 NON-COVERED ITEM OR SERVICE: HCPCS | Performed by: ORTHOPAEDIC SURGERY

## 2021-09-24 PROCEDURE — A9270 NON-COVERED ITEM OR SERVICE: HCPCS | Performed by: PHYSICIAN ASSISTANT

## 2021-09-24 PROCEDURE — 63710000001 CHOLECALCIFEROL 10 MCG (400 UNIT) TABLET: Performed by: ORTHOPAEDIC SURGERY

## 2021-09-24 PROCEDURE — 63710000001 POLYETHYLENE GLYCOL 17 G PACK: Performed by: PHYSICIAN ASSISTANT

## 2021-09-24 PROCEDURE — 63710000001 PANTOPRAZOLE 40 MG TABLET DELAYED-RELEASE: Performed by: ORTHOPAEDIC SURGERY

## 2021-09-24 PROCEDURE — 63710000001 OXYBUTYNIN 5 MG TABLET: Performed by: ORTHOPAEDIC SURGERY

## 2021-09-24 PROCEDURE — 63710000001 TRIAMTERENE-HYDROCHLOROTHIAZIDE 37.5-25 MG TABLET: Performed by: ORTHOPAEDIC SURGERY

## 2021-09-24 PROCEDURE — 97161 PT EVAL LOW COMPLEX 20 MIN: CPT | Performed by: PHYSICAL THERAPIST

## 2021-09-24 PROCEDURE — 97116 GAIT TRAINING THERAPY: CPT

## 2021-09-24 PROCEDURE — 63710000001 ASPIRIN 325 MG TABLET: Performed by: PHYSICIAN ASSISTANT

## 2021-09-24 PROCEDURE — 97165 OT EVAL LOW COMPLEX 30 MIN: CPT | Performed by: OCCUPATIONAL THERAPIST

## 2021-09-24 RX ORDER — TRIAMTERENE AND HYDROCHLOROTHIAZIDE 37.5; 25 MG/1; MG/1
1 TABLET ORAL DAILY
Status: DISCONTINUED | OUTPATIENT
Start: 2021-09-24 | End: 2021-09-24 | Stop reason: HOSPADM

## 2021-09-24 RX ORDER — POLYETHYLENE GLYCOL 3350 17 G/17G
17 POWDER, FOR SOLUTION ORAL DAILY
Status: DISCONTINUED | OUTPATIENT
Start: 2021-09-24 | End: 2021-09-24 | Stop reason: HOSPADM

## 2021-09-24 RX ORDER — ASPIRIN 325 MG
325 TABLET ORAL 2 TIMES DAILY
Status: DISCONTINUED | OUTPATIENT
Start: 2021-09-24 | End: 2021-09-24

## 2021-09-24 RX ADMIN — TRIAMTERENE AND HYDROCHLOROTHIAZIDE 1 TABLET: 37.5; 25 TABLET ORAL at 10:00

## 2021-09-24 RX ADMIN — HYDROCODONE BITARTRATE AND ACETAMINOPHEN 1 TABLET: 7.5; 325 TABLET ORAL at 07:56

## 2021-09-24 RX ADMIN — HYDROCODONE BITARTRATE AND ACETAMINOPHEN 1 TABLET: 7.5; 325 TABLET ORAL at 01:09

## 2021-09-24 RX ADMIN — OXYBUTYNIN CHLORIDE 2.5 MG: 5 TABLET ORAL at 08:01

## 2021-09-24 RX ADMIN — APIXABAN 5 MG: 5 TABLET, FILM COATED ORAL at 10:00

## 2021-09-24 RX ADMIN — OXYBUTYNIN CHLORIDE 2.5 MG: 5 TABLET ORAL at 15:15

## 2021-09-24 RX ADMIN — POLYETHYLENE GLYCOL 3350 17 G: 17 POWDER, FOR SOLUTION ORAL at 08:01

## 2021-09-24 RX ADMIN — HYDROCODONE BITARTRATE AND ACETAMINOPHEN 1 TABLET: 7.5; 325 TABLET ORAL at 15:15

## 2021-09-24 RX ADMIN — CHOLECALCIFEROL TAB 10 MCG (400 UNIT) 400 UNITS: 10 TAB at 08:01

## 2021-09-24 RX ADMIN — LOSARTAN POTASSIUM 50 MG: 50 TABLET, FILM COATED ORAL at 08:00

## 2021-09-24 RX ADMIN — ASPIRIN 325 MG: 325 TABLET ORAL at 08:01

## 2021-09-24 RX ADMIN — PANTOPRAZOLE SODIUM 40 MG: 40 TABLET, DELAYED RELEASE ORAL at 06:08

## 2021-09-24 NOTE — PLAN OF CARE
Goal Outcome Evaluation:  Plan of Care Reviewed With: patient        Progress: improving  Outcome Summary: PT eval completed: pt AOx4. pt presents with no ROM or sensation deficits. pt does have slight weakness on the LLE with hip flexion, knee extension, and knee flexion. pt able to get EOB independently. pt SBA with standing and amb. pt uses her arms to push up through the walker as well as scoots her feet bilaterally. pt was educated about WB status. pt was slow while amb but did not display any safety concerns. pt to continue with skilled PT for stair training and endurance as she has 3 stairs to get into her house. D/C recommendation home with assist and outpatient therapy.

## 2021-09-24 NOTE — PROGRESS NOTES
Orthopaedic Surgery Progress Note      9/24/2021   07:18 CDT    Name:  Stefani Quintero  MRN:    [id number]     Acct:     [id number]   Room:  [facility] Day: 0     Admit Date: 9/23/2021  PCP: Sofia Hampton MD        Subjective:     C/C: POD 1 Day Post-Op CEPHALOMEDULLARY NAILING OF LEFT PATHOLOGIC FEMUR FRACTURE, BIOPSY OF BONE AND SOFT TISSUE TUMOR (Left)     Interval History: Decision made to keep patient overnight due to pain and inability to ween off of oxygen.  Patient is satting well today on room air. Her pain currently is well controlled in bed, but she is concerned about bearing weight through her left leg.  Patient typically take aspirin 81mg daily. Her last BM was Wednesday.  She denies any other complaints this morning.      Pain: improved      Medications:     Allergies: No Known Allergies    Current Meds:   Current Facility-Administered Medications   Medication Dose Route Frequency Provider Last Rate Last Admin   • acetaminophen (TYLENOL) tablet 650 mg  650 mg Oral Q6H PRN Bryan Adamson MD       • aspirin tablet 325 mg  325 mg Oral BID Chase Matthews PA-C       • cholecalciferol (VITAMIN D3) tablet 400 Units  400 Units Oral Daily Bryan Adamson MD       • HYDROcodone-acetaminophen (NORCO) 7.5-325 MG per tablet 1 tablet  1 tablet Oral Q6H PRN Bryan Adamson MD   1 tablet at 09/24/21 0109   • influenza vac split quad (FLUZONE,FLUARIX,AFLURIA,FLULAVAL) injection 0.5 mL  0.5 mL Intramuscular During Hospitalization Johnny Coleman MD       • lactated ringers infusion  100 mL/hr Intravenous Continuous Yoni Velásquez MD       • losartan (COZAAR) tablet 50 mg  50 mg Oral Q24H Bryan Adamson MD       • ondansetron (ZOFRAN) injection 4 mg  4 mg Intravenous Q6H PRN Bryan Adamson MD       • oxybutynin (DITROPAN) tablet 2.5 mg  2.5 mg Oral TID Bryan Adamson MD   2.5 mg at 09/23/21 2231   • pantoprazole (PROTONIX) EC tablet 40 mg  40 mg Oral Q AM Juan Diego  "Bryan Quezada MD   40 mg at 21 0608   • polyethylene glycol (MIRALAX) packet 17 g  17 g Oral Daily Chase Matthews PA-C       • pravastatin (PRAVACHOL) tablet 10 mg  10 mg Oral Nightly Bryan Adamson MD   10 mg at 21 2232   • triamterene-hydrochlorothiazide (DYAZIDE) 37.5-25 MG per capsule 1 capsule  1 capsule Oral Daily Bryan Adamson MD               Data:     Code Status:    There are no questions and answers to display.       History reviewed. No pertinent family history.    Social History     Socioeconomic History   • Marital status:      Spouse name: Not on file   • Number of children: Not on file   • Years of education: Not on file   • Highest education level: Not on file   Tobacco Use   • Smoking status: Former Smoker   • Tobacco comment: quit smoking 20 years ago   Vaping Use   • Vaping Use: Never used   Substance and Sexual Activity   • Alcohol use: Never   • Drug use: Never   • Sexual activity: Defer       Vitals:  /60 (BP Location: Right arm, Patient Position: Lying)   Pulse 90   Temp 97.9 °F (36.6 °C) (Oral)   Resp 18   Ht 167.6 cm (66\")   Wt 75.2 kg (165 lb 12.8 oz)   SpO2 96%   Breastfeeding No   BMI 26.76 kg/m²   Temp (24hrs), Av °F (36.7 °C), Min:97.4 °F (36.3 °C), Max:98.9 °F (37.2 °C)      I/O (24Hr):    Intake/Output Summary (Last 24 hours) at 2021 0718  Last data filed at 2021 1554  Gross per 24 hour   Intake 1200 ml   Output --   Net 1200 ml       Labs:  Lab Results (last 72 hours)     ** No results found for the last 72 hours. **              Physical Exam:   General: NAD, cooperative with exam.  Alert and oriented x 3.     Left Hip: Incision is clean, dry, intact. Dressing is Clean, Dry, Intact. Neurovascular intact distally. Moderate tenderness to palpation, soft throughout. No signs or symptoms of DVT or PE.      Assessment:     Primary Problem  POD 1 Day Post-Op CEPHALOMEDULLARY NAILING OF LEFT PATHOLOGIC FEMUR FRACTURE, BIOPSY " OF BONE AND SOFT TISSUE TUMOR (Left)    Plan:     1. Continue PT/OT - will see how patient ambulates with assistance and monitor oxygen saturation  2. Continue DVT prophylaxis - aspirin 325 BID.  Patient at increase risk of DVT due to recent surgery and metastatic cancer.   3. Continue WBAT left lower extremity with rolling walker  4. Anticipate discharge today or tomorrow if pain well controlled and medically stable - patient's anticipates being discharged home and has help from her sons  5. Miralax added - patient last BM was Wednesday.   6. Awaiting results of biopsy.       Electronically signed by Chase Matthews PA-C on 9/24/2021 at 07:18 CDT     ADDENDUM: Patient changed to Eliquis 5 BID in place of aspirin 325 BID. Patient to remain on this for 30 days.

## 2021-09-24 NOTE — THERAPY TREATMENT NOTE
Acute Care - Physical Therapy Treatment Note  [facility]     Patient Name: Stefani Quintero  : 1940  MRN: [id number]  Today's Date: 2021      Visit Dx:     ICD-10-CM ICD-9-CM   1. Femur fracture, left (CMS/HCC)  S72.92XA 821.00   2. Impaired mobility  Z74.09 799.89     Patient Active Problem List   Diagnosis   • Femur fracture, left (HCC)     Past Medical History:   Diagnosis Date   • Bell's palsy     history of   • GERD (gastroesophageal reflux disease)    • Hypertension    • Kidney disease    • Shingles     history of   • Sleep apnea      Past Surgical History:   Procedure Laterality Date   • CHOLECYSTECTOMY     • FEMUR IM NAILING/RODDING Left 2021    Procedure: CEPHALOMEDULLARY NAILING OF LEFT PATHOLOGIC FEMUR FRACTURE, BIOPSY OF BONE AND SOFT TISSUE TUMOR;  Surgeon: Johnny Coleman MD;  Location: [facility];  Service: Orthopedics;  Laterality: Left;   • HYSTERECTOMY          PT Assessment (last 12 hours)      PT Evaluation and Treatment     Row Name 21 1344          Physical Therapy Time and Intention    Subjective Information  complains of;pain  -     Document Type  therapy note (daily note)  -     Mode of Treatment  physical therapy  -     Row Name 21 1344          General Information    Existing Precautions/Restrictions  fall WBAT LLE  -     Row Name 21 1344          Pain Scale: Numbers Pre/Post-Treatment    Pretreatment Pain Rating  4/10  -     Posttreatment Pain Rating  4/10  -     Pain Location - Side  Left  -     Pain Location  hip  -     Row Name 21 1344          Pain Scale: Word Pre/Post-Treatment    Pain Intervention(s)  Repositioned;Ambulation/increased activity  -     Row Name 21 1344          Bed Mobility    Supine-Sit [place] (Bed Mobility)  independent  -     Sit-Supine [place] (Bed Mobility)  independent  -     Row Name 21 1344          Transfers    Sit-Stand [place] (Transfers)  supervision  -     Row Name  09/24/21 1344          Gait/Stairs (Locomotion)    [place] Level (Gait)  supervision;verbal cues  -     Assistive Device (Gait)  walker, front-wheeled  -     Distance in Feet (Gait)  15  -AH     [place] Level (Stairs)  contact guard;stand by assist;verbal cues  -     Handrail Location (Stairs)  both sides  -     Number of Steps (Stairs)  5  -AH     Ascending Technique (Stairs)  step-to-step  -AH     Descending Technique (Stairs)  step-to-step  -AH     Row Name             Wound 09/23/21 1452 Left thigh Incision    Wound - Properties Group Placement Date: 09/23/21  -SB Placement Time: 1452  -SB Present on Hospital Admission: N  -SB Side: Left  -SB Location: thigh  -SB Primary Wound Type: Incision  -SB, x3     Retired Wound - Properties Group Date first assessed: 09/23/21  -SB Time first assessed: 1452  -SB Present on Hospital Admission: N  -SB Side: Left  -SB Location: thigh  -SB Primary Wound Type: Incision  -SB, x3     Row Name 09/24/21 1344          Positioning and Restraints    Pre-Treatment Position  in bed  -     Post Treatment Position  bed  -AH     In Bed  fowlers;call light within reach;encouraged to call for assist  -       User Key  (r) = Recorded By, (t) = Taken By, (c) = Cosigned By    Initials Name Provider Type     Lor Landry PTA Physical Therapy Assistant    Eli Calderon, RN Registered Nurse        Physical Therapy Education                 Title: PT OT SLP Therapies (Done)     Topic: Physical Therapy (Done)     Point: Mobility training (Done)     Learning Progress Summary           Patient Acceptance, E,TB,D, VU,DU by  at 9/24/2021 1409    Comment: stair training    Acceptance, E, VU by JANAE at 9/24/2021 0932    Comment: PT role in care, WB Status, Proper RWX Use                   Point: Home exercise program (Done)     Learning Progress Summary           Patient Acceptance, E, VU by NN at 9/24/2021 0932    Comment: PT role in care, WB Status, Proper RWX Use                    Point: Body mechanics (Done)     Learning Progress Summary           Patient Acceptance, E, VU by NN at 9/24/2021 0932    Comment: PT role in care, WB Status, Proper RWX Use                   Point: Precautions (Done)     Learning Progress Summary           Patient Acceptance, E, VU by NN at 9/24/2021 0932    Comment: PT role in care, WB Status, Proper RWX Use                               User Key     Initials Effective Dates Name Provider Type [facility] 06/16/21 -  Lor Landry PTA Physical Therapy Assistant PT    NN 08/18/21 -  Wen Ramirez, PT Student PT Student PT              PT Recommendation and Plan             Time Calculation:   PT Charges     Row Name 09/24/21 1409 09/24/21 0932          Time Calculation    Start Time  1344  -  0932 10 Min Chart Review: Eval Low 2  -MS (r) NN (t) MS (c)     Stop Time  1409  -  0952  -MS (r) NN (t) MS (c)     Time Calculation (min)  25 min  -  20 min  -MS (r) NN (t)     PT Received On  --  09/24/21  -MS (r) NN (t) MS (c)     PT Goal Re-Cert Due Date  --  10/04/21  -MS (r) NN (t) MS (c)        Time Calculation- PT    Total Timed Code Minutes- PT  25 minute(s)  -AH  --        Timed Charges    12287 - Gait Training Minutes   25  -AH  --        Untimed Charges    PT Eval/Re-eval Minutes  --  30  -MS (r) NN (t) MS (c)        Total Minutes    Timed Charges Total Minutes  25  -AH  --     Untimed Charges Total Minutes  --  30  -MS (r) NN (t)      Total Minutes  25  -AH  30  -MS (r) NN (t)       User Key  (r) = Recorded By, (t) = Taken By, (c) = Cosigned By    Initials Name Provider Type     Lor Landry PTA Physical Therapy Assistant    Melodie Joseph, PT, DPT, NCS Physical Therapist    NN Wen Ramirez, PT Student PT Student        Therapy Charges for Today     Code Description Service Date Service Provider Modifiers Qty    [id number] HC GAIT TRAINING EA 15 MIN 9/24/2021 Lor Landry, PTA GP 2          PT G-Codes  Outcome Measure  Options: AM-PAC 6 Clicks Basic Mobility (PT)  AM-PAC 6 Clicks Score (PT): 20  AM-PAC 6 Clicks Score (OT): 24    Lor Landry, PTA  9/24/2021

## 2021-09-24 NOTE — THERAPY DISCHARGE NOTE
Acute Care - Occupational Therapy Discharge  [facility]    Patient Name: Stefani Quintero  : 1940    MRN: [id number]                              Today's Date: 2021       Admit Date: 2021    Visit Dx:     ICD-10-CM ICD-9-CM   1. Femur fracture, left (CMS/[facility])  S72.92XA 821.00     Patient Active Problem List   Diagnosis   • Femur fracture, left (HCC)     Past Medical History:   Diagnosis Date   • Bell's palsy     history of   • GERD (gastroesophageal reflux disease)    • Hypertension    • Kidney disease    • Shingles     history of   • Sleep apnea      Past Surgical History:   Procedure Laterality Date   • CHOLECYSTECTOMY     • FEMUR IM NAILING/RODDING Left 2021    Procedure: CEPHALOMEDULLARY NAILING OF LEFT PATHOLOGIC FEMUR FRACTURE, BIOPSY OF BONE AND SOFT TISSUE TUMOR;  Surgeon: Johnny Coleman MD;  Location: [facility];  Service: Orthopedics;  Laterality: Left;   • HYSTERECTOMY       General Information     Row Name 21          OT Time and Intention    Document Type  evaluation  -CH (r) EJ (t) CH (c)     Mode of Treatment  occupational therapy  -CH (r) EJ (t) CH (c)     Row Name 21           General Information    Patient Profile Reviewed  yes  -CH (r) EJ (t) CH (c)     Prior Level of Function  independent:;all household mobility;ADL's;mod assist:;home management  -CH (r) EJ (t) CH (c)     Existing Precautions/Restrictions  fall  -CH (r) EJ (t) CH (c)     Barriers to Rehab  environmental barriers flight of stairs up to 2nd floor where shower is  -CH (r) EJ (t) CH (c)     Row Name 21 08          Occupational Profile    Reason for Services/Referral (Occupational Profile)  Bell's palsy, Left pathologic femoral shaft fracture, s/p Cephalomedullary nailing of left pathologic femoral shaft fracture, Open biopsy of a left femoral shaft bone and soft tissue mass  -CH (r) EJ (t) CH (c)     Performance Patterns (Occupational Profile)  Has rwx upstairs and downstairs, was  primarily using WC downstairs 2' dr order for NWB prior to surgery  -CH (r) EJ (t) CH (c)     Environmental Supports and Barriers (Occupational Profile)  2 sons live close and come everyday, has 4 sons total that have all pitched in to help, wc. RW, shower chair, walk in shower, grab bars  -CH (r) EJ (t) CH (c)     Patient Goals (Occupational Profile)  dc to home with help from sons  -CH (r) EJ (t) CH (c)     Row Name 09/24/21 0800          Living Environment    Lives With  alone  -CH (r) EJ (t) CH (c)     Row Name 09/24/21 0800          Home Main Entrance    Number of Stairs, Main Entrance  three  -CH (r) EJ (t) CH (c)     Stair Railings, Main Entrance  railing on left side (ascending)  -CH (r) EJ (t) CH (c)     Row Name 09/24/21 0800          Stairs Within Home, Primary    Stair Railings, Within Home, Primary  railings on both sides of stairs  -CH (r) EJ (t) CH (c)     Stairs Comment, Within Home, Primary  flight going upstairs, only goes up to shower  -CH (r) EJ (t) CH (c)     Row Name 09/24/21 0800          Cognition    Orientation Status (Cognition)  oriented x 4  -CH (r) EJ (t) CH (c)     Row Name 09/24/21 0800          Safety Issues, Functional Mobility    Safety Issues Affecting Function (Mobility)  ability to follow commands;at risk behavior observed;awareness of need for assistance;friction/shear risk;insight into deficits/self-awareness;positioning of assistive device;safety precaution awareness;safety precautions follow-through/compliance;steps too close to assistive device  -CH (r) EJ (t) CH (c)     Impairments Affecting Function (Mobility)  endurance/activity tolerance;pain  -CH (r) EJ (t) CH (c)     Cognitive Impairments, Mobility Safety/Performance  insight into deficits/self-awareness;safety precaution awareness;safety precaution follow-through  -CH (r) EJ (t) CH (c)       User Key  (r) = Recorded By, (t) = Taken By, (c) = Cosigned By    Initials Name Provider Type    Vilma Lentz, OTR/L  Occupational Therapist    Preeti Collier, OT Student OT Student        Mobility/ADL's     Row Name 09/24/21 0800          Bed Mobility    Bed Mobility  rolling right;supine-sit;sit-supine  -CH (r) EJ (t) CH (c)     Rolling Right [place] (Bed Mobility)  verbal cues;nonverbal cues (demo/gesture);1 person assist;standby assist  -CH (r) EJ (t) CH (c)     Supine-Sit [place] (Bed Mobility)  verbal cues;nonverbal cues (demo/gesture);1 person assist;standby assist  -CH (r) EJ (t) CH (c)     Sit-Supine [place] (Bed Mobility)  standby assist;verbal cues;nonverbal cues (demo/gesture);1 person assist  -CH (r) EJ (t) CH (c)     Bed Mobility, Safety Issues  decreased use of legs for bridging/pushing  -CH (r) EJ (t) CH (c)     Assistive Device (Bed Mobility)  head of bed elevated;bed rails  -CH (r) EJ (t) CH (c)     Row Name 09/24/21 0800          Transfers    Transfers  sit-stand transfer;toilet transfer  -CH (r) EJ (t) CH (c)     Sit-Stand [place] (Transfers)  verbal cues;nonverbal cues (demo/gesture);contact guard;1 person assist  -CH (r) EJ (t) CH (c)     [place] Level (Toilet Transfer)  verbal cues;nonverbal cues (demo/gesture);standby assist;1 person assist  -CH (r) EJ (t) CH (c)     Assistive Device (Toilet Transfer)  walker, front-wheeled;grab bars/safety frame  -CH (r) EJ (t) CH (c)     Row Name 09/24/21 0800          Sit-Stand Transfer    Assistive Device (Sit-Stand Transfers)  walker, front-wheeled  -CH (r) EJ (t) CH (c)     Row Name 09/24/21 0800          Toilet Transfer    Type (Toilet Transfer)  stand pivot/stand step  -CH (r) EJ (t) CH (c)     Row Name 09/24/21 0800          Functional Mobility    Functional Mobility- Ind. Level  contact guard assist;verbal cues required;nonverbal cues required (demo/gesture);1 person  -CH (r) EJ (t) CH (c)     Functional Mobility- Device  rolling walker  -CH (r) EJ (t) CH (c)     Functional Mobility-Maintain WBing  able to maintain weight bearing status   -CH (r) EJ (t) CH (c)     Functional Mobility- Safety Issues  steps too close front assistive device  -CH (r) EJ (t) CH (c)     Functional Mobility- Comment  Pt ambulated from bed to bathroom and to [place] back to bed with rw. Able to maintain WBAT, but tends to step very close to front of walker, able to correct with vc  -CH (r) EJ (t) CH (c)     Row Name 09/24/21 0800          Activities of Daily Living    BADL Assessment/Intervention  lower body dressing;feeding;toileting  -CH (r) EJ (t) CH (c)     Row Name 09/24/21 0800          Mobility    Extremity Weight-bearing Status  left lower extremity  -CH (r) EJ (t) CH (c)     Left Lower Extremity (Weight-bearing Status)  weight-bearing as tolerated (WBAT)  -CH (r) EJ (t) CH (c)     Row Name 09/24/21 0800          Lower Body Dressing Assessment/Training    [place] Level (Lower Body Dressing)  doff;don;socks;independent  -CH (r) EJ (t) CH (c)     Position (Lower Body Dressing)  edge of bed sitting  -CH (r) EJ (t) CH (c)     Row Name 09/24/21 0800          Self-Feeding Assessment/Training    [place] Level (Feeding)  liquids to mouth;independent  -CH (r) EJ (t) CH (c)     Position (Self-Feeding)  sitting up in bed  -CH (r) EJ (t) CH (c)     Row Name 09/24/21 0800          Toileting Assessment/Training    [place] Level (Toileting)  toileting skills;adjust/manage clothing;perform perineal hygiene;standby assist  -CH (r) EJ (t) CH (c)     Assistive Devices (Toileting)  commode  -CH (r) EJ (t) CH (c)     Position (Toileting)  unsupported sitting  -CH (r) EJ (t) CH (c)       User Key  (r) = Recorded By, (t) = Taken By, (c) = Cosigned By    Initials Name Provider Type    Vilma Lentz, OTR/L Occupational Therapist    Preeti Collier OT Student OT Student        Obj/Interventions     Row Name 09/24/21 0800          Range of Motion Comprehensive    Comment, General Range of Motion  BUE ROM WFL  -CH (r) EJ (t) CH (c)     Row Name 09/24/21 0813           Strength Comprehensive (MMT)    Comment, General Manual Muscle Testing (MMT) Assessment  BUE MMT 5/5  -CH (r) EJ (t) CH (c)     Row Name 09/24/21 0800          Balance    Balance Assessment  sitting static balance;sit to stand dynamic balance;sitting dynamic balance;standing static balance;standing dynamic balance  -CH (r) EJ (t) CH (c)     Static Sitting Balance  WFL;unsupported;sitting, edge of bed  -CH (r) EJ (t) CH (c)     Dynamic Sitting Balance  WFL;unsupported;sitting, edge of bed  -CH (r) EJ (t) CH (c)     Sit to Stand Dynamic Balance  WFL;supported  -CH (r) EJ (t) CH (c)     Static Standing Balance  WFL;supported with rwx  -CH (r) EJ (t) CH (c)     Dynamic Standing Balance  WFL;supported  -CH (r) EJ (t) CH (c)     Comment, Balance  Pt able to maintain sitting and standing balance but tends to walk very close to rwx, able to correct with vc  -CH (r) EJ (t) CH (c)       User Key  (r) = Recorded By, (t) = Taken By, (c) = Cosigned By    Initials Name Provider Type    Vilma Lentz, OTR/L Occupational Therapist    Preeti Collier, OT Student OT Student        Goals/Plan    No documentation.       Clinical Impression     Row Name 09/24/21 0800          Pain Assessment    Additional Documentation  Pain Scale: Numbers Pre/Post-Treatment (Group)  -CH (r) EJ (t) CH (c)     Row Name 09/24/21 0800          Pain Scale: Numbers Pre/Post-Treatment    Pretreatment Pain Rating  3/10  -CH (r) EJ (t) CH (c)     Posttreatment Pain Rating  3/10  -CH (r) EJ (t) CH (c)     Pain Location - Side  Left  -CH (r) EJ (t) CH (c)     Pain Location - Orientation  incisional  -CH (r) EJ (t) CH (c)     Pain Location  extremity  -CH (r) EJ (t) CH (c)     Pain Intervention(s)  Medication (See MAR);Repositioned;Ambulation/increased activity  -CH (r) EJ (t) CH (c)     Row Name 09/24/21 0800          Plan of Care Review    Plan of Care Reviewed With  patient  -CH (r) EJ (t) CH (c)     Progress  improving  -CH (r) EJ (t) CH (c)     Outcome  Summary  OT eval completed. Pt A&Ox4, c/o minimal pain at rest in LLE 3/10. Pt performed bed mobility SBA, was able to maintain sitting balance at EOB to don/doff socks I'ly. Pt sit to stand CGA and ambulated to bathroom. Pt performed all toileting tasks I'ly with no concerns. Pt ambulated from bathroom to [place] and back with vc needed for stepping too close to rwx, was able to correct. At EOB pt able to readjust self in bed. Pt is very willing to perform tx and tolerates well. She plans to dc home with help from her 4 sons, and will look into HH after the weekend depending on the amount of additional assistance she needs. Reccomend pt dc home with assist and HH.  -CH (r) EJ (t) CH (c)     Row Name 09/24/21 0800          Therapy Assessment/Plan (OT)    Patient/Family Therapy Goal Statement (OT)  To go home, will have some assist from sons, might consult HH after weekend depending on how much help she needs  -CH (r) EJ (t) CH (c)     Criteria for Skilled Therapeutic Interventions Met (OT)  no problems identified which require skilled intervention  -CH (r) EJ (t) CH (c)     Therapy Frequency (OT)  evaluation only  -CH (r) EJ (t) CH (c)     Row Name 09/24/21 0800          Therapy Plan Review/Discharge Plan (OT)    Equipment Needs Upon Discharge (OT)  shower chair;walker, rolling;wheelchair  -CH (r) EJ (t) CH (c)     Anticipated Discharge Disposition (OT)  home with assist;home;home with home health  -CH (r) EJ (t) CH (c)     Row Name 09/24/21 0800          Positioning and Restraints    Pre-Treatment Position  in bed  -CH (r) EJ (t) CH (c)     Post Treatment Position  bed  -CH (r) EJ (t) CH (c)     In Bed  supine;fowlers;call light within reach;encouraged to call for assist;side rails up x2  -CH (r) EJ (t) CH (c)       User Key  (r) = Recorded By, (t) = Taken By, (c) = Cosigned By    Initials Name Provider Type    Vilma Lentz, OTR/L Occupational Therapist    Preeti Collier, OT Student OT Student        Outcome  Measures     Row Name 09/24/21 0800          How much help from another is currently needed...    Putting on and taking off regular lower body clothing?  4  -CH (r) EJ (t) CH (c)     Bathing (including washing, rinsing, and drying)  4  -CH (r) EJ (t) CH (c)     Toileting (which includes using toilet bed pan or urinal)  4  -CH (r) EJ (t) CH (c)     Putting on and taking off regular upper body clothing  4  -CH (r) EJ (t) CH (c)     Taking care of personal grooming (such as brushing teeth)  4  -CH (r) EJ (t) CH (c)     Eating meals  4  -CH (r) EJ (t) CH (c)     AM-PAC 6 Clicks Score (OT)  24  -CH (r) EJ (t)     Row Name 09/24/21 0932          How much help from another person do you currently need...    Turning from your back to your side while in flat bed without using bedrails?  4  (Pended)   -NN     Moving from lying on back to sitting on the side of a flat bed without bedrails?  4  (Pended)   -NN     Moving to and from a bed to a chair (including a wheelchair)?  3  (Pended)   -NN     Standing up from a chair using your arms (e.g., wheelchair, bedside chair)?  3  (Pended)   -NN     Climbing 3-5 steps with a railing?  3  (Pended)   -NN     To walk in hospital room?  3  (Pended)   -NN     AM-PAC 6 Clicks Score (PT)  20  (Pended)   -NN     Row Name 09/24/21 0932 09/24/21 0800       Functional Assessment    Outcome Measure Options  AM-PAC 6 Clicks Basic Mobility (PT)  (Pended)   -NN  AM-PAC 6 Clicks Daily Activity (OT)  -CH (r) EJ (t) CH (c)      User Key  (r) = Recorded By, (t) = Taken By, (c) = Cosigned By    Initials Name Provider Type    Vilma Lentz, OTR/L Occupational Therapist    Preeti Collier, OT Student OT Student    NN Wen Ramirez, PT Student PT Student        Occupational Therapy Education                 Title: PT OT SLP Therapies (Done)     Topic: Occupational Therapy (Done)     Point: ADL training (Done)     Description:   Instruct learner(s) on proper safety adaptation and remediation  techniques during self care or transfers.   Instruct in proper use of assistive devices.              Learning Progress Summary           Patient Acceptance, E,TB,D, VU,DU by ALMITA at 9/24/2021 0955                   Point: Home exercise program (Done)     Description:   Instruct learner(s) on appropriate technique for monitoring, assisting and/or progressing therapeutic exercises/activities.              Learning Progress Summary           Patient Acceptance, E,TB,D, VU,DU by ALMITA at 9/24/2021 0955                   Point: Precautions (Done)     Description:   Instruct learner(s) on prescribed precautions during self-care and functional transfers.              Learning Progress Summary           Patient Acceptance, E,TB,D, VU,DU by ALMITA at 9/24/2021 0955                   Point: Body mechanics (Done)     Description:   Instruct learner(s) on proper positioning and spine alignment during self-care, functional mobility activities and/or exercises.              Learning Progress Summary           Patient Acceptance, E,TB,D, VU,DU by ALMITA at 9/24/2021 0955                               User Key     Initials Effective Dates Name Provider Type Discipline     08/04/21 -  Preeti Daugherty OT Student OT Student OT              OT Recommendation and Plan  Retired Outcome Summary/Treatment Plan (OT)  Anticipated Discharge Disposition (OT): home with assist, home, home with home health  Therapy Frequency (OT): evaluation only  Plan of Care Review  Plan of Care Reviewed With: patient  Progress: improving  Outcome Summary: OT eval completed. Pt A&Ox4, c/o minimal pain at rest in LLE 3/10. Pt performed bed mobility SBA, was able to maintain sitting balance at EOB to don/doff socks I'ly. Pt sit to stand CGA and ambulated to bathroom. Pt performed all toileting tasks I'ly with no concerns. Pt ambulated from bathroom to [place] and back with vc needed for stepping too close to rwx, was able to correct. At EOB pt able to readjust self in bed.  Pt is very willing to perform tx and tolerates well. She plans to dc home with help from her 4 sons, and will look into HH after the weekend depending on the amount of additional assistance she needs. Reccomend pt dc home with assist and HH.  Plan of Care Reviewed With: patient  Outcome Summary: OT sheila completed. Pt A&Ox4, c/o minimal pain at rest in LLE 3/10. Pt performed bed mobility SBA, was able to maintain sitting balance at EOB to don/doff socks I'ly. Pt sit to stand CGA and ambulated to bathroom. Pt performed all toileting tasks I'ly with no concerns. Pt ambulated from bathroom to [place] and back with vc needed for stepping too close to rwx, was able to correct. At EOB pt able to readjust self in bed. Pt is very willing to perform tx and tolerates well. She plans to dc home with help from her 4 sons, and will look into HH after the weekend depending on the amount of additional assistance she needs. Reccomend pt dc home with assist and HH.     Time Calculation:   Time Calculation- OT     Row Name 09/24/21 0800             Time Calculation- OT    OT Start Time  0755 +10 mins chart review  -CH (r) EJ (t) CH (c)      OT Stop Time  0842  -CH (r) EJ (t) CH (c)      OT Time Calculation (min)  47 min  -CH (r) EJ (t)      Total Timed Code Minutes- OT  57 minute(s)  -CH (r) EJ (t) CH (c)      OT Received On  09/24/21  -CH (r) EJ (t) CH (c)        User Key  (r) = Recorded By, (t) = Taken By, (c) = Cosigned By    Initials Name Provider Type    Vilma Lentz, OTR/L Occupational Therapist    Siomara Collier OT Student OT Student                 SIOMARA FORBES OT Student  9/24/2021

## 2021-09-24 NOTE — THERAPY EVALUATION
Patient Name: Stefani Quintero  : 1940    MRN: [id number]                              Today's Date: 2021       Admit Date: 2021    Visit Dx:     ICD-10-CM ICD-9-CM   1. Femur fracture, left (CMS/HCC)  S72.92XA 821.00   2. Impaired mobility  Z74.09 799.89     Patient Active Problem List   Diagnosis   • Femur fracture, left (HCC)     Past Medical History:   Diagnosis Date   • Bell's palsy     history of   • GERD (gastroesophageal reflux disease)    • Hypertension    • Kidney disease    • Shingles     history of   • Sleep apnea      Past Surgical History:   Procedure Laterality Date   • CHOLECYSTECTOMY     • FEMUR IM NAILING/RODDING Left 2021    Procedure: CEPHALOMEDULLARY NAILING OF LEFT PATHOLOGIC FEMUR FRACTURE, BIOPSY OF BONE AND SOFT TISSUE TUMOR;  Surgeon: Johnny Coleman MD;  Location: [facility];  Service: Orthopedics;  Laterality: Left;   • HYSTERECTOMY       General Information     Row Name 2116          Physical Therapy Time and Intention    Document Type  evaluation  -MS (r) NN (t) MS (c)     Mode of Treatment  physical therapy Pathologic L Femur Fx, Lesions found on L Pubic Rami & Meatphyseal Diaphyseal Junction, Cephalomedullary nailing of L Femur, Post Sx WBAT with RWX, Cunningham Palsy, HTN, GERD  -MS (r) NN (t) MS (c)     Row Name 21 0935          General Information    Patient Profile Reviewed  yes  -MS (r) NN (t) MS (c)     Prior Level of Function  independent:;all household mobility;community mobility;gait;bed mobility;ADL's;using stairs;transfer  -MS (r) NN (t) MS (c)     Existing Precautions/Restrictions  fall;weight bearing WBAT with RWX  -MS (r) NN (t) MS (c)     Row Name 21          Living Environment    Lives With  alone  -MS (r) NN (t) MS (c)     Row Name 21 0908          Home Main Entrance    Number of Stairs, Main Entrance  three  -MS (r) NN (t) MS (c)     Stair Railings, Main Entrance  railing on left side (ascending)  -MS (r) NN (t) MS (c)      Row Name 09/24/21 0932          Stairs Within Home, Primary    Stairs, Within Home, Primary  pt only goes up flight of stairs to shower  -MS (r) NN (t) MS (c)     Stair Railings, Within Home, Primary  railings on both sides of stairs  -MS (r) NN (t) MS (c)     Row Name 09/24/21 0932          Cognition    Orientation Status (Cognition)  oriented x 4  -MS (r) NN (t) MS (c)     Row Name 09/24/21 0932          Safety Issues, Functional Mobility    Impairments Affecting Function (Mobility)  endurance/activity tolerance;pain  -MS (r) NN (t) MS (c)       User Key  (r) = Recorded By, (t) = Taken By, (c) = Cosigned By    Initials Name Provider Type    Melodie Joseph R, PT, DPT, NCS Physical Therapist    NN Wen Ramirez, PT Student PT Student        Mobility     Row Name 09/24/21 0932          Bed Mobility    Bed Mobility  supine-sit;scooting/bridging;sit-supine  -MS (r) NN (t) MS (c)     Scooting/Bridging [place] (Bed Mobility)  independent  -MS (r) NN (t) MS (c)     Supine-Sit [place] (Bed Mobility)  independent  -MS (r) NN (t) MS (c)     Sit-Supine [place] (Bed Mobility)  independent  -MS (r) NN (t) MS (c)     Assistive Device (Bed Mobility)  bed rails  -MS (r) NN (t) MS (c)     Row Name 09/24/21 0932          Sit-Stand Transfer    Sit-Stand [place] (Transfers)  standby assist  -MS (r) NN (t) MS (c)     Assistive Device (Sit-Stand Transfers)  walker, front-wheeled  -MS (r) NN (t) MS (c)     Row Name 09/24/21 0932          Gait/Stairs (Locomotion)    [place] Level (Gait)  standby assist  -MS (r) NN (t) MS (c)     Assistive Device (Gait)  walker, front-wheeled  -MS (r) NN (t) MS (c)     Distance in Feet (Gait)  25 x 2': pt uses arms to push up on walker to help swing LLE forward. pt scoots her feet bilaterally. pt amb slow but displays no safety concerns. pt states she is WB about 50% through LLE.  -MS (r) NN (t) MS (c)     Row Name 09/24/21 2416          Mobility    Extremity Weight-bearing  Status  left lower extremity  -MS (r) NN (t) MS (c)     Left Lower Extremity (Weight-bearing Status)  weight-bearing as tolerated (WBAT)  -MS (r) NN (t) MS (c)       User Key  (r) = Recorded By, (t) = Taken By, (c) = Cosigned By    Initials Name Provider Type    Melodie Joseph R, PT, DPT, NCS Physical Therapist    NN Wen Ramirez, PT Student PT Student        Obj/Interventions     Row Name 09/24/21 0932          Range of Motion Comprehensive    General Range of Motion  no range of motion deficits identified  -MS (r) NN (t) MS (c)     Row Name 09/24/21 0932          Strength Comprehensive (MMT)    Comment, General Manual Muscle Testing (MMT) Assessment  BUE WNL; RLE hip flexion 4+/5, knee extension 4+/5, knee flexion 4+/5, DF 4+/5, PF 4+/5, EHL 4+/5; LLE hip flexion 3/5, knee extension 4/5, knee flexion 4/5, DF 4+/5, PF 4+/5, EHL 4+/5  -MS (r) NN (t) MS (c)     Row Name 09/24/21 0932          Balance    Balance Assessment  sitting static balance;sitting dynamic balance;standing static balance;standing dynamic balance  -MS (r) NN (t) MS (c)     Static Sitting Balance  WFL  -MS (r) NN (t) MS (c)     Dynamic Sitting Balance  WFL  -MS (r) NN (t) MS (c)     Static Standing Balance  WFL  -MS (r) NN (t) MS (c)     Dynamic Standing Balance  WFL  -MS (r) NN (t) MS (c)     Balance Interventions  sit to stand  -MS (r) NN (t) MS (c)     Row Name 09/24/21 0932          Sensory Assessment (Somatosensory)    Sensory Assessment (Somatosensory)  sensation intact  -MS (r) NN (t) MS (c)       User Key  (r) = Recorded By, (t) = Taken By, (c) = Cosigned By    Initials Name Provider Type    Melodie Joseph R, PT, DPT, NCS Physical Therapist    NN Wen Ramirez, PT Student PT Student        Goals/Plan     Row Name 09/24/21 0932          Transfer Goal 1 (PT)    Activity/Assistive Device (Transfer Goal 1, PT)  --  -MS (r) NN (t) MS (c)     [place] Level/Cues Needed (Transfer Goal 1, PT)  --  -MS (r) NN (t) MS (c)     Time Frame  (Transfer Goal 1, PT)  --  -MS (r) NN (t) MS (c)     Progress/Outcome (Transfer Goal 1, PT)  --  -MS (r) NN (t) MS (c)     Row Name 09/24/21 0932          Gait Training Goal 1 (PT)    Activity/Assistive Device (Gait Training Goal 1, PT)  gait (walking locomotion);assistive device use;decrease fall risk;diminish gait deviation;improve balance and speed;increase endurance/gait distance;walker, rolling  -MS (r) NN (t) MS (c)     [place] Level (Gait Training Goal 1, PT)  modified independence  -MS (r) NN (t) MS (c)     Distance (Gait Training Goal 1, PT)  50ft  -MS (r) NN (t) MS (c)     Time Frame (Gait Training Goal 1, PT)  long term goal (LTG)  -MS (r) NN (t) MS (c)     Progress/Outcome (Gait Training Goal 1, PT)  goal ongoing  -MS (r) NN (t) MS (c)     Row Name 09/24/21 1338 09/24/21 0932       Stairs Goal 1 (PT)    Activity/Assistive Device (Stairs Goal 1, PT)  stairs, all skills  -MS (r) NN (t) MS (c)  stairs, all skills  -MS (r) NN (t) MS (c)    [place] Level/Cues Needed (Stairs Goal 1, PT)  --  contact guard assist  -MS (r) NN (t) MS (c)    Number of Stairs (Stairs Goal 1, PT)  --  5  -MS (r) NN (t) MS (c)    Time Frame (Stairs Goal 1, PT)  --  long term goal (LTG)  -MS (r) NN (t) MS (c)    Progress/Outcome (Stairs Goal 1, PT)  --  goal ongoing  -MS (r) NN (t) MS (c)    Row Name 09/24/21 0932          Problem Specific Goal 1 (PT)    Time Frame (Problem Specific Goal 1, PT)  --  -MS (r) NN (t) MS (c)       User Key  (r) = Recorded By, (t) = Taken By, (c) = Cosigned By    Initials Name Provider Type    Melodie Joseph R, PT, DPT, NCS Physical Therapist    NN Nauert, Wen, PT Student PT Student        Clinical Impression     Row Name 09/24/21 0932          Pain    Additional Documentation  Pain Scale: Numbers Pre/Post-Treatment (Group)  -MS (r) NN (t) MS (c)     Row Name 09/24/21 0932          Pain Scale: Numbers Pre/Post-Treatment    Pretreatment Pain Rating  3/10  -MS (r) NN (t) MS (c)     Pain  Location - Side  Left  -MS (r) NN (t) MS (c)     Pain Location  hip  -MS (r) NN (t) MS (c)     Pain Intervention(s)  Medication (See MAR);Repositioned;Ambulation/increased activity  -MS (r) NN (t) MS (c)     Row Name 09/24/21 0932          Plan of Care Review    Plan of Care Reviewed With  patient  -MS (r) NN (t) MS (c)     Progress  improving  -MS (r) NN (t) MS (c)     Outcome Summary  PT eval completed: pt AOx4. pt presents with no ROM or sensation deficits. pt does have slight weakness on the LLE with hip flexion, knee extension, and knee flexion. pt able to get EOB independently. pt SBA with standing and amb. pt uses her arms to push up through the walker as well as scoots her feet bilaterally. pt was educated about WB status. pt was slow while amb but did not display any safety concerns. pt to continue with skilled PT for stair training and endurance as she has 3 stairs to get into her house. D/C recommendation home with assist and outpatient therapy.  -MS (r) NN (t) MS (c)     Row Name 09/24/21 0932          Therapy Assessment/Plan (PT)    Patient/Family Therapy Goals Statement (PT)  go home  -MS (r) NN (t) MS (c)     Rehab Potential (PT)  good, to achieve stated therapy goals  -MS (r) NN (t) MS (c)     Criteria for Skilled Interventions Met (PT)  yes;meets criteria;skilled treatment is necessary  -MS (r) NN (t) MS (c)     Predicted Duration of Therapy Intervention (PT)  until D/C  -MS (r) NN (t) MS (c)     Row Name 09/24/21 0932          Positioning and Restraints    Pre-Treatment Position  in bed  -MS (r) NN (t) MS (c)     Post Treatment Position  bed  -MS (r) NN (t) MS (c)     In Bed  fowlers;call light within reach;encouraged to call for assist  -MS (r) NN (t) MS (c)       User Key  (r) = Recorded By, (t) = Taken By, (c) = Cosigned By    Initials Name Provider Type    Melodie Joseph, PT, DPT, NCS Physical Therapist    NN Wen Ramirez, PT Student PT Student        Outcome Measures     Row Name  09/24/21 0932          How much help from another person do you currently need...    Turning from your back to your side while in flat bed without using bedrails?  4  -MS (r) NN (t) MS (c)     Moving from lying on back to sitting on the side of a flat bed without bedrails?  4  -MS (r) NN (t) MS (c)     Moving to and from a bed to a chair (including a wheelchair)?  3  -MS (r) NN (t) MS (c)     Standing up from a chair using your arms (e.g., wheelchair, bedside chair)?  3  -MS (r) NN (t) MS (c)     Climbing 3-5 steps with a railing?  3  -MS (r) NN (t) MS (c)     To walk in hospital room?  3  -MS (r) NN (t) MS (c)     AM-PAC 6 Clicks Score (PT)  20  -MS (r) NN (t)     Row Name 09/24/21 0932 09/24/21 0800       Functional Assessment    Outcome Measure Options  AM-PAC 6 Clicks Basic Mobility (PT)  -MS (r) NN (t) MS (c)  AM-PAC 6 Clicks Daily Activity (OT)  -CH (r) EJ (t) CH (c)      User Key  (r) = Recorded By, (t) = Taken By, (c) = Cosigned By    Initials Name Provider Type    Vilma Lentz, OTR/L Occupational Therapist    Melodie Joseph, PT, DPT, NCS Physical Therapist    Preeti Collier, OT Student OT Student    Wen Pantoja, PT Student PT Student                       Physical Therapy Education                 Title: PT OT SLP Therapies (Done)     Topic: Physical Therapy (Done)     Point: Mobility training (Done)     Learning Progress Summary           Patient Acceptance, E,TB,D, VU,DU by  at 9/24/2021 1409    Comment: stair training    Acceptance, E, VU by NN at 9/24/2021 0932    Comment: PT role in care, WB Status, Proper RWX Use                   Point: Home exercise program (Done)     Learning Progress Summary           Patient Acceptance, E, VU by NN at 9/24/2021 0932    Comment: PT role in care, WB Status, Proper RWX Use                   Point: Body mechanics (Done)     Learning Progress Summary           Patient Acceptance, E, VU by NN at 9/24/2021 0932    Comment: PT role in care, WB  Status, Proper RWX Use                   Point: Precautions (Done)     Learning Progress Summary           Patient Acceptance, E, VU by  at 9/24/2021 0932    Comment: PT role in care, WB Status, Proper RWX Use                               User Key     Initials Effective Dates Name Provider Type Discipline     06/16/21 -  Lor Landry, PTA Physical Therapy Assistant PT     08/18/21 -  Wen Ramirez, PT Student PT Student PT              PT Recommendation and Plan  Planned Therapy Interventions (PT): balance training, bed mobility training, gait training, stair training, strengthening, transfer training, patient/family education  Plan of Care Reviewed With: patient  Progress: improving  Outcome Summary: PT eval completed: pt AOx4. pt presents with no ROM or sensation deficits. pt does have slight weakness on the LLE with hip flexion, knee extension, and knee flexion. pt able to get EOB independently. pt SBA with standing and amb. pt uses her arms to push up through the walker as well as scoots her feet bilaterally. pt was educated about WB status. pt was slow while amb but did not display any safety concerns. pt to continue with skilled PT for stair training and endurance as she has 3 stairs to get into her house. D/C recommendation home with assist and outpatient therapy.     Time Calculation:   PT Charges     Row Name 09/24/21 1409 09/24/21 0932          Time Calculation    Start Time  1344  -  0932 10 Min Chart Review: Eval Low 2  -MS (r) NN (t) MS (c)     Stop Time  1409  -  0952  -MS (r) NN (t) MS (c)     Time Calculation (min)  25 min  -  20 min  -MS (r) NN (t)     PT Received On  --  09/24/21  -MS (r) NN (t) MS (c)     PT Goal Re-Cert Due Date  --  10/04/21  -MS (r) NN (t) MS (c)        Time Calculation- PT    Total Timed Code Minutes- PT  25 minute(s)  -AH  --        Timed Charges    93967 - Gait Training Minutes   25  -AH  --        Untimed Charges    PT Eval/Re-eval Minutes  --  30  -MS (r)  NN (t) MS (c)        Total Minutes    Timed Charges Total Minutes  25  -AH  --     Untimed Charges Total Minutes  --  30  -MS (r) NN (t)      Total Minutes  25  -AH  30  -MS (r) NN (t)       User Key  (r) = Recorded By, (t) = Taken By, (c) = Cosigned By    Initials Name Provider Type     Lor Landry, PTA Physical Therapy Assistant    MS Melodie Peterson, PT, DPT, NCS Physical Therapist    Wen Pantoja, PT Student PT Student            PT G-Codes  Outcome Measure Options: AM-PAC 6 Clicks Basic Mobility (PT)  AM-PAC 6 Clicks Score (PT): 20  AM-PAC 6 Clicks Score (OT): 24    Wen Ramirez, PT Student  9/24/2021

## 2021-09-24 NOTE — PLAN OF CARE
Goal Outcome Evaluation:  Plan of Care Reviewed With: patient        Progress: improving  Outcome Summary: Pt A&Ox4, VSS. Pt is on room air this AM, O2 WNL. C/o pain with relief noted from PRN meds. Rolls and turns well in bed. Dried drainage to L hip dressing. PPP. Voiding. Safety maintained, will continue to monitor.

## 2021-09-24 NOTE — CASE MANAGEMENT/SOCIAL WORK
Continued Stay Note   [place]     Patient Name: Stefani Quintero  MRN: [id number]  Today's Date: 9/24/2021    Admit Date: 9/23/2021    Discharge Plan     Row Name 09/24/21 1527       Plan    Final Discharge Disposition Code  06 - home with home health care    Final Note  Pt is being discharged home with  orders. [facility] is the only agency that covers [facility]. Pallavi Yan, admissions, is aware of referral.        Discharge Codes    No documentation.       Expected Discharge Date and Time     Expected Discharge Date Expected Discharge Time    Sep 23, 2021             Angy Carson

## 2021-09-24 NOTE — CASE MANAGEMENT/SOCIAL WORK
Discharge Planning Assessment  [facility]     Patient Name: Stefani Quintero  MRN: [id number]  Today's Date: 9/24/2021    Admit Date: 9/23/2021    Discharge Needs Assessment     Row Name 09/24/21 0945       Living Environment    Lives With  alone    Current Living Arrangements  home/apartment/condo    Primary Care Provided by  self    Provides Primary Care For  no one    Family Caregiver if Needed  child(mary), adult    Family Caregiver Names  Dane    Quality of Family Relationships  helpful;involved;supportive    Able to Return to Prior Arrangements  yes       Resource/Environmental Concerns    Resource/Environmental Concerns  none    Transportation Concerns  car, none       Transition Planning    Patient/Family Anticipates Transition to  home with family    Patient/Family Anticipated Services at Transition  none    Transportation Anticipated  family or friend will provide       Discharge Needs Assessment    Readmission Within the Last 30 Days  no previous admission in last 30 days    Equipment Currently Used at Home  wheelchair;walker, rolling    Concerns to be Addressed  no discharge needs identified;denies needs/concerns at this time    Anticipated Changes Related to Illness  none    Equipment Needed After Discharge  -- unknown at this time    Provided Post Acute Provider List?  Yes    Post Acute Provider List  Home Health;Nursing Home    Provided Post Acute Provider Quality & Resource List?  Yes    Post Acute Provider Quality and Resource List  Nursing Home;Home Health    Delivered To  Support Person    Support Person  Dane    Method of Delivery  Telephone    Discharge Coordination/Progress  Pt has RX coverage and a PCP. MICHAEL called and spoke with Dane, son, who states that pt lived at home by herself prior to admission but he worked from home and stayed with her during the day. Dane states that he does have some brothers that are coming into to town to assist if needed. MICHAEL explained that therapy has  not seen pt today but did explain the difference in patient going home with HH therapy vs going to a SNF and getting therapy. Dane states that if pt is in need of SNF placement then he will speak with pt about this but family is really wanting pt to go home. Dane did state that pt was already only living in the downstairs part of her home and climbing up stairs just to shower before this fall. SW will follow for therapy notes        Discharge Plan    No documentation.       Continued Care and Services - Admitted Since 9/23/2021    Coordination has not been started for this encounter.       Expected Discharge Date and Time     Expected Discharge Date Expected Discharge Time    Sep 23, 2021         Demographic Summary    No documentation.       Functional Status    No documentation.       Psychosocial    No documentation.       Abuse/Neglect    No documentation.       Legal    No documentation.       Substance Abuse    No documentation.       Patient Forms    No documentation.           Angy Carson

## 2021-09-24 NOTE — PLAN OF CARE
Goal Outcome Evaluation:   Pt A&Ox4. Denies n/t. C/o pain, prn pain medication given with good relief. L hip/thigh drsgs, CDI. , room air. Up Standby asst, walker. PPP. Cap refill<3. VASQUEZ, LLE tender. ASA, eliquis VTE. Voiding. Call light within reach. Safety maintained. Pt is anxious to go home.

## 2021-09-24 NOTE — PLAN OF CARE
Goal Outcome Evaluation:  Plan of Care Reviewed With: patient        Progress: improving  Outcome Summary: OT sheila completed. Pt A&Ox4, c/o minimal pain at rest in LLE 3/10. Pt performed bed mobility SBA, was able to maintain sitting balance at EOB to don/doff socks I'ly. Pt sit to stand CGA and ambulated to bathroom. Pt performed all toileting tasks I'ly with no concerns. Pt ambulated from bathroom to [place] and back with vc needed for stepping too close to rwx, was able to correct. At EOB pt able to readjust self in bed. Pt is very willing to perform tx and tolerates well. She plans to dc home with help from her 4 sons, and will look into HH after the weekend depending on the amount of additional assistance she needs. Reccomend pt dc home with assist and HH.

## 2021-09-27 NOTE — THERAPY DISCHARGE NOTE
Acute Care - Physical Therapy Discharge Summary  [facility]       Patient Name: Stefani Quintero  : 1940  MRN: [id number]    Today's Date: 2021                 Admit Date: 2021      PT Recommendation and Plan    Visit Dx:    ICD-10-CM ICD-9-CM   1. Femur fracture, left (CMS/HCC)  S72.92XA 821.00   2. Impaired mobility  Z74.09 799.89               PT Rehab Goals     Row Name 21 1322             Gait Training Goal 1 (PT)    Activity/Assistive Device (Gait Training Goal 1, PT)  gait (walking locomotion);assistive device use;decrease fall risk;diminish gait deviation;improve balance and speed;increase endurance/gait distance;walker, rolling  -AB      [place] Level (Gait Training Goal 1, PT)  modified independence  -AB      Distance (Gait Training Goal 1, PT)  50ft  -AB      Time Frame (Gait Training Goal 1, PT)  long term goal (LTG)  -AB      Progress/Outcome (Gait Training Goal 1, PT)  goal not met  -AB         Stairs Goal 1 (PT)    Activity/Assistive Device (Stairs Goal 1, PT)  stairs, all skills  -AB      [place] Level/Cues Needed (Stairs Goal 1, PT)  contact guard assist  -AB      Number of Stairs (Stairs Goal 1, PT)  5  -AB      Time Frame (Stairs Goal 1, PT)  long term goal (LTG)  -AB      Progress/Outcome (Stairs Goal 1, PT)  goal met  -AB        User Key  (r) = Recorded By, (t) = Taken By, (c) = Cosigned By    Initials Name Provider Type Discipline    Betina Self PTA Physical Therapy Assistant PT              PT Discharge Summary  Anticipated Discharge Disposition (PT): home with assist, home with outpatient therapy services  Reason for Discharge: Discharge from facility  Outcomes Achieved: Refer to plan of care for updates on goals achieved  Discharge Destination: Home with assist      Betina Chopra PTA   2021

## 2021-09-27 NOTE — TELEPHONE ENCOUNTER
[facility]    Outreach made within 2 business days of discharge: Yes    Patient: Armand Ball   Patient : 1940  MRN: [id number]   Reason for Admission: Admitted 21 for left femur fracture, impaired mobility   Discharge Date: 21  Final Diagnoses: Femur fracture, left ([address] [S72.92XA]    Procedures:   1. Cephalomedullary nailing of left pathologic femoral shaft fracture  2. Open biopsy of a left femoral shaft bone and soft tissue mass     Spoke with: patient     Discharge department/facility: [facility] Patient Contact:  Was patient able to fill all prescriptions: Yes  Was patient instructed to bring all medications to the follow-up visit: Yes  Is patient taking all medications as directed in the discharge summary? Yes  Does patient understand their discharge instructions: Yes  Does patient have questions or concerns that need addressed prior to 7-14 day follow up office visit: no    I spoke with Mrs. Landin. She states she went in for a CT scan and bone cancer was found. She also has a femur fracture. She states she is in a wheel chair and in a lot of pain. She states she was prescribed pain medication and reports it keeps her pain level tolerable. She is scheduled to follow up with Dr. Suzanne Simpson tomorrow to discuss a treatment plans. She states she has all that she needs at this time and does not wish to follow up with Dr. Hayde Yang right now. She states she has a lot going on and will call to scheduled follow up if she still has questions after meeting with Dr. Suzanne Simpson tomorrow.        Follow Up  Future Appointments   Date Time Provider Jose Corcoran   2021  8:45 AM SCHEDULE, L MED ONC MA L MED ONC [facility]   2021  9:00 AM [facility]   2022 10:45 AM [facility]       Roscoe Marcio, [place]

## 2021-09-27 NOTE — PROGRESS NOTES
Patient:  Mira Ahumada  YOB: 1940  Date of Service: 9/29/2021  MRN: [id number]    Primary Care Physician: Idalia Mcknight MD    Chief Complaint   Patient presents with    Follow-up     Right lower lobe lung mass       Patient Seen, Chart, Consults notes, Labs, Radiology studies reviewed. Subjective:  Krishna Dalal is an 79year old female originally referred by Dr Delicia Ford with a nondisplaced fracture of the left superior pubic ramus, expansile lytic lesion of the distal third shaft of the left femur and a 4.1 x 3.6 x 3.5 cm  lung mass. A cephalomedullary nailing of the left pathologic femoral shaft fracture and open biopsy of a left femoral shaft bone and soft tissue mass was performed by Dr. Delicia Ford on 9/23/2021. Krishna Dalal and her son Maya Minaya comes today to review these results and for treatment planning. TARGET LESIONS:  · 4.1 x 3.6 cm spiculated superior segment right lower lobe lung mass suspicious for primary malignancy. · Right hilar adenopathy. · Nondisplaced fracture at the left acetabulum and at the root of the left superior pubic ramus  · Expansile lytic lesion involving the more distal shaft of the left femur with extraosseous extension    TUMOR HISTORY:  Krishna Dalal was seen in initial oncology consultation on 9/20/2021 referred by Dr Delicia Ford with a nondisplaced fracture of the left superior pubic ramus, expansile lytic lesion of the distal third shaft of the left femur and a 4.1 x 3.6 x 3.5 cm  lung mass suspicious for primary malignancy with metastasis. Krishna Dalal had an office visit with PCP, Sandy Joseph on 8/20/21 with complaints of left hip pain radiating from groin down left thigh. Over the next several weeks, the pain progressed down the leg to the distal left thigh causing difficulty walking, utilizing a cane, and eventually a wheelchair. She denies any trauma. Plain film imaging was ordered and orthopaedic referral was made.     XR HIP 2-3 VW W PELVIS LEFT on 8/30/2021 at [facility] documented:  · No acute bony abnormality. · Mild acetabular and femoral head spurring on the left side    XR FEMUR LEFT (MIN 2 VIEWS) 8/30/2021 at [address] documented:  · No acute bony abnormality. Orthopaedic consultation with Dr Jose Vidal on 9/7/2021 to address left hip and groin pain over 1 month period, ambulating with a walker. Denies any injury and describes a stabbing pain going down her left leg when standing. MRI PELVIS on 9/8/2021 at [facility] documented:  Along the  Helder-medial aspect of the left acetabulum and at the root of the left superior pubic ramus, there is definite evidence of a nondisplaced fracture. It should be noted that there is expansion of the cortex in this region and some [id number] STIR signal with diminished T1 signal in the marrow at this location, as well as possible expansion of the cortex. The degree of marrow edema surrounding the fracture site is very abruptly cut off between normal signal, and I am suspicious that there may be an underlying bone lesion with pathologic fracture rather than a simple fracture. Dr. Latosha Gamez called me with the above information desiring consultation. Further imaging was recommended including a CT scan of the chest abdomen and pelvis. MRI LEFT FEMUR WO CONTRAST on 9/14/21 at [facility] documented: There is an expansile lytic lesion involving the more distal shaft of the left femur at the juncture of the proximal two-thirds with the distal one-third. This is creating permeative changes within the more lateral cortex of the femur at this level and on T1 sequencing there appears to be extraosseous extension of the process. There is surrounding edema associated with this lesion. Given the permeative appearance of the more lateral cortex I feel this patient would certainly be at a high risk for impending pathologic fracture. No additional lesions are present.     CT CHEST WO CONTRAST DIAGNOSTIC on  9/9/2021 at [facility] documented:  · 4.1 x 3.6 cm spiculated superior segment right lower lobe lung mass suspicious for primary malignancy. · Questionable right hilar adenopathy. · Emphysema. CT ABDOMEN PELVIS WO CONTRAST on 9/9/2021 at [facility] documented:  · Nonenhanced imaging of the abdomen and pelvis reduces assessment of the visceral organs. No convincing intra-abdominal or pelvic metastasis. Probable left renal cyst. Colonic diverticulosis. · Left perineural sacral cyst. No pathologic fractures. Appointment Note from 9/21/2021 visit to Dr. Brayden Villalta:   She was set up an appointment for Left cephalomedullary nailing of her left pathologic femur fracture to protect her femur. Plan to biopsy  From distal femoral lesion including a small sample of bone and tissue. A cephalomedullary nailing of the left pathologic femoral shaft fracture and open biopsy of a left femoral shaft bone and soft tissue mass was performed by Dr. Sergio Mata on 9/23/2021  Pathology reported to:  · Soft tissue and bone, left femur, excision:Hemorrhagic bone and soft tissue. · Bone, left femur, reamings:Metastatic poorly differentiated carcinoma. 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 Allergies:  Patient has no known allergies. Medicines:  Current Outpatient Medications   Medication Sig Dispense Refill    apixaban (ELIQUIS) 5 MG TABS tablet Take 5 mg by mouth every 12 hours      HYDROcodone-acetaminophen (NORCO)  MG per tablet Take 1 tablet by mouth every 4 hours as needed.       ondansetron (ZOFRAN) 4 MG tablet Take 4 mg by mouth every 8 hours as needed      pravastatin (PRAVACHOL) 80 MG tablet TAKE 1 TABLET BY MOUTH AT BEDTIME 90 tablet 3    losartan (COZAAR) 50 MG tablet TAKE 1 TABLET BY MOUTH DAILY 90 tablet 3    oxybutynin (DITROPAN) 5 MG tablet TAKE 1 TABLET BY MOUTH TWO TIMES A  tablet 3    pantoprazole (PROTONIX) 40 MG tablet TAKE 2 TABLETS BY MOUTH DAILY (Patient taking differently: Take 40 mg by mouth daily ) 180 tablet 3    triamterene-hydrochlorothiazide (MAXZIDE-25) 37.5-25 MG per tablet Take 0.5 tablets by mouth daily 45 tablet 3    calcium carbonate (OSCAL) 500 MG TABS tablet Take 500 mg by mouth daily      allopurinol (ZYLOPRIM) 100 MG tablet Take 100 mg by mouth daily      Cholecalciferol (VITAMIN D) 2000 units CAPS capsule Take by mouth       No current facility-administered medications for this visit.        Past Medical History:      Diagnosis Date    Acid reflux     LORA (acute kidney injury) ([facility] [address] 2018    HIGH CHOLESTEROL     Hypertension     Metastatic lung cancer (metastasis from lung to other site) [facility]) 2021    Metastatic lung cancer (metastasis from lung to other site) [facility]) 2021    Urinary incontinence     UTI (urinary tract infection)     Vertigo         Past Surgical History:      Procedure Laterality Date   [facility] COLONOSCOPY      Dr Mirian Carranza 2019    Dr SARAH Juarez-Diverticular disease-Tubular AP (-) dysplasia, 5 yr recall    HYSTERECTOMY      VARICOSE VEIN SURGERY      laser         Family History:      Problem Relation Age of Onset    Cancer Mother     Colon Cancer Mother     Heart Disease Father     Stroke Brother     Colon Polyps Neg Hx     Esophageal Cancer Neg Hx     Liver Cancer Neg Hx     Liver Disease Neg Hx     Rectal Cancer Neg Hx     Stomach Cancer Neg Hx         Social History  Social History     Tobacco Use    Smoking status: Former Smoker     Packs/day: 0.50     Years: 40.00     Pack years: 20.00     Types: Cigarettes     Start date:      Quit date:      Years since quittin.7    Smokeless tobacco: Never Used   Vaping Use    Vaping Use: Never used   Substance Use Topics    Alcohol use: No    Drug use: No             Wt Readings from Last 3 Encounters:   09/28/21 165 lb 12.8 oz (75.2 kg)   09/20/21 163 lb 14.4 oz (74.3 kg)   08/30/21 167 lb (75.8 kg)        Objective:  Vital Signs: Blood pressure 126/82, pulse 81, height 5' 6\" (1.676 m), weight 165 lb 12.8 oz (75.2 kg), SpO2 92 %. Labs:  BMP:   Recent Labs     09/28/21  1355      K 5.1      CO2 27   BUN 31*   CREATININE 1.7*   CALCIUM 10.5*     CBC:   Recent Labs     09/28/21  0927   WBC 10.36*   HGB 11.6   HCT 38.7   MCV 97.2*   *     PT/INR: No results for input(s): PROTIME, INR in the last 72 hours. APTT: No results for input(s): APTT in the last 72 hours. Magnesium:No results for input(s): MG in the last 72 hours. Phosphorus:No results for input(s): PHOS in the last 72 hours. Hepatic:   Recent Labs     09/28/21  1355   ALKPHOS 136*   ALT 17   AST 23   PROT 6.8   BILITOT 0.9   LABALBU 4.0       Cultures:   No results for input(s): CULTURE in the last 72 hours. Radiology reports as per the Radiologist  Radiology:        ASSESSMENT AND PLAN:    #1   Hollie Villegas is an 79year old female that was seen on 9/20/2021 originally referred by Dr Yuan Manrique  with a nondisplaced fracture of the left superior pubic ramus, expansile lytic lesion of the distal third shaft of the left femur and a 4.1 x 3.6 x 3.5 cm  lung mass. A cephalomedullary nailing of the left pathologic femoral shaft fracture and open biopsy of a left femoral shaft bone and soft tissue mass was performed by Dr. Yuan Manrique on 9/23/2021. Hollie Villegas and her son Gisell Perez comes today to review these results and for treatment planning. TARGET LESIONS:  · 4.1 x 3.6 cm spiculated superior segment right lower lobe lung mass suspicious for primary malignancy. · Right hilar adenopathy.    · Nondisplaced fracture at the left acetabulum and at the root of the left superior pubic ramus  · Expansile lytic lesion involving the more distal shaft of the left femur with extraosseous extension    Physical examination today, 9/28/2021 is performed in the wheelchair due to redoes difficulty and pain. She is accompanied by her son Melania Lynn. HEENT is without asymmetry extraocular movements are intact. Neck is supple no JVD no palpable lymphadenopathy, specifically no supraclavicular lymphadenopathy on the left or the right. Lungs are clear heart is regular normal S1-S2, no S3. Abdominal exam is benign extremities no edema. Musculoskeletal: There was no mass-effect on the distal left femur that I can appreciate on palpation at her initial clinic visit on 9/20/2021. She has pain primarily in the distal left femur. Neurological exam is grossly intact, gait is not tested. Appointment Note from 9/21/2021 visit to Dr. Yuli Hess:   She was set up an appointment for Left cephalomedullary nailing of her left pathologic femur fracture to protect her femur. Plan to biopsy  From distal femoral lesion including a small sample of bone and tissue. A cephalomedullary nailing of the left pathologic femoral shaft fracture and open biopsy of a left femoral shaft bone and soft tissue mass was performed by Dr. Vielka Angeles on 9/23/2021  Pathology reported to:  · Soft tissue and bone, left femur, excision:Hemorrhagic bone and soft tissue. · Bone, left femur, reamings:Metastatic poorly differentiated carcinoma. 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 CBC today 9/28/2021  reveals a WBC of 10.36 Hgb is 11.6 with an MCV of 97.2 and platelet count of 559,055.     Wilfred Chance continues to have issues of pain in the distal third of the left femur.  She has hydrocodone 10 prescribed for pain that is helping. I had an extended discussion with Rony Amin and her son Scott Alston today regarding the diagnosis, prognosis assessment and recommendations that are listed below. They both are in agreement to proceed as outlined. 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 · I called and spoke to Dr. Isabella Saucedo regarding radiation therapy for pain control and stabilization of the left femur and left hip, he will be seeing her immediately after leaving this clinic today, 9/28/2021 to get started. · Treatment will not be curable but rather palliative and Rony Amin desires systemic therapy  · Mairchuy Harris requested to look for actionable mutations  · Pathology specimen from 9/23/2021 requested to be sent to [facility] for NGS, MSI and PD-L1 testing  · DANNA/Marlon Hardy Final general surgery for port placement  · PET scan  · Cycle #1 of chemotherapy with Carboplatin AUC of 5/Alimta 500 mg/m²/Keytruda standard dosing will begin on 10/7/2021,   · She will be seen by DAVIDSON Cabrera in the treatment room with cycle #1      #2  Dustin on 8/13/2021 revealed Osteopenia. Patient takes Calcium + Vitamin D as indicated. Breast cancer screening - RADHA DIGITAL SCREEN W OR WO CAD BILATERAL on  8/13/2021 was BI-RADS category 1     GI cancer screening - Colonoscopy per Dr Billie Chin 6/24/19 with a benign 6mm sessile polyp in the the descending colon. 5 year recall.     GYN cancer screening - JUAN ALBERTO & BSO 1970s      #3  Immunization History   COVID-19, Pfizer, PF, 30mcg/0.3mL 02/22/2021, 03/15/2021   Influenza, High Dose (Fluzone 65 yrs and older) 11/09/2018, 10/11/2019, 09/10/2020   Pneumococcal Conjugate 13-valent ([id number]) 07/13/2015   Pneumococcal Polysaccharide ([id number]) 05/09/2019   Zoster Live (Zostavax) 12/09/2016   Zoster Recombinant (Shingrix) 05/09/2019, 09/25/2019          Damien Ramos was seen today for follow-up. Diagnoses and all orders for this visit:    Lytic bone lesion of left femur  -     PET CT SKULL BASE TO MID THIGH; Future  -     Miscellaneous Sendout 1; Future  -     Miscellaneous Sendout 2; Future  -     Amb External Referral To Radiation Oncology  -     Cookie Red MD, General Surgery, [place]    Right lower lobe lung mass  -     PET CT SKULL BASE TO MID THIGH; Future  -     Miscellaneous Sendout 1; Future  -     Miscellaneous Sendout 2; Future  -     Amb External Referral To Radiation Oncology  -     Cookie Red MD, General Surgery, [place]    Adenopathy, hilar  -     PET CT SKULL BASE TO MID THIGH; Future  -     Miscellaneous Sendout 1; Future  -     Miscellaneous Sendout 2; Future  -     Amb External Referral To Radiation Oncology  -     Cookie Red MD, General Surgery, [place]    Primary malignant neoplasm of lung metastatic to other site, unspecified laterality [facility])        Orders Placed This Encounter   Procedures    PET CT SKULL BASE TO MID THIGH     Standing Status:   Future     Standing Expiration Date:   9/28/2022     Order Specific Question:   Reason for exam:     Answer:   lung mass, adenopathy and bone lesion    Miscellaneous Sendout 1     Standing Status:   Future     Standing Expiration Date:   9/28/2022     Order Specific Question:   Specify Req. Test (1 Test/Order)     Answer:   JULIA on 9/23/21 pathology    Miscellaneous Sendout 2     Standing Status:   Future     Standing Expiration Date:   9/28/2022     Order Specific Question:   Specify Req.  Test (1 Test/Order)     Answer:   Radha Jones 360    Amb External Referral To Radiation Oncology     Referral Priority:   Routine     Referral Reason:   Specialty Services Required     Referred to Provider:   Yary Ayon     Requested Specialty:   Radiology     Number of Visits Requested:   1    Dominica Hatchet, MD, General Surgery, [place]     Referral Priority:   Routine     Referral Type:   Eval and Treat     Referral Reason:   Specialty Services Required     Referred to Provider:   Mickey Alexander MD     Requested Specialty:   General Surgery     Number of Visits Requested:   1       No orders of the defined types were placed in this encounter. Return in about 9 days (around 10/7/2021) for follow-up with Aura

## 2021-09-28 ENCOUNTER — HOSPITAL ENCOUNTER (OUTPATIENT)
Dept: RADIATION ONCOLOGY | Facility: HOSPITAL | Age: 81
Setting detail: RADIATION/ONCOLOGY SERIES
End: 2021-09-28

## 2021-09-28 ENCOUNTER — DOCUMENTATION (OUTPATIENT)
Dept: RADIATION ONCOLOGY | Facility: HOSPITAL | Age: 81
End: 2021-09-28

## 2021-09-28 ENCOUNTER — APPOINTMENT (OUTPATIENT)
Dept: RADIATION ONCOLOGY | Facility: HOSPITAL | Age: 81
End: 2021-09-28

## 2021-09-28 ENCOUNTER — CONSULT (OUTPATIENT)
Dept: RADIATION ONCOLOGY | Facility: HOSPITAL | Age: 81
End: 2021-09-28

## 2021-09-28 VITALS
OXYGEN SATURATION: 96 % | DIASTOLIC BLOOD PRESSURE: 74 MMHG | RESPIRATION RATE: 18 BRPM | SYSTOLIC BLOOD PRESSURE: 154 MMHG | BODY MASS INDEX: 26.52 KG/M2 | HEART RATE: 89 BPM | WEIGHT: 165 LBS | HEIGHT: 66 IN

## 2021-09-28 DIAGNOSIS — Z87.891 FORMER SMOKER: ICD-10-CM

## 2021-09-28 DIAGNOSIS — R91.8 LUNG MASS: ICD-10-CM

## 2021-09-28 DIAGNOSIS — C79.51 MALIGNANT NEOPLASM METASTATIC TO BONE (HCC): Primary | ICD-10-CM

## 2021-09-28 PROBLEM — C34.90 METASTATIC LUNG CANCER (METASTASIS FROM LUNG TO OTHER SITE) (HCC): Status: ACTIVE | Noted: 2021-01-01

## 2021-09-28 PROCEDURE — 77290 THER RAD SIMULAJ FIELD CPLX: CPT | Performed by: RADIOLOGY

## 2021-09-28 RX ORDER — HYDROCODONE BITARTRATE AND ACETAMINOPHEN 10; 325 MG/1; MG/1
1 TABLET ORAL EVERY 4 HOURS PRN
COMMUNITY
Start: 2021-09-23

## 2021-09-28 RX ORDER — ONDANSETRON 4 MG/1
4 TABLET, FILM COATED ORAL EVERY 8 HOURS PRN
COMMUNITY
Start: 2021-09-23

## 2021-09-28 RX ORDER — MELOXICAM 7.5 MG/1
7.5 TABLET ORAL DAILY
COMMUNITY
Start: 2021-09-07

## 2021-09-28 NOTE — PROGRESS NOTES
MICHAEL met with Ms. Quintero due to her distress score. She is here for a radiation consultation for malignant neoplasm metastatic to bone. MICHAEL introduced self and explained role and source of support. She is 80 years old and lives alone, however her son has recently been staying with her. She has a strong support system including her four sons. She is a member at [employer] in [place]. Her  of 61 years passed away in April and her son has multiple myeloma. Emotional support provided and encouraged her to express her feelings. She is currently have some difficulty bathing and getting around. Ms. Quintero states [facility] started this week. She is having pain and states her pain medication does provide her with some relief and she is hoping radiation will help as well. She was thankful the doctor was able to answer her radiation questions. She does not take any medication for anxiety or depression. If she is feeling stressed she will go on her computer or watch sports or soap operas. Her main stressor is transportation. MICHAEL provided her with information and pricing on [facility], [facility], [facility], and [facility]. MICHAEL encouraged her to call if she needed further assistance with transportation.

## 2021-09-28 NOTE — PROGRESS NOTES
I spoke with Dr. Liu regarding this patient today.  She presented with pain in the left hip and femur.  She has a nondisplaced fracture in the left acetabulum as well as a span solid lesion in the left distal femur.  Dr. Mireles has placed an intramedullary gurmeet and obtain tissue diagnosis which is supported differentiated sarcomatoid tumor.  She also has a primary lesion in the right lung with a 4.1 cm right lower lobe lung nodule as well as hilar adenopathy.    Dr. Liu will manage her systemic therapy and due to pain he will send her over and we will see her today for palliative radiotherapy.

## 2021-09-30 NOTE — PROGRESS NOTES
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 originally referred by Dr Marsha Valenzuela with a nondisplaced fracture of the left superior pubic ramus, expansile lytic lesion of the distal third shaft of the left femur and a 4.1 x 3.6 x 3.5 cm  lung mass. A cephalomedullary nailing of the left pathologic femoral shaft fracture and open biopsy of a left femoral shaft bone and soft tissue mass was performed by Dr. Marsha Valenzuela on 9/23/2021. Armen Pepe is accompanied by her son Kvng Tran today to initiate systemic chemotherapy with Carboplatin AUC of 5/Alimta 500 mg/m²/Keytruda. TARGET LESIONS:  · 4.1 x 3.6 cm spiculated superior segment right lower lobe lung mass suspicious for primary malignancy. · Right hilar adenopathy. · Nondisplaced fracture at the left acetabulum and at the root of the left superior pubic ramus  · Expansile lytic lesion involving the more distal shaft of the left femur with extraosseous extension    TUMOR HISTORY:  Armen Pepe was seen in initial oncology consultation on 9/20/2021 referred by Dr Marsha Valenzuela with a nondisplaced fracture of the left superior pubic ramus, expansile lytic lesion of the distal third shaft of the left femur and a 4.1 x 3.6 x 3.5 cm  lung mass suspicious for primary malignancy with metastasis. Armen Pepe had an office visit with PCP, Dian Hurtado on 8/20/21 with complaints of left hip pain radiating from groin down left thigh.   Over the next several weeks, the pain progressed down the leg to the distal left thigh causing difficulty walking, utilizing a cane, and eventually a wheelchair. She denies any trauma. Plain film imaging was ordered and orthopaedic referral was made. XR HIP 2-3 VW W PELVIS LEFT on 8/30/2021 at [facility] documented:  · No acute bony abnormality. · Mild acetabular and femoral head spurring on the left side    XR FEMUR LEFT (MIN 2 VIEWS) 8/30/2021 at [facility] documented:  · No acute bony abnormality. Orthopaedic consultation with Dr Yuki Sneed on 9/7/2021 to address left hip and groin pain over 1 month period, ambulating with a walker. Denies any injury and describes a stabbing pain going down her left leg when standing. MRI PELVIS on 9/8/2021 at [address] [place] [address] documented:  Along the  Helder-medial aspect of the left acetabulum and at the root of the left superior pubic ramus, there is definite evidence of a nondisplaced fracture. It should be noted that there is expansion of the cortex in this region and some [id number] STIR signal with diminished T1 signal in the marrow at this location, as well as possible expansion of the cortex. The degree of marrow edema surrounding the fracture site is very abruptly cut off between normal signal, and I am suspicious that there may be an underlying bone lesion with pathologic fracture rather than a simple fracture. Dr. Keeley Salgado called me with the above information desiring consultation. Further imaging was recommended including a CT scan of the chest abdomen and pelvis. MRI LEFT FEMUR WO CONTRAST on 9/14/21 at [address] [place] [address] documented: There is an expansile lytic lesion involving the more distal shaft of the left femur at the juncture of the proximal two-thirds with the distal one-third. This is creating permeative changes within the more lateral cortex of the femur at this level and on T1 sequencing there appears to be extraosseous extension of the process.   There is surrounding edema associated with this lesion. Given the permeative appearance of the more lateral cortex I feel this patient would certainly be at a high risk for impending pathologic fracture. No additional lesions are present. CT CHEST WO CONTRAST DIAGNOSTIC on  9/9/2021 at [facility] documented:  · 4.1 x 3.6 cm spiculated superior segment right lower lobe lung mass suspicious for primary malignancy. · Questionable right hilar adenopathy. · Emphysema. CT ABDOMEN PELVIS WO CONTRAST on 9/9/2021 at [facility] documented:  · Nonenhanced imaging of the abdomen and pelvis reduces assessment of the visceral organs. No convincing intra-abdominal or pelvic metastasis. Probable left renal cyst. Colonic diverticulosis. · Left perineural sacral cyst. No pathologic fractures. Appointment Note from 9/21/2021 visit to Dr. Stacy Wasserman:   She was set up an appointment for Left cephalomedullary nailing of her left pathologic femur fracture to protect her femur. Plan to biopsy  From distal femoral lesion including a small sample of bone and tissue. A cephalomedullary nailing of the left pathologic femoral shaft fracture and open biopsy of a left femoral shaft bone and soft tissue mass was performed by Dr. Glynn Saldana on 9/23/2021  Pathology reported to:  · Soft tissue and bone, left femur, excision:Hemorrhagic bone and soft tissue. · Bone, left femur, reamings:Metastatic poorly differentiated carcinoma. 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 RECOMMENDATION AND PLAN by Dr. 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      · I called and spoke to Dr. Fadumo Vargas regarding radiation therapy for pain control and stabilization of the left femur and left hip, he will be seeing her immediately after leaving this clinic today, 9/28/2021 to get started. · Treatment will not be curable but rather palliative and Gisell Fraga desires systemic therapy  · Guardant 360 requested to look for actionable mutations  · Pathology specimen from 9/23/2021 requested to be sent to [facility] for NGS, MSI and PD-L1 testing  · DANNA/Marlon Hardy Final general surgery for port placement   · PET scan  · Cycle #1 of chemotherapy with Carboplatin AUC of 5/Alimta 500 mg/m²/Keytruda standard dosing will begin on 10/7/2021,       Port-A-Cath placed to right IJ on 10/4/2021 by Dr. Constance Miramontes:  · 10/1/2021 -initiated palliative radiation therapy left femur and left hip for an anticipated a dose of 3000 cGy over 10 fractions  · 10/7/2021 -initiated palliative chemotherapy with Carboplatin AUC of 5/Alimta 500 mg/m²/Keytruda      Allergies:  Patient has no known allergies. Medicines:  Current Outpatient Medications   Medication Sig Dispense Refill    lidocaine-prilocaine (EMLA) 2.5-2.5 % cream Apply topically as needed. 30 g 3    promethazine (PHENERGAN) 25 MG tablet Take 1 tablet by mouth every 8 hours as needed for Nausea 30 tablet 2    folic acid (FOLVITE) 1 MG tablet Take 1 tablet by mouth daily 90 tablet 0    dexamethasone (DECADRON) 4 MG tablet Take 1 tablet by mouth See Admin Instructions for 24 doses Take 4mg BID the day before, of and after chemotherapy every 21 days 24 tablet 0    apixaban (ELIQUIS) 5 MG TABS tablet Take 5 mg by mouth every 12 hours      HYDROcodone-acetaminophen (NORCO)  MG per tablet Take 1 tablet by mouth every 4 hours as needed.       ondansetron (ZOFRAN) 4 MG tablet Take 4 mg by mouth every 8 hours as needed      pravastatin (PRAVACHOL) 80 MG tablet TAKE 1 TABLET BY MOUTH AT BEDTIME 90 tablet 3    losartan (COZAAR) 50 MG tablet TAKE 1 TABLET BY MOUTH DAILY 90 tablet 3    Neg Hx         Social History  Social History     Tobacco Use    Smoking status: Former Smoker     Packs/day: 0.50     Years: 40.00     Pack years: 20.00     Types: Cigarettes     Start date:      Quit date:      Years since quittin.7    Smokeless tobacco: Never Used   Vaping Use    Vaping Use: Never used   Substance Use Topics    Alcohol use: No    Drug use: No             Wt Readings from Last 3 Encounters:   10/07/21 161 lb 6.4 oz (73.2 kg)   10/04/21 160 lb (72.6 kg)   21 160 lb (72.6 kg)        Objective:  Vital Signs: Blood pressure (!) 133/59, pulse 71, temperature 97.9 °F (36.6 °C), resp. rate 16, height 5' 6\" (1.676 m), weight 161 lb 6.4 oz (73.2 kg), SpO2 97 %. Labs:  BMP:   No results for input(s): NA, K, CL, CO2, PHOS, BUN, CREATININE, CALCIUM in the last 72 hours. CBC:   No results for input(s): WBC, HGB, HCT, MCV, PLT in the last 72 hours. PT/INR: No results for input(s): PROTIME, INR in the last 72 hours. APTT: No results for input(s): APTT in the last 72 hours. Magnesium:No results for input(s): MG in the last 72 hours. Phosphorus:No results for input(s): PHOS in the last 72 hours. Hepatic:   No results for input(s): ALKPHOS, ALT, AST, PROT, BILITOT, BILIDIR, LABALBU in the last 72 hours. Cultures:   No results for input(s): CULTURE in the last 72 hours.     Lab Results   Component Value Date    WBC 13.88 (H) 10/07/2021    HGB 10.4 (L) 10/07/2021    HCT 33.0 (L) 10/07/2021    MCV 92.4 10/07/2021     (H) 10/07/2021     Lab Results   Component Value Date    NEUTROABS 13.10 (H) 10/07/2021     Lab Results   Component Value Date     10/07/2021    K 4.4 10/07/2021     10/07/2021    CO2 22 10/07/2021    BUN 34 (H) 10/07/2021    CREATININE 1.7 (H) 10/07/2021    GLUCOSE 186 (H) 10/07/2021    CALCIUM 9.7 10/07/2021    PROT 6.5 10/07/2021    LABALBU 4.0 10/07/2021    BILITOT 0.7 10/07/2021    ALKPHOS 124 (H) 10/07/2021    AST 33 10/07/2021    ALT 20 10/07/2021    LABGLOM 29 (A) 10/07/2021    GFRAA 47 (L) 07/15/2021    GLOB 2.5 10/07/2021             Radiology reports as per the Radiologist  Radiology:  None       ASSESSMENT AND PLAN:    #1   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 TARGET LESIONS:  · 4.1 x 3.6 cm spiculated superior segment right lower lobe lung mass suspicious for primary malignancy. · Right hilar adenopathy. · Nondisplaced fracture at the left acetabulum and at the root of the left superior pubic ramus  · Expansile lytic lesion involving the more distal shaft of the left femur with extraosseous extension      Umu Wright is an 79year old female that was seen by Dr. Felicita Benson on 9/20/2021 originally referred by Dr Harish Cruz  with a nondisplaced fracture of the left superior pubic ramus, expansile lytic lesion of the distal third shaft of the left femur and a 4.1 x 3.6 x 3.5 cm  lung mass. A cephalomedullary nailing of the left pathologic femoral shaft fracture and open biopsy of a left femoral shaft bone and soft tissue mass was performed by Dr. Harish Cruz on 9/23/2021  Pathology reported to:  · Soft tissue and bone, left femur, excision:Hemorrhagic bone and soft tissue. · Bone, left femur, reamings:Metastatic poorly differentiated carcinoma. 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 Port-A-Cath placed to right IJ on 10/4/2021 by Dr. Alexx Kumar, insertion site appears to be healing well    Initial consultation with Dr. Konstantin Hernandez on 9/28/2021 for consideration of palliative radiation therapy for pain control and stabilization of the left femur and left hip.   Dr. Bryce Hernández recommended to treat the left leg with palliative radiation therapy for an anticipated a dose of 3000 cGy over 10 fractions, final course pending completion of planning. Dee Dee Estrada received her first fraction of radiation therapy on 10/1/2021, she received treatment #3 of 10 earlier this morning     The following are pending results:  · Guardant 360 requested on 9/28/2021 to look for actionable mutations  · Pathology specimen from 9/23/2021 requested to be sent to [facility] for NGS, MSI and PD-L1 testing on 9/28/2021  · PET scan, will ensure scan is scheduled today, 10/7/2021      Cycle #1 of chemotherapy with Carboplatin AUC of 5/Alimta 500 mg/m²/Keytruda standard dosing delivered today. Dee Dee Estrada was provided with written documentation on her drug regimen to include carboplatin, Alimta and Keytruda. I reviewed potential side effects to include but not limited to increased fatigue, bone marrow suppression, increased risk for infection, GI upset, rash, peripheral neuropathy, ototoxicity, and nephrotoxicity. She reports having Zofran on hand for nausea. Pain is currently controlled and has Norco 10 mg / 325 mg tablets that can be taken 1 every 4 hours as needed for pain. Physical examination today, 10/7/2021 She is accompanied by her son Leyla Henning. HEENT is without asymmetry extraocular movements are intact. Neck is supple no JVD no palpable lymphadenopathy, specifically no supraclavicular lymphadenopathy on the left or the right. Lungs clear to auscultation  Abdominal  with positive bowel sounds  Musculoskeletal:  Continues to have pain to her distal left femur. No peripheral edema. Neurological exam is grossly intact       #2  TUMOR SCREENING AND HEALTH MAINTENANCE    Bone Health  - DEXA BONE DENSITY AXIAL SKELETON on 8/13/2021 revealed Osteopenia. Patient takes Calcium + Vitamin D as indicated.     Breast cancer screening - [facility] DIGITAL SCREEN W OR WO CAD BILATERAL on  8/13/2021 was BI-RADS category 1     GI cancer screening - Colonoscopy per  Polo Yusuf 6/24/19 with a benign 6mm sessile polyp in the the descending colon. 5 year recall. GYN cancer screening - JUAN ALBERTO & [facility] 1970s      #3  Immunization History   COVID-19, Pfizer, PF, 30mcg/0.3mL 02/22/2021, 03/15/2021   Influenza, High Dose (Fluzone 65 yrs and older) 11/09/2018, 10/11/2019, 09/10/2020   Pneumococcal Conjugate 13-valent ([id number]) 07/13/2015   Pneumococcal Polysaccharide ([id number]) 05/09/2019   Zoster Live (Zostavax) 12/09/2016   Zoster Recombinant (Shingrix) 05/09/2019, 09/25/2019          Johnie Ledezma was seen today for cancer and treatment. Diagnoses and all orders for this visit:    Primary malignant neoplasm of lung metastatic to other site, unspecified laterality ([facility] [address]  -     CBC Auto Differential; Future  -     Comprehensive Metabolic Panel; Future  -     TSH without Reflex; Future    Lytic bone lesion of left femur    Adenopathy, hilar    Closed fracture of ramus of left pubis with routine healing, subsequent encounter        Orders Placed This Encounter   Procedures    CBC Auto Differential     Standing Status:   Future     Number of Occurrences:   1     Standing Expiration Date:   10/7/2022    Comprehensive Metabolic Panel     Standing Status:   Future     Number of Occurrences:   1     Standing Expiration Date:   10/7/2022    TSH without Reflex     Standing Status:   Future     Standing Expiration Date:   10/7/2022       No orders of the defined types were placed in this encounter. Return in about 3 weeks (around 10/28/2021) for See Prudence Odor, Return for Treatment in treatment room.

## 2021-09-30 NOTE — PROGRESS NOTES
(OSCAL) 500 MG TABS tablet Take 500 mg by mouth daily      allopurinol (ZYLOPRIM) 100 MG tablet Take 100 mg by mouth daily      Cholecalciferol (VITAMIN D) 2000 units CAPS capsule Take by mouth       No current facility-administered medications for this visit. Allergies: Patient has no known allergies. Family History   Problem Relation Age of Onset    Cancer Mother     Colon Cancer Mother     Heart Disease Father     Stroke Brother     Colon Polyps Neg Hx     Esophageal Cancer Neg Hx     Liver Cancer Neg Hx     Liver Disease Neg Hx     Rectal Cancer Neg Hx     Stomach Cancer Neg Hx        Social History     Tobacco Use    Smoking status: Former Smoker     Packs/day: 0.50     Years: 40.00     Pack years: 20.00     Types: Cigarettes     Start date:      Quit date:      Years since quittin.7    Smokeless tobacco: Never Used   Substance Use Topics    Alcohol use: No       Review of Systems   Constitutional: Negative for activity change and appetite change. HENT: Negative for congestion and sore throat. Eyes: Negative for pain and redness. Respiratory: Negative for cough and shortness of breath. Cardiovascular: Negative for chest pain and palpitations. Gastrointestinal: Negative for abdominal distention and abdominal pain. Genitourinary: Negative for dysuria and hematuria. Musculoskeletal: Positive for arthralgias and myalgias. Neurological: Negative for dizziness and headaches. Psychiatric/Behavioral: Negative for confusion and dysphoric mood. Physical Exam  Vitals reviewed. Constitutional:       General: She is not in acute distress. HENT:      Head: Normocephalic and atraumatic. Nose:      Comments: Wearing face mask  Eyes:      General: No scleral icterus. Pupils: Pupils are equal, round, and reactive to light. Cardiovascular:      Rate and Rhythm: Normal rate and regular rhythm.    Pulmonary:      Effort: Pulmonary effort is normal. No respiratory distress. Abdominal:      General: There is no distension. Palpations: Abdomen is soft. Musculoskeletal:         General: No swelling or deformity. Cervical back: Neck supple. No rigidity. Skin:     General: Skin is warm and dry. Neurological:      General: No focal deficit present. Mental Status: She is alert. Mental status is at baseline. Psychiatric:         Mood and Affect: Mood normal.         Behavior: Behavior normal.         Assessment and plan:  79year old female with metastatic lung cancer  The risks and benefits of port placement with US and fluoro were discussed with the patient, including but no limited to, bleeding, infection, and pneumothorax. The patient expressed understanding and would like to proceed with surgery. Will have her hold her eliquis for 48 hours before surgery.      Donn Palmer MD  9/30/2021  11:48 AM

## 2021-09-30 NOTE — LETTER
Preop Orders:     Patient: Cherry Tobias  : 1940    Hospital: [facility]    Admitting Physician:  Dr. Carla Jones    Diagnosis: metastatic lung cancer    Procedure: single lumen port placement with ultrasound and fluoro    Time: 1 hour    Anesthesia: MAC    Admission: Outpatient     Date: to be scheduled- Monday or Wednesday    Labs: per anesthesia     Special Instructions:  none    Cardiac Clearance:  none    Special Equipment:  none    Pre-Op Meds:  ancef    Latex Allergy: no    Beta Blocker: no    Medication Instructions: hold eliquis 48 hours preop    Post op visit: PRN      Electronically signed by Patrice Perez MD   On 21   @ 11:15 AM

## 2021-10-01 ENCOUNTER — HOSPITAL ENCOUNTER (OUTPATIENT)
Dept: RADIATION ONCOLOGY | Facility: HOSPITAL | Age: 81
Setting detail: RADIATION/ONCOLOGY SERIES
End: 2021-10-01

## 2021-10-01 PROCEDURE — 77334 RADIATION TREATMENT AID(S): CPT | Performed by: RADIOLOGY

## 2021-10-01 PROCEDURE — 77307 TELETHX ISODOSE PLAN CPLX: CPT | Performed by: RADIOLOGY

## 2021-10-01 NOTE — TELEPHONE ENCOUNTER
Spoke with patients son, Ophelia Mcfadden, regarding treatment regimen, premedications and appointment schedule. Rx phoned to pharmacy (Folic Acid daily + Decadron 4mg BID day before of and after chemo). Ophelia Mcfadden reports that Russ Moran had prework today (EKG, COVID test) for port placement scheduled with Dr Ami Pastrana on 10/4. She has f/u with Dr Stacy Wasserman on 10/6 for staple removal and to clinic on 10/7 for C1D1 Carbo/Alimta/Keytruda.

## 2021-10-04 NOTE — H&P
3    triamterene-hydrochlorothiazide (MAXZIDE-25) 37.5-25 MG per tablet Take 0.5 tablets by mouth daily 45 tablet 3    calcium carbonate (OSCAL) 500 MG TABS tablet Take 500 mg by mouth daily        allopurinol (ZYLOPRIM) 100 MG tablet Take 100 mg by mouth daily        Cholecalciferol (VITAMIN D) 2000 units CAPS capsule Take by mouth          No current facility-administered medications for this visit.         Allergies: Patient has no known allergies.     Family History         Family History   Problem Relation Age of Onset    Cancer Mother      Colon Cancer Mother      Heart Disease Father      Stroke Brother      Colon Polyps Neg Hx      Esophageal Cancer Neg Hx      Liver Cancer Neg Hx      Liver Disease Neg Hx      Rectal Cancer Neg Hx      Stomach Cancer Neg Hx              Social History            Tobacco Use    Smoking status: Former Smoker       Packs/day: 0.50       Years: 40.00       Pack years: 20.00       Types: Cigarettes       Start date:        Quit date:        Years since quittin.7    Smokeless tobacco: Never Used   Substance Use Topics    Alcohol use: No         Review of Systems   Constitutional: Negative for activity change and appetite change. HENT: Negative for congestion and sore throat. Eyes: Negative for pain and redness. Respiratory: Negative for cough and shortness of breath. Cardiovascular: Negative for chest pain and palpitations. Gastrointestinal: Negative for abdominal distention and abdominal pain. Genitourinary: Negative for dysuria and hematuria. Musculoskeletal: Positive for arthralgias and myalgias. Neurological: Negative for dizziness and headaches. Psychiatric/Behavioral: Negative for confusion and dysphoric mood.         Physical Exam  Vitals reviewed. Constitutional:       General: She is not in acute distress. HENT:      Head: Normocephalic and atraumatic.       Nose:      Comments: Wearing face mask  Eyes:      General: No scleral icterus. Pupils: Pupils are equal, round, and reactive to light. Cardiovascular:      Rate and Rhythm: Normal rate and regular rhythm. Pulmonary:      Effort: Pulmonary effort is normal. No respiratory distress. Abdominal:      General: There is no distension. Palpations: Abdomen is soft. Musculoskeletal:         General: No swelling or deformity. Cervical back: Neck supple. No rigidity. Skin:     General: Skin is warm and dry. Neurological:      General: No focal deficit present. Mental Status: She is alert. Mental status is at baseline. Psychiatric:         Mood and Affect: Mood normal.         Behavior: Behavior normal.            Assessment and plan:  79year old female with metastatic lung cancer  The risks and benefits of port placement with US and fluoro were discussed with the patient, including but no limited to, bleeding, infection, and pneumothorax. The patient expressed understanding and would like to proceed with surgery.  Will have her hold her eliquis for 48 hours before surgery.      Madonna Cote MD  9/30/2021  11:48 AM

## 2021-10-04 NOTE — H&P (VIEW-ONLY)
Ms. Paz Walls is an 79year old female, referred from Dr. Gee Mayer for port placement in order to start chemotherapy. She has recent diagnosis of metastatic carcinoma, likely lung primary. She is scheduled to return to oncology next week for treatments. She is also seeing Dr. Arsenio King for radiation. She was diagnosed after having a pathological left femur fracture requiring pinning and biopsy.  She has been on eliquis since that surgery.      Past Medical History        Past Medical History:   Diagnosis Date    Acid reflux      LORA (acute kidney injury) ([facility] [address] 7/28/2018    HIGH CHOLESTEROL      Hypertension      Metastatic lung cancer (metastasis from lung to other [facility]) [facility]) 9/28/2021    Metastatic lung cancer (metastasis from lung to other site) [facility]) 9/28/2021    Urinary incontinence      UTI (urinary tract infection)      Vertigo           Past Surgical History         Past Surgical History:   Procedure Laterality Date    CHOLECYSTECTOMY        COLONOSCOPY         Dr Philomena Martins 6/24/2019     Dr Antonio Juarez-Diverticular disease-Tubular AP (-) dysplasia, 5 yr recall    HYSTERECTOMY        VARICOSE VEIN SURGERY         laser          Current Facility-Administered Medications          Current Outpatient Medications   Medication Sig Dispense Refill    apixaban (ELIQUIS) 5 MG TABS tablet Take 5 mg by mouth every 12 hours        HYDROcodone-acetaminophen (NORCO)  MG per tablet Take 1 tablet by mouth every 4 hours as needed.        ondansetron (ZOFRAN) 4 MG tablet Take 4 mg by mouth every 8 hours as needed        pravastatin (PRAVACHOL) 80 MG tablet TAKE 1 TABLET BY MOUTH AT BEDTIME 90 tablet 3    losartan (COZAAR) 50 MG tablet TAKE 1 TABLET BY MOUTH DAILY 90 tablet 3    oxybutynin (DITROPAN) 5 MG tablet TAKE 1 TABLET BY MOUTH TWO TIMES A  tablet 3    pantoprazole (PROTONIX) 40 MG tablet TAKE 2 TABLETS BY MOUTH DAILY (Patient taking differently: Take 40 mg by mouth daily ) 180 tablet 3    triamterene-hydrochlorothiazide (MAXZIDE-25) 37.5-25 MG per tablet Take 0.5 tablets by mouth daily 45 tablet 3    calcium carbonate (OSCAL) 500 MG TABS tablet Take 500 mg by mouth daily        allopurinol (ZYLOPRIM) 100 MG tablet Take 100 mg by mouth daily        Cholecalciferol (VITAMIN D) 2000 units CAPS capsule Take by mouth          No current facility-administered medications for this visit.         Allergies: Patient has no known allergies.     Family History         Family History   Problem Relation Age of Onset    Cancer Mother      Colon Cancer Mother      Heart Disease Father      Stroke Brother      Colon Polyps Neg Hx      Esophageal Cancer Neg Hx      Liver Cancer Neg Hx      Liver Disease Neg Hx      Rectal Cancer Neg Hx      Stomach Cancer Neg Hx              Social History            Tobacco Use    Smoking status: Former Smoker       Packs/day: 0.50       Years: 40.00       Pack years: 20.00       Types: Cigarettes       Start date:        Quit date:        Years since quittin.7    Smokeless tobacco: Never Used   Substance Use Topics    Alcohol use: No         Review of Systems   Constitutional: Negative for activity change and appetite change. HENT: Negative for congestion and sore throat. Eyes: Negative for pain and redness. Respiratory: Negative for cough and shortness of breath. Cardiovascular: Negative for chest pain and palpitations. Gastrointestinal: Negative for abdominal distention and abdominal pain. Genitourinary: Negative for dysuria and hematuria. Musculoskeletal: Positive for arthralgias and myalgias. Neurological: Negative for dizziness and headaches. Psychiatric/Behavioral: Negative for confusion and dysphoric mood.         Physical Exam  Vitals reviewed. Constitutional:       General: She is not in acute distress. HENT:      Head: Normocephalic and atraumatic.       Nose:      Comments: Wearing face mask  Eyes:      General: No scleral icterus. Pupils: Pupils are equal, round, and reactive to light. Cardiovascular:      Rate and Rhythm: Normal rate and regular rhythm. Pulmonary:      Effort: Pulmonary effort is normal. No respiratory distress. Abdominal:      General: There is no distension. Palpations: Abdomen is soft. Musculoskeletal:         General: No swelling or deformity. Cervical back: Neck supple. No rigidity. Skin:     General: Skin is warm and dry. Neurological:      General: No focal deficit present. Mental Status: She is alert. Mental status is at baseline. Psychiatric:         Mood and Affect: Mood normal.         Behavior: Behavior normal.            Assessment and plan:  79year old female with metastatic lung cancer  The risks and benefits of port placement with US and fluoro were discussed with the patient, including but no limited to, bleeding, infection, and pneumothorax. The patient expressed understanding and would like to proceed with surgery.  Will have her hold her eliquis for 48 hours before surgery.      Tahira Navas MD  9/30/2021  11:48 AM

## 2021-10-04 NOTE — OP NOTE
flushed. The catheter was then tunneled from the chest incision out the small neck incision. The port was secured to the chest wall using PDS. Fluoro was used to guide trimming the catheter to the appropriate length. The sheath dilator was then placed over the guidewire and advanced. The dilator and guidewire were removed. The catheter was inserted in the sheath, the sheath was broken in half, and the catheter was advanced. Fluoro was used to insure appropriate positioning of the catheter tip. There were no signs of pneumothorax. The port was then aspirated and flushed with heparin solution. The small neck incision was closed with 4-0 monocryl subcuticular stitch. The chest incision was re approximated with 3-0 vicryl and then the skin was closed using 4-0 monocryl subcuticular stitches. Sterile dressings with dermabond were applied. The patient tolerated the procedure well, was removed from anesthesia, and taken to the recovery room in stable condition.                 Modesta Cade MD    Electronically signed 10/04/21 at 12:59 PM

## 2021-10-04 NOTE — INTERVAL H&P NOTE
Update History & Physical    The patient's History and Physical of September 30, 2021 was reviewed with the patient and I examined the patient. There was no change. The surgical site was confirmed by the patient and me. Plan: The risks, benefits, expected outcome, and alternative to the recommended procedure have been discussed with the patient. Patient understands and wants to proceed with the procedure.      Electronically signed by Madonna Coet MD on 10/4/2021 at 11:48 AM

## 2021-10-04 NOTE — ANESTHESIA PRE PROCEDURE
Department of Anesthesiology  Preprocedure Note       Name:  Ximena López   Age:  79 y.o.  :  1940                                          MRN:  [id number]         Date:  10/4/2021      Surgeon: Shakira Rowe):  Yee Peñaloza MD    Procedure: Procedure(s):  SINGLE LUMEN PORT PLMT WITH ULTRASOUND AND FLURO    Medications prior to admission:   Prior to Admission medications    Medication Sig Start Date End Date Taking? Authorizing Provider   folic acid (FOLVITE) 1 MG tablet Take 1 tablet by mouth daily 10/1/21 12/30/21 Yes Murphy Lawrence MD   HYDROcodone-acetaminophen [facility])  MG per tablet Take 1 tablet by mouth every 4 hours as needed.  21  Yes Historical Provider, MD   ondansetron (ZOFRAN) 4 MG tablet Take 4 mg by mouth every 8 hours as needed 21  Yes Historical Provider, MD   pravastatin (PRAVACHOL) 80 MG tablet TAKE 1 TABLET BY MOUTH AT BEDTIME 21  Yes Royal Ojeda MD   losartan (COZAAR) 50 MG tablet TAKE 1 TABLET BY MOUTH DAILY 21  Yes Royal Ojeda MD   oxybutynin (DITROPAN) 5 MG tablet TAKE 1 TABLET BY MOUTH TWO TIMES A DAY 8/10/20  Yes Royal Ojeda MD   pantoprazole (PROTONIX) 40 MG tablet TAKE 2 TABLETS BY MOUTH DAILY  Patient taking differently: Take 40 mg by mouth daily  20  Yes Royal Ojeda MD   triamterene-hydrochlorothiazide (MAXZIDE-25) 37.5-25 MG per tablet Take 0.5 tablets by mouth daily 19  Yes Royal Ojeda MD   calcium carbonate (OSCAL) 500 MG TABS tablet Take 500 mg by mouth daily   Yes Historical Provider, MD   allopurinol (ZYLOPRIM) 100 MG tablet Take 100 mg by mouth daily   Yes Historical Provider, MD   Cholecalciferol (VITAMIN D) 2000 units CAPS capsule Take by mouth   Yes Historical Provider, MD   dexamethasone (DECADRON) 4 MG tablet Take 1 tablet by mouth See Admin Instructions for 24 doses Take 4mg BID the day before, of and after chemotherapy every 21 days 10/1/21   Murphy Lawrence MD   apixaban (ELIQUIS) 5 MG TABS tablet Take 5 mg by mouth every 12 hours 21   Historical Provider, MD       Current medications:    Current Facility-Administered Medications   Medication Dose Route Frequency Provider Last Rate Last Admin    lactated ringers infusion   IntraVENous Continuous DAVIDSON Miguel CRNA 125 mL/hr at 10/04/21 1049 New Bag at 10/04/21 1049       Allergies:  No Known Allergies    Problem List:    Patient Active Problem List   Diagnosis Code    Wrist pain, right M25.531    Distal radius fracture S52.509A    Fall from other slipping, tripping, or stumbling W01. 0XXA    Hypertension I10    Pneumonia J18.9    LORA (acute kidney injury) ([facility] [address] T34.9    Metabolic acidosis, increased anion gap (IAG) E87.2    Colorectal polyps K63.5    Chronic kidney disease N18.9    Metastatic lung cancer (metastasis from lung to other site) [facility]) C34.90       Past Medical History:        Diagnosis Date    Acid reflux     LORA (acute kidney injury) ([facility] [address] 2018    HIGH CHOLESTEROL     Hypertension     Metastatic lung [facility]metast[facility] lung [facility] other [facility]) [facility]) 2021    Metastatic lung cancer (metastasis [facility] lung to other site[facility]) 2021    Urinary incontinence     UTI (urinary tract infection)     Vertigo        Past Surgical History:        Procedure Laterality Date   Gunner Olsen COLONOSCOPY      Dr Kusum Colon 2019    Dr SARAH Juarez-Diverticular disease-Tubular AP (-) dysplasia, 5 yr recall    HYSTERECTOMY      VARICOSE VEIN SURGERY      laser        Social History:    Social History     Tobacco Use    Smoking status: Former Smoker     Packs/day: 0.50     Years: 40.00     Pack years: 20.00     Types: Cigarettes     Start date:      Quit date:      Years since quittin.7    Smokeless tobacco: Never Used   Substance Use Topics    Alcohol use:  No                                Counseling given: Not Answered      Vital Signs (Current):   Vitals:    10/04/21 1009   BP: (!) 144/62   Pulse: 68   Resp: 18   Temp: 98 °F (36.7 °C)   TempSrc: Oral   SpO2: 94%   Weight: 160 lb (72.6 kg)   Height: 5' 6\" (1.676 m)                                              BP Readings from Last 3 Encounters:   10/04/21 (!) 144/62   09/30/21 124/88   09/28/21 126/82       NPO Status: Time of last liquid consumption: 2000                        Time of last solid consumption: 1700                        Date of last liquid consumption: 10/03/21                        Date of last solid food consumption: 10/03/21    BMI:   Wt Readings from Last 3 Encounters:   10/04/21 160 lb (72.6 kg)   09/30/21 160 lb (72.6 kg)   09/28/21 165 lb 12.8 oz (75.2 kg)     Body mass index is 25.82 kg/m². CBC:   Lab Results   Component Value Date    WBC 10.36 09/28/2021    RBC 3.98 09/28/2021    HGB 11.6 09/28/2021    HCT 38.7 09/28/2021    MCV 97.2 09/28/2021    RDW 14.8 09/28/2021     09/28/2021       CMP:   Lab Results   Component Value Date     09/28/2021    K 5.1 09/28/2021    K 3.0 07/29/2018     09/28/2021    CO2 27 09/28/2021    BUN 31 09/28/2021    CREATININE 1.7 09/28/2021    GFRAA 47 07/15/2021    LABGLOM 29 09/28/2021    GLUCOSE 99 09/28/2021    PROT 6.8 09/28/2021    CALCIUM 10.5 09/28/2021    BILITOT 0.9 09/28/2021    ALKPHOS 136 09/28/2021    AST 23 09/28/2021    ALT 17 09/28/2021       POC Tests: No results for input(s): POCGLU, POCNA, POCK, POCCL, POCBUN, POCHEMO, POCHCT in the last 72 hours.     Coags:   Lab Results   Component Value Date    PROTIME 13.0 08/11/2015    INR 1.01 08/11/2015       HCG (If Applicable): No results found for: PREGTESTUR, PREGSERUM, HCG, HCGQUANT     ABGs: No results found for: PHART, PO2ART, UYM6HOI, UPH5UJQ, BEART, H7PSUZEH     Type & Screen (If Applicable):  No results found for: LABABO, LABRH    Drug/Infectious Status (If Applicable):  No results found for: HIV, HEPCAB    COVID-19 Screening (If Applicable):   Lab Results   Component Value Date    COVID19 Not Detected 10/01/2021           Anesthesia Evaluation    Airway: Mallampati: II  TM distance: >3 FB   Neck ROM: full  Mouth opening: > = 3 FB Dental: normal exam         Pulmonary:normal exam  breath sounds clear to auscultation  (+) shortness of breath:                             Cardiovascular:  Exercise tolerance: good (>4 METS),         ECG reviewed  Rhythm: regular  Rate: normal           Beta Blocker:  Not on Beta Blocker         Neuro/Psych:               GI/Hepatic/Renal:             Endo/Other:    (+) malignancy/cancer. ROS comment: history of lung cancer Abdominal:             Vascular: Other Findings:             Anesthesia Plan      MAC     ASA 2       Induction: intravenous. Anesthetic plan and risks discussed with patient.                       DAVIDSON Carty - AMADA   10/4/2021

## 2021-10-04 NOTE — DISCHARGE INSTR - ACTIVITY
Activity as tolerated except no driving if taking pain medication. Okay to shower over incisions, but do not submerge under water like a tub or pool for 10 days. Watch for redness, drainage, fever, or worsening pain, and call for any questions or concerns.

## 2021-10-04 NOTE — ANESTHESIA POSTPROCEDURE EVALUATION
Department of Anesthesiology  Postprocedure Note    Patient: Karime Neumann  MRN: [id number]  YOB: 1940  Date of evaluation: 10/4/2021  Time:  1:06 PM     Procedure Summary     Date: 10/04/21 Room / Location: Count includes the [facility] OR  / [address] [place] [address]    Anesthesia Start: 8064 Anesthesia Stop: 2984    Procedure: SINGLE LUMEN PORT PLMT WITH ULTRASOUND AND FLURO (N/A Chest) Diagnosis: (METASTATIC LUNG CANCER)    Surgeons: Vi Pink MD Responsible Provider: DAVIDSON Lozano CRNA    Anesthesia Type: MAC ASA Status: 2          Anesthesia Type: MAC    Thiago Phase I:      Thiago Phase II: Thiago Score: 10    Last vitals: Reviewed and per EMR flowsheets.        Anesthesia Post Evaluation    Patient location during evaluation: bedside  Patient participation: complete - patient participated  Level of consciousness: awake  Airway patency: patent  Nausea & Vomiting: no nausea and no vomiting  Complications: no  Cardiovascular status: hemodynamically stable  Respiratory status: acceptable  Hydration status: euvolemic

## 2021-10-06 ENCOUNTER — HOSPITAL ENCOUNTER (OUTPATIENT)
Dept: RADIATION ONCOLOGY | Facility: HOSPITAL | Age: 81
Setting detail: RADIATION/ONCOLOGY SERIES
Discharge: HOME | End: 2021-10-06

## 2021-10-06 PROCEDURE — 77417 THER RADIOLOGY PORT IMAGE(S): CPT | Performed by: RADIOLOGY

## 2021-10-06 PROCEDURE — 77412 RADIATION TX DELIVERY LVL 3: CPT | Performed by: RADIOLOGY

## 2021-10-07 ENCOUNTER — HOSPITAL ENCOUNTER (OUTPATIENT)
Dept: RADIATION ONCOLOGY | Facility: HOSPITAL | Age: 81
Setting detail: RADIATION/ONCOLOGY SERIES
Discharge: HOME | End: 2021-10-07

## 2021-10-07 PROCEDURE — 77412 RADIATION TX DELIVERY LVL 3: CPT | Performed by: RADIOLOGY

## 2021-10-08 ENCOUNTER — HOSPITAL ENCOUNTER (OUTPATIENT)
Dept: RADIATION ONCOLOGY | Facility: HOSPITAL | Age: 81
Setting detail: RADIATION/ONCOLOGY SERIES
Discharge: HOME | End: 2021-10-08

## 2021-10-08 PROCEDURE — 77412 RADIATION TX DELIVERY LVL 3: CPT | Performed by: RADIOLOGY

## 2021-10-11 ENCOUNTER — HOSPITAL ENCOUNTER (OUTPATIENT)
Dept: RADIATION ONCOLOGY | Facility: HOSPITAL | Age: 81
Setting detail: RADIATION/ONCOLOGY SERIES
Discharge: HOME | End: 2021-10-11

## 2021-10-11 PROCEDURE — 77412 RADIATION TX DELIVERY LVL 3: CPT | Performed by: RADIOLOGY

## 2021-10-12 ENCOUNTER — HOSPITAL ENCOUNTER (OUTPATIENT)
Dept: RADIATION ONCOLOGY | Facility: HOSPITAL | Age: 81
Setting detail: RADIATION/ONCOLOGY SERIES
Discharge: HOME | End: 2021-10-12

## 2021-10-12 PROCEDURE — 77412 RADIATION TX DELIVERY LVL 3: CPT | Performed by: RADIOLOGY

## 2021-10-12 NOTE — TELEPHONE ENCOUNTER
Patient's son Macy Blum) called as his mother is not eating or drinking and getting dehydrated. She is currently in PET scan but it extremely weak. I have talked to DAVIDSON Haq and patient is to come over to our office as soon as she is down with PET scan to get labs and IVF. Claus Palencia.  Gloria Alejandra

## 2021-10-13 ENCOUNTER — TELEPHONE (OUTPATIENT)
Dept: RADIATION ONCOLOGY | Facility: HOSPITAL | Age: 81
End: 2021-10-13

## 2021-10-13 ENCOUNTER — HOSPITAL ENCOUNTER (OUTPATIENT)
Dept: RADIATION ONCOLOGY | Facility: HOSPITAL | Age: 81
Setting detail: RADIATION/ONCOLOGY SERIES
Discharge: HOME | End: 2021-10-13

## 2021-10-13 PROCEDURE — 77336 RADIATION PHYSICS CONSULT: CPT | Performed by: RADIOLOGY

## 2021-10-13 PROCEDURE — 77417 THER RADIOLOGY PORT IMAGE(S): CPT | Performed by: RADIOLOGY

## 2021-10-13 PROCEDURE — 77412 RADIATION TX DELIVERY LVL 3: CPT | Performed by: RADIOLOGY

## 2021-10-13 NOTE — TELEPHONE ENCOUNTER
Fern Urrutia RN with Dr. Liu called to notify us that patient had PET 10- at [facility].    Patient currently under treatment to left hip and femur.    PET scan transferred from [facility] electronically to [facility] Radiology system for review.    Dr. Reeder notified to review PET.

## 2021-10-13 NOTE — TELEPHONE ENCOUNTER
SUBJECTIVE:  Tessie is a  40 year old   female who presents today for annual checkup..      PAST MEDICAL HISTORY:    Past Medical History:   Diagnosis Date   • Anxiety    • Connective tissue disorder (CMS/HCC) 2013   • Pernicious anemia 2013   • Plantar fascial fibromatosis 1997    resolved       SOCIAL HISTORY:   Social History     Tobacco Use   • Smoking status: Never Smoker   • Smokeless tobacco: Never Used   Substance Use Topics   • Alcohol use: Yes     Alcohol/week: 0.0 standard drinks     Comment: rare   ,does not have insurance    FAMILY HISTORY:    Family History   Problem Relation Age of Onset   • Cancer Paternal Grandmother         breast   • Cancer Maternal Aunt         breast       MEDICATIONS:   Current Outpatient Medications   Medication Sig Dispense Refill   • levonorgestrel-ethinyl estradiol (Vienva) 0.1-20 MG-MCG per tablet Take 1 tablet by mouth daily. 84 tablet 4   • doxycycline monohydrate (MONODOX) 100 MG capsule TAKE 1 CAPSULE BY MOUTH TWICE DAILY 60 capsule 1   • buPROPion (WELLBUTRIN XL) 300 MG 24 hr tablet Take 1 tablet by mouth daily. 30 tablet 11   • hydroxychloroquine (PLAQUENIL) 200 MG tablet Take 1.5 tablets by mouth daily. PLEASE SCHEDULE AN APPOINTMENT WITH DR. ACEVEDO 45 tablet 11   • buPROPion (WELLBUTRIN XL) 300 MG 24 hr tablet TAKE 1 TABLET BY MOUTH EVERY DAY 90 tablet 0   • ketoconazole (NIZORAL) 2 % shampoo Apply to scalp 3 times a week as needed 120 mL 2   • triamcinolone (ARISTOCORT) 0.1 % cream Apply to rash areas twice, do not use on face or body folds. 60 g 0   • cyclobenzaprine (FLEXERIL) 10 MG tablet Take 1 tablet by mouth 3 times daily as needed for Muscle spasms. 30 tablet 0   • spironolactone (ALDACTONE) 50 MG tablet TAKE 1 TABLET BY MOUTH EVERY DAY 30 tablet 1   • clindamycin-benzoyl peroxide 1.2-5 % gel Apply topically 2 times daily. 45 g 2   • hydroquinone 10 % cream Apply to dark spots twice daily. 30 g 1     No current  This RN called to check on PT this AM.  She states that she was able to eat last night. She states that she only had 1 episode of vomiting around 10pm last night. She states she took her phenergan and went to bed. She said she was able to take her zofran this AM and drink a boost.  She states she will alternate the phenergan and zofran as directed and she will call us this afternoon if she is unable to keep things down or if she is not feeling well. facility-administered medications for this visit.        ALLERGIES:    Allergies as of 2020   • (No Known Allergies)       OBSTETRIC/GYNECOLOGIC HISTORY:   OB History    Para Term  AB Living   3 3 3 0 0 3   SAB TAB Ectopic Molar Multiple Live Births   0 0 0 0 0 3    No LMP recorded. (Menstrual status: Other). Menstrual history includes regular. Patient Denies history of sexually transmitted diseases; has no history of abnormal PAP.  Present form of contraception is birth control pills          OBJECTIVE:  Visit Vitals  /76   Ht 5' 8\" (1.727 m)   Wt 79.8 kg   BMI 26.76 kg/m²     NECK: Supple  LUNGS: clear to auscultation  HEART: Regular rate, regular rhythm  BREASTS: no dominant or suspicious mass  ABDOMEN: Benign, Soft, flat, non-tender, No masses, organomegaly  EXTREMITIES: Normal  SKIN:Normal    PELVIC EXAM:Performed with medium slick.  Vulva: no lesions or masses noted and no erythema or discharge  Vagina:normal size & caliber  Cervix:Normal in appearance, no lesions or masses and no discharge or bleeding. No cervical motion tenderness  Uterus: firm, non-tender, normal size, normal shape  Adnexa:non-tender, no fullness noted and no masses noted  RECTAL: not performed    ASSESSMENT  > Essentially normal well woman exam.    PLAN  > Pap done,  will notify of results.  > Reinforced Self-Breast Exam.  > Will return to clinic in 1 year and will call with any questions/concerns prior to that time.  > mammogram,will do once she has insurance  > refill birth control pills

## 2021-10-14 ENCOUNTER — HOSPITAL ENCOUNTER (OUTPATIENT)
Dept: RADIATION ONCOLOGY | Facility: HOSPITAL | Age: 81
Setting detail: RADIATION/ONCOLOGY SERIES
Discharge: HOME | End: 2021-10-14

## 2021-10-14 PROCEDURE — 77412 RADIATION TX DELIVERY LVL 3: CPT | Performed by: RADIOLOGY

## 2021-10-14 NOTE — ED PROVIDER NOTES
[facility] [place] EMERGENCY DEPT  eMERGENCY dEPARTMENT eNCOUnter      Pt Name: Sandy Joya  MRN: [id number]  [place] 1940  Date of evaluation: 10/13/2021  Provider: Eduar Alfonso MD    [address] [place] [address]       Chief Complaint   Patient presents with    Nausea    Emesis         HISTORY OF PRESENT ILLNESS   (Location/Symptom, Timing/Onset,Context/Setting, Quality, Duration, Modifying Factors, Severity)  Note limiting factors. Sandy Joya is a 79 y.o. female who presents to the emergency department for evaluation regarding nausea with associated vomiting. Patient has a prior history of lung cancer with metastasis and is currently undergoing outpatient chemotherapy. Just recently received some IV fluids in the oncology office couple of days ago. States that she continues to have ongoing nausea, decreased p.o. intake. She describes lack of energy and some associated body aches. Patient denies chest pain or shortness of breath. She is not had any episodes of diarrhea. HPI    NursingNotes were reviewed. REVIEW OF SYSTEMS    (2-9 systems for level 4, 10 or more for level 5)     Review of Systems   Constitutional: Positive for appetite change and fatigue. Negative for chills and fever. Respiratory: Negative for cough and shortness of breath. Cardiovascular: Negative for chest pain and palpitations. Gastrointestinal: Positive for nausea and vomiting. Negative for abdominal pain. Neurological: Positive for dizziness. All other systems reviewed and are negative.            PAST MEDICALHISTORY     Past Medical History:   Diagnosis Date    Acid reflux     LORA (acute kidney injury) ([facility] [address] 7/28/2018    HIGH CHOLESTEROL     Hypertension     Metastatic lung cancer (metastasis from lung to other site) [facility]) 9/28/2021    Metastatic lung cancer (metastasis from lung to other site) [facility]) 9/28/2021    Urinary incontinence     UTI (urinary tract infection)     Vertigo          SURGICAL HISTORY       Past Surgical History:   Procedure Laterality Date   [facility] Dr Roddy Kales 6/24/2019    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 Cook disease-Tubular AP (-) dysplasia, 5 yr recall    HYSTERECTOMY      PORT SURGERY N/A 10/4/2021    SINGLE LUMEN PORT PLMT WITH ULTRASOUND AND FLURO performed by Greg Garrett MD at [address] [place] [address]     Discharge Medication List as of 10/14/2021  2:40 AM      CONTINUE these medications which have NOT CHANGED    Details   ondansetron (ZOFRAN-ODT) 8 MG TBDP disintegrating tablet Place 1 tablet under the tongue every 8 hours as needed for Nausea or Vomiting, Disp-30 tablet, R-2Normal      promethazine (PHENERGAN) 25 MG tablet Take 1 tablet by mouth every 8 hours as needed for Nausea, Disp-30 tablet, R-2Normal      lidocaine-prilocaine (EMLA) 2.5-2.5 % cream Apply topically as needed. , Disp-30 g, R-3, Normal      folic acid (FOLVITE) 1 MG tablet Take 1 tablet by mouth daily, Disp-90 tablet, R-0Normal      dexamethasone (DECADRON) 4 MG tablet Take 1 tablet by mouth See Admin Instructions for 24 doses Take 4mg BID the day before, of and after chemotherapy every 21 days, Disp-24 tablet, R-0Normal      apixaban (ELIQUIS) 5 MG TABS tablet Take 5 mg by mouth every 12 hoursHistorical Med      HYDROcodone-acetaminophen (NORCO)  MG per tablet Take 1 tablet by mouth every 4 hours as needed. Historical Med      pravastatin (PRAVACHOL) 80 MG tablet TAKE 1 TABLET BY MOUTH AT BEDTIME, Disp-90 tablet, R-3Normal      losartan (COZAAR) 50 MG tablet TAKE 1 TABLET BY MOUTH DAILY, Disp-90 tablet, R-3Normal      oxybutynin (DITROPAN) 5 MG tablet TAKE 1 TABLET BY MOUTH TWO TIMES A DAY, Disp-180 tablet,R-3Normal      pantoprazole (PROTONIX) 40 MG tablet TAKE 2 TABLETS BY MOUTH DAILY, Disp-180 tablet,R-3Normal      triamterene-hydrochlorothiazide (MAXZIDE-25) 37.5-25 MG per tablet Take 0.5 tablets by mouth daily, Disp-45 tablet, R-3Normal      calcium carbonate (OSCAL) 500 MG TABS tablet Take 500 mg by mouth dailyHistorical Med      allopurinol (ZYLOPRIM) 100 MG tablet Take 100 mg by mouth dailyHistorical Med      Cholecalciferol (VITAMIN D) 2000 units CAPS capsule Take by mouthHistorical Med             ALLERGIES     Patient has no known allergies. FAMILY HISTORY       Family History   Problem Relation Age of Onset    Cancer Mother     Colon Cancer Mother     Heart Disease Father     Stroke Brother     Colon Polyps Neg Hx     Esophageal Cancer Neg Hx     Liver Cancer Neg Hx     Liver Disease Neg Hx     Rectal Cancer Neg Hx     Stomach Cancer Neg Hx           SOCIAL HISTORY       Social History     Socioeconomic History    Marital status:      Spouse name: Suzanne Hawkins Number of children: Not on file    Years of education: Not on file    Highest education level: Not on file   Occupational History     Employer: RETIRED   Tobacco Use    Smoking status: Former Smoker     Packs/day: 0.50     Years: 40.00     Pack years: 20.00     Types: Cigarettes     Start date:      Quit date:      Years since quittin.8    Smokeless tobacco: Never Used   Vaping Use    Vaping Use: Never used   Substance and Sexual Activity    Alcohol use: No    Drug use: No    Sexual activity: Not on file   Other Topics Concern    Not on file   Social History Narrative    Not on file     Social Determinants of Health     Financial Resource Strain: Low Risk     Difficulty of Paying Living Expenses: Not hard at all   Food Insecurity: No Food Insecurity    Worried About [facility] in the Last Year: Never true    Светлана of Food in the Last Year: Never true   Transportation Needs: No Transportation Needs    Lack of Transportation (Medical): No    Lack of Transportation (Non-Medical):  No   Physical Activity:     Days of Exercise per Week:     Minutes of Exercise per Session:    Stress:     Feeling of Stress :    Social Connections:     Frequency of Communication with Friends and Family:     Frequency of Social Gatherings with Friends and Family:     Attends [employer] Services:     Active Member of Clubs or Organizations:     Attends Club or Organization Meetings:     Marital Status:    Intimate Partner Violence:     Fear of Current or Ex-Partner:     Emotionally Abused:     Physically Abused:     Sexually Abused:        SCREENINGS             PHYSICAL EXAM    (up to 7 for level 4, 8 or more for level 5)     ED Triage Vitals [10/13/21 2344]   BP Temp Temp Source Pulse Resp SpO2 Height Weight   (!) 151/70 98.6 °F (37 °C) Oral 68 18 98 % 5' 5\" (1.651 m) 150 lb (68 kg)       Physical Exam  Vitals and nursing note reviewed. HENT:      Head: Atraumatic. Mouth/Throat:      Mouth: Mucous membranes are dry. Pharynx: No posterior oropharyngeal erythema. Eyes:      General: No scleral icterus. Pupils: Pupils are equal, round, and reactive to light. Neck:      Trachea: No tracheal deviation. Cardiovascular:      Rate and Rhythm: Normal rate and regular rhythm. Pulses: Normal pulses. Heart sounds: Normal heart sounds. No murmur heard. Pulmonary:      Effort: Pulmonary effort is normal. No respiratory distress. Breath sounds: Normal breath sounds. No stridor. Abdominal:      General: There is no distension. Palpations: Abdomen is soft. Tenderness: There is no abdominal tenderness. There is no guarding. Musculoskeletal:      Right lower leg: No edema. Left lower leg: No edema. Skin:     Capillary Refill: Capillary refill takes less than 2 seconds. Coloration: Skin is not pale. Findings: No rash. Neurological:      General: No focal deficit present. Mental Status: She is alert and oriented to person, place, and time. Cranial Nerves: No cranial nerve deficit. Psychiatric:         Behavior: Behavior is cooperative.          DIAGNOSTIC RESULTS LABS:  Labs Reviewed   COMPREHENSIVE METABOLIC PANEL - Abnormal; Notable for the following components:       Result Value    CO2 21 (*)     Glucose 121 (*)     BUN 41 (*)     CREATININE 1.6 (*)     GFR Non- 31 (*)     GFR  37 (*)     Total Protein 6.4 (*)     Alkaline Phosphatase 131 (*)     All other components within normal limits   CBC WITH AUTO DIFFERENTIAL - Abnormal; Notable for the following components:    WBC 3.4 (*)     RBC 3.71 (*)     Hemoglobin 10.5 (*)     Hematocrit 33.5 (*)     MCHC 31.3 (*)     RDW 14.6 (*)     Neutrophils % 67.3 (*)     Eosinophils % 6.4 (*)     Lymphocytes Absolute 0.8 (*)     All other components within normal limits   LIPASE   SPECIMEN REJECTION       All other labs were within normal range or not returned as of this dictation. EMERGENCY DEPARTMENT COURSE and DIFFERENTIAL DIAGNOSIS/MDM:   Vitals:    Vitals:    10/13/21 2344   BP: (!) 151/70   Pulse: 68   Resp: 18   Temp: 98.6 °F (37 °C)   TempSrc: Oral   SpO2: 98%   Weight: 150 lb (68 kg)   Height: 5' 5\" (1.651 m)       MDM    Reassessment    Patient has received IV fluids. Overall she is feeling quite a bit better. WBC count looks okay. Her creatinine is a little bit elevated at 1.6. PROCEDURES:  Unless otherwise noted below, none     Procedures    FINAL IMPRESSION      1.  Chemotherapy induced nausea and vomiting          DISPOSITION/PLAN   DISPOSITION Decision To Discharge 10/14/2021 02:39:45 AM      PATIENT REFERRED TO:  Modesto Kruse MD  [address] [place] [address] Dr Damien Villar [address] [facility]yl [facility]  [phone]            DISCHARGE MEDICATIONS:  Discharge Medication List as of 10/14/2021  2:40 AM             (Please note that portions of this note were completed with a voice recognition program.  Efforts were made to edit thedictations but occasionally words are mis-transcribed.)    Pranav Gotti MD (electronically signed)  Attending Emergency Physician         Pranav Gotti MD  11/01/21 [id number]

## 2021-10-14 NOTE — PROGRESS NOTES
Spoke with Adele's son, Víctor Warren, who reports that Jose Schofield was seen in [facility] ER last night for intractable vomiting. She was not admitted but given IVF and sent home with instructions to call clinic. Kayleigh Adarsh requests afternoon appt for Jose Schofield to have IV nausea medicine + IVF (if indicated). She continues to have nausea despite taking Zofran ODT and alternating with Phenergan. Spoke in detail about Jose Schofield being 1 week s/p C1 Carbo + Alimta + Keytruda and anticipated s/s and s/e related to therapy. Explained Adele's current stable lab status and plan for acute care to control nausea (scheduled prn meds, encourage PO intake) and will address regimen to include possible dose adjustments for tolerance prior to Cycle 2. Appt made for 130 PM in clinic today (Adele's son also has appt same day at 2pm, scheduled for transportation convenience per request) to assess for IVF, give IV antiemetic and ensure understanding of nausea medication instructions.

## 2021-10-14 NOTE — PROGRESS NOTES
PT arrives to this facility stating she was in the ER last night for N/V. She states that she has not vomited today but feels like she might. She states that she took zofran 8mg ODT this morning at 1000. This RN speaks with Kevin CEDEÑO. New orders received for pt to receive 500ml NS IVF, IVP pepcid x1 time and to order Reglan PO Q6 Hrs for pt to take at home in place of the phenergan. PT verbalizes understanding. This RN notices PT's incision site to port is red localized to incision site only, no drainage or warmth noted. Onkarina Grajeda APRN notified. Renata examined port site and decision to not use port at this time is made. This RN obtains IV access to PT's LT AC x1 attempt. PT tolerates fluids today. PT verbalizes understanding of new medication instructions and to stop the phenergan. Per PT's request, this RN explained this to her son Arabella Kumar at this time. The PT and her son have no further questions or concerns at this time.

## 2021-10-14 NOTE — ED TRIAGE NOTES
Patient began chemo last Thursday. Patient has experienced intractable nausea/vomiting since Friday. Patient saw her oncologist on Tues, and received at least at bag of IV fluids. Patient states no relief despite PO Phenergan.

## 2021-10-15 ENCOUNTER — HOSPITAL ENCOUNTER (OUTPATIENT)
Dept: RADIATION ONCOLOGY | Facility: HOSPITAL | Age: 81
Setting detail: RADIATION/ONCOLOGY SERIES
Discharge: HOME | End: 2021-10-15

## 2021-10-15 PROCEDURE — 77412 RADIATION TX DELIVERY LVL 3: CPT | Performed by: RADIOLOGY

## 2021-10-18 ENCOUNTER — HOSPITAL ENCOUNTER (OUTPATIENT)
Dept: RADIATION ONCOLOGY | Facility: HOSPITAL | Age: 81
Setting detail: RADIATION/ONCOLOGY SERIES
Discharge: HOME | End: 2021-10-18

## 2021-10-18 PROCEDURE — 77412 RADIATION TX DELIVERY LVL 3: CPT | Performed by: RADIOLOGY

## 2021-10-18 NOTE — PROGRESS NOTES
Port incision site is red and inflamed. Patient neutropenic. Rx Keflex to preferred pharmacy. Discussed neutropenic precautions with both patient and son, Sana Stout. Both verbalize understanding of s/s to report. Amy Aj has thermometer at home to take temperature and understands to call with ANY s/s of infection. Repeat cbc in clinic on Thursday 10/21 as scheduled.

## 2021-10-18 NOTE — PROGRESS NOTES
thigh.  Over the next several weeks, the pain progressed down the leg to the distal left thigh causing difficulty walking, utilizing a cane, and eventually a wheelchair. She denies any trauma. Plain film imaging was ordered and orthopaedic referral was made. XR HIP 2-3 VW W PELVIS LEFT on 8/30/2021 at [facility] documented:  · No acute bony abnormality. · Mild acetabular and femoral head spurring on the left side    XR FEMUR LEFT (MIN 2 VIEWS) 8/30/2021 at [facility] documented:  · No acute bony abnormality. Orthopaedic consultation with Dr Rach Butler on 9/7/2021 to address left hip and groin pain over 1 month period, ambulating with a walker. Denies any injury and describes a stabbing pain going down her left leg when standing. MRI PELVIS on 9/8/2021 at [address] [place] [address] documented:  Along the  Helder-medial aspect of the left acetabulum and at the root of the left superior pubic ramus, there is definite evidence of a nondisplaced fracture. It should be noted that there is expansion of the cortex in this region and some anlunilrcciq-hl-atebvoeck STIR signal with diminished T1 signal in the marrow at this location, as well as possible expansion of the cortex. The degree of marrow edema surrounding the fracture site is very abruptly cut off between normal signal, and I am suspicious that there may be an underlying bone lesion with pathologic fracture rather than a simple fracture. Dr. Justine Clancy called me with the above information desiring consultation. Further imaging was recommended including a CT scan of the chest abdomen and pelvis. MRI LEFT FEMUR WO CONTRAST on 9/14/21 at [address] [place] [address] documented: There is an expansile lytic lesion involving the more distal shaft of the left femur at the juncture of the proximal two-thirds with the distal one-third.   This is creating permeative changes within the more lateral cortex of the femur at this level and on T1 sequencing there appears to be extraosseous extension of the process. There is surrounding edema associated with this lesion. Given the permeative appearance of the more lateral cortex I feel this patient would certainly be at a high risk for impending pathologic fracture. No additional lesions are present. CT CHEST WO CONTRAST DIAGNOSTIC on  9/9/2021 at \[facility]\"" documented:  · 4.1 x 3.6 cm spiculated superior segment right lower lobe lung mass suspicious for primary malignancy. · Questionable right hilar adenopathy. · Emphysema. CT ABDOMEN PELVIS WO CONTRAST on 9/9/2021 at \[facility]\"" documented:  · Nonenhanced imaging of the abdomen and pelvis reduces assessment of the visceral organs. No convincing intra-abdominal or pelvic metastasis. Probable left renal cyst. Colonic diverticulosis. · Left perineural sacral cyst. No pathologic fractures. Appointment Note from 9/21/2021 visit to Dr. Nadya Rosenbaum:   She was set up an appointment for Left cephalomedullary nailing of her left pathologic femur fracture to protect her femur. Plan to biopsy  From distal femoral lesion including a small sample of bone and tissue. A cephalomedullary nailing of the left pathologic femoral shaft fracture and open biopsy of a left femoral shaft bone and soft tissue mass was performed by Dr. Stephon Ball on 9/23/2021  Pathology reported to:  · Soft tissue and bone, left femur, excision:Hemorrhagic bone and soft tissue. · Bone, left femur, reamings:Metastatic poorly differentiated carcinoma. 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 RECOMMENDATION AND PLAN by Dr. 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      · I called and spoke to Dr. Keeley Bunch regarding radiation therapy for pain control and stabilization of the left femur and left hip, he will be seeing her immediately after leaving this clinic today, 9/28/2021 to get started. · Treatment will not be curable but rather palliative and Shree Brody desires systemic therapy  · [facility] requested to look for actionable mutations  · Pathology specimen from 9/23/2021 requested to be sent to [facility] for NGS, MSI and PD-L1 testing  · DANNA/Marlon Hardy Final general surgery for port placement    · PET scan  · Cycle #1 of chemotherapy with Carboplatin AUC of 5/Alimta 500 mg/m²/Keytruda standard dosing  began on 10/7/2021,     Initial consultation with Dr. Keeley Bunch on 9/28/2021 for consideration of palliative radiation therapy for pain control and stabilization of the left femur and left hip. Dr. Arsenio King recommended to treat the left leg with palliative radiation therapy for an anticipated a dose of 3000 cGy over 10 fractions, final course pending completion of planning. Palliative XRT to the left femur and left hip was initiated on 10/6/2021 for a total dose of 3000 cGy over 10 treatment fractions, completed 10/19/2021    Port-A-Cath placed to right IJ on 10/4/2021 by Dr. Ivone Cole    Primary induction systemic chemotherapy cycle #1 of Carboplatin AUC of 5/Alimta 500 mg/m²/Keytruda on 10/7/2021. She tolerated cycle #1 of Carboplatin AUC of 5/Alimta 500 mg/m²/Keytruda very poorly.     PET CT SKULL BASE TO MID THIGH 10/12/2021 :  · 2.9 x 6.0NG hypermetabolic mass in the superior segment of the right lower lobe, maximum SUV of 20.8   · 1.7 cm posterior right hilum there is a separate hypermetabolic mass,most likely posterior right hilar neoplastic        Adenopathy  · Lytic lesion with abnormal hypermetabolism within the anterior left acetabulum/left distal superior pubic ramus compatible with a metastatic lesion  · Intense hypermetabolism associated with the known lesion within the distal left femur, only partially visualized in the field-of-view obtained. 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 An actionable MET mutation was documented after delivery of cycle #1 of chemoimmunotherapy. She tolerated cycle #1 of Carboplatin AUC of 5/Alimta 500 mg/m²/Keytruda very poorly. Decision was made on 10/28/2021 to begin capmatinib (trabecta) 400 mg BID. TREATMENT SUMMARY:  · Palliative XRT to the left femur and left hip was initiated on 10/6/2021 for a total dose of 3000 cGy over 10 treatment fractions, completed 10/19/2021  · Cycle #1 of palliative chemotherapy with Carboplatin AUC of 5/Alimta 500 mg/m²/Keytruda was started on 10/7/2021  · Capmatinib (trabecta) 400 mg BID was prescribed on 10/28/2021    Allergies:  Patient has no known allergies. Medicines:  Current Outpatient Medications   Medication Sig Dispense Refill    metoclopramide (REGLAN) 10 MG tablet Take 1 tablet by mouth 4 times daily 120 tablet 1    ondansetron (ZOFRAN-ODT) 8 MG TBDP disintegrating tablet Place 1 tablet under the tongue every 8 hours as needed for Nausea or Vomiting 30 tablet 2    lidocaine-prilocaine (EMLA) 2.5-2.5 % cream Apply topically as needed. 30 g 3    promethazine (PHENERGAN) 25 MG tablet Take 1 tablet by mouth every 8 hours as needed for Nausea 30 tablet 2    folic acid (FOLVITE) 1 MG tablet Take 1 tablet by mouth daily 90 tablet 0    dexamethasone (DECADRON) 4 MG tablet Take 1 tablet by mouth See Admin Instructions for 24 doses Take 4mg BID the day before, of and after chemotherapy every 21 days 24 tablet 0    apixaban (ELIQUIS) 5 MG TABS tablet Take 5 mg by mouth every 12 hours      HYDROcodone-acetaminophen (NORCO)  MG per tablet Take 1 tablet by mouth every 4 hours as needed.       pravastatin (PRAVACHOL) 80 MG tablet TAKE 1 TABLET BY MOUTH AT BEDTIME 90 tablet 3    losartan (COZAAR) 50 MG tablet TAKE 1 TABLET BY MOUTH DAILY 90 tablet 3    oxybutynin (DITROPAN) 5 MG tablet TAKE 1 TABLET BY MOUTH TWO TIMES A  tablet 3    pantoprazole (PROTONIX) 40 MG tablet TAKE 2 TABLETS BY MOUTH DAILY (Patient taking differently: Take 40 mg by mouth daily ) 180 tablet 3    triamterene-hydrochlorothiazide (MAXZIDE-25) 37.5-25 MG per tablet Take 0.5 tablets by mouth daily 45 tablet 3    calcium carbonate (OSCAL) 500 MG TABS tablet Take 500 mg by mouth daily      allopurinol (ZYLOPRIM) 100 MG tablet Take 100 mg by mouth daily      Cholecalciferol (VITAMIN D) 2000 units CAPS capsule Take by mouth       No current facility-administered medications for this visit.      Facility-Administered Medications Ordered in Other Visits   Medication Dose Route Frequency Provider Last Rate Last Admin    sodium chloride flush 0.9 % injection 5-40 mL  5-40 mL IntraVENous PRN Emmanuel Ruth MD   10 mL at 10/28/21 1253    heparin flush 100 UNIT/ML injection 500 Units  500 Units IntraCATHeter PRN Emmanuel Ruth MD   500 Units at 10/28/21 1253       Past Medical History:      Diagnosis Date    Acid reflux     LORA (acute kidney injury) ([facility] [address] 7/28/2018    HIGH CHOLESTEROL     Hypertension     Metastatic lung cancer (metastasis from lung to other site) [facility]) 9/28/2021    Metastatic lung cancer (metastasis from lung to other site) [facility]) 9/28/2021    Urinary incontinence     UTI (urinary tract infection)     Vertigo         Past Surgical History:      Procedure Laterality Date   [facility] COLONOSCOPY      Dr Marland Boeck 6/24/2019    Dr SARAH Juarez-Diverticular disease-Tubular AP (-) dysplasia, 5 yr recall    HYSTERECTOMY      PORT SURGERY N/A 10/4/2021    SINGLE LUMEN PORT PLMT WITH ULTRASOUND AND FLURO performed by Bhaskar Cochran MD at [address] [place] [address]         Family History:      Problem Relation Age of Onset    Cancer Mother     Colon Cancer Mother     Heart Disease Father     Stroke Brother     Colon Polyps Neg Hx     Esophageal Cancer Neg Hx     Liver Cancer Neg Hx     Liver Disease Neg Hx     Rectal Cancer Neg Hx     Stomach Cancer Neg Hx         Social History  Social History     Tobacco Use    Smoking status: Former Smoker     Packs/day: 0.50     Years: 40.00     Pack years: 20.00     Types: Cigarettes     Start date:      Quit date:      Years since quittin.8    Smokeless tobacco: Never Used   Vaping Use    Vaping Use: Never used   Substance Use Topics    Alcohol use: No    Drug use: No             Wt Readings from Last 3 Encounters:   10/28/21 153 lb 3.2 oz (69.5 kg)   10/13/21 150 lb (68 kg)   10/07/21 161 lb 6.4 oz (73.2 kg)        Objective:  Vital Signs: Blood pressure 124/69, pulse 87, temperature 98.4 °F (36.9 °C), temperature source Oral, height 5' 5\" (1.651 m), weight 153 lb 3.2 oz (69.5 kg), SpO2 97 %. Labs:  BMP:   Recent Labs     10/28/21  1215      K 4.7      CO2 25   BUN 32*   CREATININE 1.7*   CALCIUM 10.4*     CBC:   Recent Labs     10/28/21  1236   WBC 6.27   HGB 10.2*   HCT 32.2*   MCV 94.2   *     PT/INR: No results for input(s): PROTIME, INR in the last 72 hours. APTT: No results for input(s): APTT in the last 72 hours. Magnesium:No results for input(s): MG in the last 72 hours. Phosphorus:No results for input(s): PHOS in the last 72 hours. Hepatic:   Recent Labs     10/28/21  1215   ALKPHOS 90   ALT 17   AST 50*   PROT 6.7   BILITOT 0.8   LABALBU 4.2       Cultures:   No results for input(s): CULTURE in the last 72 hours.     Lab Results   Component Value Date    WBC 6.27 10/28/2021    HGB 10.2 (L) 10/28/2021    HCT 32.2 (L) 10/28/2021    MCV 94.2 10/28/2021     (H) 10/28/2021     Lab Results   Component Value Date    NEUTROABS 5.39 10/28/2021     Lab Results   Component Value Date     10/28/2021    K 4.7 10/28/2021     10/28/2021    CO2 25 10/28/2021    BUN 32 (H) 10/28/2021 CREATININE 1.7 (H) 10/28/2021    GLUCOSE 192 (H) 10/28/2021    CALCIUM 10.4 (H) 10/28/2021    PROT 6.7 10/28/2021    LABALBU 4.2 10/28/2021    BILITOT 0.8 10/28/2021    ALKPHOS 90 10/28/2021    AST 50 (H) 10/28/2021    ALT 17 10/28/2021    LABGLOM 29 (A) 10/28/2021    GFRAA 37 (L) 10/14/2021    GLOB 2.5 10/28/2021             Radiology reports as per the Radiologist  Radiology:  None       ASSESSMENT AND PLAN:  Alyssa Frankel is an 79year old female managed with primary and secondary diagnoses as outlined:    · Stage IV, 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 Palliative XRT to the left femur and left hip was initiated on 10/6/2021 for a total dose of 3000 cGy over 10 treatment fractions, completed 10/19/2021    Primary induction systemic chemotherapy cycle #1 of Carboplatin AUC of 5/Alimta 500 mg/m²/Keytruda on 10/7/2021. She tolerated cycle #1 of Carboplatin AUC of 5/Alimta 500 mg/m²/Keytruda very poorly. 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 Alyssa Frankel is accompanied by her son Pam Reis today for evaluation and further decision-making. 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 TARGET LESIONS:  · 4.1 x 3.6 cm spiculated superior segment right lower lobe lung mass suspicious for primary malignancy. · Right hilar adenopathy. · Nondisplaced fracture at the left acetabulum and at the root of the left superior pubic ramus  · Expansile lytic lesion involving the more distal shaft of the left femur with extraosseous extension    Palliative XRT to the left femur and left hip was initiated on 10/6/2021 for a total dose of 3000 cGy over 10 treatment fractions, completed 10/19/2021    Primary induction systemic chemotherapy cycle #1 of Carboplatin AUC of 5/Alimta 500 mg/m²/Keytruda on 10/7/2021.    She tolerated cycle #1 of Carboplatin AUC of 5/Alimta 500 mg/m²/Keytruda very poorly. PET CT SKULL BASE TO MID THIGH 10/12/2021 :  · 2.9 x 8.9YM hypermetabolic mass in the superior segment of the right lower lobe, maximum SUV of 20.8   · 1.7 cm posterior right hilum there is a separate hypermetabolic mass,most likely posterior right hilar neoplastic        Adenopathy  · Lytic lesion with abnormal hypermetabolism within the anterior left acetabulum/left distal superior pubic ramus compatible with a metastatic lesion  · Intense hypermetabolism associated with the known lesion within the distal left femur, only partially visualized in the field-of-view obtained. 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 An actionable MET mutation was documented after delivery of cycle #1 of chemoimmunotherapy. She tolerated cycle #1 of Carboplatin AUC of 5/Alimta 500 mg/m²/Keytruda very poorly. Physical examination today, 10/28/2021 She is accompanied by her son Michael Fernandes. HEENT is without asymmetry extraocular movements are intact. Neck is supple no JVD no palpable lymphadenopathy, specifically no supraclavicular lymphadenopathy on the left or the right. Lungs clear to auscultation  Abdominal  with positive bowel sounds  Musculoskeletal:  Continues to have pain to her distal left femur. No peripheral edema. Neurological exam is grossly intact    CBC today 10/28/2021 reveals a WBC of 6.27 Hgb is 10.2 with an MCV of  94.2 and platelet count of 252,501. Decision was made on 10/28/2021 to begin capmatinib (trabecta) 400 mg BID. Extended discussion was undertaken with Efraín Darin and her son Michael Fernandes today. She is very excited with the prospect of targeted chemotherapy for the actionable MET mutation found with NGS on her tumor. All other questions were answered to their understanding satisfaction. Printed information was also provided to them for review.   Side effects, toxicities, expectations and

## 2021-10-19 ENCOUNTER — HOSPITAL ENCOUNTER (OUTPATIENT)
Dept: RADIATION ONCOLOGY | Facility: HOSPITAL | Age: 81
Setting detail: RADIATION/ONCOLOGY SERIES
Discharge: HOME | End: 2021-10-19

## 2021-10-19 PROCEDURE — 77412 RADIATION TX DELIVERY LVL 3: CPT | Performed by: RADIOLOGY

## 2021-10-21 NOTE — RESULT ENCOUNTER NOTE
Neutropenic precautions in place. Patient denies any fever, chills or s/s of infx. Verbalized understanding of s/s to report. Written information on Neutropenia and clinic phone number provided as well to both patient and son accompanying her today in clinic.

## 2021-10-25 LAB
CYTO UR: NORMAL
LAB AP CASE REPORT: NORMAL
LAB AP DIAGNOSIS COMMENT: NORMAL
LAB AP INTRADEPARTMENTAL CONSULT: NORMAL
LAB AP SPECIAL STAINS: NORMAL
PATH REPORT.FINAL DX SPEC: NORMAL
PATH REPORT.GROSS SPEC: NORMAL

## 2021-11-09 NOTE — PROGRESS NOTES
Patient:  Gayatri Horowitz  YOB: 1940  Date of Service: 11/11/2021  MRN: [id number]    Primary Care Physician: René Merritt MD    Chief Complaint   Patient presents with    Follow-up     Primary malignant neoplasm of lung metastatic to other site, unspecified laterality [facility])       Patient Seen, Chart, Consults notes, Labs, Radiology studies reviewed. 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 Palliative XRT to the left femur and left hip was initiated on 10/6/2021 for a total dose of 3000 cGy over 10 treatment fractions, completed 10/19/2021    Primary induction systemic chemotherapy cycle #1 of Carboplatin AUC of 5/Alimta 500 mg/m²/Keytruda on 10/7/2021. She tolerated cycle #1 of Carboplatin AUC of 5/Alimta 500 mg/m²/Keytruda very poorly. [address] [facility] NGS sequencing reported a positive MET (exon 14 skipping) mutation on 10/19/2021. Decision was made on 10/28/2021 to begin capmatinib (trabecta) 400 mg BID. Vane Tripathi is accompanied by her son Chriss Silva today for further evaluation and further decision-making. Since last week, Vane Trpiathi has regained most of her strength. She is up and about in the house. She is eating better feeling stronger and more herself. Physical therapy started yesterday and she is able to get up and about on the left leg with some weightbearing. TARGET LESIONS:  · 4.1 x 3.6 cm spiculated superior segment RLL lung mass consistent with the primary  · MET+ (Exon 14 Skipping)  · Right hilar adenopathy. · Nondisplaced fracture at the left acetabulum and at the root of the left superior pubic ramus.    · Expansile lytic lesion involving the more distal shaft of the left femur with extraosseous extension    TUMOR HISTORY:  Vane Tripathi was seen in initial oncology consultation on 9/20/2021 referred by Dr Shannan Stephenson with a nondisplaced fracture of the left superior pubic ramus, expansile lytic lesion of the distal third shaft of the left femur and a 4.1 x 3.6 x 3.5 cm  lung mass suspicious for primary malignancy with metastasis. Sherie Ceron had an office visit with PCP, Percy Guzman on 8/20/21 with complaints of left hip pain radiating from groin down left thigh. Over the next several weeks, the pain progressed down the leg to the distal left thigh causing difficulty walking, utilizing a cane, and eventually a wheelchair. She denies any trauma. Plain film imaging was ordered and orthopaedic referral was made. XR HIP 2-3 VW W PELVIS LEFT on 8/30/2021 at [facility] documented:  · No acute bony abnormality. · Mild acetabular and femoral head spurring on the left side    XR FEMUR LEFT (MIN 2 VIEWS) 8/30/2021 at [facility] documented:  · No acute bony abnormality. Orthopaedic consultation with Dr Shannan Stephenson on 9/7/2021 to address left hip and groin pain over 1 month period, ambulating with a walker. Denies any injury and describes a stabbing pain going down her left leg when standing. MRI PELVIS on 9/8/2021 at [place] [id number] documented:  Along the  Helder-medial aspect of the left acetabulum and at the root of the left superior pubic ramus, there is definite evidence of a nondisplaced fracture. It should be noted that there is expansion of the cortex in this region and some qkfalbykgtep-mg-ihahfzlln STIR signal with diminished T1 signal in the marrow at this location, as well as possible expansion of the cortex. The degree of marrow edema surrounding the fracture site is very abruptly cut off between normal signal, and I am suspicious that there may be an underlying bone lesion with pathologic fracture rather than a simple fracture. Dr. Ryan Morris called me with the above information desiring consultation.     Further imaging was recommended including a CT scan of the chest abdomen and pelvis. MRI LEFT FEMUR WO CONTRAST on 9/14/21 at [facility] documented: There is an expansile lytic lesion involving the more distal shaft of the left femur at the juncture of the proximal two-thirds with the distal one-third. This is creating permeative changes within the more lateral cortex of the femur at this level and on T1 sequencing there appears to be extraosseous extension of the process. There is surrounding edema associated with this lesion. Given the permeative appearance of the more lateral cortex I feel this patient would certainly be at a high risk for impending pathologic fracture. No additional lesions are present. CT CHEST WO CONTRAST DIAGNOSTIC on  9/9/2021 at [facility] documented:  · 4.1 x 3.6 cm spiculated superior segment right lower lobe lung mass suspicious for primary malignancy. · Questionable right hilar adenopathy. · Emphysema. CT ABDOMEN PELVIS WO CONTRAST on 9/9/2021 at [facility] documented:  · Nonenhanced imaging of the abdomen and pelvis reduces assessment of the visceral organs. No convincing intra-abdominal or pelvic metastasis. Probable left renal cyst. Colonic diverticulosis. · Left perineural sacral cyst. No pathologic fractures. Appointment Note from 9/21/2021 visit to Dr. Lajean Paget:   She was set up an appointment for Left cephalomedullary nailing of her left pathologic femur fracture to protect her femur. Plan to biopsy  From distal femoral lesion including a small sample of bone and tissue. A cephalomedullary nailing of the left pathologic femoral shaft fracture and open biopsy of a left femoral shaft bone and soft tissue mass was performed by Dr. Phillip Michaud on 9/23/2021  Pathology reported to:  · Soft tissue and bone, left femur, excision:Hemorrhagic bone and soft tissue. · Bone, left femur, reamings:Metastatic poorly differentiated carcinoma.   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 RECOMMENDATION AND PLAN by Dr. Felicita Benson on 9/28/2021:  Diagnosis is stage IV metastatic poorly differentiated carcinoma with sarcomatoid features. · I called and spoke to Dr. Konstantin Hernandez regarding radiation therapy for pain control and stabilization of the left femur and left hip, he will be seeing her immediately after leaving this clinic today, 9/28/2021 to get started. · Treatment will not be curable but rather palliative and Umu Wright desires systemic therapy  · Marichuy Harris requested to look for actionable mutations  · Pathology specimen from 9/23/2021 requested to be sent to [facility] for NGS, MSI and PD-L1 testing  · DANNA/Marlon Hardy Final general surgery for port placement    · PET scan  · Cycle #1 of chemotherapy with Carboplatin AUC of 5/Alimta 500 mg/m²/Keytruda standard dosing  began on 10/7/2021,     Initial consultation with Dr. Konstantin Hernandez on 9/28/2021 for consideration of palliative radiation therapy for pain control and stabilization of the left femur and left hip. Dr. Bryce Hernández recommended to treat the left leg with palliative radiation therapy for an anticipated a dose of 3000 cGy over 10 fractions, final course pending completion of planning. Palliative XRT to the left femur and left hip was initiated on 10/6/2021 for a total dose of 3000 cGy over 10 treatment fractions, completed 10/19/2021    Port-A-Cath placed to right IJ on 10/4/2021 by Dr. Alexx Kumar    Primary induction systemic chemotherapy cycle #1 of Carboplatin AUC of 5/Alimta 500 mg/m²/Keytruda on 10/7/2021. She tolerated cycle #1 of Carboplatin AUC of 5/Alimta 500 mg/m²/Keytruda very poorly.     PET CT SKULL BASE TO MID THIGH 10/12/2021 :  · 2.9 x 8.1PA hypermetabolic mass in the superior segment of the right lower lobe, maximum SUV of 20.8   · 1.7 cm posterior right hilum there is a separate hypermetabolic mass,most likely posterior right hilar neoplastic        Adenopathy  · Lytic lesion with abnormal hypermetabolism within the anterior left acetabulum/left distal superior pubic ramus compatible with a metastatic lesion  · Intense hypermetabolism associated with the known lesion within the distal left femur, only partially visualized in the field-of-view obtained. [address] [facility] NGS sequencing reported a positive MET (exon 14 skipping) mutation on 10/19/2021. An actionable MET mutation was documented after delivery of cycle #1 of chemoimmunotherapy. She tolerated cycle #1 of Carboplatin AUC of 5/Alimta 500 mg/m²/Keytruda very poorly. Decision was made on 10/28/2021 to begin capmatinib (trabecta) 400 mg BID. TREATMENT SUMMARY:  · Palliative XRT to the left femur and left hip was initiated on 10/6/2021 for a total dose of 3000 cGy over 10 treatment fractions, completed 10/19/2021  · Cycle #1 of palliative chemotherapy with Carboplatin AUC of 5/Alimta 500 mg/m²/Keytruda was started on 10/7/2021  · Capmatinib (trabecta) 400 mg BID was prescribed on 10/28/2021    Allergies:  Patient has no known allergies. Medicines:  Current Outpatient Medications   Medication Sig Dispense Refill    Capmatinib HCl (TABRECTA) 200 MG TABS Take 400 mg by mouth 2 times daily 120 tablet 3    metoclopramide (REGLAN) 10 MG tablet Take 1 tablet by mouth 4 times daily 120 tablet 1    ondansetron (ZOFRAN-ODT) 8 MG TBDP disintegrating tablet Place 1 tablet under the tongue every 8 hours as needed for Nausea or Vomiting 30 tablet 2    lidocaine-prilocaine (EMLA) 2.5-2.5 % cream Apply topically as needed.  30 g 3    promethazine (PHENERGAN) 25 MG tablet Take 1 tablet by mouth every 8 hours as needed for Nausea 30 tablet 2    folic acid (FOLVITE) 1 MG tablet Take 1 tablet by mouth daily 90 tablet 0    apixaban (ELIQUIS) 5 MG TABS tablet Take 5 mg by mouth every 12 hours      HYDROcodone-acetaminophen (NORCO)  MG per tablet Take 1 tablet by mouth every 4 hours as needed.  pravastatin (PRAVACHOL) 80 MG tablet TAKE 1 TABLET BY MOUTH AT BEDTIME 90 tablet 3    losartan (COZAAR) 50 MG tablet TAKE 1 TABLET BY MOUTH DAILY 90 tablet 3    oxybutynin (DITROPAN) 5 MG tablet TAKE 1 TABLET BY MOUTH TWO TIMES A  tablet 3    pantoprazole (PROTONIX) 40 MG tablet TAKE 2 TABLETS BY MOUTH DAILY (Patient taking differently: Take 40 mg by mouth daily ) 180 tablet 3    triamterene-hydrochlorothiazide (MAXZIDE-25) 37.5-25 MG per tablet Take 0.5 tablets by mouth daily 45 tablet 3    calcium carbonate (OSCAL) 500 MG TABS tablet Take 500 mg by mouth daily      allopurinol (ZYLOPRIM) 100 MG tablet Take 100 mg by mouth daily      Cholecalciferol (VITAMIN D) 2000 units CAPS capsule Take by mouth       No current facility-administered medications for this visit.        Past Medical History:      Diagnosis Date    Acid reflux     LORA (acute kidney injury) ([facility] [address] 7/28/2018    HIGH CHOLESTEROL     Hypertension     Metastatic lung cancer (metastasis from lung to other site) [facility]) 9/28/2021    Metastatic lung cancer (metastasis from lung to other site) [facility]) 9/28/2021    Urinary incontinence     UTI (urinary tract infection)     Vertigo         Past Surgical History:      Procedure Laterality Date   [facility] COLONOSCOPY      Dr Leora Nieto 6/24/2019    Dr SARAH Juarez-Diverticular disease-Tubular AP (-) dysplasia, 5 yr recall    HYSTERECTOMY      PORT SURGERY N/A 10/4/2021    SINGLE LUMEN PORT PLMT WITH ULTRASOUND AND FLURO performed by Yee Peñaloza MD at [address] [place] [address]         Family History:      Problem Relation Age of Onset   Shante Gonzalez Cancer Mother     Colon Cancer Mother     Heart Disease Father     Stroke Brother     Colon Polyps Neg Hx     Esophageal Cancer Neg Hx     Liver Cancer Neg Hx     Liver Disease Neg Hx     Rectal Cancer Neg Hx     Stomach Cancer Neg Hx         Social History  Social History     Tobacco Use    Smoking status: Former Smoker     Packs/day: 0.50     Years: 40.00     Pack years: 20.00     Types: Cigarettes     Start date:      Quit date:      Years since quittin.8    Smokeless tobacco: Never Used   Vaping Use    Vaping Use: Never used   Substance Use Topics    Alcohol use: No    Drug use: No             Wt Readings from Last 3 Encounters:   21 156 lb (70.8 kg)   10/28/21 153 lb 3.2 oz (69.5 kg)   10/13/21 150 lb (68 kg)        Objective:  Vital Signs: Blood pressure 110/70, pulse 89, height 5' 5\" (1.651 m), weight 156 lb (70.8 kg), SpO2 99 %. Labs:  BMP:   No results for input(s): NA, K, CL, CO2, PHOS, BUN, CREATININE, CALCIUM in the last 72 hours. CBC:   No results for input(s): WBC, HGB, HCT, MCV, PLT in the last 72 hours. PT/INR: No results for input(s): PROTIME, INR in the last 72 hours. APTT: No results for input(s): APTT in the last 72 hours. Magnesium:No results for input(s): MG in the last 72 hours. Phosphorus:No results for input(s): PHOS in the last 72 hours. Hepatic:   No results for input(s): ALKPHOS, ALT, AST, PROT, BILITOT, BILIDIR, LABALBU in the last 72 hours. Cultures:   No results for input(s): CULTURE in the last 72 hours.     Lab Results   Component Value Date    WBC 10.34 (H) 2021    HGB 11.9 2021    HCT 37.1 2021    MCV 94.9 (H) 2021     2021     Lab Results   Component Value Date    NEUTROABS 8.31 (H) 2021     Lab Results   Component Value Date     2021    K 4.5 2021     2021    CO2 24 2021    BUN 28 (H) 2021    CREATININE 1.9 (H) 2021    GLUCOSE 112 (H) 2021    CALCIUM 11.0 (H) 2021    PROT 7.2 2021    LABALBU 4.5 2021    BILITOT 0.8 2021    ALKPHOS 90 2021    AST 22 11/04/2021    ALT 14 11/04/2021    LABGLOM 25 (A) 11/04/2021    GFRAA 37 (L) 10/14/2021    GLOB 2.7 11/04/2021             Radiology reports as per the Radiologist  Radiology:  None       ASSESSMENT AND PLAN:  Jacqueline Troy is an 79year old female managed with primary and secondary diagnoses as outlined:    · Stage IV, 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 Palliative XRT to the left femur and left hip was initiated on 10/6/2021 for a total dose of 3000 cGy over 10 treatment fractions, completed 10/19/2021    Primary induction systemic chemotherapy cycle #1 of Carboplatin AUC of 5/Alimta 500 mg/m²/Keytruda on 10/7/2021. She tolerated cycle #1 of Carboplatin AUC of 5/Alimta 500 mg/m²/Keytruda very poorly. [address] [facility] NGS sequencing reported a positive MET (exon 14 skipping) mutation on 10/19/2021. Decision was made on 10/28/2021 to begin capmatinib (trabecta) 400 mg BID. Jacqueline Troy is accompanied by her son Marylee Liter today for further evaluation and further decision-making. Since last week, Jacqueline Troy has regained most of her strength. She is up and about in the house. She is eating better feeling stronger and more herself. Physical therapy started yesterday and she is able to get up and about on the left leg with some weightbearing. 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 TARGET LESIONS:  · 4.1 x 3.6 cm spiculated superior segment right lower lobe lung mass suspicious for primary malignancy. · Right hilar adenopathy.    · Nondisplaced fracture at the left acetabulum and at the root of the left superior pubic ramus  · Expansile lytic lesion involving the more distal shaft of the left femur with extraosseous extension    Palliative XRT to the left femur and left hip was initiated on 10/6/2021 for a total dose of 3000 cGy over 10 treatment fractions, completed 10/19/2021    Primary induction systemic chemotherapy cycle #1 of Carboplatin AUC of 5/Alimta 500 mg/m²/Keytruda on 10/7/2021. She tolerated cycle #1 of Carboplatin AUC of 5/Alimta 500 mg/m²/Keytruda very poorly. PET CT SKULL BASE TO MID THIGH 10/12/2021 :  · 2.9 x 6.8LM hypermetabolic mass in the superior segment of the right lower lobe, maximum SUV of 20.8   · 1.7 cm posterior right hilum there is a separate hypermetabolic mass,most likely posterior right hilar neoplastic        Adenopathy  · Lytic lesion with abnormal hypermetabolism within the anterior left acetabulum/left distal superior pubic ramus compatible with a metastatic lesion  · Intense hypermetabolism associated with the known lesion within the distal left femur, only partially visualized in the field-of-view obtained. [address] [facility] NGS sequencing reported a positive MET (exon 14 skipping) mutation on 10/19/2021. An actionable MET mutation was documented after delivery of cycle #1 of chemoimmunotherapy. She tolerated cycle #1 of Carboplatin AUC of 5/Alimta 500 mg/m²/Keytruda very poorly. Physical examination today, 10/28/2021 She is accompanied by her son Sary Olivo. HEENT is without asymmetry extraocular movements are intact. Neck is supple no JVD no palpable lymphadenopathy, specifically no supraclavicular lymphadenopathy on the left or the right. Lungs clear to auscultation  Abdominal  with positive bowel sounds  Musculoskeletal:  Continues to have pain to her distal left femur. No peripheral edema. Neurological exam is grossly intact    CBC 10/28/2021 revealed a WBC of 6.27 Hgb is 10.2 with an MCV of  94.2 and platelet count of 392,088. Decision was made on 10/28/2021 to begin capmatinib (trabecta) 400 mg BID. Extended discussion was again undertaken with Jazmin Osmany and her son Sary Olivo today, 11/11/2021. She continues to be very excited with the prospect of targeted chemotherapy for the actionable MET mutation found with NGS on her tumor.     She was unable to tolerate the economic burden of the cost of the medication and therefore we have been working on assistance through a foundation to help her get started on targeted therapy with capmatinib (trabecta). All other questions were answered to their understanding satisfaction. Printed information was also provided to them for review. I will start 400 mg daily for a week and then ramp up to the 400 mg twice a day. Side effects, toxicities, expectations and prognosis and long-range plans were all discussed with multiple questions entertained in the visit today. Arrangements are being made for trabecta assistance. I will see her in 4 weeks or sooner. Weekly blood work will continue once capmatinib (trabecta) is initiated. #2  TUMOR SCREENING AND HEALTH MAINTENANCE    Bone Health  - DEXA BONE DENSITY AXIAL SKELETON on 8/13/2021 revealed Osteopenia. Patient takes Calcium + Vitamin D as indicated. Breast cancer screening - RADHA DIGITAL SCREEN W OR WO CAD BILATERAL on  8/13/2021 was BI-RADS category 1     GI cancer screening - Colonoscopy per Dr Adal Benitez 6/24/19 with a benign 6mm sessile polyp in the the descending colon. 5 year recall. GYN cancer screening - JUAN ALBERTO & BSO 1970s      #3  Immunization History   COVID-19, Pfizer, PF, 30mcg/0.3mL 02/22/2021, 03/15/2021   Influenza, High Dose (Fluzone 65 yrs and older) 11/09/2018, 10/11/2019, 09/10/2020   Pneumococcal Conjugate 13-valent (Nukgpmh20) 07/13/2015   Pneumococcal Polysaccharide (Xfrziipbi50) 05/09/2019   Zoster Live (Zostavax) 12/09/2016   Zoster Recombinant (Shingrix) 05/09/2019, 09/25/2019          Soraya Shannon was seen today for follow-up. Diagnoses and all orders for this visit:    Primary malignant neoplasm of lung metastatic to other site, unspecified laterality ([facility] [address]  -     Comprehensive Metabolic Panel;  Future  -     Comprehensive Metabolic Panel; Standing    Lytic bone lesion of left femur    Adenopathy, hilar        Orders Placed This Encounter   Procedures    Comprehensive Metabolic Panel     Standing Status:   Future     Standing Expiration Date:   11/11/2022    Comprehensive Metabolic Panel     Standing Status:   Standing     Number of Occurrences:   12     Standing Expiration Date:   11/11/2022       No orders of the defined types were placed in this encounter. Return in about 4 weeks (around 12/9/2021) for follow-up with . Patient:  Kennedy Jackson  YOB: 1940  Date of Service: 11/11/2021  MRN: [id number]    Primary Care Physician: Inidana Almanza MD    Chief Complaint   Patient presents with    Follow-up     Primary malignant neoplasm of lung metastatic to other site, unspecified laterality [facility])       Patient Seen, Chart, Consults notes, Labs, Radiology studies reviewed. 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 Palliative XRT to the left femur and left hip was initiated on 10/6/2021 for a total dose of 3000 cGy over 10 treatment fractions, completed 10/19/2021    Primary induction systemic chemotherapy cycle #1 of Carboplatin AUC of 5/Alimta 500 mg/m²/Keytruda on 10/7/2021. She tolerated cycle #1 of Carboplatin AUC of 5/Alimta 500 mg/m²/Keytruda very poorly. [address] [facility] NGS sequencing reported a positive MET (exon 14 skipping) mutation on 10/19/2021. Phuong Parkinson is accompanied by her son Kaci Balderas today for evaluation and further decision-making. TARGET LESIONS:  · 4.1 x 3.6 cm spiculated superior segment RLL lung mass consistent with the primary. · Right hilar adenopathy.   · Nondisplaced fracture at the left acetabulum and at the root of the left superior pubic ramus  · Expansile lytic lesion involving the more distal shaft of the left femur with extraosseous extension    TUMOR HISTORY:  Dev Lay was seen in initial oncology consultation on 9/20/2021 referred by Dr Tish Subramanian with a nondisplaced fracture of the left superior pubic ramus, expansile lytic lesion of the distal third shaft of the left femur and a 4.1 x 3.6 x 3.5 cm  lung mass suspicious for primary malignancy with metastasis. Dev Lay had an office visit with PCP, Ashley Montesinos on 8/20/21 with complaints of left hip pain radiating from groin down left thigh. Over the next several weeks, the pain progressed down the leg to the distal left thigh causing difficulty walking, utilizing a cane, and eventually a wheelchair. She denies any trauma. Plain film imaging was ordered and orthopaedic referral was made. XR HIP 2-3 VW W PELVIS LEFT on 8/30/2021 at [address] documented:  · No acute bony abnormality. · Mild acetabular and femoral head spurring on the left side    XR FEMUR LEFT (MIN 2 VIEWS) 8/30/2021 at [address] documented:  · No acute bony abnormality. Orthopaedic consultation with Dr Tish Subramanian on 9/7/2021 to address left hip and groin pain over 1 month period, ambulating with a walker. Denies any injury and describes a stabbing pain going down her left leg when standing. MRI PELVIS on 9/8/2021 at [facility] documented:  Along the  Helder-medial aspect of the left acetabulum and at the root of the left superior pubic ramus, there is definite evidence of a nondisplaced fracture. It should be noted that there is expansion of the cortex in this region and some crxtrzsanleh-lq-smhdsimxt STIR signal with diminished T1 signal in the marrow at this location, as well as possible expansion of the cortex. The degree of marrow edema surrounding the fracture site is very abruptly cut off between normal signal, and I am suspicious that there may be an underlying bone lesion with pathologic fracture rather than a simple fracture. Dr. Mali Nickerson called me with the above information desiring consultation. Further imaging was recommended including a CT scan of the chest abdomen and pelvis. MRI LEFT FEMUR WO CONTRAST on 9/14/21 at [facility] documented: There is an expansile lytic lesion involving the more distal shaft of the left femur at the juncture of the proximal two-thirds with the distal one-third. This is creating permeative changes within the more lateral cortex of the femur at this level and on T1 sequencing there appears to be extraosseous extension of the process. There is surrounding edema associated with this lesion. Given the permeative appearance of the more lateral cortex I feel this patient would certainly be at a high risk for impending pathologic fracture. No additional lesions are present. CT CHEST WO CONTRAST DIAGNOSTIC on  9/9/2021 at [facility] documented:  · 4.1 x 3.6 cm spiculated superior segment right lower lobe lung mass suspicious for primary malignancy. · Questionable right hilar adenopathy. · Emphysema. CT ABDOMEN PELVIS WO CONTRAST on 9/9/2021 at [facility] documented:  · Nonenhanced imaging of the abdomen and pelvis reduces assessment of the visceral organs. No convincing intra-abdominal or pelvic metastasis. Probable left renal cyst. Colonic diverticulosis. · Left perineural sacral cyst. No pathologic fractures. Appointment Note from 9/21/2021 visit to Dr. Temo Coburn:   She was set up an appointment for Left cephalomedullary nailing of her left pathologic femur fracture to protect her femur. Plan to biopsy  From distal femoral lesion including a small sample of bone and tissue. A cephalomedullary nailing of the left pathologic femoral shaft fracture and open biopsy of a left femoral shaft bone and soft tissue mass was performed by Dr. Gustavo Soler on 9/23/2021  Pathology reported to:  · Soft tissue and bone, left femur, excision:Hemorrhagic bone and soft tissue. · Bone, left femur, reamings:Metastatic poorly differentiated carcinoma.   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 RECOMMENDATION AND PLAN by Dr. 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 · I called and spoke to Dr. Jacob Lundberg regarding radiation therapy for pain control and stabilization of the left femur and left hip, he will be seeing her immediately after leaving this clinic today, 9/28/2021 to get started. · Treatment will not be curable but rather palliative and Dee Dee Estrada desires systemic therapy  · Marichuy Harris requested to look for actionable mutations  · Pathology specimen from 9/23/2021 requested to be sent to [facility] for NGS, MSI and PD-L1 testing  · DANNA/Marlon Hardy Final general surgery for port placement    · PET scan  · Cycle #1 of chemotherapy with Carboplatin AUC of 5/Alimta 500 mg/m²/Keytruda standard dosing  began on 10/7/2021,     Initial consultation with Dr. Jacob Lundberg on 9/28/2021 for consideration of palliative radiation therapy for pain control and stabilization of the left femur and left hip. Dr. Damián Hubbard recommended to treat the left leg with palliative radiation therapy for an anticipated a dose of 3000 cGy over 10 fractions, final course pending completion of planning. Palliative XRT to the left femur and left hip was initiated on 10/6/2021 for a total dose of 3000 cGy over 10 treatment fractions, completed 10/19/2021    Port-A-Cath placed to right IJ on 10/4/2021 by Dr. Edmundo Reno    Primary induction systemic chemotherapy cycle #1 of Carboplatin AUC of 5/Alimta 500 mg/m²/Keytruda on 10/7/2021. She tolerated cycle #1 of Carboplatin AUC of 5/Alimta 500 mg/m²/Keytruda very poorly.     PET CT SKULL BASE TO MID THIGH 10/12/2021 :  · 2.9 x 7.5WI hypermetabolic mass in the superior segment of the right lower lobe, maximum SUV of 20.8   · 1.7 cm posterior right hilum there is a separate hypermetabolic mass,most likely posterior right hilar neoplastic        Adenopathy  · Lytic lesion with abnormal hypermetabolism within the anterior left acetabulum/left distal superior pubic ramus compatible with a metastatic lesion  · Intense hypermetabolism associated with the known lesion within the distal left femur, only partially visualized in the field-of-view obtained. [facility] NGS sequencing reported a positive MET (exon 14 skipping) mutation on 10/19/2021. An actionable MET mutation was documented after delivery of cycle #1 of chemoimmunotherapy. She tolerated cycle #1 of Carboplatin AUC of 5/Alimta 500 mg/m²/Keytruda very poorly. Decision was made on 10/28/2021 to begin capmatinib (trabecta) 400 mg BID. TREATMENT SUMMARY:  · Palliative XRT to the left femur and left hip was initiated on 10/6/2021 for a total dose of 3000 cGy over 10 treatment fractions, completed 10/19/2021  · Cycle #1 of palliative chemotherapy with Carboplatin AUC of 5/Alimta 500 mg/m²/Keytruda was started on 10/7/2021  · Capmatinib (trabecta) 400 mg BID was prescribed on 10/28/2021    Allergies:  Patient has no known allergies. Medicines:  Current Outpatient Medications   Medication Sig Dispense Refill    Capmatinib HCl (TABRECTA) 200 MG TABS Take 400 mg by mouth 2 times daily 120 tablet 3    metoclopramide (REGLAN) 10 MG tablet Take 1 tablet by mouth 4 times daily 120 tablet 1    ondansetron (ZOFRAN-ODT) 8 MG TBDP disintegrating tablet Place 1 tablet under the tongue every 8 hours as needed for Nausea or Vomiting 30 tablet 2    lidocaine-prilocaine (EMLA) 2.5-2.5 % cream Apply topically as needed.  30 g 3    promethazine (PHENERGAN) 25 MG tablet Take 1 tablet by mouth every 8 hours as needed for Nausea 30 tablet 2    folic acid (FOLVITE) 1 MG tablet Take 1 tablet by mouth daily 90 tablet 0    apixaban (ELIQUIS) 5 MG TABS tablet Take 5 mg by mouth every 12 hours      HYDROcodone-acetaminophen (NORCO)  MG per tablet Take 1 tablet by mouth every 4 hours as needed.  pravastatin (PRAVACHOL) 80 MG tablet TAKE 1 TABLET BY MOUTH AT BEDTIME 90 tablet 3    losartan (COZAAR) 50 MG tablet TAKE 1 TABLET BY MOUTH DAILY 90 tablet 3    oxybutynin (DITROPAN) 5 MG tablet TAKE 1 TABLET BY MOUTH TWO TIMES A  tablet 3    pantoprazole (PROTONIX) 40 MG tablet TAKE 2 TABLETS BY MOUTH DAILY (Patient taking differently: Take 40 mg by mouth daily ) 180 tablet 3    triamterene-hydrochlorothiazide (MAXZIDE-25) 37.5-25 MG per tablet Take 0.5 tablets by mouth daily 45 tablet 3    calcium carbonate (OSCAL) 500 MG TABS tablet Take 500 mg by mouth daily      allopurinol (ZYLOPRIM) 100 MG tablet Take 100 mg by mouth daily      Cholecalciferol (VITAMIN D) 2000 units CAPS capsule Take by mouth       No current facility-administered medications for this visit.        Past Medical History:      Diagnosis Date    Acid reflux     LORA (acute kidney injury) ([facility] [address] 7/28/2018    HIGH CHOLESTEROL     Hypertension     Metastatic lung cancer (metastasis from lung to other site) [facility]) 9/28/2021    Metastatic lung cancer (metastasis from lung to other site) [facility]) 9/28/2021    Urinary incontinence     UTI (urinary tract infection)     Vertigo         Past Surgical History:      Procedure Laterality Date   [facility] COLONOSCOPY      Dr Rufus Rai 6/24/2019    Dr SARAH Juarez-Diverticular disease-Tubular AP (-) dysplasia, 5 yr recall    HYSTERECTOMY      PORT SURGERY N/A 10/4/2021    SINGLE LUMEN PORT PLMT WITH ULTRASOUND AND FLURO performed by Antony Patel MD at [address] [place] [address]         Family History:      Problem Relation Age of Onset    Cancer Mother     Colon Cancer Mother     Heart Disease Father     Stroke Brother     Colon Polyps Neg Hx     Esophageal Cancer Neg Hx     Liver Cancer Neg Hx     Liver Disease Neg Hx     Rectal Cancer Neg Hx     Stomach Cancer Neg Hx         Social History  Social History     Tobacco Use    Smoking status: Former Smoker     Packs/day: 0.50     Years: 40.00     Pack years: 20.00     Types: Cigarettes     Start date:      Quit date:      Years since quittin.8    Smokeless tobacco: Never Used   Vaping Use    Vaping Use: Never used   Substance Use Topics    Alcohol use: No    Drug use: No             Wt Readings from Last 3 Encounters:   21 156 lb (70.8 kg)   10/28/21 153 lb 3.2 oz (69.5 kg)   10/13/21 150 lb (68 kg)        Objective:  Vital Signs: Blood pressure 110/70, pulse 89, height 5' 5\" (1.651 m), weight 156 lb (70.8 kg), SpO2 99 %. Labs:  BMP:   No results for input(s): NA, K, CL, CO2, PHOS, BUN, CREATININE, CALCIUM in the last 72 hours. CBC:   No results for input(s): WBC, HGB, HCT, MCV, PLT in the last 72 hours. PT/INR: No results for input(s): PROTIME, INR in the last 72 hours. APTT: No results for input(s): APTT in the last 72 hours. Magnesium:No results for input(s): MG in the last 72 hours. Phosphorus:No results for input(s): PHOS in the last 72 hours. Hepatic:   No results for input(s): ALKPHOS, ALT, AST, PROT, BILITOT, BILIDIR, LABALBU in the last 72 hours. Cultures:   No results for input(s): CULTURE in the last 72 hours.     Lab Results   Component Value Date    WBC 10.34 (H) 2021    HGB 11.9 2021    HCT 37.1 2021    MCV 94.9 (H) 2021     2021     Lab Results   Component Value Date    NEUTROABS 8.31 (H) 2021     Lab Results   Component Value Date     2021    K 4.5 2021     2021    CO2 24 2021    BUN 28 (H) 2021    CREATININE 1.9 (H) 2021    GLUCOSE 112 (H) 2021    CALCIUM 11.0 (H) 2021    PROT 7.2 2021 LABALBU 4.5 11/04/2021    BILITOT 0.8 11/04/2021    ALKPHOS 90 11/04/2021    AST 22 11/04/2021    ALT 14 11/04/2021    LABGLOM 25 (A) 11/04/2021    GFRAA 37 (L) 10/14/2021    GLOB 2.7 11/04/2021             Radiology reports as per the Radiologist  Radiology:  None       ASSESSMENT AND PLAN:  Jesús Awan is an 79year old female managed with primary and secondary diagnoses as outlined:    · Stage IV, 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 Palliative XRT to the left femur and left hip was initiated on 10/6/2021 for a total dose of 3000 cGy over 10 treatment fractions, completed 10/19/2021    Primary induction systemic chemotherapy cycle #1 of Carboplatin AUC of 5/Alimta 500 mg/m²/Keytruda on 10/7/2021. She tolerated cycle #1 of Carboplatin AUC of 5/Alimta 500 mg/m²/Keytruda very poorly. [facility] NGS sequencing reported a positive MET (exon 14 skipping) mutation on 10/19/2021. Jesús Awan is accompanied by her son Tyrel Lugo today for evaluation and further decision-making. 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 TARGET LESIONS:  · 4.1 x 3.6 cm spiculated superior segment right lower lobe lung mass suspicious for primary malignancy. · Right hilar adenopathy. · Nondisplaced fracture at the left acetabulum and at the root of the left superior pubic ramus  · Expansile lytic lesion involving the more distal shaft of the left femur with extraosseous extension    Palliative XRT to the left femur and left hip was initiated on 10/6/2021 for a total dose of 3000 cGy over 10 treatment fractions, completed 10/19/2021    Primary induction systemic chemotherapy cycle #1 of Carboplatin AUC of 5/Alimta 500 mg/m²/Keytruda on 10/7/2021. She tolerated cycle #1 of Carboplatin AUC of 5/Alimta 500 mg/m²/Keytruda very poorly.        PET CT SKULL BASE TO MID THIGH 10/12/2021 :  · 2.9 x 0.1ZO hypermetabolic mass in the superior segment of the right lower lobe, maximum SUV of 20.8   · 1.7 cm posterior right hilum there is a separate hypermetabolic mass,most likely posterior right hilar neoplastic        Adenopathy  · Lytic lesion with abnormal hypermetabolism within the anterior left acetabulum/left distal superior pubic ramus compatible with a metastatic lesion  · Intense hypermetabolism associated with the known lesion within the distal left femur, only partially visualized in the field-of-view obtained. [address] [facility] NGS sequencing reported a positive MET (exon 14 skipping) mutation on 10/19/2021. An actionable MET mutation was documented after delivery of cycle #1 of chemoimmunotherapy. She tolerated cycle #1 of Carboplatin AUC of 5/Alimta 500 mg/m²/Keytruda very poorly. Physical examination today, 11/11/2021 She is accompanied by her son Bebeto Gilman. HEENT is without asymmetry extraocular movements are intact. Neck is supple no JVD no palpable lymphadenopathy, specifically no supraclavicular lymphadenopathy on the left or the right. Lungs clear to auscultation  Abdominal  with positive bowel sounds  Musculoskeletal:  Continues to have pain to her distal left femur. No peripheral edema. Neurological exam is grossly intact    CBC 10/28/2021 revealed a WBC of 6.27 Hgb is 10.2 with an MCV of  94.2 and platelet count of [phone],063. CBC today 11/11/2021 reveals a WBC of 13.48 Hgb is 10.6 with an MCV of  97.1 and platelet count of 746,[phone] . Decision was made on 10/28/2021 to begin capmatinib (trabecta) 400 mg BID. Extended discussion was undertaken with Dotty Maldonado and her son Delta Economy today. She is very excited with the prospect of targeted chemotherapy for the actionable MET mutation found with NGS on her tumor. All other questions were answered to their understanding satisfaction. Printed information was also provided to them for review.   Side effects, toxicities, expectations and prognosis and long-range plans were all discussed with multiple questions entertained in the visit today. Arrangements are being made to procure trabecta. I will see her in 2 weeks or sooner if we are able to get the medication for her and she is feeling well to get started. She is also pleased with the fact that she will have an extra week or more to strengthen and recover from the last chemotherapy treatment. #2  TUMOR SCREENING AND HEALTH MAINTENANCE    Bone Health  - DEXA BONE DENSITY AXIAL SKELETON on 8/13/2021 revealed Osteopenia. Patient takes Calcium + Vitamin D as indicated. Breast cancer screening - RADHA DIGITAL SCREEN W OR WO CAD BILATERAL on  8/13/2021 was BI-RADS category 1     GI cancer screening - Colonoscopy per Dr Gee Vasquez 6/24/19 with a benign 6mm sessile polyp in the the descending colon. 5 year recall. GYN cancer screening - JUAN ALBERTO & BSO 1970s      #3  Immunization History   COVID-19, Pfizer, PF, 30mcg/0.3mL 02/22/2021, 03/15/2021   Influenza, High Dose (Fluzone 65 yrs and older) 11/09/2018, 10/11/2019, 09/10/2020   Pneumococcal Conjugate 13-valent ([id number]) 07/13/2015   Pneumococcal Polysaccharide ([id number]) 05/09/2019   Zoster Live (Zostavax) 12/09/2016   Zoster Recombinant (Shingrix) 05/09/2019, 09/25/2019          Raisa Lyons was seen today for follow-up. Diagnoses and all orders for this visit:    Primary malignant neoplasm of lung metastatic to other site, unspecified laterality ([facility] [address]  -     Comprehensive Metabolic Panel;  Future  -     Comprehensive Metabolic Panel; Standing    Lytic bone lesion of left femur    Adenopathy, hilar        Orders Placed This Encounter   Procedures    Comprehensive Metabolic Panel     Standing Status:   Future     Standing Expiration Date:   11/11/2022    Comprehensive Metabolic Panel     Standing Status:   Standing     Number of Occurrences:   12     Standing Expiration Date:   11/11/2022       No orders of the defined types were placed in this encounter. Return in about 4 weeks (around 12/9/2021) for follow-up with .

## 2021-11-24 NOTE — TELEPHONE ENCOUNTER
Spoke with Kvng Tran about Ritas medication. She received it Monday and started it that night 11-22-21.

## 2021-11-29 NOTE — PROGRESS NOTES
Spoke with Tawana Check regarding today's CMP results with elevated Creatinine. Confirmed that she started PO Tabrecta (starting at 200mg BID to increase to desired dose of 400mg BID). Due to elevated Creatinine, will not increase dose yet. Patient will come to clinic on Wed 12/1 for IVF and continue dosing at 200mg BID. Tawana Check verbalized understanding. She denies any side effects to treatment.

## 2021-12-01 NOTE — RESULT ENCOUNTER NOTE
Patient received 1L IVF in clinic today. Will continue Tabrecta at 200mg daily (with goal of 400mg daily) x 1 week;  repeat CMP and assess kidney function next week as scheduled. Will evaluate for dose increase at that time. Adele's son Yayo Mir is present also. Reports that Dietrich Bamberger has f/u with Nephrology in 1 month. This will time appropriately to have 1 month of treatment and lab values to evaluate.

## 2021-12-03 NOTE — TELEPHONE ENCOUNTER
Jesús Awan called requesting a refill of the below medication which has been pended for you:     Requested Prescriptions     Pending Prescriptions Disp Refills    pantoprazole (PROTONIX) 40 MG tablet [Pharmacy Med Name: PANTOPRAZOLE 40 MG T 40 Tablet] 90 tablet 1     Sig: Take 1 tablet by mouth daily       Last Appointment Date: 8/30/2021  Next Appointment Date: 1/25/2022    No Known Allergies

## 2021-12-09 NOTE — TELEPHONE ENCOUNTER
Isaiah Narayanan called requesting a refill of the below medication which has been pended for you:     Requested Prescriptions     Pending Prescriptions Disp Refills    oxybutynin (DITROPAN) 5 MG tablet [Pharmacy Med Name: OXYBUTYNIN 5 MG TA 5 Tablet] 180 tablet 3     Sig: TAKE 1 TABLET BY MOUTH TWO TIMES A DAY       Last Appointment Date: 8/30/2021  Next Appointment Date: 1/25/2022    No Known Allergies

## 2021-12-14 NOTE — PROGRESS NOTES
Patient:  Tanja Harley  YOB: 1940  Date of Service: 12/16/2021  MRN: [id number]    Primary Care Physician: Anjelica Jones MD    Chief Complaint   Patient presents with    Follow-up     Metastatic lung cancer    Oral Chemotherapy Monitoring       Patient Seen, Chart, Consults notes, Labs, Radiology studies reviewed. 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 Palliative XRT to the left femur and left hip was initiated on 10/6/2021 for a total dose of 3000 cGy over 10 treatment fractions, completed 10/19/2021    Primary induction systemic chemotherapy cycle #1 of Carboplatin AUC of 5/Alimta 500 mg/m²/Keytruda on 10/7/2021. She tolerated cycle #1 of Carboplatin AUC of 5/Alimta 500 mg/m²/Keytruda very poorly. [facility] NGS sequencing reported a positive MET (exon 14 skipping) mutation on 10/19/2021. Decision was made on 10/28/2021 to begin capmatinib (trabecta) 400 mg PO BID. Trabecta (capmatinib) 400 mg PO BID was initiated on 11/22/2021    Gilda Ledezma is accompanied today, 12/16/2021, by her son Mayelin Varela, who lives in [facility], for monitoring and IV fluid support due to dehydration and abnormal kidney function on serology. TARGET LESIONS:  · 4.1 x 3.6 cm spiculated superior segment RLL lung mass consistent with the primary  · MET+ (Exon 14 Skipping)  · Right hilar adenopathy. · Nondisplaced fracture at the left acetabulum and at the root of the left superior pubic ramus.    · Expansile lytic lesion involving the more distal shaft of the left femur with extraosseous extension    TUMOR HISTORY:  Gilda Ledezma was seen in initial oncology consultation on 9/20/2021 referred by Dr Braydon Trotter with a nondisplaced fracture of the left superior pubic ramus, expansile lytic lesion of the distal third shaft of the left femur and a 4.1 x 3.6 x 3.5 cm  lung mass suspicious for primary malignancy with metastasis. Isaiah Narayanan had an office visit with PCP, Sweta Che on 8/20/21 with complaints of left hip pain radiating from groin down left thigh. Over the next several weeks, the pain progressed down the leg to the distal left thigh causing difficulty walking, utilizing a cane, and eventually a wheelchair. She denies any trauma. Plain film imaging was ordered and orthopaedic referral was made. XR HIP 2-3 VW W PELVIS LEFT on 8/30/2021 at [address] documented:  · No acute bony abnormality. · Mild acetabular and femoral head spurring on the left side    XR FEMUR LEFT (MIN 2 VIEWS) 8/30/2021 at [address] documented:  · No acute bony abnormality. Orthopaedic consultation with Dr Winnie Garcia on 9/7/2021 to address left hip and groin pain over 1 month period, ambulating with a walker. Denies any injury and describes a stabbing pain going down her left leg when standing. MRI PELVIS on 9/8/2021 at [facility] documented:  Along the  Helder-medial aspect of the left acetabulum and at the root of the left superior pubic ramus, there is definite evidence of a nondisplaced fracture. It should be noted that there is expansion of the cortex in this region and some [id number] STIR signal with diminished T1 signal in the marrow at this location, as well as possible expansion of the cortex. The degree of marrow edema surrounding the fracture site is very abruptly cut off between normal signal, and I am suspicious that there may be an underlying bone lesion with pathologic fracture rather than a simple fracture. Dr. García Skaggs called me with the above information desiring consultation. Further imaging was recommended including a CT scan of the chest abdomen and pelvis. MRI LEFT FEMUR WO CONTRAST on 9/14/21 at [address] [facility] documented:   There is an expansile lytic lesion involving the more distal shaft of the left femur at the juncture of the proximal two-thirds with the distal one-third. This is creating permeative changes within the more lateral cortex of the femur at this level and on T1 sequencing there appears to be extraosseous extension of the process. There is surrounding edema associated with this lesion. Given the permeative appearance of the more lateral cortex I feel this patient would certainly be at a high risk for impending pathologic fracture. No additional lesions are present. CT CHEST WO CONTRAST DIAGNOSTIC on  9/9/2021 at [facility] documented:  · 4.1 x 3.6 cm spiculated superior segment right lower lobe lung mass suspicious for primary malignancy. · Questionable right hilar adenopathy. · Emphysema. CT ABDOMEN PELVIS WO CONTRAST on 9/9/2021 at [facility] documented:  · Nonenhanced imaging of the abdomen and pelvis reduces assessment of the visceral organs. No convincing intra-abdominal or pelvic metastasis. Probable left renal cyst. Colonic diverticulosis. · Left perineural sacral cyst. No pathologic fractures. Appointment Note from 9/21/2021 visit to Dr. Eliza Walden:   She was set up an appointment for Left cephalomedullary nailing of her left pathologic femur fracture to protect her femur. Plan to biopsy  From distal femoral lesion including a small sample of bone and tissue. A cephalomedullary nailing of the left pathologic femoral shaft fracture and open biopsy of a left femoral shaft bone and soft tissue mass was performed by Dr. Yasir Vazquez on 9/23/2021  Pathology reported to:  · Soft tissue and bone, left femur, excision:Hemorrhagic bone and soft tissue. · Bone, left femur, reamings:Metastatic poorly differentiated carcinoma. 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 RECOMMENDATION AND PLAN by Dr. Lit Trevino on 9/28/2021:  Diagnosis is stage IV metastatic poorly differentiated carcinoma with sarcomatoid features. · I called and spoke to Dr. Jennifer Nair regarding radiation therapy for pain control and stabilization of the left femur and left hip, he will be seeing her immediately after leaving this clinic today, 9/28/2021 to get started. · Treatment will not be curable but rather palliative and Alyssa Frankel desires systemic therapy  · Marichuy Harris requested to look for actionable mutations  · Pathology specimen from 9/23/2021 requested to be sent to [facility] for NGS, MSI and PD-L1 testing  · C/Marlon Hardy Final general surgery for port placement    · PET scan  · Cycle #1 of chemotherapy with Carboplatin AUC of 5/Alimta 500 mg/m²/Keytruda standard dosing  began on 10/7/2021,     Initial consultation with Dr. Jennifer Nair on 9/28/2021 for consideration of palliative radiation therapy for pain control and stabilization of the left femur and left hip. Dr. Grabiel Jaime recommended to treat the left leg with palliative radiation therapy for an anticipated a dose of 3000 cGy over 10 fractions, final course pending completion of planning. Palliative XRT to the left femur and left hip was initiated on 10/6/2021 for a total dose of 3000 cGy over 10 treatment fractions, completed 10/19/2021    Port-A-Cath placed to right IJ on 10/4/2021 by Dr. Joseph Sanchez    Primary induction systemic chemotherapy cycle #1 of Carboplatin AUC of 5/Alimta 500 mg/m²/Keytruda on 10/7/2021. She tolerated cycle #1 of Carboplatin AUC of 5/Alimta 500 mg/m²/Keytruda very poorly.     PET CT SKULL BASE TO MID THIGH 10/12/2021 :  · 2.9 x 1.3TP hypermetabolic mass in the superior segment of the right lower lobe, maximum SUV of 20.8   · 1.7 cm posterior right hilum there is a separate hypermetabolic mass,most likely posterior right hilar neoplastic Adenopathy  · Lytic lesion with abnormal hypermetabolism within the anterior left acetabulum/left distal superior pubic ramus compatible with a metastatic lesion  · Intense hypermetabolism associated with the known lesion within the distal left femur, only partially visualized in the field-of-view obtained. [facility] NGS sequencing reported a positive MET (exon 14 skipping) mutation on 10/19/2021. An actionable MET mutation was documented after delivery of cycle #1 of chemoimmunotherapy. She tolerated cycle #1 of Carboplatin AUC of 5/Alimta 500 mg/m²/Keytruda very poorly. Decision was made on 10/28/2021 to begin capmatinib (trabecta) 400 mg BID. TREATMENT SUMMARY:  · Palliative XRT to the left femur and left hip was initiated on 10/6/2021 for a total dose of 3000 cGy over 10 treatment fractions, completed 10/19/2021  · Cycle #1 of palliative chemotherapy with Carboplatin AUC of 5/Alimta 500 mg/m²/Keytruda was started on 10/7/2021  · Capmatinib (trabecta) 400 mg BID was started on 11/22/2021    Allergies:  Patient has no known allergies. Medicines:  Current Outpatient Medications   Medication Sig Dispense Refill    oxybutynin (DITROPAN) 5 MG tablet TAKE 1 TABLET BY MOUTH TWO TIMES A  tablet 3    pantoprazole (PROTONIX) 40 MG tablet Take 1 tablet by mouth daily 90 tablet 1    Capmatinib HCl (TABRECTA) 200 MG TABS Take 400 mg by mouth 2 times daily 120 tablet 3    metoclopramide (REGLAN) 10 MG tablet Take 1 tablet by mouth 4 times daily 120 tablet 1    ondansetron (ZOFRAN-ODT) 8 MG TBDP disintegrating tablet Place 1 tablet under the tongue every 8 hours as needed for Nausea or Vomiting 30 tablet 2    lidocaine-prilocaine (EMLA) 2.5-2.5 % cream Apply topically as needed.  30 g 3    promethazine (PHENERGAN) 25 MG tablet Take 1 tablet by mouth every 8 hours as needed for Nausea 30 tablet 2    folic acid (FOLVITE) 1 MG tablet Take 1 tablet by mouth daily 90 tablet 0    apixaban (ELIQUIS) 5 MG TABS tablet Take 5 mg by mouth every 12 hours      HYDROcodone-acetaminophen (NORCO)  MG per tablet Take 1 tablet by mouth every 4 hours as needed.  pravastatin (PRAVACHOL) 80 MG tablet TAKE 1 TABLET BY MOUTH AT BEDTIME 90 tablet 3    losartan (COZAAR) 50 MG tablet TAKE 1 TABLET BY MOUTH DAILY 90 tablet 3    triamterene-hydrochlorothiazide (MAXZIDE-25) 37.5-25 MG per tablet Take 0.5 tablets by mouth daily 45 tablet 3    calcium carbonate (OSCAL) 500 MG TABS tablet Take 500 mg by mouth daily      allopurinol (ZYLOPRIM) 100 MG tablet Take 100 mg by mouth daily      Cholecalciferol (VITAMIN D) 2000 units CAPS capsule Take by mouth       No current facility-administered medications for this visit.      Facility-Administered Medications Ordered in Other Visits   Medication Dose Route Frequency Provider Last Rate Last Admin    sodium chloride flush 0.9 % injection 5-40 mL  5-40 mL IntraVENous PRN Jessica Barajas MD        heparin flush 100 UNIT/ML injection 500 Units  500 Units IntraCATHeter PRN Jessica Barajas MD           Past Medical History:      Diagnosis Date    Acid reflux     LORA (acute kidney injury) ([facility] [address] 7/28/2018    HIGH CHOLESTEROL     Hypertension     Metastatic lung cancer (metastasis from lung to other site) [facility]) 9/28/2021    Metastatic lung cancer (metastasis from lung to other site) [facility]) 9/28/2021    Urinary incontinence     UTI (urinary tract infection)     Vertigo         Past Surgical History:      Procedure Laterality Date   Emma Nicolas COLONOSCOPY      Dr Fanta Cook 6/24/2019    Dr SARAH Juarez-Diverticular disease-Tubular AP (-) dysplasia, 5 yr recall    HYSTERECTOMY      PORT SURGERY N/A 10/4/2021    SINGLE LUMEN PORT PLMT WITH ULTRASOUND AND FLURO performed by Cici Grajeda MD at [address] [place] [address]         Family History:      Problem Relation Age of Onset    Cancer Mother     Colon Cancer Mother     Heart Disease Father     Stroke Brother     Colon Polyps Neg Hx     Esophageal Cancer Neg Hx     Liver Cancer Neg Hx     Liver Disease Neg Hx     Rectal Cancer Neg Hx     Stomach Cancer Neg Hx         Social History  Social History     Tobacco Use    Smoking status: Former Smoker     Packs/day: 0.50     Years: 40.00     Pack years: 20.00     Types: Cigarettes     Start date:      Quit date:      Years since quittin.9    Smokeless tobacco: Never Used   Vaping Use    Vaping Use: Never used   Substance Use Topics    Alcohol use: No    Drug use: No             Wt Readings from Last 3 Encounters:   21 153 lb 11.2 oz (69.7 kg)   12/10/21 154 lb 8 oz (70.1 kg)   21 159 lb 1.6 oz (72.2 kg)        Objective:  Vital Signs: There were no vitals taken for this visit. Labs:  BMP:   Recent Labs     21  0950      K 4.4   *   CO2 24   BUN 20*   CREATININE 2.0*   CALCIUM 9.2     CBC:   Recent Labs     21  1005   WBC 6.18   HGB 9.5*   HCT 29.9*   MCV 96.1*        PT/INR: No results for input(s): PROTIME, INR in the last 72 hours. APTT: No results for input(s): APTT in the last 72 hours. Magnesium:No results for input(s): MG in the last 72 hours. Phosphorus:No results for input(s): PHOS in the last 72 hours. Hepatic:   Recent Labs     21  0950   ALKPHOS 76   ALT 15   AST 20   PROT 5.4*   BILITOT 0.7   LABALBU 3.3*       Cultures:   No results for input(s): CULTURE in the last 72 hours.     Lab Results   Component Value Date    WBC 6.18 2021    HGB 9.5 (L) 2021    HCT 29.9 (L) 2021    MCV 96.1 (H) 2021     2021     Lab Results   Component Value Date    NEUTROABS 4.19 2021     Lab Results   Component Value Date     2021    K 4.4 2021     (H) 2021    CO2 24 2021    BUN 20 (H) 2021    CREATININE 2.0 (H) 2021    GLUCOSE 102 2021    CALCIUM 9.2 12/16/2021    PROT 5.4 (L) 12/16/2021    LABALBU 3.3 (L) 12/16/2021    BILITOT 0.7 12/16/2021    ALKPHOS 76 12/16/2021    AST 20 12/16/2021    ALT 15 12/16/2021    LABGLOM 24 (A) 12/16/2021    GFRAA 25 (L) 12/10/2021    AGRATIO 2.4 (H) 12/10/2021    GLOB 2.2 12/16/2021             Radiology reports as per the Radiologist  Radiology:  None       ASSESSMENT AND PLAN:  Carmine Carpenter is an 79year old female managed with primary and secondary diagnoses as outlined:    · Stage IV, 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 Palliative XRT to the left femur and left hip was initiated on 10/6/2021 for a total dose of 3000 cGy over 10 treatment fractions, completed 10/19/2021    Primary induction systemic chemotherapy cycle #1 of Carboplatin AUC of 5/Alimta 500 mg/m²/Keytruda on 10/7/2021. She tolerated cycle #1 of Carboplatin AUC of 5/Alimta 500 mg/m²/Keytruda very poorly. [facility] NGS sequencing reported a positive MET (exon 14 skipping) mutation on 10/19/2021. Decision was made on 10/28/2021 to begin capmatinib (trabecta) 400 mg PO BID. Trabecta (capmatinib) 400 mg PO BID was initiated on 11/22/2021    Carmine Carpenter is accompanied today, 12/16/2021, by her son Ramos Morelos, who lives in [facility], for monitoring and IV fluid support due to dehydration and abnormal kidney function on serology. She states that she is feeling 900% better as far as nausea is concerned with trabecta been on systemic intravenous chemotherapy. 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 TARGET LESIONS:  · 4.1 x 3.6 cm spiculated superior segment right lower lobe lung mass suspicious for primary malignancy. · Right hilar adenopathy.    · Nondisplaced fracture at the left acetabulum and at the root of the left superior pubic ramus  · Expansile lytic lesion involving the more distal shaft of the left femur with extraosseous extension    Palliative XRT to the left femur and left hip was initiated on 10/6/2021 for a total dose of 3000 cGy over 10 treatment fractions, completed 10/19/2021    Primary induction systemic chemotherapy cycle #1 of Carboplatin AUC of 5/Alimta 500 mg/m²/Keytruda on 10/7/2021. She tolerated cycle #1 of Carboplatin AUC of 5/Alimta 500 mg/m²/Keytruda very poorly. PET CT SKULL BASE TO MID THIGH 10/12/2021 :  · 2.9 x 6.6JM hypermetabolic mass in the superior segment of the right lower lobe, maximum SUV of 20.8   · 1.7 cm posterior right hilum there is a separate hypermetabolic mass,most likely posterior right hilar neoplastic        Adenopathy  · Lytic lesion with abnormal hypermetabolism within the anterior left acetabulum/left distal superior pubic ramus compatible with a metastatic lesion  · Intense hypermetabolism associated with the known lesion within the distal left femur, only partially visualized in the field-of-view obtained. [facility] NGS sequencing reported a positive MET (exon 14 skipping) mutation on 10/19/2021. An actionable MET mutation was documented after delivery of cycle #1 of chemoimmunotherapy. She tolerated cycle #1 of Carboplatin AUC of 5/Alimta 500 mg/m²/Keytruda very poorly. Wt Readings from Last 3 Encounters:   12/16/21 153 lb 11.2 oz (69.7 kg)   12/10/21 154 lb 8 oz (70.1 kg)   12/01/21 159 lb 1.6 oz (72.2 kg)       Physical examination today, 12/16/2021 She is accompanied by her son Nonnie Kocher. HEENT is without asymmetry extraocular movements are intact. Neck is supple no JVD no palpable lymphadenopathy, specifically no supraclavicular lymphadenopathy on the left or the right. Lungs clear to auscultation  Abdominal  with positive bowel sounds  Musculoskeletal:  Continues to have pain to her distal left femur. Minimal lower extremity edema about the ankles.    Neurological exam is grossly intact    CBC today 12/16/2021  reveals a WBC of 6.18  Hgb encounter. Return in 1 month (on 1/16/2022) for follow-up with .

## 2021-12-23 NOTE — PROGRESS NOTES
Pt presents for CBC, CMP & IVF. On arrival to room, pt states her feet & legs are very swollen. Bilateral pedal & lower leg edema noted, 3-4+. Pt states she has been elevating her legs for 1-2 hours at a time, but not often. Encouraged pt to keep feet & lower legs elevated above heart level any time she is sitting or lying to help alleviate swelling. She verbalizes understanding. Discussed with Shagufta Martino, TRACIE; will hold IVF today & obtain labs only.

## 2022-01-01 ENCOUNTER — HOSPITAL ENCOUNTER (OUTPATIENT)
Dept: ULTRASOUND IMAGING | Age: 82
Discharge: HOME | End: 2022-01-14
Payer: MEDICARE

## 2022-01-01 ENCOUNTER — HOSPITAL ENCOUNTER (OUTPATIENT)
Dept: INFUSION THERAPY | Age: 82
Discharge: HOME | End: 2022-03-08
Payer: MEDICARE

## 2022-01-01 ENCOUNTER — HOSPITAL ENCOUNTER (OUTPATIENT)
Dept: INFUSION THERAPY | Age: 82
Discharge: HOME | End: 2022-02-01
Payer: MEDICARE

## 2022-01-01 ENCOUNTER — APPOINTMENT (OUTPATIENT)
Dept: MRI IMAGING | Age: 82
DRG: 391 | End: 2022-01-01
Payer: MEDICARE

## 2022-01-01 ENCOUNTER — HOSPITAL ENCOUNTER (OUTPATIENT)
Dept: INFUSION THERAPY | Age: 82
End: 2022-01-01
Payer: MEDICARE

## 2022-01-01 ENCOUNTER — OFFICE VISIT (OUTPATIENT)
Dept: HEMATOLOGY | Age: 82
End: 2022-01-01
Payer: MEDICARE

## 2022-01-01 ENCOUNTER — HOSPITAL ENCOUNTER (OUTPATIENT)
Dept: INFUSION THERAPY | Age: 82
Discharge: HOME | End: 2022-02-15
Payer: MEDICARE

## 2022-01-01 ENCOUNTER — TELEPHONE (OUTPATIENT)
Dept: INTERNAL MEDICINE | Age: 82
End: 2022-01-01

## 2022-01-01 ENCOUNTER — OFFICE VISIT (OUTPATIENT)
Dept: INTERNAL MEDICINE | Age: 82
End: 2022-01-01
Payer: MEDICARE

## 2022-01-01 ENCOUNTER — HOSPITAL ENCOUNTER (OUTPATIENT)
Dept: VASCULAR LAB | Age: 82
Discharge: HOME | End: 2022-01-20
Payer: MEDICARE

## 2022-01-01 ENCOUNTER — APPOINTMENT (OUTPATIENT)
Dept: GENERAL RADIOLOGY | Age: 82
DRG: 391 | End: 2022-01-01
Payer: MEDICARE

## 2022-01-01 ENCOUNTER — APPOINTMENT (OUTPATIENT)
Dept: ULTRASOUND IMAGING | Age: 82
DRG: 391 | End: 2022-01-01
Payer: MEDICARE

## 2022-01-01 ENCOUNTER — TELEPHONE (OUTPATIENT)
Dept: HEMATOLOGY | Age: 82
End: 2022-01-01

## 2022-01-01 ENCOUNTER — CLINICAL DOCUMENTATION (OUTPATIENT)
Dept: HEMATOLOGY | Age: 82
End: 2022-01-01

## 2022-01-01 ENCOUNTER — HOSPITAL ENCOUNTER (OUTPATIENT)
Dept: INFUSION THERAPY | Age: 82
Discharge: HOME | End: 2022-01-14
Payer: MEDICARE

## 2022-01-01 ENCOUNTER — HOSPITAL ENCOUNTER (INPATIENT)
Age: 82
LOS: 5 days | Discharge: HOSPICE | DRG: 391 | End: 2022-03-18
Attending: EMERGENCY MEDICINE | Admitting: INTERNAL MEDICINE
Payer: MEDICARE

## 2022-01-01 ENCOUNTER — APPOINTMENT (OUTPATIENT)
Dept: CT IMAGING | Age: 82
DRG: 391 | End: 2022-01-01
Payer: MEDICARE

## 2022-01-01 ENCOUNTER — HOSPITAL ENCOUNTER (OUTPATIENT)
Dept: INFUSION THERAPY | Age: 82
Discharge: HOME | End: 2022-01-07
Payer: MEDICARE

## 2022-01-01 ENCOUNTER — HOSPITAL ENCOUNTER (OUTPATIENT)
Dept: INFUSION THERAPY | Age: 82
Discharge: HOME | End: 2022-01-03
Payer: MEDICARE

## 2022-01-01 ENCOUNTER — HOSPITAL ENCOUNTER (OUTPATIENT)
Dept: INFUSION THERAPY | Age: 82
Discharge: HOME | End: 2022-01-25
Payer: MEDICARE

## 2022-01-01 ENCOUNTER — HOSPITAL ENCOUNTER (OUTPATIENT)
Dept: INFUSION THERAPY | Age: 82
Discharge: HOME | End: 2022-01-13
Payer: MEDICARE

## 2022-01-01 ENCOUNTER — HOSPITAL ENCOUNTER (OUTPATIENT)
Dept: INFUSION THERAPY | Age: 82
Discharge: HOME | End: 2022-03-01
Payer: MEDICARE

## 2022-01-01 ENCOUNTER — HOSPITAL ENCOUNTER (OUTPATIENT)
Dept: INFUSION THERAPY | Age: 82
Discharge: HOME | End: 2022-02-22
Payer: MEDICARE

## 2022-01-01 ENCOUNTER — HOSPITAL ENCOUNTER (OUTPATIENT)
Dept: INFUSION THERAPY | Age: 82
Discharge: HOME | End: 2022-02-08
Payer: MEDICARE

## 2022-01-01 VITALS
HEART RATE: 67 BPM | WEIGHT: 153.8 LBS | OXYGEN SATURATION: 100 % | DIASTOLIC BLOOD PRESSURE: 67 MMHG | SYSTOLIC BLOOD PRESSURE: 132 MMHG | HEIGHT: 65 IN | BODY MASS INDEX: 25.62 KG/M2

## 2022-01-01 VITALS
HEIGHT: 65 IN | SYSTOLIC BLOOD PRESSURE: 155 MMHG | HEART RATE: 68 BPM | BODY MASS INDEX: 25.59 KG/M2 | WEIGHT: 153.6 LBS | TEMPERATURE: 98.8 F | DIASTOLIC BLOOD PRESSURE: 63 MMHG | RESPIRATION RATE: 18 BRPM | OXYGEN SATURATION: 99 %

## 2022-01-01 VITALS
RESPIRATION RATE: 20 BRPM | BODY MASS INDEX: 25.62 KG/M2 | WEIGHT: 153.8 LBS | HEART RATE: 67 BPM | DIASTOLIC BLOOD PRESSURE: 76 MMHG | HEIGHT: 65 IN | OXYGEN SATURATION: 97 % | SYSTOLIC BLOOD PRESSURE: 138 MMHG

## 2022-01-01 VITALS
OXYGEN SATURATION: 93 % | HEART RATE: 97 BPM | SYSTOLIC BLOOD PRESSURE: 103 MMHG | RESPIRATION RATE: 22 BRPM | DIASTOLIC BLOOD PRESSURE: 58 MMHG | WEIGHT: 154.44 LBS | TEMPERATURE: 97.2 F | HEIGHT: 66 IN | BODY MASS INDEX: 24.82 KG/M2

## 2022-01-01 VITALS
BODY MASS INDEX: 25.39 KG/M2 | HEIGHT: 65 IN | OXYGEN SATURATION: 99 % | WEIGHT: 152.4 LBS | SYSTOLIC BLOOD PRESSURE: 150 MMHG | DIASTOLIC BLOOD PRESSURE: 86 MMHG | TEMPERATURE: 98.2 F | HEART RATE: 78 BPM

## 2022-01-01 DIAGNOSIS — M79.89 LEFT LEG SWELLING: ICD-10-CM

## 2022-01-01 DIAGNOSIS — M89.9 LYTIC BONE LESION OF LEFT FEMUR: ICD-10-CM

## 2022-01-01 DIAGNOSIS — I10 ESSENTIAL HYPERTENSION: ICD-10-CM

## 2022-01-01 DIAGNOSIS — Z00.00 HEALTH CARE MAINTENANCE: ICD-10-CM

## 2022-01-01 DIAGNOSIS — C34.90 PRIMARY MALIGNANT NEOPLASM OF LUNG METASTATIC TO OTHER SITE, UNSPECIFIED LATERALITY (HCC): ICD-10-CM

## 2022-01-01 DIAGNOSIS — D70.9 NEUTROPENIA, UNSPECIFIED TYPE (HCC): ICD-10-CM

## 2022-01-01 DIAGNOSIS — C34.90 PRIMARY MALIGNANT NEOPLASM OF LUNG METASTATIC TO OTHER SITE, UNSPECIFIED LATERALITY (HCC): Primary | ICD-10-CM

## 2022-01-01 DIAGNOSIS — R79.89 ELEVATED LIVER FUNCTION TESTS: ICD-10-CM

## 2022-01-01 DIAGNOSIS — E78.5 HYPERLIPIDEMIA, UNSPECIFIED HYPERLIPIDEMIA TYPE: ICD-10-CM

## 2022-01-01 DIAGNOSIS — M79.89 LEFT LEG SWELLING: Primary | ICD-10-CM

## 2022-01-01 DIAGNOSIS — E55.9 VITAMIN D DEFICIENCY: ICD-10-CM

## 2022-01-01 DIAGNOSIS — R91.8 LUNG MASS: Primary | ICD-10-CM

## 2022-01-01 DIAGNOSIS — E79.0 HYPERURICEMIA: ICD-10-CM

## 2022-01-01 DIAGNOSIS — N18.30 CHRONIC RENAL FAILURE, STAGE 3 (MODERATE), UNSPECIFIED WHETHER STAGE 3A OR 3B CKD (HCC): ICD-10-CM

## 2022-01-01 DIAGNOSIS — R19.7 NAUSEA VOMITING AND DIARRHEA: Primary | ICD-10-CM

## 2022-01-01 DIAGNOSIS — R91.8 LUNG MASS: ICD-10-CM

## 2022-01-01 DIAGNOSIS — R91.8 RIGHT LOWER LOBE LUNG MASS: ICD-10-CM

## 2022-01-01 DIAGNOSIS — Z51.11 ENCOUNTER FOR CHEMOTHERAPY MANAGEMENT: ICD-10-CM

## 2022-01-01 DIAGNOSIS — K21.9 GASTROESOPHAGEAL REFLUX DISEASE WITHOUT ESOPHAGITIS: ICD-10-CM

## 2022-01-01 DIAGNOSIS — N32.81 OAB (OVERACTIVE BLADDER): ICD-10-CM

## 2022-01-01 DIAGNOSIS — R91.8 RIGHT LOWER LOBE LUNG MASS: Primary | ICD-10-CM

## 2022-01-01 DIAGNOSIS — L98.9 SKIN LESION: ICD-10-CM

## 2022-01-01 DIAGNOSIS — Z00.00 ROUTINE ADULT HEALTH MAINTENANCE: Primary | ICD-10-CM

## 2022-01-01 DIAGNOSIS — I10 PRIMARY HYPERTENSION: ICD-10-CM

## 2022-01-01 DIAGNOSIS — Z51.11 ENCOUNTER FOR CHEMOTHERAPY MANAGEMENT: Primary | ICD-10-CM

## 2022-01-01 DIAGNOSIS — I82.4Y9 DEEP VEIN THROMBOSIS (DVT) OF PROXIMAL LOWER EXTREMITY, UNSPECIFIED CHRONICITY, UNSPECIFIED LATERALITY (HCC): ICD-10-CM

## 2022-01-01 DIAGNOSIS — R79.89 ELEVATED LIVER FUNCTION TESTS: Primary | ICD-10-CM

## 2022-01-01 DIAGNOSIS — C79.51 BONE METASTASES (HCC): ICD-10-CM

## 2022-01-01 DIAGNOSIS — R11.2 NAUSEA VOMITING AND DIARRHEA: Primary | ICD-10-CM

## 2022-01-01 LAB
ADENOVIRUS F 40 41 PCR: NOT DETECTED
ALBUMIN SERPL-MCNC: 1.8 G/DL (ref 3.5–5.2)
ALBUMIN SERPL-MCNC: 2.1 G/DL (ref 3.5–5.2)
ALBUMIN SERPL-MCNC: 2.8 G/DL (ref 3.5–5.2)
ALBUMIN SERPL-MCNC: 3.2 G/DL (ref 3.5–5.2)
ALBUMIN SERPL-MCNC: 3.4 G/DL (ref 3.5–5.2)
ALBUMIN SERPL-MCNC: 3.4 G/DL (ref 3.5–5.2)
ALBUMIN SERPL-MCNC: 3.5 G/DL (ref 3.5–5.2)
ALBUMIN SERPL-MCNC: 3.6 G/DL (ref 3.5–5.2)
ALBUMIN SERPL-MCNC: 3.8 G/DL (ref 3.5–5.2)
ALBUMIN SERPL-MCNC: 3.8 G/DL (ref 3.5–5.2)
ALBUMIN SERPL-MCNC: 3.9 G/DL (ref 3.5–5.2)
ALBUMIN SERPL-MCNC: 3.9 G/DL (ref 3.5–5.2)
ALBUMIN SERPL-MCNC: 4 G/DL (ref 3.5–5.2)
ALBUMIN SERPL-MCNC: 4.3 G/DL (ref 3.5–5.2)
ALP BLD-CCNC: 106 U/L (ref 35–104)
ALP BLD-CCNC: 108 U/L (ref 35–104)
ALP BLD-CCNC: 108 U/L (ref 35–104)
ALP BLD-CCNC: 118 U/L (ref 35–104)
ALP BLD-CCNC: 47 U/L (ref 35–104)
ALP BLD-CCNC: 63 U/L (ref 35–104)
ALP BLD-CCNC: 67 U/L (ref 35–104)
ALP BLD-CCNC: 71 U/L (ref 35–104)
ALP BLD-CCNC: 73 U/L (ref 35–104)
ALP BLD-CCNC: 75 U/L (ref 35–104)
ALP BLD-CCNC: 77 U/L (ref 35–104)
ALP BLD-CCNC: 78 U/L (ref 35–104)
ALP BLD-CCNC: 82 U/L (ref 35–104)
ALP BLD-CCNC: 84 U/L (ref 35–104)
ALP BLD-CCNC: 91 U/L (ref 35–104)
ALP BLD-CCNC: 99 U/L (ref 35–104)
ALT SERPL-CCNC: 134 U/L (ref 5–33)
ALT SERPL-CCNC: 14 U/L (ref 5–33)
ALT SERPL-CCNC: 17 U/L (ref 9–52)
ALT SERPL-CCNC: 19 U/L (ref 9–52)
ALT SERPL-CCNC: 233 U/L (ref 9–52)
ALT SERPL-CCNC: 24 U/L (ref 9–52)
ALT SERPL-CCNC: 24 U/L (ref 9–52)
ALT SERPL-CCNC: 248 U/L (ref 9–52)
ALT SERPL-CCNC: 28 U/L (ref 9–52)
ALT SERPL-CCNC: 284 U/L (ref 5–33)
ALT SERPL-CCNC: 35 U/L (ref 5–33)
ALT SERPL-CCNC: 49 U/L (ref 9–52)
ALT SERPL-CCNC: 540 U/L (ref 9–52)
ALT SERPL-CCNC: 55 U/L (ref 9–52)
ALT SERPL-CCNC: 57 U/L (ref 5–33)
ALT SERPL-CCNC: 9 U/L (ref 5–33)
ANION GAP SERPL CALCULATED.3IONS-SCNC: 10 MMOL/L (ref 7–19)
ANION GAP SERPL CALCULATED.3IONS-SCNC: 12 MMOL/L (ref 7–19)
ANION GAP SERPL CALCULATED.3IONS-SCNC: 14 MMOL/L (ref 7–19)
ANION GAP SERPL CALCULATED.3IONS-SCNC: 16 MMOL/L (ref 7–19)
ANION GAP SERPL CALCULATED.3IONS-SCNC: 16 MMOL/L (ref 7–19)
ANION GAP SERPL CALCULATED.3IONS-SCNC: 18 MMOL/L (ref 7–19)
ANION GAP SERPL CALCULATED.3IONS-SCNC: 21 MMOL/L (ref 7–19)
ANION GAP SERPL CALCULATED.3IONS-SCNC: 23 MMOL/L (ref 7–19)
ANION GAP SERPL CALCULATED.3IONS-SCNC: 5 MMOL/L (ref 7–19)
ANION GAP SERPL CALCULATED.3IONS-SCNC: 5 MMOL/L (ref 7–19)
ANION GAP SERPL CALCULATED.3IONS-SCNC: 7 MMOL/L (ref 7–19)
ANION GAP SERPL CALCULATED.3IONS-SCNC: 8 MMOL/L (ref 7–19)
ANION GAP SERPL CALCULATED.3IONS-SCNC: 8 MMOL/L (ref 7–19)
ANION GAP SERPL CALCULATED.3IONS-SCNC: 9 MMOL/L (ref 7–19)
ANION GAP SERPL CALCULATED.3IONS-SCNC: 9 MMOL/L (ref 7–19)
ANISOCYTOSIS: ABNORMAL
AST SERPL-CCNC: 140 U/L (ref 5–32)
AST SERPL-CCNC: 16 U/L (ref 5–32)
AST SERPL-CCNC: 181 U/L (ref 14–36)
AST SERPL-CCNC: 208 U/L (ref 14–36)
AST SERPL-CCNC: 23 U/L (ref 5–32)
AST SERPL-CCNC: 29 U/L (ref 14–36)
AST SERPL-CCNC: 32 U/L (ref 14–36)
AST SERPL-CCNC: 32 U/L (ref 5–32)
AST SERPL-CCNC: 33 U/L (ref 14–36)
AST SERPL-CCNC: 34 U/L (ref 14–36)
AST SERPL-CCNC: 38 U/L (ref 14–36)
AST SERPL-CCNC: 405 U/L (ref 14–36)
AST SERPL-CCNC: 43 U/L (ref 5–32)
AST SERPL-CCNC: 78 U/L (ref 5–32)
ASTROVIRUS PCR: NOT DETECTED
ATYPICAL LYMPHOCYTE RELATIVE PERCENT: 1 % (ref 0–8)
ATYPICAL LYMPHOCYTE RELATIVE PERCENT: 5 % (ref 0–8)
BACTERIA: ABNORMAL /HPF
BACTERIA: ABNORMAL /HPF
BACTERIA: NEGATIVE /HPF
BANDED NEUTROPHILS RELATIVE PERCENT: 2 % (ref 0–5)
BANDED NEUTROPHILS RELATIVE PERCENT: 21 % (ref 0–5)
BANDED NEUTROPHILS RELATIVE PERCENT: 40 % (ref 0–5)
BANDED NEUTROPHILS RELATIVE PERCENT: 8 % (ref 0–5)
BASOPHILS ABSOLUTE: 0 K/UL (ref 0–0.2)
BASOPHILS ABSOLUTE: 0.01 K/UL (ref 0.01–0.08)
BASOPHILS ABSOLUTE: 0.02 K/UL (ref 0.01–0.08)
BASOPHILS ABSOLUTE: 0.03 K/UL (ref 0.01–0.08)
BASOPHILS ABSOLUTE: 0.03 K/UL (ref 0.01–0.08)
BASOPHILS ABSOLUTE: 0.06 K/UL (ref 0.01–0.08)
BASOPHILS ABSOLUTE: 0.07 K/UL (ref 0.01–0.08)
BASOPHILS ABSOLUTE: 0.1 K/UL (ref 0–0.2)
BASOPHILS RELATIVE PERCENT: 0 % (ref 0–1)
BASOPHILS RELATIVE PERCENT: 0.1 % (ref 0.1–1.2)
BASOPHILS RELATIVE PERCENT: 0.2 % (ref 0–1)
BASOPHILS RELATIVE PERCENT: 0.3 % (ref 0.1–1.2)
BASOPHILS RELATIVE PERCENT: 0.5 % (ref 0.1–1.2)
BASOPHILS RELATIVE PERCENT: 0.5 % (ref 0.1–1.2)
BASOPHILS RELATIVE PERCENT: 0.7 % (ref 0.1–1.2)
BASOPHILS RELATIVE PERCENT: 0.8 % (ref 0.1–1.2)
BASOPHILS RELATIVE PERCENT: 1 % (ref 0–1)
BASOPHILS RELATIVE PERCENT: 1.1 % (ref 0.1–1.2)
BASOPHILS RELATIVE PERCENT: 1.2 % (ref 0.1–1.2)
BASOPHILS RELATIVE PERCENT: 1.2 % (ref 0.1–1.2)
BASOPHILS RELATIVE PERCENT: 1.7 % (ref 0.1–1.2)
BILIRUB SERPL-MCNC: 0.3 MG/DL (ref 0.2–1.2)
BILIRUB SERPL-MCNC: 0.4 MG/DL (ref 0.2–1.2)
BILIRUB SERPL-MCNC: 0.4 MG/DL (ref 0.2–1.2)
BILIRUB SERPL-MCNC: 0.4 MG/DL (ref 0.2–1.3)
BILIRUB SERPL-MCNC: 0.5 MG/DL (ref 0.2–1.2)
BILIRUB SERPL-MCNC: 0.5 MG/DL (ref 0.2–1.3)
BILIRUB SERPL-MCNC: 0.7 MG/DL (ref 0.2–1.3)
BILIRUB SERPL-MCNC: 0.7 MG/DL (ref 0.2–1.3)
BILIRUB SERPL-MCNC: 0.8 MG/DL (ref 0.2–1.3)
BILIRUB SERPL-MCNC: 1.3 MG/DL (ref 0.2–1.3)
BILIRUB SERPL-MCNC: 1.5 MG/DL (ref 0.2–1.3)
BILIRUBIN URINE: ABNORMAL
BILIRUBIN URINE: NEGATIVE
BILIRUBIN URINE: NEGATIVE
BLOOD CULTURE, ROUTINE: NORMAL
BLOOD, URINE: ABNORMAL
BLOOD, URINE: NEGATIVE
BLOOD, URINE: NEGATIVE
BUN BLDV-MCNC: 13 MG/DL (ref 7–17)
BUN BLDV-MCNC: 13 MG/DL (ref 7–17)
BUN BLDV-MCNC: 15 MG/DL (ref 7–17)
BUN BLDV-MCNC: 16 MG/DL (ref 8–23)
BUN BLDV-MCNC: 20 MG/DL (ref 7–17)
BUN BLDV-MCNC: 21 MG/DL (ref 7–17)
BUN BLDV-MCNC: 21 MG/DL (ref 8–23)
BUN BLDV-MCNC: 22 MG/DL (ref 7–17)
BUN BLDV-MCNC: 23 MG/DL (ref 7–17)
BUN BLDV-MCNC: 24 MG/DL (ref 7–17)
BUN BLDV-MCNC: 25 MG/DL (ref 7–17)
BUN BLDV-MCNC: 26 MG/DL (ref 8–23)
BUN BLDV-MCNC: 28 MG/DL (ref 7–17)
BUN BLDV-MCNC: 35 MG/DL (ref 8–23)
BUN BLDV-MCNC: 44 MG/DL (ref 8–23)
BUN BLDV-MCNC: 52 MG/DL (ref 8–23)
BUN BLDV-MCNC: 63 MG/DL (ref 8–23)
CALCIUM SERPL-MCNC: 10.3 MG/DL (ref 8.4–10.2)
CALCIUM SERPL-MCNC: 6.9 MG/DL (ref 8.8–10.2)
CALCIUM SERPL-MCNC: 7.1 MG/DL (ref 8.8–10.2)
CALCIUM SERPL-MCNC: 7.2 MG/DL (ref 8.8–10.2)
CALCIUM SERPL-MCNC: 7.5 MG/DL (ref 8.8–10.2)
CALCIUM SERPL-MCNC: 8.4 MG/DL (ref 8.8–10.2)
CALCIUM SERPL-MCNC: 9 MG/DL (ref 8.4–10.2)
CALCIUM SERPL-MCNC: 9 MG/DL (ref 8.4–10.2)
CALCIUM SERPL-MCNC: 9.1 MG/DL (ref 8.4–10.2)
CALCIUM SERPL-MCNC: 9.4 MG/DL (ref 8.4–10.2)
CALCIUM SERPL-MCNC: 9.4 MG/DL (ref 8.4–10.2)
CALCIUM SERPL-MCNC: 9.5 MG/DL (ref 8.4–10.2)
CALCIUM SERPL-MCNC: 9.5 MG/DL (ref 8.8–10.2)
CALCIUM SERPL-MCNC: 9.6 MG/DL (ref 8.4–10.2)
CALCIUM SERPL-MCNC: 9.6 MG/DL (ref 8.4–10.2)
CALCIUM SERPL-MCNC: 9.6 MG/DL (ref 8.8–10.2)
CALCIUM SERPL-MCNC: 9.7 MG/DL (ref 8.4–10.2)
CAMPYLOBACTER PCR: NOT DETECTED
CHLORIDE BLD-SCNC: 100 MMOL/L (ref 98–111)
CHLORIDE BLD-SCNC: 104 MMOL/L (ref 98–111)
CHLORIDE BLD-SCNC: 106 MMOL/L (ref 98–111)
CHLORIDE BLD-SCNC: 106 MMOL/L (ref 98–111)
CHLORIDE BLD-SCNC: 107 MMOL/L (ref 98–111)
CHLORIDE BLD-SCNC: 108 MMOL/L (ref 98–111)
CHLORIDE BLD-SCNC: 109 MMOL/L (ref 98–111)
CHLORIDE BLD-SCNC: 110 MMOL/L (ref 98–111)
CHLORIDE BLD-SCNC: 110 MMOL/L (ref 98–111)
CHLORIDE BLD-SCNC: 96 MMOL/L (ref 98–111)
CHLORIDE BLD-SCNC: 97 MMOL/L (ref 98–111)
CHOLESTEROL, TOTAL: 181 MG/DL (ref 160–199)
CLARITY: CLEAR
CLOSTRIDIUM DIFFICILE, PCR: NOT DETECTED
CO2: 10 MMOL/L (ref 22–29)
CO2: 14 MMOL/L (ref 22–29)
CO2: 16 MMOL/L (ref 22–29)
CO2: 18 MMOL/L (ref 22–29)
CO2: 18 MMOL/L (ref 22–29)
CO2: 22 MMOL/L (ref 22–29)
CO2: 24 MMOL/L (ref 22–29)
CO2: 25 MMOL/L (ref 22–29)
CO2: 25 MMOL/L (ref 22–29)
CO2: 26 MMOL/L (ref 22–29)
CO2: 26 MMOL/L (ref 22–29)
CO2: 27 MMOL/L (ref 22–29)
CO2: 28 MMOL/L (ref 22–29)
CO2: 28 MMOL/L (ref 22–29)
COLOR: ABNORMAL
COLOR: YELLOW
COLOR: YELLOW
CREAT SERPL-MCNC: 1.1 MG/DL (ref 0.5–0.9)
CREAT SERPL-MCNC: 1.1 MG/DL (ref 0.5–1)
CREAT SERPL-MCNC: 1.2 MG/DL (ref 0.5–1)
CREAT SERPL-MCNC: 1.2 MG/DL (ref 0.5–1)
CREAT SERPL-MCNC: 1.3 MG/DL (ref 0.5–1)
CREAT SERPL-MCNC: 1.4 MG/DL (ref 0.5–0.9)
CREAT SERPL-MCNC: 1.4 MG/DL (ref 0.5–1)
CREAT SERPL-MCNC: 1.5 MG/DL (ref 0.5–1)
CREAT SERPL-MCNC: 1.5 MG/DL (ref 0.5–1)
CREAT SERPL-MCNC: 2.1 MG/DL (ref 0.5–1)
CREAT SERPL-MCNC: 3.8 MG/DL (ref 0.5–0.9)
CREAT SERPL-MCNC: 4.6 MG/DL (ref 0.5–0.9)
CREAT SERPL-MCNC: 5.4 MG/DL (ref 0.5–0.9)
CRYPTOSPORIDIUM PCR: NOT DETECTED
CRYSTALS, UA: ABNORMAL /HPF
CRYSTALS, UA: ABNORMAL /HPF
CYCLOSPORA CAYETANENSIS PCR: NOT DETECTED
E COLI ENTEROAGGREGATIVE PCR: NOT DETECTED
E COLI ENTEROPATHOGENIC PCR: NOT DETECTED
E COLI ENTEROTOXIGENIC PCR: NOT DETECTED
E COLI SHIGELLA/ENTEROINVASIVE PCR: NOT DETECTED
ENTAMOEBA HISTOLYTICA PCR: NOT DETECTED
EOSINOPHILS ABSOLUTE: 0 K/UL (ref 0.04–0.54)
EOSINOPHILS ABSOLUTE: 0 K/UL (ref 0.04–0.54)
EOSINOPHILS ABSOLUTE: 0 K/UL (ref 0–0.6)
EOSINOPHILS ABSOLUTE: 0.02 K/UL (ref 0.04–0.54)
EOSINOPHILS ABSOLUTE: 0.02 K/UL (ref 0–0.6)
EOSINOPHILS ABSOLUTE: 0.04 K/UL (ref 0–0.6)
EOSINOPHILS ABSOLUTE: 0.05 K/UL (ref 0.04–0.54)
EOSINOPHILS ABSOLUTE: 0.05 K/UL (ref 0–0.6)
EOSINOPHILS ABSOLUTE: 0.08 K/UL (ref 0.04–0.54)
EOSINOPHILS ABSOLUTE: 0.08 K/UL (ref 0.04–0.54)
EOSINOPHILS ABSOLUTE: 0.1 K/UL (ref 0.04–0.54)
EOSINOPHILS ABSOLUTE: 0.12 K/UL (ref 0.04–0.54)
EOSINOPHILS ABSOLUTE: 0.13 K/UL (ref 0.04–0.54)
EOSINOPHILS ABSOLUTE: 0.13 K/UL (ref 0.04–0.54)
EOSINOPHILS RELATIVE PERCENT: 0 % (ref 0.7–7)
EOSINOPHILS RELATIVE PERCENT: 0 % (ref 0.7–7)
EOSINOPHILS RELATIVE PERCENT: 0 % (ref 0–5)
EOSINOPHILS RELATIVE PERCENT: 0.9 % (ref 0.7–7)
EOSINOPHILS RELATIVE PERCENT: 1 % (ref 0–5)
EOSINOPHILS RELATIVE PERCENT: 1 % (ref 0–5)
EOSINOPHILS RELATIVE PERCENT: 1.3 % (ref 0.7–7)
EOSINOPHILS RELATIVE PERCENT: 1.9 % (ref 0.7–7)
EOSINOPHILS RELATIVE PERCENT: 2 % (ref 0.7–7)
EOSINOPHILS RELATIVE PERCENT: 2.1 % (ref 0.7–7)
EOSINOPHILS RELATIVE PERCENT: 2.2 % (ref 0.7–7)
EOSINOPHILS RELATIVE PERCENT: 2.5 % (ref 0.7–7)
EOSINOPHILS RELATIVE PERCENT: 3.3 % (ref 0.7–7)
EOSINOPHILS RELATIVE PERCENT: 5 % (ref 0–5)
EPITHELIAL CELLS, UA: 11 /HPF (ref 0–5)
EPITHELIAL CELLS, UA: ABNORMAL /HPF
EPITHELIAL CELLS, UA: ABNORMAL /HPF
GFR AFRICAN AMERICAN: 11
GFR AFRICAN AMERICAN: 14
GFR AFRICAN AMERICAN: 44
GFR AFRICAN AMERICAN: 58
GFR AFRICAN AMERICAN: 9
GFR NON-AFRICAN AMERICAN: 11
GFR NON-AFRICAN AMERICAN: 23
GFR NON-AFRICAN AMERICAN: 33
GFR NON-AFRICAN AMERICAN: 33
GFR NON-AFRICAN AMERICAN: 36
GFR NON-AFRICAN AMERICAN: 39
GFR NON-AFRICAN AMERICAN: 43
GFR NON-AFRICAN AMERICAN: 43
GFR NON-AFRICAN AMERICAN: 48
GFR NON-AFRICAN AMERICAN: 48
GFR NON-AFRICAN AMERICAN: 8
GFR NON-AFRICAN AMERICAN: 9
GIARDIA LAMBLIA PCR: NOT DETECTED
GLOBULIN: 1.8 G/DL
GLOBULIN: 2 G/DL
GLOBULIN: 2.2 G/DL
GLOBULIN: 2.3 G/DL
GLOBULIN: 2.3 G/DL
GLOBULIN: 2.4 G/DL
GLOBULIN: 2.5 G/DL
GLOBULIN: 2.5 G/DL
GLUCOSE BLD-MCNC: 100 MG/DL (ref 74–106)
GLUCOSE BLD-MCNC: 111 MG/DL (ref 74–109)
GLUCOSE BLD-MCNC: 115 MG/DL (ref 74–106)
GLUCOSE BLD-MCNC: 119 MG/DL (ref 74–106)
GLUCOSE BLD-MCNC: 124 MG/DL (ref 70–99)
GLUCOSE BLD-MCNC: 138 MG/DL (ref 70–99)
GLUCOSE BLD-MCNC: 146 MG/DL (ref 74–109)
GLUCOSE BLD-MCNC: 154 MG/DL (ref 74–109)
GLUCOSE BLD-MCNC: 191 MG/DL (ref 74–109)
GLUCOSE BLD-MCNC: 213 MG/DL (ref 74–109)
GLUCOSE BLD-MCNC: 70 MG/DL (ref 74–106)
GLUCOSE BLD-MCNC: 79 MG/DL (ref 74–106)
GLUCOSE BLD-MCNC: 85 MG/DL (ref 74–106)
GLUCOSE BLD-MCNC: 87 MG/DL (ref 74–106)
GLUCOSE BLD-MCNC: 89 MG/DL (ref 74–106)
GLUCOSE BLD-MCNC: 92 MG/DL (ref 70–99)
GLUCOSE BLD-MCNC: 92 MG/DL (ref 74–106)
GLUCOSE BLD-MCNC: 94 MG/DL (ref 74–106)
GLUCOSE BLD-MCNC: 95 MG/DL (ref 74–109)
GLUCOSE BLD-MCNC: 96 MG/DL (ref 70–99)
GLUCOSE BLD-MCNC: 96 MG/DL (ref 70–99)
GLUCOSE BLD-MCNC: 97 MG/DL (ref 74–109)
GLUCOSE BLD-MCNC: 98 MG/DL (ref 70–99)
GLUCOSE URINE: NEGATIVE MG/DL
HCT VFR BLD CALC: 30.5 % (ref 34.1–44.9)
HCT VFR BLD CALC: 31 % (ref 34.1–44.9)
HCT VFR BLD CALC: 31.1 % (ref 37–47)
HCT VFR BLD CALC: 31.4 % (ref 34.1–44.9)
HCT VFR BLD CALC: 31.4 % (ref 34.1–44.9)
HCT VFR BLD CALC: 33.4 % (ref 34.1–44.9)
HCT VFR BLD CALC: 33.6 % (ref 34.1–44.9)
HCT VFR BLD CALC: 34.1 % (ref 34.1–44.9)
HCT VFR BLD CALC: 34.3 % (ref 34.1–44.9)
HCT VFR BLD CALC: 34.3 % (ref 34.1–44.9)
HCT VFR BLD CALC: 35 % (ref 34.1–44.9)
HCT VFR BLD CALC: 36 % (ref 37–47)
HCT VFR BLD CALC: 36.1 % (ref 37–47)
HCT VFR BLD CALC: 36.2 % (ref 37–47)
HCT VFR BLD CALC: 38.1 % (ref 37–47)
HCT VFR BLD CALC: 38.6 % (ref 37–47)
HCT VFR BLD CALC: 39.7 % (ref 37–47)
HDLC SERPL-MCNC: 61 MG/DL (ref 65–121)
HEMOGLOBIN: 10 G/DL (ref 11.2–15.7)
HEMOGLOBIN: 10 G/DL (ref 11.2–15.7)
HEMOGLOBIN: 10.2 G/DL (ref 11.2–15.7)
HEMOGLOBIN: 10.3 G/DL (ref 11.2–15.7)
HEMOGLOBIN: 10.4 G/DL (ref 12–16)
HEMOGLOBIN: 10.7 G/DL (ref 11.2–15.7)
HEMOGLOBIN: 10.7 G/DL (ref 12–16)
HEMOGLOBIN: 10.8 G/DL (ref 11.2–15.7)
HEMOGLOBIN: 10.8 G/DL (ref 11.2–15.7)
HEMOGLOBIN: 11.1 G/DL (ref 11.2–15.7)
HEMOGLOBIN: 11.3 G/DL (ref 11.2–15.7)
HEMOGLOBIN: 11.6 G/DL (ref 12–16)
HEMOGLOBIN: 11.9 G/DL (ref 12–16)
HEMOGLOBIN: 12.3 G/DL (ref 12–16)
HEMOGLOBIN: 12.4 G/DL (ref 12–16)
HEMOGLOBIN: 12.5 G/DL (ref 12–16)
HEMOGLOBIN: 9.8 G/DL (ref 11.2–15.7)
HYALINE CASTS: 8 /HPF (ref 0–8)
IMMATURE GRANULOCYTES #: 0 K/UL
IMMATURE GRANULOCYTES #: 0.2 K/UL
IMMATURE GRANULOCYTES #: 0.3 K/UL
KETONES, URINE: ABNORMAL MG/DL
KETONES, URINE: ABNORMAL MG/DL
KETONES, URINE: NEGATIVE MG/DL
LACTIC ACID: 2.5 MMOL/L (ref 0.5–1.9)
LDL CHOLESTEROL CALCULATED: 90 MG/DL
LEUKOCYTE ESTERASE, URINE: ABNORMAL
LIPASE: 22 U/L (ref 13–60)
LYMPHOCYTES ABSOLUTE: 0.2 K/UL (ref 1.1–4.5)
LYMPHOCYTES ABSOLUTE: 0.3 K/UL (ref 1.1–4.5)
LYMPHOCYTES ABSOLUTE: 0.49 K/UL (ref 1.18–3.74)
LYMPHOCYTES ABSOLUTE: 0.5 K/UL (ref 1.1–4.5)
LYMPHOCYTES ABSOLUTE: 0.54 K/UL (ref 1.18–3.74)
LYMPHOCYTES ABSOLUTE: 0.55 K/UL (ref 1.18–3.74)
LYMPHOCYTES ABSOLUTE: 0.6 K/UL (ref 1.1–4.5)
LYMPHOCYTES ABSOLUTE: 0.63 K/UL (ref 1.18–3.74)
LYMPHOCYTES ABSOLUTE: 0.7 K/UL (ref 1.1–4.5)
LYMPHOCYTES ABSOLUTE: 0.89 K/UL (ref 1.18–3.74)
LYMPHOCYTES ABSOLUTE: 0.91 K/UL (ref 1.18–3.74)
LYMPHOCYTES ABSOLUTE: 0.93 K/UL (ref 1.18–3.74)
LYMPHOCYTES ABSOLUTE: 0.94 K/UL (ref 1.18–3.74)
LYMPHOCYTES ABSOLUTE: 1.11 K/UL (ref 1.18–3.74)
LYMPHOCYTES ABSOLUTE: 1.14 K/UL (ref 1.18–3.74)
LYMPHOCYTES RELATIVE PERCENT: 10 % (ref 20–40)
LYMPHOCYTES RELATIVE PERCENT: 11 % (ref 19.3–53.1)
LYMPHOCYTES RELATIVE PERCENT: 14 % (ref 19.3–53.1)
LYMPHOCYTES RELATIVE PERCENT: 16.3 % (ref 19.3–53.1)
LYMPHOCYTES RELATIVE PERCENT: 17.3 % (ref 19.3–53.1)
LYMPHOCYTES RELATIVE PERCENT: 21.7 % (ref 19.3–53.1)
LYMPHOCYTES RELATIVE PERCENT: 21.9 % (ref 19.3–53.1)
LYMPHOCYTES RELATIVE PERCENT: 22 % (ref 19.3–53.1)
LYMPHOCYTES RELATIVE PERCENT: 24.2 % (ref 19.3–53.1)
LYMPHOCYTES RELATIVE PERCENT: 26 % (ref 20–40)
LYMPHOCYTES RELATIVE PERCENT: 33 % (ref 20–40)
LYMPHOCYTES RELATIVE PERCENT: 4.9 % (ref 19.3–53.1)
LYMPHOCYTES RELATIVE PERCENT: 40.8 % (ref 19.3–53.1)
LYMPHOCYTES RELATIVE PERCENT: 46 % (ref 20–40)
LYMPHOCYTES RELATIVE PERCENT: 6 % (ref 20–40)
LYMPHOCYTES RELATIVE PERCENT: 7 % (ref 20–40)
LYMPHOCYTES RELATIVE PERCENT: 7 % (ref 20–40)
MAGNESIUM: 1.5 MG/DL (ref 1.6–2.4)
MAGNESIUM: 1.6 MG/DL (ref 1.6–2.4)
MAGNESIUM: 1.9 MG/DL (ref 1.6–2.4)
MAGNESIUM: 2.1 MG/DL (ref 1.6–2.4)
MCH RBC QN AUTO: 29.3 PG (ref 25.6–32.2)
MCH RBC QN AUTO: 30.1 PG (ref 27–31)
MCH RBC QN AUTO: 30.3 PG (ref 27–31)
MCH RBC QN AUTO: 30.6 PG (ref 27–31)
MCH RBC QN AUTO: 30.8 PG (ref 25.6–32.2)
MCH RBC QN AUTO: 30.9 PG (ref 27–31)
MCH RBC QN AUTO: 31 PG (ref 25.6–32.2)
MCH RBC QN AUTO: 31 PG (ref 25.6–32.2)
MCH RBC QN AUTO: 31.2 PG (ref 25.6–32.2)
MCH RBC QN AUTO: 31.4 PG (ref 25.6–32.2)
MCH RBC QN AUTO: 31.5 PG (ref 25.6–32.2)
MCH RBC QN AUTO: 31.6 PG (ref 27–31)
MCH RBC QN AUTO: 31.7 PG (ref 25.6–32.2)
MCHC RBC AUTO-ENTMCNC: 29.6 G/DL (ref 33–37)
MCHC RBC AUTO-ENTMCNC: 30 G/DL (ref 32.3–35.5)
MCHC RBC AUTO-ENTMCNC: 31.2 G/DL (ref 33–37)
MCHC RBC AUTO-ENTMCNC: 31.4 G/DL (ref 32.3–35.5)
MCHC RBC AUTO-ENTMCNC: 31.6 G/DL (ref 32.3–35.5)
MCHC RBC AUTO-ENTMCNC: 31.8 G/DL (ref 32.3–35.5)
MCHC RBC AUTO-ENTMCNC: 32 G/DL (ref 33–37)
MCHC RBC AUTO-ENTMCNC: 32.1 G/DL (ref 32.3–35.5)
MCHC RBC AUTO-ENTMCNC: 32.3 G/DL (ref 32.3–35.5)
MCHC RBC AUTO-ENTMCNC: 32.3 G/DL (ref 32.3–35.5)
MCHC RBC AUTO-ENTMCNC: 32.3 G/DL (ref 33–37)
MCHC RBC AUTO-ENTMCNC: 32.4 G/DL (ref 32.3–35.5)
MCHC RBC AUTO-ENTMCNC: 32.4 G/DL (ref 33–37)
MCHC RBC AUTO-ENTMCNC: 32.5 G/DL (ref 32.3–35.5)
MCHC RBC AUTO-ENTMCNC: 32.8 G/DL (ref 32.3–35.5)
MCHC RBC AUTO-ENTMCNC: 33.1 G/DL (ref 33–37)
MCHC RBC AUTO-ENTMCNC: 33.4 G/DL (ref 33–37)
MCV RBC AUTO: 102.3 FL (ref 81–99)
MCV RBC AUTO: 90.1 FL (ref 81–99)
MCV RBC AUTO: 93.4 FL (ref 81–99)
MCV RBC AUTO: 94.6 FL (ref 81–99)
MCV RBC AUTO: 95.5 FL (ref 81–99)
MCV RBC AUTO: 96 FL (ref 79.4–94.8)
MCV RBC AUTO: 96.2 FL (ref 79.4–94.8)
MCV RBC AUTO: 96.3 FL (ref 81–99)
MCV RBC AUTO: 96.4 FL (ref 81–99)
MCV RBC AUTO: 97.2 FL (ref 79.4–94.8)
MCV RBC AUTO: 97.2 FL (ref 79.4–94.8)
MCV RBC AUTO: 97.5 FL (ref 79.4–94.8)
MCV RBC AUTO: 97.5 FL (ref 79.4–94.8)
MCV RBC AUTO: 97.7 FL (ref 79.4–94.8)
MCV RBC AUTO: 97.7 FL (ref 79.4–94.8)
MCV RBC AUTO: 97.8 FL (ref 79.4–94.8)
MCV RBC AUTO: 98.8 FL (ref 79.4–94.8)
METAMYELOCYTES RELATIVE PERCENT: 2 %
METAMYELOCYTES RELATIVE PERCENT: 4 %
MICROCYTES: ABNORMAL
MONOCYTES ABSOLUTE: 0.1 K/UL (ref 0–0.9)
MONOCYTES ABSOLUTE: 0.16 K/UL (ref 0.24–0.82)
MONOCYTES ABSOLUTE: 0.22 K/UL (ref 0.24–0.82)
MONOCYTES ABSOLUTE: 0.3 K/UL (ref 0–0.9)
MONOCYTES ABSOLUTE: 0.35 K/UL (ref 0.24–0.82)
MONOCYTES ABSOLUTE: 0.36 K/UL (ref 0.24–0.82)
MONOCYTES ABSOLUTE: 0.4 K/UL (ref 0–0.9)
MONOCYTES ABSOLUTE: 0.45 K/UL (ref 0.24–0.82)
MONOCYTES ABSOLUTE: 0.51 K/UL (ref 0.24–0.82)
MONOCYTES ABSOLUTE: 0.6 K/UL (ref 0–0.9)
MONOCYTES ABSOLUTE: 0.61 K/UL (ref 0.24–0.82)
MONOCYTES ABSOLUTE: 0.62 K/UL (ref 0.24–0.82)
MONOCYTES ABSOLUTE: 0.63 K/UL (ref 0.24–0.82)
MONOCYTES ABSOLUTE: 0.65 K/UL (ref 0.24–0.82)
MONOCYTES ABSOLUTE: 1.9 K/UL (ref 0–0.9)
MONOCYTES RELATIVE PERCENT: 11.3 % (ref 4.7–12.5)
MONOCYTES RELATIVE PERCENT: 12 % (ref 0–10)
MONOCYTES RELATIVE PERCENT: 12.2 % (ref 4.7–12.5)
MONOCYTES RELATIVE PERCENT: 13 % (ref 0–10)
MONOCYTES RELATIVE PERCENT: 14.6 % (ref 4.7–12.5)
MONOCYTES RELATIVE PERCENT: 15 % (ref 4.7–12.5)
MONOCYTES RELATIVE PERCENT: 16 % (ref 0–10)
MONOCYTES RELATIVE PERCENT: 26 % (ref 0–10)
MONOCYTES RELATIVE PERCENT: 4 % (ref 4.7–12.5)
MONOCYTES RELATIVE PERCENT: 4.5 % (ref 0–10)
MONOCYTES RELATIVE PERCENT: 5.7 % (ref 4.7–12.5)
MONOCYTES RELATIVE PERCENT: 6 % (ref 0–10)
MONOCYTES RELATIVE PERCENT: 7 % (ref 0–10)
MONOCYTES RELATIVE PERCENT: 7.3 % (ref 4.7–12.5)
MONOCYTES RELATIVE PERCENT: 9 % (ref 4.7–12.5)
MONOCYTES RELATIVE PERCENT: 9.3 % (ref 4.7–12.5)
MONOCYTES RELATIVE PERCENT: 9.8 % (ref 4.7–12.5)
MYELOCYTE PERCENT: 2 %
NEUTROPHILS ABSOLUTE: 0.2 K/UL (ref 1.5–7.5)
NEUTROPHILS ABSOLUTE: 0.5 K/UL (ref 1.5–7.5)
NEUTROPHILS ABSOLUTE: 1.1 K/UL (ref 1.56–6.13)
NEUTROPHILS ABSOLUTE: 1.1 K/UL (ref 1.5–7.5)
NEUTROPHILS ABSOLUTE: 10.22 K/UL (ref 1.56–6.13)
NEUTROPHILS ABSOLUTE: 2.45 K/UL (ref 1.56–6.13)
NEUTROPHILS ABSOLUTE: 2.5 K/UL (ref 1.56–6.13)
NEUTROPHILS ABSOLUTE: 2.5 K/UL (ref 1.5–7.5)
NEUTROPHILS ABSOLUTE: 2.8 K/UL (ref 1.56–6.13)
NEUTROPHILS ABSOLUTE: 2.8 K/UL (ref 1.5–7.5)
NEUTROPHILS ABSOLUTE: 3 K/UL (ref 1.56–6.13)
NEUTROPHILS ABSOLUTE: 3.19 K/UL (ref 1.56–6.13)
NEUTROPHILS ABSOLUTE: 3.29 K/UL (ref 1.56–6.13)
NEUTROPHILS ABSOLUTE: 3.34 K/UL (ref 1.56–6.13)
NEUTROPHILS ABSOLUTE: 3.66 K/UL (ref 1.56–6.13)
NEUTROPHILS ABSOLUTE: 4.8 K/UL (ref 1.5–7.5)
NEUTROPHILS ABSOLUTE: 5.6 K/UL (ref 1.5–7.5)
NEUTROPHILS RELATIVE PERCENT: 33 % (ref 50–65)
NEUTROPHILS RELATIVE PERCENT: 38 % (ref 50–65)
NEUTROPHILS RELATIVE PERCENT: 45 % (ref 50–65)
NEUTROPHILS RELATIVE PERCENT: 50.5 % (ref 34–71.1)
NEUTROPHILS RELATIVE PERCENT: 55 % (ref 50–65)
NEUTROPHILS RELATIVE PERCENT: 59.7 % (ref 34–71.1)
NEUTROPHILS RELATIVE PERCENT: 61 % (ref 50–65)
NEUTROPHILS RELATIVE PERCENT: 62.7 % (ref 34–71.1)
NEUTROPHILS RELATIVE PERCENT: 63 % (ref 34–71.1)
NEUTROPHILS RELATIVE PERCENT: 64.5 % (ref 34–71.1)
NEUTROPHILS RELATIVE PERCENT: 68 % (ref 50–65)
NEUTROPHILS RELATIVE PERCENT: 68.1 % (ref 34–71.1)
NEUTROPHILS RELATIVE PERCENT: 72.3 % (ref 34–71.1)
NEUTROPHILS RELATIVE PERCENT: 73.7 % (ref 34–71.1)
NEUTROPHILS RELATIVE PERCENT: 77.9 % (ref 34–71.1)
NEUTROPHILS RELATIVE PERCENT: 84.8 % (ref 50–65)
NEUTROPHILS RELATIVE PERCENT: 91 % (ref 34–71.1)
NITRITE, URINE: NEGATIVE
NOROVIRUS GI GII PCR: NOT DETECTED
ORGANISM: ABNORMAL
OVALOCYTES: ABNORMAL
PARATHYROID HORMONE INTACT: 186 PG/ML (ref 15–65)
PDW BLD-RTO: 13.5 % (ref 11.7–14.4)
PDW BLD-RTO: 13.7 % (ref 11.7–14.4)
PDW BLD-RTO: 14 % (ref 11.7–14.4)
PDW BLD-RTO: 14.3 % (ref 11.7–14.4)
PDW BLD-RTO: 14.4 % (ref 11.7–14.4)
PDW BLD-RTO: 14.6 % (ref 11.5–14.5)
PDW BLD-RTO: 15 % (ref 11.7–14.4)
PDW BLD-RTO: 15 % (ref 11.7–14.4)
PDW BLD-RTO: 15.1 % (ref 11.7–14.4)
PDW BLD-RTO: 15.2 % (ref 11.7–14.4)
PDW BLD-RTO: 15.4 % (ref 11.7–14.4)
PDW BLD-RTO: 15.6 % (ref 11.5–14.5)
PDW BLD-RTO: 15.7 % (ref 11.5–14.5)
PDW BLD-RTO: 15.8 % (ref 11.5–14.5)
PDW BLD-RTO: 15.8 % (ref 11.5–14.5)
PERFORMED ON: ABNORMAL
PERFORMED ON: ABNORMAL
PERFORMED ON: NORMAL
PH UA: 5.5 (ref 5–8)
PLATELET # BLD: 115 K/UL (ref 182–369)
PLATELET # BLD: 124 K/UL (ref 130–400)
PLATELET # BLD: 156 K/UL (ref 130–400)
PLATELET # BLD: 160 K/UL (ref 182–369)
PLATELET # BLD: 169 K/UL (ref 182–369)
PLATELET # BLD: 205 K/UL (ref 182–369)
PLATELET # BLD: 219 K/UL (ref 182–369)
PLATELET # BLD: 229 K/UL (ref 182–369)
PLATELET # BLD: 243 K/UL (ref 182–369)
PLATELET # BLD: 271 K/UL (ref 130–400)
PLATELET # BLD: 281 K/UL (ref 182–369)
PLATELET # BLD: 289 K/UL (ref 130–400)
PLATELET # BLD: 322 K/UL (ref 130–400)
PLATELET # BLD: 379 K/UL (ref 182–369)
PLATELET # BLD: 389 K/UL (ref 182–369)
PLATELET # BLD: 437 K/UL (ref 130–400)
PLATELET # BLD: 72 K/UL (ref 130–400)
PLATELET SLIDE REVIEW: ADEQUATE
PLESIOMONAS SHIGELLOIDES PCR: NOT DETECTED
PMV BLD AUTO: 10.1 FL (ref 7.4–10.4)
PMV BLD AUTO: 10.3 FL (ref 7.4–10.4)
PMV BLD AUTO: 10.4 FL (ref 7.4–10.4)
PMV BLD AUTO: 10.6 FL (ref 7.4–10.4)
PMV BLD AUTO: 10.7 FL (ref 9.4–12.3)
PMV BLD AUTO: 10.8 FL (ref 7.4–10.4)
PMV BLD AUTO: 10.8 FL (ref 9.4–12.3)
PMV BLD AUTO: 10.9 FL (ref 9.4–12.3)
PMV BLD AUTO: 10.9 FL (ref 9.4–12.3)
PMV BLD AUTO: 11.6 FL (ref 9.4–12.3)
PMV BLD AUTO: 13 FL (ref 9.4–12.3)
PMV BLD AUTO: 13.4 FL (ref 9.4–12.3)
PMV BLD AUTO: 9.4 FL (ref 7.4–10.4)
PMV BLD AUTO: 9.4 FL (ref 7.4–10.4)
PMV BLD AUTO: 9.7 FL (ref 7.4–10.4)
PMV BLD AUTO: 9.7 FL (ref 7.4–10.4)
PMV BLD AUTO: 9.8 FL (ref 7.4–10.4)
POIKILOCYTES: ABNORMAL
POIKILOCYTES: ABNORMAL
POTASSIUM REFLEX MAGNESIUM: 3.2 MMOL/L (ref 3.5–5)
POTASSIUM SERPL-SCNC: 2.7 MMOL/L (ref 3.5–5)
POTASSIUM SERPL-SCNC: 2.7 MMOL/L (ref 3.5–5)
POTASSIUM SERPL-SCNC: 3 MMOL/L (ref 3.5–5)
POTASSIUM SERPL-SCNC: 4.4 MMOL/L (ref 3.5–5)
POTASSIUM SERPL-SCNC: 4.5 MMOL/L (ref 3.5–5.1)
POTASSIUM SERPL-SCNC: 4.5 MMOL/L (ref 3.5–5.1)
POTASSIUM SERPL-SCNC: 4.6 MMOL/L (ref 3.5–5)
POTASSIUM SERPL-SCNC: 4.6 MMOL/L (ref 3.5–5.1)
POTASSIUM SERPL-SCNC: 4.6 MMOL/L (ref 3.5–5.1)
POTASSIUM SERPL-SCNC: 4.7 MMOL/L (ref 3.5–5.1)
POTASSIUM SERPL-SCNC: 4.8 MMOL/L (ref 3.5–5)
POTASSIUM SERPL-SCNC: 4.8 MMOL/L (ref 3.5–5.1)
POTASSIUM SERPL-SCNC: 4.9 MMOL/L (ref 3.5–5.1)
POTASSIUM SERPL-SCNC: 4.9 MMOL/L (ref 3.5–5.1)
PROTEIN UA: 30 MG/DL
PROTEIN UA: ABNORMAL MG/DL
PROTEIN UA: ABNORMAL MG/DL
RBC # BLD: 3.17 M/UL (ref 3.93–5.22)
RBC # BLD: 3.18 M/UL (ref 3.93–5.22)
RBC # BLD: 3.21 M/UL (ref 3.93–5.22)
RBC # BLD: 3.22 M/UL (ref 3.93–5.22)
RBC # BLD: 3.44 M/UL (ref 3.93–5.22)
RBC # BLD: 3.45 M/UL (ref 3.93–5.22)
RBC # BLD: 3.45 M/UL (ref 4.2–5.4)
RBC # BLD: 3.48 M/UL (ref 3.93–5.22)
RBC # BLD: 3.51 M/UL (ref 3.93–5.22)
RBC # BLD: 3.53 M/UL (ref 3.93–5.22)
RBC # BLD: 3.53 M/UL (ref 4.2–5.4)
RBC # BLD: 3.6 M/UL (ref 3.93–5.22)
RBC # BLD: 3.76 M/UL (ref 4.2–5.4)
RBC # BLD: 3.77 M/UL (ref 4.2–5.4)
RBC # BLD: 4.08 M/UL (ref 4.2–5.4)
RBC # BLD: 4.08 M/UL (ref 4.2–5.4)
RBC # BLD: 4.12 M/UL (ref 4.2–5.4)
RBC UA: 3 /HPF (ref 0–4)
ROTAVIRUS A PCR: NOT DETECTED
SALMONELLA PCR: NOT DETECTED
SAPOVIRUS PCR: NOT DETECTED
SARS-COV-2, NAAT: NOT DETECTED
SARS-COV-2, NAAT: NOT DETECTED
SHIGA-LIKE TOXIN-PRODUCING E. COLI (STEC) STX1/STX2: NOT DETECTED
SODIUM BLD-SCNC: 130 MMOL/L (ref 136–145)
SODIUM BLD-SCNC: 135 MMOL/L (ref 136–145)
SODIUM BLD-SCNC: 138 MMOL/L (ref 136–145)
SODIUM BLD-SCNC: 139 MMOL/L (ref 136–145)
SODIUM BLD-SCNC: 139 MMOL/L (ref 136–145)
SODIUM BLD-SCNC: 140 MMOL/L (ref 137–145)
SODIUM BLD-SCNC: 141 MMOL/L (ref 136–145)
SODIUM BLD-SCNC: 141 MMOL/L (ref 137–145)
SODIUM BLD-SCNC: 142 MMOL/L (ref 137–145)
SODIUM BLD-SCNC: 143 MMOL/L (ref 136–145)
SODIUM BLD-SCNC: 143 MMOL/L (ref 137–145)
SODIUM BLD-SCNC: 144 MMOL/L (ref 137–145)
SODIUM URINE: <20 MMOL/L
SPECIFIC GRAVITY UA: 1.03 (ref 1–1.03)
SPECIFIC GRAVITY UA: >=1.045 (ref 1–1.03)
SPECIFIC GRAVITY UA: >=1.045 (ref 1–1.03)
TEAR DROP CELLS: ABNORMAL
TOTAL PROTEIN: 4.2 G/DL (ref 6.6–8.7)
TOTAL PROTEIN: 4.2 G/DL (ref 6.6–8.7)
TOTAL PROTEIN: 4.8 G/DL (ref 6.6–8.7)
TOTAL PROTEIN: 5.2 G/DL (ref 6.6–8.7)
TOTAL PROTEIN: 5.4 G/DL (ref 6.6–8.7)
TOTAL PROTEIN: 5.7 G/DL (ref 6.3–8.2)
TOTAL PROTEIN: 5.8 G/DL (ref 6.3–8.2)
TOTAL PROTEIN: 5.9 G/DL (ref 6.3–8.2)
TOTAL PROTEIN: 5.9 G/DL (ref 6.3–8.2)
TOTAL PROTEIN: 6 G/DL (ref 6.3–8.2)
TOTAL PROTEIN: 6.3 G/DL (ref 6.6–8.7)
TOTAL PROTEIN: 6.4 G/DL (ref 6.3–8.2)
TOTAL PROTEIN: 6.6 G/DL (ref 6.3–8.2)
TOTAL PROTEIN: 6.8 G/DL (ref 6.3–8.2)
TRIGL SERPL-MCNC: 151 MG/DL (ref 0–149)
TSH SERPL DL<=0.05 MIU/L-ACNC: 1.09 UIU/ML (ref 0.27–4.2)
URINE CULTURE, ROUTINE: ABNORMAL
UROBILINOGEN, URINE: 0.2 E.U./DL
VIBRIO CHOLERAE PCR: NOT DETECTED
VIBRIO PCR: NOT DETECTED
VITAMIN D 25-HYDROXY: 33.5 NG/ML
VITAMIN D 25-HYDROXY: 35.1 NG/ML
WBC # BLD: 0.6 K/UL (ref 4.8–10.8)
WBC # BLD: 0.9 K/UL (ref 4.8–10.8)
WBC # BLD: 1.8 K/UL (ref 4.8–10.8)
WBC # BLD: 11.23 K/UL (ref 3.98–10.04)
WBC # BLD: 2.18 K/UL (ref 3.98–10.04)
WBC # BLD: 3.1 K/UL (ref 4.8–10.8)
WBC # BLD: 3.7 K/UL (ref 4.8–10.8)
WBC # BLD: 3.85 K/UL (ref 3.98–10.04)
WBC # BLD: 3.87 K/UL (ref 3.98–10.04)
WBC # BLD: 3.89 K/UL (ref 3.98–10.04)
WBC # BLD: 4.19 K/UL (ref 3.98–10.04)
WBC # BLD: 4.46 K/UL (ref 3.98–10.04)
WBC # BLD: 5.08 K/UL (ref 3.98–10.04)
WBC # BLD: 5.18 K/UL (ref 3.98–10.04)
WBC # BLD: 5.38 K/UL (ref 3.98–10.04)
WBC # BLD: 6.6 K/UL (ref 4.8–10.8)
WBC # BLD: 7.3 K/UL (ref 4.8–10.8)
WBC UA: 32 /HPF (ref 0–5)
WBC UA: ABNORMAL /HPF (ref 0–5)
WBC UA: ABNORMAL /HPF (ref 0–5)
YERSINIA ENTEROCOLITICA PCR: NOT DETECTED

## 2022-01-01 PROCEDURE — 6360000002 HC RX W HCPCS: Performed by: STUDENT IN AN ORGANIZED HEALTH CARE EDUCATION/TRAINING PROGRAM

## 2022-01-01 PROCEDURE — 80048 BASIC METABOLIC PNL TOTAL CA: CPT

## 2022-01-01 PROCEDURE — 2580000003 HC RX 258: Performed by: INTERNAL MEDICINE

## 2022-01-01 PROCEDURE — 36415 COLL VENOUS BLD VENIPUNCTURE: CPT

## 2022-01-01 PROCEDURE — 82947 ASSAY GLUCOSE BLOOD QUANT: CPT

## 2022-01-01 PROCEDURE — 6360000002 HC RX W HCPCS: Performed by: INTERNAL MEDICINE

## 2022-01-01 PROCEDURE — 1090F PRES/ABSN URINE INCON ASSESS: CPT | Performed by: INTERNAL MEDICINE

## 2022-01-01 PROCEDURE — 99283 EMERGENCY DEPT VISIT LOW MDM: CPT

## 2022-01-01 PROCEDURE — G8399 PT W/DXA RESULTS DOCUMENT: HCPCS | Performed by: INTERNAL MEDICINE

## 2022-01-01 PROCEDURE — 80053 COMPREHEN METABOLIC PANEL: CPT

## 2022-01-01 PROCEDURE — 1036F TOBACCO NON-USER: CPT | Performed by: INTERNAL MEDICINE

## 2022-01-01 PROCEDURE — 6370000000 HC RX 637 (ALT 250 FOR IP): Performed by: INTERNAL MEDICINE

## 2022-01-01 PROCEDURE — 96372 THER/PROPH/DIAG INJ SC/IM: CPT

## 2022-01-01 PROCEDURE — 87040 BLOOD CULTURE FOR BACTERIA: CPT

## 2022-01-01 PROCEDURE — G8417 CALC BMI ABV UP PARAM F/U: HCPCS | Performed by: INTERNAL MEDICINE

## 2022-01-01 PROCEDURE — 0097U HC GI PTHGN MULT REV TRANS & AMP PRB TECH 22 TRGT: CPT

## 2022-01-01 PROCEDURE — 85025 COMPLETE CBC W/AUTO DIFF WBC: CPT

## 2022-01-01 PROCEDURE — 99232 SBSQ HOSP IP/OBS MODERATE 35: CPT | Performed by: INTERNAL MEDICINE

## 2022-01-01 PROCEDURE — A9577 INJ MULTIHANCE: HCPCS | Performed by: INTERNAL MEDICINE

## 2022-01-01 PROCEDURE — 99233 SBSQ HOSP IP/OBS HIGH 50: CPT | Performed by: PSYCHIATRY & NEUROLOGY

## 2022-01-01 PROCEDURE — 83735 ASSAY OF MAGNESIUM: CPT

## 2022-01-01 PROCEDURE — 99231 SBSQ HOSP IP/OBS SF/LOW 25: CPT | Performed by: INTERNAL MEDICINE

## 2022-01-01 PROCEDURE — G8428 CUR MEDS NOT DOCUMENT: HCPCS | Performed by: INTERNAL MEDICINE

## 2022-01-01 PROCEDURE — 96413 CHEMO IV INFUSION 1 HR: CPT

## 2022-01-01 PROCEDURE — 2500000003 HC RX 250 WO HCPCS: Performed by: STUDENT IN AN ORGANIZED HEALTH CARE EDUCATION/TRAINING PROGRAM

## 2022-01-01 PROCEDURE — 1123F ACP DISCUSS/DSCN MKR DOCD: CPT | Performed by: INTERNAL MEDICINE

## 2022-01-01 PROCEDURE — 1210000000 HC MED SURG R&B

## 2022-01-01 PROCEDURE — G8484 FLU IMMUNIZE NO ADMIN: HCPCS | Performed by: INTERNAL MEDICINE

## 2022-01-01 PROCEDURE — 4040F PNEUMOC VAC/ADMIN/RCVD: CPT | Performed by: INTERNAL MEDICINE

## 2022-01-01 PROCEDURE — G8482 FLU IMMUNIZE ORDER/ADMIN: HCPCS | Performed by: INTERNAL MEDICINE

## 2022-01-01 PROCEDURE — 6360000002 HC RX W HCPCS: Performed by: PHYSICIAN ASSISTANT

## 2022-01-01 PROCEDURE — 96375 TX/PRO/DX INJ NEW DRUG ADDON: CPT

## 2022-01-01 PROCEDURE — 96374 THER/PROPH/DIAG INJ IV PUSH: CPT

## 2022-01-01 PROCEDURE — 96367 TX/PROPH/DG ADDL SEQ IV INF: CPT

## 2022-01-01 PROCEDURE — 96411 CHEMO IV PUSH ADDL DRUG: CPT

## 2022-01-01 PROCEDURE — 93971 EXTREMITY STUDY: CPT

## 2022-01-01 PROCEDURE — 82306 VITAMIN D 25 HYDROXY: CPT

## 2022-01-01 PROCEDURE — 74177 CT ABD & PELVIS W/CONTRAST: CPT

## 2022-01-01 PROCEDURE — 2500000003 HC RX 250 WO HCPCS: Performed by: INTERNAL MEDICINE

## 2022-01-01 PROCEDURE — 2580000003 HC RX 258: Performed by: STUDENT IN AN ORGANIZED HEALTH CARE EDUCATION/TRAINING PROGRAM

## 2022-01-01 PROCEDURE — 76770 US EXAM ABDO BACK WALL COMP: CPT

## 2022-01-01 PROCEDURE — 81003 URINALYSIS AUTO W/O SCOPE: CPT

## 2022-01-01 PROCEDURE — 6370000000 HC RX 637 (ALT 250 FOR IP): Performed by: PHYSICIAN ASSISTANT

## 2022-01-01 PROCEDURE — 51798 US URINE CAPACITY MEASURE: CPT

## 2022-01-01 PROCEDURE — 83605 ASSAY OF LACTIC ACID: CPT

## 2022-01-01 PROCEDURE — 76705 ECHO EXAM OF ABDOMEN: CPT

## 2022-01-01 PROCEDURE — 84300 ASSAY OF URINE SODIUM: CPT

## 2022-01-01 PROCEDURE — 2580000003 HC RX 258: Performed by: EMERGENCY MEDICINE

## 2022-01-01 PROCEDURE — 2580000003 HC RX 258: Performed by: PHYSICIAN ASSISTANT

## 2022-01-01 PROCEDURE — 6360000004 HC RX CONTRAST MEDICATION: Performed by: EMERGENCY MEDICINE

## 2022-01-01 PROCEDURE — 99223 1ST HOSP IP/OBS HIGH 75: CPT | Performed by: INTERNAL MEDICINE

## 2022-01-01 PROCEDURE — 99213 OFFICE O/P EST LOW 20 MIN: CPT | Performed by: INTERNAL MEDICINE

## 2022-01-01 PROCEDURE — 87635 SARS-COV-2 COVID-19 AMP PRB: CPT

## 2022-01-01 PROCEDURE — 96417 CHEMO IV INFUS EACH ADDL SEQ: CPT

## 2022-01-01 PROCEDURE — A4216 STERILE WATER/SALINE, 10 ML: HCPCS | Performed by: STUDENT IN AN ORGANIZED HEALTH CARE EDUCATION/TRAINING PROGRAM

## 2022-01-01 PROCEDURE — 83690 ASSAY OF LIPASE: CPT

## 2022-01-01 PROCEDURE — 70553 MRI BRAIN STEM W/O & W/DYE: CPT

## 2022-01-01 PROCEDURE — 99215 OFFICE O/P EST HI 40 MIN: CPT | Performed by: INTERNAL MEDICINE

## 2022-01-01 PROCEDURE — 96360 HYDRATION IV INFUSION INIT: CPT

## 2022-01-01 PROCEDURE — G0439 PPPS, SUBSEQ VISIT: HCPCS | Performed by: INTERNAL MEDICINE

## 2022-01-01 PROCEDURE — 6360000002 HC RX W HCPCS: Performed by: EMERGENCY MEDICINE

## 2022-01-01 PROCEDURE — 51701 INSERT BLADDER CATHETER: CPT

## 2022-01-01 PROCEDURE — 96523 IRRIG DRUG DELIVERY DEVICE: CPT

## 2022-01-01 PROCEDURE — 71045 X-RAY EXAM CHEST 1 VIEW: CPT

## 2022-01-01 PROCEDURE — 87186 SC STD MICRODIL/AGAR DIL: CPT

## 2022-01-01 PROCEDURE — 99223 1ST HOSP IP/OBS HIGH 75: CPT | Performed by: PSYCHIATRY & NEUROLOGY

## 2022-01-01 PROCEDURE — 99212 OFFICE O/P EST SF 10 MIN: CPT

## 2022-01-01 PROCEDURE — 6360000004 HC RX CONTRAST MEDICATION: Performed by: INTERNAL MEDICINE

## 2022-01-01 PROCEDURE — 83970 ASSAY OF PARATHORMONE: CPT

## 2022-01-01 PROCEDURE — 81001 URINALYSIS AUTO W/SCOPE: CPT

## 2022-01-01 PROCEDURE — 99214 OFFICE O/P EST MOD 30 MIN: CPT | Performed by: INTERNAL MEDICINE

## 2022-01-01 PROCEDURE — 36591 DRAW BLOOD OFF VENOUS DEVICE: CPT

## 2022-01-01 PROCEDURE — 87086 URINE CULTURE/COLONY COUNT: CPT

## 2022-01-01 RX ORDER — 0.9 % SODIUM CHLORIDE 0.9 %
500 INTRAVENOUS SOLUTION INTRAVENOUS ONCE
Status: COMPLETED | OUTPATIENT
Start: 2022-01-01 | End: 2022-01-01

## 2022-01-01 RX ORDER — PALONOSETRON 0.05 MG/ML
0.25 INJECTION, SOLUTION INTRAVENOUS ONCE
Status: CANCELLED | OUTPATIENT
Start: 2022-01-01 | End: 2022-01-01

## 2022-01-01 RX ORDER — DEXAMETHASONE SODIUM PHOSPHATE 10 MG/ML
10 INJECTION, SOLUTION INTRAMUSCULAR; INTRAVENOUS ONCE
Status: CANCELLED | OUTPATIENT
Start: 2022-03-29 | End: 2022-03-29

## 2022-01-01 RX ORDER — 0.9 % SODIUM CHLORIDE 0.9 %
1000 INTRAVENOUS SOLUTION INTRAVENOUS ONCE
Status: COMPLETED | OUTPATIENT
Start: 2022-01-01 | End: 2022-01-01

## 2022-01-01 RX ORDER — DIPHENOXYLATE HYDROCHLORIDE AND ATROPINE SULFATE 2.5; .025 MG/1; MG/1
1 TABLET ORAL 4 TIMES DAILY PRN
Status: DISCONTINUED | OUTPATIENT
Start: 2022-01-01 | End: 2022-01-01 | Stop reason: HOSPADM

## 2022-01-01 RX ORDER — SODIUM CHLORIDE 0.9 % (FLUSH) 0.9 %
5-40 SYRINGE (ML) INJECTION PRN
Status: DISCONTINUED | OUTPATIENT
Start: 2022-01-01 | End: 2022-01-01 | Stop reason: HOSPADM

## 2022-01-01 RX ORDER — CYANOCOBALAMIN 1000 UG/ML
1000 INJECTION INTRAMUSCULAR; SUBCUTANEOUS ONCE
Status: COMPLETED | OUTPATIENT
Start: 2022-01-01 | End: 2022-01-01

## 2022-01-01 RX ORDER — EPINEPHRINE 1 MG/ML
0.3 INJECTION, SOLUTION, CONCENTRATE INTRAVENOUS PRN
Status: CANCELLED | OUTPATIENT
Start: 2022-01-01

## 2022-01-01 RX ORDER — DIPHENOXYLATE HYDROCHLORIDE AND ATROPINE SULFATE 2.5; .025 MG/1; MG/1
2 TABLET ORAL 4 TIMES DAILY
Status: DISCONTINUED | OUTPATIENT
Start: 2022-01-01 | End: 2022-01-01

## 2022-01-01 RX ORDER — DEXAMETHASONE SODIUM PHOSPHATE 10 MG/ML
10 INJECTION, SOLUTION INTRAMUSCULAR; INTRAVENOUS ONCE
Status: COMPLETED | OUTPATIENT
Start: 2022-01-01 | End: 2022-01-01

## 2022-01-01 RX ORDER — CYANOCOBALAMIN 1000 UG/ML
1000 INJECTION INTRAMUSCULAR; SUBCUTANEOUS ONCE
Status: CANCELLED | OUTPATIENT
Start: 2022-01-01 | End: 2022-01-01

## 2022-01-01 RX ORDER — SODIUM CHLORIDE 9 MG/ML
25 INJECTION, SOLUTION INTRAVENOUS PRN
Status: DISCONTINUED | OUTPATIENT
Start: 2022-01-01 | End: 2022-01-01 | Stop reason: HOSPADM

## 2022-01-01 RX ORDER — SODIUM CHLORIDE 0.9 % (FLUSH) 0.9 %
5-40 SYRINGE (ML) INJECTION PRN
Status: CANCELLED | OUTPATIENT
Start: 2022-01-01

## 2022-01-01 RX ORDER — DEXAMETHASONE SODIUM PHOSPHATE 4 MG/ML
4 INJECTION, SOLUTION INTRA-ARTICULAR; INTRALESIONAL; INTRAMUSCULAR; INTRAVENOUS; SOFT TISSUE EVERY 8 HOURS
Status: DISCONTINUED | OUTPATIENT
Start: 2022-01-01 | End: 2022-01-01 | Stop reason: HOSPADM

## 2022-01-01 RX ORDER — 0.9 % SODIUM CHLORIDE 0.9 %
250 INTRAVENOUS SOLUTION INTRAVENOUS ONCE
Status: COMPLETED | OUTPATIENT
Start: 2022-01-01 | End: 2022-01-01

## 2022-01-01 RX ORDER — LOPERAMIDE HYDROCHLORIDE 2 MG/1
2 CAPSULE ORAL 4 TIMES DAILY PRN
Status: DISCONTINUED | OUTPATIENT
Start: 2022-01-01 | End: 2022-01-01 | Stop reason: HOSPADM

## 2022-01-01 RX ORDER — PALONOSETRON 0.05 MG/ML
0.25 INJECTION, SOLUTION INTRAVENOUS ONCE
Status: COMPLETED | OUTPATIENT
Start: 2022-01-01 | End: 2022-01-01

## 2022-01-01 RX ORDER — SODIUM CHLORIDE 0.9 % (FLUSH) 0.9 %
5-40 SYRINGE (ML) INJECTION EVERY 12 HOURS SCHEDULED
Status: DISCONTINUED | OUTPATIENT
Start: 2022-01-01 | End: 2022-01-01 | Stop reason: HOSPADM

## 2022-01-01 RX ORDER — FOLIC ACID 1 MG/1
1 TABLET ORAL DAILY
Status: DISCONTINUED | OUTPATIENT
Start: 2022-01-01 | End: 2022-01-01 | Stop reason: HOSPADM

## 2022-01-01 RX ORDER — PROMETHAZINE HYDROCHLORIDE 25 MG/ML
25 INJECTION, SOLUTION INTRAMUSCULAR; INTRAVENOUS ONCE
Status: COMPLETED | OUTPATIENT
Start: 2022-01-01 | End: 2022-01-01

## 2022-01-01 RX ORDER — HEPARIN SODIUM 5000 [USP'U]/ML
5000 INJECTION, SOLUTION INTRAVENOUS; SUBCUTANEOUS EVERY 8 HOURS SCHEDULED
Status: DISCONTINUED | OUTPATIENT
Start: 2022-01-01 | End: 2022-01-01 | Stop reason: HOSPADM

## 2022-01-01 RX ORDER — MEPERIDINE HYDROCHLORIDE 25 MG/ML
12.5 INJECTION INTRAMUSCULAR; INTRAVENOUS; SUBCUTANEOUS PRN
Status: CANCELLED | OUTPATIENT
Start: 2022-03-29

## 2022-01-01 RX ORDER — HEPARIN SODIUM (PORCINE) LOCK FLUSH IV SOLN 100 UNIT/ML 100 UNIT/ML
500 SOLUTION INTRAVENOUS PRN
Status: CANCELLED | OUTPATIENT
Start: 2022-01-01

## 2022-01-01 RX ORDER — HEPARIN SODIUM (PORCINE) LOCK FLUSH IV SOLN 100 UNIT/ML 100 UNIT/ML
500 SOLUTION INTRAVENOUS PRN
Status: DISCONTINUED | OUTPATIENT
Start: 2022-01-01 | End: 2022-01-01 | Stop reason: HOSPADM

## 2022-01-01 RX ORDER — ONDANSETRON 2 MG/ML
8 INJECTION INTRAMUSCULAR; INTRAVENOUS EVERY 8 HOURS PRN
Status: DISCONTINUED | OUTPATIENT
Start: 2022-01-01 | End: 2022-01-01

## 2022-01-01 RX ORDER — PROCHLORPERAZINE EDISYLATE 5 MG/ML
10 INJECTION INTRAMUSCULAR; INTRAVENOUS EVERY 6 HOURS PRN
Status: DISCONTINUED | OUTPATIENT
Start: 2022-01-01 | End: 2022-01-01

## 2022-01-01 RX ORDER — PALONOSETRON 0.05 MG/ML
0.25 INJECTION, SOLUTION INTRAVENOUS ONCE
Status: CANCELLED | OUTPATIENT
Start: 2022-03-29 | End: 2022-03-29

## 2022-01-01 RX ORDER — SODIUM CHLORIDE 9 MG/ML
20 INJECTION, SOLUTION INTRAVENOUS ONCE
Status: COMPLETED | OUTPATIENT
Start: 2022-01-01 | End: 2022-01-01

## 2022-01-01 RX ORDER — SODIUM CHLORIDE 9 MG/ML
20 INJECTION, SOLUTION INTRAVENOUS ONCE
Status: CANCELLED | OUTPATIENT
Start: 2022-01-01 | End: 2022-01-01

## 2022-01-01 RX ORDER — SODIUM CHLORIDE 9 MG/ML
INJECTION, SOLUTION INTRAVENOUS CONTINUOUS
Status: CANCELLED | OUTPATIENT
Start: 2022-01-01

## 2022-01-01 RX ORDER — SODIUM CHLORIDE 0.9 % (FLUSH) 0.9 %
5-40 SYRINGE (ML) INJECTION PRN
Status: CANCELLED | OUTPATIENT
Start: 2022-03-29

## 2022-01-01 RX ORDER — ONDANSETRON 2 MG/ML
4 INJECTION INTRAMUSCULAR; INTRAVENOUS EVERY 6 HOURS PRN
Status: DISCONTINUED | OUTPATIENT
Start: 2022-01-01 | End: 2022-01-01

## 2022-01-01 RX ORDER — SODIUM CHLORIDE 9 MG/ML
25 INJECTION, SOLUTION INTRAVENOUS PRN
Status: CANCELLED | OUTPATIENT
Start: 2022-01-01

## 2022-01-01 RX ORDER — SODIUM CHLORIDE 9 MG/ML
INJECTION, SOLUTION INTRAVENOUS CONTINUOUS
Status: CANCELLED | OUTPATIENT
Start: 2022-03-29

## 2022-01-01 RX ORDER — 0.9 % SODIUM CHLORIDE 0.9 %
500 INTRAVENOUS SOLUTION INTRAVENOUS ONCE
Status: CANCELLED | OUTPATIENT
Start: 2022-01-01 | End: 2022-01-01

## 2022-01-01 RX ORDER — SODIUM CHLORIDE 9 MG/ML
20 INJECTION, SOLUTION INTRAVENOUS ONCE
Status: CANCELLED | OUTPATIENT
Start: 2022-03-29 | End: 2022-03-29

## 2022-01-01 RX ORDER — ALLOPURINOL 100 MG/1
100 TABLET ORAL DAILY
Status: DISCONTINUED | OUTPATIENT
Start: 2022-01-01 | End: 2022-01-01 | Stop reason: HOSPADM

## 2022-01-01 RX ORDER — PANTOPRAZOLE SODIUM 40 MG/1
40 TABLET, DELAYED RELEASE ORAL DAILY
Qty: 90 TABLET | Refills: 1 | Status: ON HOLD | OUTPATIENT
Start: 2022-01-01 | End: 2022-01-01 | Stop reason: HOSPADM

## 2022-01-01 RX ORDER — POTASSIUM CHLORIDE 7.45 MG/ML
10 INJECTION INTRAVENOUS
Status: ACTIVE | OUTPATIENT
Start: 2022-01-01 | End: 2022-01-01

## 2022-01-01 RX ORDER — MAGNESIUM SULFATE IN WATER 40 MG/ML
2000 INJECTION, SOLUTION INTRAVENOUS ONCE
Status: COMPLETED | OUTPATIENT
Start: 2022-01-01 | End: 2022-01-01

## 2022-01-01 RX ORDER — SODIUM CHLORIDE 0.9 % (FLUSH) 0.9 %
10 SYRINGE (ML) INJECTION PRN
Status: CANCELLED | OUTPATIENT
Start: 2022-01-01

## 2022-01-01 RX ORDER — ACETAMINOPHEN 325 MG/1
650 TABLET ORAL
Status: CANCELLED | OUTPATIENT
Start: 2022-01-01

## 2022-01-01 RX ORDER — ONDANSETRON 2 MG/ML
8 INJECTION INTRAMUSCULAR; INTRAVENOUS
Status: CANCELLED | OUTPATIENT
Start: 2022-01-01

## 2022-01-01 RX ORDER — EPINEPHRINE 1 MG/ML
0.3 INJECTION, SOLUTION, CONCENTRATE INTRAVENOUS PRN
Status: CANCELLED | OUTPATIENT
Start: 2022-03-29

## 2022-01-01 RX ORDER — ALBUTEROL SULFATE 90 UG/1
4 AEROSOL, METERED RESPIRATORY (INHALATION) PRN
Status: CANCELLED | OUTPATIENT
Start: 2022-01-01

## 2022-01-01 RX ORDER — ONDANSETRON 2 MG/ML
8 INJECTION INTRAMUSCULAR; INTRAVENOUS EVERY 8 HOURS SCHEDULED
Status: DISCONTINUED | OUTPATIENT
Start: 2022-01-01 | End: 2022-01-01 | Stop reason: HOSPADM

## 2022-01-01 RX ORDER — ACETAMINOPHEN 325 MG/1
650 TABLET ORAL
Status: CANCELLED | OUTPATIENT
Start: 2022-03-29

## 2022-01-01 RX ORDER — SODIUM CHLORIDE 9 MG/ML
5-40 INJECTION INTRAVENOUS PRN
Status: CANCELLED | OUTPATIENT
Start: 2022-01-01

## 2022-01-01 RX ORDER — ACETAMINOPHEN 650 MG/1
650 SUPPOSITORY RECTAL EVERY 6 HOURS PRN
Status: DISCONTINUED | OUTPATIENT
Start: 2022-01-01 | End: 2022-01-01 | Stop reason: HOSPADM

## 2022-01-01 RX ORDER — ONDANSETRON 4 MG/1
4 TABLET, ORALLY DISINTEGRATING ORAL EVERY 8 HOURS PRN
Status: DISCONTINUED | OUTPATIENT
Start: 2022-01-01 | End: 2022-01-01

## 2022-01-01 RX ORDER — SODIUM CHLORIDE 0.9 % (FLUSH) 0.9 %
10 SYRINGE (ML) INJECTION PRN
Status: DISCONTINUED | OUTPATIENT
Start: 2022-01-01 | End: 2022-01-01 | Stop reason: HOSPADM

## 2022-01-01 RX ORDER — MEPERIDINE HYDROCHLORIDE 50 MG/ML
12.5 INJECTION INTRAMUSCULAR; INTRAVENOUS; SUBCUTANEOUS PRN
Status: CANCELLED | OUTPATIENT
Start: 2022-01-01

## 2022-01-01 RX ORDER — HYDROMORPHONE HYDROCHLORIDE 1 MG/ML
0.5 INJECTION, SOLUTION INTRAMUSCULAR; INTRAVENOUS; SUBCUTANEOUS
Status: DISCONTINUED | OUTPATIENT
Start: 2022-01-01 | End: 2022-01-01 | Stop reason: HOSPADM

## 2022-01-01 RX ORDER — HYDROCODONE BITARTRATE AND ACETAMINOPHEN 10; 325 MG/1; MG/1
1 TABLET ORAL EVERY 6 HOURS PRN
Status: DISCONTINUED | OUTPATIENT
Start: 2022-01-01 | End: 2022-01-01 | Stop reason: HOSPADM

## 2022-01-01 RX ORDER — ALBUTEROL SULFATE 90 UG/1
4 AEROSOL, METERED RESPIRATORY (INHALATION) PRN
Status: CANCELLED | OUTPATIENT
Start: 2022-03-29

## 2022-01-01 RX ORDER — DIPHENHYDRAMINE HYDROCHLORIDE 50 MG/ML
50 INJECTION INTRAMUSCULAR; INTRAVENOUS
Status: CANCELLED | OUTPATIENT
Start: 2022-03-29

## 2022-01-01 RX ORDER — SODIUM CHLORIDE, SODIUM LACTATE, POTASSIUM CHLORIDE, AND CALCIUM CHLORIDE .6; .31; .03; .02 G/100ML; G/100ML; G/100ML; G/100ML
1000 INJECTION, SOLUTION INTRAVENOUS ONCE
Status: COMPLETED | OUTPATIENT
Start: 2022-01-01 | End: 2022-01-01

## 2022-01-01 RX ORDER — DIPHENHYDRAMINE HYDROCHLORIDE 50 MG/ML
50 INJECTION INTRAMUSCULAR; INTRAVENOUS
Status: CANCELLED | OUTPATIENT
Start: 2022-01-01

## 2022-01-01 RX ORDER — SODIUM CHLORIDE 9 MG/ML
25 INJECTION, SOLUTION INTRAVENOUS PRN
Status: CANCELLED | OUTPATIENT
Start: 2022-03-29

## 2022-01-01 RX ORDER — SODIUM CHLORIDE 9 MG/ML
20 INJECTION, SOLUTION INTRAVENOUS ONCE
Status: DISCONTINUED | OUTPATIENT
Start: 2022-01-01 | End: 2022-01-01 | Stop reason: HOSPADM

## 2022-01-01 RX ORDER — CALCIUM GLUCONATE 20 MG/ML
1000 INJECTION, SOLUTION INTRAVENOUS ONCE
Status: DISCONTINUED | OUTPATIENT
Start: 2022-01-01 | End: 2022-01-01

## 2022-01-01 RX ORDER — FOLIC ACID 1 MG/1
1 TABLET ORAL DAILY
Qty: 30 TABLET | Refills: 0 | Status: SHIPPED | OUTPATIENT
Start: 2022-01-01 | End: 2022-01-01

## 2022-01-01 RX ORDER — IPRATROPIUM BROMIDE AND ALBUTEROL SULFATE 2.5; .5 MG/3ML; MG/3ML
1 SOLUTION RESPIRATORY (INHALATION) EVERY 4 HOURS PRN
Status: DISCONTINUED | OUTPATIENT
Start: 2022-01-01 | End: 2022-01-01 | Stop reason: HOSPADM

## 2022-01-01 RX ORDER — SODIUM CHLORIDE, SODIUM LACTATE, POTASSIUM CHLORIDE, AND CALCIUM CHLORIDE .6; .31; .03; .02 G/100ML; G/100ML; G/100ML; G/100ML
500 INJECTION, SOLUTION INTRAVENOUS ONCE
Status: COMPLETED | OUTPATIENT
Start: 2022-01-01 | End: 2022-01-01

## 2022-01-01 RX ORDER — HEPARIN SODIUM (PORCINE) LOCK FLUSH IV SOLN 100 UNIT/ML 100 UNIT/ML
500 SOLUTION INTRAVENOUS PRN
Status: CANCELLED | OUTPATIENT
Start: 2022-03-29

## 2022-01-01 RX ORDER — DEXAMETHASONE SODIUM PHOSPHATE 4 MG/ML
4 INJECTION, SOLUTION INTRA-ARTICULAR; INTRALESIONAL; INTRAMUSCULAR; INTRAVENOUS; SOFT TISSUE EVERY 24 HOURS
Status: DISCONTINUED | OUTPATIENT
Start: 2022-01-01 | End: 2022-01-01

## 2022-01-01 RX ORDER — DEXAMETHASONE 4 MG/1
4 TABLET ORAL SEE ADMIN INSTRUCTIONS
Qty: 24 TABLET | Refills: 0 | Status: ON HOLD | OUTPATIENT
Start: 2022-01-01 | End: 2022-01-01 | Stop reason: HOSPADM

## 2022-01-01 RX ORDER — CYANOCOBALAMIN 1000 UG/ML
1000 INJECTION INTRAMUSCULAR; SUBCUTANEOUS ONCE
Status: CANCELLED
Start: 2022-01-01 | End: 2022-01-01

## 2022-01-01 RX ORDER — ONDANSETRON 2 MG/ML
4 INJECTION INTRAMUSCULAR; INTRAVENOUS ONCE
Status: COMPLETED | OUTPATIENT
Start: 2022-01-01 | End: 2022-01-01

## 2022-01-01 RX ORDER — ACETAMINOPHEN 325 MG/1
650 TABLET ORAL EVERY 6 HOURS PRN
Status: DISCONTINUED | OUTPATIENT
Start: 2022-01-01 | End: 2022-01-01 | Stop reason: HOSPADM

## 2022-01-01 RX ORDER — SODIUM CHLORIDE 9 MG/ML
5-40 INJECTION INTRAVENOUS PRN
Status: CANCELLED | OUTPATIENT
Start: 2022-03-29

## 2022-01-01 RX ORDER — METOCLOPRAMIDE 5 MG/1
5 TABLET ORAL 4 TIMES DAILY
Status: DISCONTINUED | OUTPATIENT
Start: 2022-01-01 | End: 2022-01-01

## 2022-01-01 RX ORDER — POTASSIUM CHLORIDE 7.45 MG/ML
10 INJECTION INTRAVENOUS
Status: COMPLETED | OUTPATIENT
Start: 2022-01-01 | End: 2022-01-01

## 2022-01-01 RX ORDER — DIPHENOXYLATE HYDROCHLORIDE AND ATROPINE SULFATE 2.5; .025 MG/1; MG/1
1 TABLET ORAL 4 TIMES DAILY PRN
Status: DISCONTINUED | OUTPATIENT
Start: 2022-01-01 | End: 2022-01-01

## 2022-01-01 RX ORDER — POLYETHYLENE GLYCOL 3350 17 G/17G
17 POWDER, FOR SOLUTION ORAL DAILY PRN
Status: DISCONTINUED | OUTPATIENT
Start: 2022-01-01 | End: 2022-01-01 | Stop reason: HOSPADM

## 2022-01-01 RX ORDER — CEFTRIAXONE 2 G/50ML
2000 INJECTION, SOLUTION INTRAVENOUS EVERY 24 HOURS
Status: DISCONTINUED | OUTPATIENT
Start: 2022-01-01 | End: 2022-01-01

## 2022-01-01 RX ORDER — SODIUM CHLORIDE 9 MG/ML
INJECTION, SOLUTION INTRAVENOUS CONTINUOUS
Status: DISCONTINUED | OUTPATIENT
Start: 2022-01-01 | End: 2022-01-01

## 2022-01-01 RX ORDER — FOLIC ACID 1 MG/1
1 TABLET ORAL DAILY
Qty: 30 TABLET | Refills: 0 | Status: ON HOLD | OUTPATIENT
Start: 2022-01-01 | End: 2022-01-01 | Stop reason: HOSPADM

## 2022-01-01 RX ORDER — ONDANSETRON 2 MG/ML
8 INJECTION INTRAMUSCULAR; INTRAVENOUS
Status: CANCELLED | OUTPATIENT
Start: 2022-03-29

## 2022-01-01 RX ADMIN — SODIUM CHLORIDE, PRESERVATIVE FREE 10 ML: 5 INJECTION INTRAVENOUS at 14:24

## 2022-01-01 RX ADMIN — DEXAMETHASONE SODIUM PHOSPHATE 10 MG: 10 INJECTION INTRAMUSCULAR; INTRAVENOUS at 12:15

## 2022-01-01 RX ADMIN — ONDANSETRON 8 MG: 2 INJECTION INTRAMUSCULAR; INTRAVENOUS at 05:27

## 2022-01-01 RX ADMIN — LOPERAMIDE HYDROCHLORIDE 2 MG: 2 CAPSULE ORAL at 09:30

## 2022-01-01 RX ADMIN — CEFTRIAXONE 2000 MG: 2 INJECTION, SOLUTION INTRAVENOUS at 08:56

## 2022-01-01 RX ADMIN — PALONOSETRON 0.25 MG: 0.05 INJECTION, SOLUTION INTRAVENOUS at 12:15

## 2022-01-01 RX ADMIN — POTASSIUM CHLORIDE 10 MEQ: 7.45 INJECTION INTRAVENOUS at 17:43

## 2022-01-01 RX ADMIN — LOPERAMIDE HYDROCHLORIDE 2 MG: 2 CAPSULE ORAL at 00:43

## 2022-01-01 RX ADMIN — DIPHENOXYLATE HYDROCHLORIDE AND ATROPINE SULFATE 2 TABLET: 2.5; .025 TABLET ORAL at 18:29

## 2022-01-01 RX ADMIN — SODIUM CHLORIDE 450 MG: 9 INJECTION, SOLUTION INTRAVENOUS at 13:29

## 2022-01-01 RX ADMIN — GADOBENATE DIMEGLUMINE 13 ML: 529 INJECTION, SOLUTION INTRAVENOUS at 12:06

## 2022-01-01 RX ADMIN — SODIUM CHLORIDE 1000 ML: 9 INJECTION, SOLUTION INTRAVENOUS at 04:30

## 2022-01-01 RX ADMIN — DIPHENOXYLATE HYDROCHLORIDE AND ATROPINE SULFATE 2 TABLET: 2.5; .025 TABLET ORAL at 20:11

## 2022-01-01 RX ADMIN — APIXABAN 5 MG: 5 TABLET, FILM COATED ORAL at 21:02

## 2022-01-01 RX ADMIN — FOSAPREPITANT 150 MG: 150 INJECTION, POWDER, LYOPHILIZED, FOR SOLUTION INTRAVENOUS at 13:24

## 2022-01-01 RX ADMIN — SODIUM CHLORIDE 200 MG: 9 INJECTION, SOLUTION INTRAVENOUS at 12:55

## 2022-01-01 RX ADMIN — DEXAMETHASONE SODIUM PHOSPHATE 4 MG: 4 INJECTION, SOLUTION INTRAMUSCULAR; INTRAVENOUS at 05:06

## 2022-01-01 RX ADMIN — IOPAMIDOL 90 ML: 755 INJECTION, SOLUTION INTRAVENOUS at 00:54

## 2022-01-01 RX ADMIN — DIPHENOXYLATE HYDROCHLORIDE AND ATROPINE SULFATE 1 TABLET: 2.5; .025 TABLET ORAL at 08:00

## 2022-01-01 RX ADMIN — CEFTRIAXONE 1000 MG: 1 INJECTION, POWDER, FOR SOLUTION INTRAMUSCULAR; INTRAVENOUS at 16:21

## 2022-01-01 RX ADMIN — SODIUM CHLORIDE: 9 INJECTION, SOLUTION INTRAVENOUS at 09:58

## 2022-01-01 RX ADMIN — SODIUM CHLORIDE, PRESERVATIVE FREE 10 ML: 5 INJECTION INTRAVENOUS at 15:22

## 2022-01-01 RX ADMIN — POTASSIUM CHLORIDE 10 MEQ: 7.46 INJECTION, SOLUTION INTRAVENOUS at 10:07

## 2022-01-01 RX ADMIN — ONDANSETRON 4 MG: 2 INJECTION INTRAMUSCULAR; INTRAVENOUS at 00:10

## 2022-01-01 RX ADMIN — CEFTRIAXONE 2000 MG: 2 INJECTION, SOLUTION INTRAVENOUS at 09:22

## 2022-01-01 RX ADMIN — DIPHENOXYLATE HYDROCHLORIDE AND ATROPINE SULFATE 2 TABLET: 2.5; .025 TABLET ORAL at 21:23

## 2022-01-01 RX ADMIN — DEXAMETHASONE SODIUM PHOSPHATE 10 MG: 10 INJECTION INTRAMUSCULAR; INTRAVENOUS at 13:21

## 2022-01-01 RX ADMIN — LOPERAMIDE HYDROCHLORIDE 2 MG: 2 CAPSULE ORAL at 21:07

## 2022-01-01 RX ADMIN — DEXAMETHASONE SODIUM PHOSPHATE 4 MG: 4 INJECTION, SOLUTION INTRAMUSCULAR; INTRAVENOUS at 13:07

## 2022-01-01 RX ADMIN — ONDANSETRON 8 MG: 2 INJECTION INTRAMUSCULAR; INTRAVENOUS at 20:11

## 2022-01-01 RX ADMIN — APIXABAN 5 MG: 5 TABLET, FILM COATED ORAL at 13:11

## 2022-01-01 RX ADMIN — SODIUM CHLORIDE, POTASSIUM CHLORIDE, SODIUM LACTATE AND CALCIUM CHLORIDE 1000 ML: 600; 310; 30; 20 INJECTION, SOLUTION INTRAVENOUS at 10:04

## 2022-01-01 RX ADMIN — DEXAMETHASONE SODIUM PHOSPHATE 4 MG: 4 INJECTION, SOLUTION INTRAMUSCULAR; INTRAVENOUS at 13:30

## 2022-01-01 RX ADMIN — FAMOTIDINE 20 MG: 10 INJECTION, SOLUTION INTRAVENOUS at 11:27

## 2022-01-01 RX ADMIN — ALLOPURINOL 100 MG: 100 TABLET ORAL at 08:56

## 2022-01-01 RX ADMIN — DEXAMETHASONE SODIUM PHOSPHATE 10 MG: 10 INJECTION, SOLUTION INTRAMUSCULAR; INTRAVENOUS at 10:04

## 2022-01-01 RX ADMIN — DIPHENOXYLATE HYDROCHLORIDE AND ATROPINE SULFATE 2 TABLET: 2.5; .025 TABLET ORAL at 14:54

## 2022-01-01 RX ADMIN — ONDANSETRON 8 MG: 2 INJECTION INTRAMUSCULAR; INTRAVENOUS at 05:06

## 2022-01-01 RX ADMIN — METRONIDAZOLE 500 MG: 500 INJECTION, SOLUTION INTRAVENOUS at 08:56

## 2022-01-01 RX ADMIN — PROMETHAZINE HYDROCHLORIDE 25 MG: 25 INJECTION INTRAMUSCULAR; INTRAVENOUS at 03:25

## 2022-01-01 RX ADMIN — CARBOPLATIN 165 MG: 10 INJECTION, SOLUTION INTRAVENOUS at 11:48

## 2022-01-01 RX ADMIN — METOCLOPRAMIDE HYDROCHLORIDE 5 MG: 5 TABLET ORAL at 08:55

## 2022-01-01 RX ADMIN — ONDANSETRON 8 MG: 2 INJECTION INTRAMUSCULAR; INTRAVENOUS at 05:58

## 2022-01-01 RX ADMIN — LOPERAMIDE HYDROCHLORIDE 2 MG: 2 CAPSULE ORAL at 11:26

## 2022-01-01 RX ADMIN — SODIUM CHLORIDE, PRESERVATIVE FREE 10 ML: 5 INJECTION INTRAVENOUS at 11:27

## 2022-01-01 RX ADMIN — ONDANSETRON 4 MG: 2 INJECTION INTRAMUSCULAR; INTRAVENOUS at 07:41

## 2022-01-01 RX ADMIN — SODIUM CHLORIDE 200 MG: 9 INJECTION, SOLUTION INTRAVENOUS at 10:47

## 2022-01-01 RX ADMIN — METOCLOPRAMIDE HYDROCHLORIDE 5 MG: 5 TABLET ORAL at 15:07

## 2022-01-01 RX ADMIN — SODIUM CHLORIDE, PRESERVATIVE FREE 10 MG: 5 INJECTION INTRAVENOUS at 08:42

## 2022-01-01 RX ADMIN — APIXABAN 5 MG: 5 TABLET, FILM COATED ORAL at 20:30

## 2022-01-01 RX ADMIN — SODIUM CHLORIDE 450 MG: 9 INJECTION, SOLUTION INTRAVENOUS at 14:30

## 2022-01-01 RX ADMIN — ONDANSETRON 8 MG: 2 INJECTION INTRAMUSCULAR; INTRAVENOUS at 13:30

## 2022-01-01 RX ADMIN — SODIUM CHLORIDE, PRESERVATIVE FREE 10 MG: 5 INJECTION INTRAVENOUS at 10:44

## 2022-01-01 RX ADMIN — CALCIUM GLUCONATE 1000 MG: 20 INJECTION, SOLUTION INTRAVENOUS at 17:17

## 2022-01-01 RX ADMIN — CARBOPLATIN 200 MG: 10 INJECTION, SOLUTION INTRAVENOUS at 13:48

## 2022-01-01 RX ADMIN — HEPARIN SODIUM 5000 UNITS: 5000 INJECTION INTRAVENOUS; SUBCUTANEOUS at 08:42

## 2022-01-01 RX ADMIN — HEPARIN 500 UNITS: 100 SYRINGE at 12:25

## 2022-01-01 RX ADMIN — HYDROCODONE BITARTRATE AND ACETAMINOPHEN 1 TABLET: 10; 325 TABLET ORAL at 09:30

## 2022-01-01 RX ADMIN — LOPERAMIDE HYDROCHLORIDE 2 MG: 2 CAPSULE ORAL at 06:32

## 2022-01-01 RX ADMIN — METRONIDAZOLE 500 MG: 500 INJECTION, SOLUTION INTRAVENOUS at 08:00

## 2022-01-01 RX ADMIN — HEPARIN 500 UNITS: 100 SYRINGE at 13:03

## 2022-01-01 RX ADMIN — SODIUM CHLORIDE, POTASSIUM CHLORIDE, SODIUM LACTATE AND CALCIUM CHLORIDE 500 ML: 600; 310; 30; 20 INJECTION, SOLUTION INTRAVENOUS at 11:00

## 2022-01-01 RX ADMIN — HEPARIN SODIUM 5000 UNITS: 5000 INJECTION INTRAVENOUS; SUBCUTANEOUS at 21:44

## 2022-01-01 RX ADMIN — ONDANSETRON 8 MG: 2 INJECTION INTRAMUSCULAR; INTRAVENOUS at 21:44

## 2022-01-01 RX ADMIN — FAMOTIDINE 20 MG: 10 INJECTION, SOLUTION INTRAVENOUS at 08:56

## 2022-01-01 RX ADMIN — SODIUM CHLORIDE 250 ML: 9 INJECTION, SOLUTION INTRAVENOUS at 03:55

## 2022-01-01 RX ADMIN — SODIUM CHLORIDE, PRESERVATIVE FREE 10 ML: 5 INJECTION INTRAVENOUS at 08:43

## 2022-01-01 RX ADMIN — DEXAMETHASONE SODIUM PHOSPHATE 4 MG: 4 INJECTION, SOLUTION INTRAMUSCULAR; INTRAVENOUS at 13:08

## 2022-01-01 RX ADMIN — POTASSIUM CHLORIDE 10 MEQ: 7.46 INJECTION, SOLUTION INTRAVENOUS at 12:16

## 2022-01-01 RX ADMIN — HYDROMORPHONE HYDROCHLORIDE 0.5 MG: 1 INJECTION, SOLUTION INTRAMUSCULAR; INTRAVENOUS; SUBCUTANEOUS at 12:39

## 2022-01-01 RX ADMIN — APIXABAN 5 MG: 5 TABLET, FILM COATED ORAL at 21:23

## 2022-01-01 RX ADMIN — POTASSIUM CHLORIDE 10 MEQ: 7.45 INJECTION INTRAVENOUS at 15:15

## 2022-01-01 RX ADMIN — SODIUM CHLORIDE 1000 ML: 9 INJECTION, SOLUTION INTRAVENOUS at 00:10

## 2022-01-01 RX ADMIN — POTASSIUM CHLORIDE 10 MEQ: 7.46 INJECTION, SOLUTION INTRAVENOUS at 11:15

## 2022-01-01 RX ADMIN — FOSAPREPITANT 150 MG: 150 INJECTION, POWDER, LYOPHILIZED, FOR SOLUTION INTRAVENOUS at 12:16

## 2022-01-01 RX ADMIN — HEPARIN SODIUM 5000 UNITS: 5000 INJECTION INTRAVENOUS; SUBCUTANEOUS at 05:05

## 2022-01-01 RX ADMIN — CEFTRIAXONE 1000 MG: 1 INJECTION, POWDER, FOR SOLUTION INTRAMUSCULAR; INTRAVENOUS at 10:44

## 2022-01-01 RX ADMIN — Medication 500 UNITS: at 15:22

## 2022-01-01 RX ADMIN — CYANOCOBALAMIN 1000 MCG: 1000 INJECTION, SOLUTION INTRAMUSCULAR at 15:22

## 2022-01-01 RX ADMIN — PALONOSETRON 0.25 MG: 0.05 INJECTION, SOLUTION INTRAVENOUS at 10:04

## 2022-01-01 RX ADMIN — CEFTRIAXONE 1000 MG: 1 INJECTION, POWDER, FOR SOLUTION INTRAMUSCULAR; INTRAVENOUS at 08:43

## 2022-01-01 RX ADMIN — SODIUM CHLORIDE: 9 INJECTION, SOLUTION INTRAVENOUS at 02:15

## 2022-01-01 RX ADMIN — PALONOSETRON 0.25 MG: 0.05 INJECTION, SOLUTION INTRAVENOUS at 13:22

## 2022-01-01 RX ADMIN — SODIUM CHLORIDE, PRESERVATIVE FREE 10 ML: 5 INJECTION INTRAVENOUS at 10:49

## 2022-01-01 RX ADMIN — DEXAMETHASONE SODIUM PHOSPHATE 4 MG: 4 INJECTION, SOLUTION INTRAMUSCULAR; INTRAVENOUS at 21:19

## 2022-01-01 RX ADMIN — ONDANSETRON 4 MG: 2 INJECTION INTRAMUSCULAR; INTRAVENOUS at 09:19

## 2022-01-01 RX ADMIN — SODIUM CHLORIDE, PRESERVATIVE FREE 10 ML: 5 INJECTION INTRAVENOUS at 15:00

## 2022-01-01 RX ADMIN — SODIUM CHLORIDE 450 MG: 9 INJECTION, SOLUTION INTRAVENOUS at 11:20

## 2022-01-01 RX ADMIN — CEFTRIAXONE 2000 MG: 2 INJECTION, SOLUTION INTRAVENOUS at 11:27

## 2022-01-01 RX ADMIN — SODIUM CHLORIDE 200 MG: 9 INJECTION, SOLUTION INTRAVENOUS at 13:59

## 2022-01-01 RX ADMIN — METRONIDAZOLE 500 MG: 500 INJECTION, SOLUTION INTRAVENOUS at 15:07

## 2022-01-01 RX ADMIN — FOLIC ACID 1 MG: 1 TABLET ORAL at 08:55

## 2022-01-01 RX ADMIN — LOPERAMIDE HYDROCHLORIDE 2 MG: 2 CAPSULE ORAL at 09:19

## 2022-01-01 RX ADMIN — SODIUM CHLORIDE 20 ML/HR: 9 INJECTION, SOLUTION INTRAVENOUS at 12:15

## 2022-01-01 RX ADMIN — SODIUM BICARBONATE: 84 INJECTION, SOLUTION INTRAVENOUS at 10:05

## 2022-01-01 RX ADMIN — ONDANSETRON 8 MG: 2 INJECTION INTRAMUSCULAR; INTRAVENOUS at 05:00

## 2022-01-01 RX ADMIN — FOLIC ACID 1 MG: 1 TABLET ORAL at 13:06

## 2022-01-01 RX ADMIN — ONDANSETRON 8 MG: 2 INJECTION INTRAMUSCULAR; INTRAVENOUS at 13:08

## 2022-01-01 RX ADMIN — SODIUM CHLORIDE 1000 ML: 9 INJECTION, SOLUTION INTRAVENOUS at 11:31

## 2022-01-01 RX ADMIN — HEPARIN 500 UNITS: 100 SYRINGE at 15:00

## 2022-01-01 RX ADMIN — CYANOCOBALAMIN 1000 MCG: 1000 INJECTION, SOLUTION INTRAMUSCULAR at 12:26

## 2022-01-01 RX ADMIN — SODIUM BICARBONATE: 84 INJECTION, SOLUTION INTRAVENOUS at 23:49

## 2022-01-01 RX ADMIN — DEXAMETHASONE SODIUM PHOSPHATE 4 MG: 4 INJECTION, SOLUTION INTRAMUSCULAR; INTRAVENOUS at 20:11

## 2022-01-01 RX ADMIN — SODIUM CHLORIDE 500 ML: 9 INJECTION, SOLUTION INTRAVENOUS at 11:59

## 2022-01-01 RX ADMIN — DEXAMETHASONE SODIUM PHOSPHATE 4 MG: 4 INJECTION, SOLUTION INTRAMUSCULAR; INTRAVENOUS at 16:01

## 2022-01-01 RX ADMIN — FOLIC ACID 1 MG: 1 TABLET ORAL at 11:27

## 2022-01-01 RX ADMIN — MAGNESIUM SULFATE HEPTAHYDRATE 2000 MG: 40 INJECTION, SOLUTION INTRAVENOUS at 15:19

## 2022-01-01 RX ADMIN — SODIUM CHLORIDE, PRESERVATIVE FREE 10 ML: 5 INJECTION INTRAVENOUS at 12:25

## 2022-01-01 RX ADMIN — DEXAMETHASONE SODIUM PHOSPHATE 4 MG: 4 INJECTION, SOLUTION INTRAMUSCULAR; INTRAVENOUS at 16:15

## 2022-01-01 RX ADMIN — HYDROMORPHONE HYDROCHLORIDE 0.5 MG: 1 INJECTION, SOLUTION INTRAMUSCULAR; INTRAVENOUS; SUBCUTANEOUS at 10:45

## 2022-01-01 RX ADMIN — SODIUM CHLORIDE, PRESERVATIVE FREE 10 ML: 5 INJECTION INTRAVENOUS at 08:56

## 2022-01-01 RX ADMIN — PROMETHAZINE HYDROCHLORIDE 25 MG: 25 INJECTION INTRAMUSCULAR; INTRAVENOUS at 05:58

## 2022-01-01 RX ADMIN — POTASSIUM CHLORIDE 10 MEQ: 7.45 INJECTION INTRAVENOUS at 18:46

## 2022-01-01 RX ADMIN — DEXAMETHASONE SODIUM PHOSPHATE 4 MG: 4 INJECTION, SOLUTION INTRAMUSCULAR; INTRAVENOUS at 21:44

## 2022-01-01 RX ADMIN — POTASSIUM CHLORIDE 10 MEQ: 7.46 INJECTION, SOLUTION INTRAVENOUS at 08:43

## 2022-01-01 RX ADMIN — ONDANSETRON 8 MG: 2 INJECTION INTRAMUSCULAR; INTRAVENOUS at 16:01

## 2022-01-01 RX ADMIN — SODIUM BICARBONATE: 84 INJECTION, SOLUTION INTRAVENOUS at 17:18

## 2022-01-01 RX ADMIN — FAMOTIDINE 20 MG: 10 INJECTION, SOLUTION INTRAVENOUS at 09:20

## 2022-01-01 RX ADMIN — HEPARIN SODIUM 5000 UNITS: 5000 INJECTION INTRAVENOUS; SUBCUTANEOUS at 13:51

## 2022-01-01 RX ADMIN — ONDANSETRON 8 MG: 2 INJECTION INTRAMUSCULAR; INTRAVENOUS at 22:39

## 2022-01-01 RX ADMIN — SODIUM CHLORIDE 20 ML/HR: 9 INJECTION, SOLUTION INTRAVENOUS at 13:21

## 2022-01-01 RX ADMIN — HEPARIN SODIUM 5000 UNITS: 5000 INJECTION INTRAVENOUS; SUBCUTANEOUS at 16:02

## 2022-01-01 RX ADMIN — Medication 500 UNITS: at 14:24

## 2022-01-01 RX ADMIN — ONDANSETRON 8 MG: 2 INJECTION INTRAMUSCULAR; INTRAVENOUS at 14:55

## 2022-01-01 RX ADMIN — ALLOPURINOL 100 MG: 100 TABLET ORAL at 13:06

## 2022-01-01 RX ADMIN — ONDANSETRON 8 MG: 2 INJECTION INTRAMUSCULAR; INTRAVENOUS at 21:18

## 2022-01-01 RX ADMIN — APIXABAN 5 MG: 5 TABLET, FILM COATED ORAL at 11:26

## 2022-01-01 RX ADMIN — LOPERAMIDE HYDROCHLORIDE 2 MG: 2 CAPSULE ORAL at 13:06

## 2022-01-01 RX ADMIN — SODIUM BICARBONATE: 84 INJECTION, SOLUTION INTRAVENOUS at 15:14

## 2022-01-01 RX ADMIN — POTASSIUM CHLORIDE 10 MEQ: 7.45 INJECTION INTRAVENOUS at 16:23

## 2022-01-01 RX ADMIN — SODIUM BICARBONATE: 84 INJECTION, SOLUTION INTRAVENOUS at 01:38

## 2022-01-01 RX ADMIN — DEXAMETHASONE SODIUM PHOSPHATE 4 MG: 4 INJECTION, SOLUTION INTRAMUSCULAR; INTRAVENOUS at 05:58

## 2022-01-01 RX ADMIN — METOCLOPRAMIDE HYDROCHLORIDE 5 MG: 5 TABLET ORAL at 16:16

## 2022-01-01 RX ADMIN — DEXAMETHASONE SODIUM PHOSPHATE 4 MG: 4 INJECTION, SOLUTION INTRAMUSCULAR; INTRAVENOUS at 05:27

## 2022-01-01 RX ADMIN — ALLOPURINOL 100 MG: 100 TABLET ORAL at 11:26

## 2022-01-01 RX ADMIN — PEGFILGRASTIM 6 MG: 6 INJECTION SUBCUTANEOUS at 09:20

## 2022-01-01 RX ADMIN — METOCLOPRAMIDE HYDROCHLORIDE 5 MG: 5 TABLET ORAL at 21:02

## 2022-01-01 RX ADMIN — SODIUM CHLORIDE, PRESERVATIVE FREE 10 ML: 5 INJECTION INTRAVENOUS at 21:45

## 2022-01-01 RX ADMIN — FOSAPREPITANT 150 MG: 150 INJECTION, POWDER, LYOPHILIZED, FOR SOLUTION INTRAVENOUS at 10:10

## 2022-01-01 RX ADMIN — DEXAMETHASONE SODIUM PHOSPHATE 4 MG: 4 INJECTION, SOLUTION INTRAMUSCULAR; INTRAVENOUS at 15:03

## 2022-01-01 RX ADMIN — METRONIDAZOLE 500 MG: 500 INJECTION, SOLUTION INTRAVENOUS at 00:30

## 2022-01-01 RX ADMIN — METOCLOPRAMIDE HYDROCHLORIDE 5 MG: 5 TABLET ORAL at 13:06

## 2022-01-01 RX ADMIN — APIXABAN 5 MG: 5 TABLET, FILM COATED ORAL at 20:11

## 2022-01-01 RX ADMIN — CARBOPLATIN 192 MG: 10 INJECTION, SOLUTION INTRAVENOUS at 14:44

## 2022-01-01 RX ADMIN — METOCLOPRAMIDE HYDROCHLORIDE 5 MG: 5 TABLET ORAL at 20:30

## 2022-01-01 SDOH — ECONOMIC STABILITY: FOOD INSECURITY: WITHIN THE PAST 12 MONTHS, THE FOOD YOU BOUGHT JUST DIDN'T LAST AND YOU DIDN'T HAVE MONEY TO GET MORE.: NEVER TRUE

## 2022-01-01 SDOH — ECONOMIC STABILITY: TRANSPORTATION INSECURITY
IN THE PAST 12 MONTHS, HAS LACK OF TRANSPORTATION KEPT YOU FROM MEETINGS, WORK, OR FROM GETTING THINGS NEEDED FOR DAILY LIVING?: NO

## 2022-01-01 SDOH — ECONOMIC STABILITY: FOOD INSECURITY: WITHIN THE PAST 12 MONTHS, YOU WORRIED THAT YOUR FOOD WOULD RUN OUT BEFORE YOU GOT MONEY TO BUY MORE.: NEVER TRUE

## 2022-01-01 ASSESSMENT — ENCOUNTER SYMPTOMS
BACK PAIN: 0
VOMITING: 1
VOICE CHANGE: 0
SINUS PAIN: 0
NAUSEA: 1
TROUBLE SWALLOWING: 0
EYE PAIN: 0
RHINORRHEA: 0
RHINORRHEA: 0
ABDOMINAL PAIN: 1
ANAL BLEEDING: 0
NAUSEA: 1
ABDOMINAL PAIN: 1
ABDOMINAL PAIN: 0
SORE THROAT: 0
WHEEZING: 0
SORE THROAT: 0
ABDOMINAL PAIN: 0
RECTAL PAIN: 0
EYE REDNESS: 0
EYE DISCHARGE: 0
SHORTNESS OF BREATH: 0
SHORTNESS OF BREATH: 1
BACK PAIN: 0
BLOOD IN STOOL: 0
EYES NEGATIVE: 1
SHORTNESS OF BREATH: 1
PHOTOPHOBIA: 0
SHORTNESS OF BREATH: 0
COLOR CHANGE: 0
DIARRHEA: 1
ALLERGIC/IMMUNOLOGIC NEGATIVE: 1
COUGH: 1
VOMITING: 1
BACK PAIN: 1
VOMITING: 0
PHOTOPHOBIA: 0
CONSTIPATION: 0
COUGH: 1
CHEST TIGHTNESS: 0
ABDOMINAL DISTENTION: 0
EYE ITCHING: 0
DIARRHEA: 0
COUGH: 0
SINUS PRESSURE: 0
NAUSEA: 0

## 2022-01-01 ASSESSMENT — PATIENT HEALTH QUESTIONNAIRE - PHQ9
2. FEELING DOWN, DEPRESSED OR HOPELESS: 0
SUM OF ALL RESPONSES TO PHQ QUESTIONS 1-9: 0
1. LITTLE INTEREST OR PLEASURE IN DOING THINGS: 0
SUM OF ALL RESPONSES TO PHQ QUESTIONS 1-9: 0
SUM OF ALL RESPONSES TO PHQ9 QUESTIONS 1 & 2: 0
SUM OF ALL RESPONSES TO PHQ QUESTIONS 1-9: 0
SUM OF ALL RESPONSES TO PHQ QUESTIONS 1-9: 0

## 2022-01-01 ASSESSMENT — LIFESTYLE VARIABLES: HOW OFTEN DO YOU HAVE A DRINK CONTAINING ALCOHOL: 0

## 2022-01-01 ASSESSMENT — PAIN SCALES - GENERAL
PAINLEVEL_OUTOF10: 10
PAINLEVEL_OUTOF10: 0
PAINLEVEL_OUTOF10: 8

## 2022-01-01 ASSESSMENT — SOCIAL DETERMINANTS OF HEALTH (SDOH): HOW HARD IS IT FOR YOU TO PAY FOR THE VERY BASICS LIKE FOOD, HOUSING, MEDICAL CARE, AND HEATING?: NOT HARD AT ALL

## 2022-01-11 NOTE — PROGRESS NOTES
Spoke with Ana Cristina Peres (Adele's son) today. He was inquiring about his mother's Liver Function labs. He also wanted to know what the plan was next. I spoke with Dr. Yonatan Spivey and relayed the message to Ana Cristina Peres that this medicine was the best choice for her we just had to get her adjusted at a dose she could tolerate. Her other option was to go back to systemic therapy, which would be the Carbo/Alimta/Keytruda. Ana Cristina Peres said he understood. Dane Stallings will be here Thursday for labs and possible re initiation of Tabrecta at 200 mg daily per Dr. Yonatan Spivey

## 2022-01-13 NOTE — PROGRESS NOTES
Patient:  Lilian Pacheco  YOB: 1940  Date of Service: 1/13/2022  MRN: [id number]    Primary Care Physician: Alexx Fournier MD    Chief Complaint   Patient presents with    Follow-up     lung cancer metastatic to bones       Patient Seen, Chart, Consults notes, Labs, Radiology studies reviewed. 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 Palliative XRT to the left femur and left hip was initiated on 10/6/2021 for a total dose of 3000 cGy over 10 treatment fractions, completed 10/19/2021    Primary induction systemic chemotherapy cycle #1 of Carboplatin AUC of 5/Alimta 500 mg/m²/Keytruda on 10/7/2021. She tolerated cycle #1 of Carboplatin AUC of 5/Alimta 500 mg/m²/Keytruda very poorly. [facility] NGS sequencing reported a positive MET (exon 14 skipping) mutation on 10/19/2021. Decision was made on 10/28/2021 to begin capmatinib (trabecta) 400 mg PO BID. Trabecta (capmatinib) 400 mg PO BID was initiated on 11/22/2021    Trabecta (capmatinib) had been reduced to 200 mg/day at one time, and more recently increased to 200 mg BID. Trabecta (capmatinib) was discontinued 2 weeks ago due to elevated LFTs and elevated creatinine. Natacha Ospina is here accompanied  by her son Beckie Nuñez, who lives in [facility]. She has been receiving IV fluid and electrolyte replacement with Trabecta (capmatinib) on hold due to elevated liver function test (LFTs) and elevated creatinine. TARGET LESIONS:  · 4.1 x 3.6 cm spiculated superior segment RLL lung mass consistent with the primary  · MET+ (Exon 14 Skipping)  · Right hilar adenopathy. · Nondisplaced fracture at the left acetabulum and at the root of the left superior pubic ramus.    · Expansile lytic lesion involving the more distal shaft of the left femur with extraosseous extension    TUMOR HISTORY:  Becky Austin was seen in initial oncology consultation on 9/20/2021 referred by Dr Bruno Adair with a nondisplaced fracture of the left superior pubic ramus, expansile lytic lesion of the distal third shaft of the left femur and a 4.1 x 3.6 x 3.5 cm  lung mass suspicious for primary malignancy with metastasis. Becky Austin had an office visit with PCP, Dima Marie on 8/20/21 with complaints of left hip pain radiating from groin down left thigh. Over the next several weeks, the pain progressed down the leg to the distal left thigh causing difficulty walking, utilizing a cane, and eventually a wheelchair. She denies any trauma. Plain film imaging was ordered and orthopaedic referral was made. XR HIP 2-3 VW W PELVIS LEFT on 8/30/2021 at [facility] documented:  · No acute bony abnormality. · Mild acetabular and femoral head spurring on the left side    XR FEMUR LEFT (MIN 2 VIEWS) 8/30/2021 at [facility] documented:  · No acute bony abnormality. Orthopaedic consultation with Dr Bruno Adair on 9/7/2021 to address left hip and groin pain over 1 month period, ambulating with a walker. Denies any injury and describes a stabbing pain going down her left leg when standing. MRI PELVIS on 9/8/2021 at [address] [place] [address] documented:  Along the  Helder-medial aspect of the left acetabulum and at the root of the left superior pubic ramus, there is definite evidence of a nondisplaced fracture. It should be noted that there is expansion of the cortex in this region and some [id number] STIR signal with diminished T1 signal in the marrow at this location, as well as possible expansion of the cortex. The degree of marrow edema surrounding the fracture site is very abruptly cut off between normal signal, and I am suspicious that there may be an underlying bone lesion with pathologic fracture rather than a simple fracture.     Dr. Odessa Ryan called me with the above information desiring consultation. Further imaging was recommended including a CT scan of the chest abdomen and pelvis. MRI LEFT FEMUR WO CONTRAST on 9/14/21 at [facility] documented: There is an expansile lytic lesion involving the more distal shaft of the left femur at the juncture of the proximal two-thirds with the distal one-third. This is creating permeative changes within the more lateral cortex of the femur at this level and on T1 sequencing there appears to be extraosseous extension of the process. There is surrounding edema associated with this lesion. Given the permeative appearance of the more lateral cortex I feel this patient would certainly be at a high risk for impending pathologic fracture. No additional lesions are present. CT CHEST WO CONTRAST DIAGNOSTIC on  9/9/2021 at [facility] documented:  · 4.1 x 3.6 cm spiculated superior segment right lower lobe lung mass suspicious for primary malignancy. · Questionable right hilar adenopathy. · Emphysema. CT ABDOMEN PELVIS WO CONTRAST on 9/9/2021 at [facility] documented:  · Nonenhanced imaging of the abdomen and pelvis reduces assessment of the visceral organs. No convincing intra-abdominal or pelvic metastasis. Probable left renal cyst. Colonic diverticulosis. · Left perineural sacral cyst. No pathologic fractures. Appointment Note from 9/21/2021 visit to Dr. Amy Ordaz:   She was set up an appointment for Left cephalomedullary nailing of her left pathologic femur fracture to protect her femur. Plan to biopsy  From distal femoral lesion including a small sample of bone and tissue. A cephalomedullary nailing of the left pathologic femoral shaft fracture and open biopsy of a left femoral shaft bone and soft tissue mass was performed by Dr. Daniel Villar on 9/23/2021  Pathology reported to:  · Soft tissue and bone, left femur, excision:Hemorrhagic bone and soft tissue. · Bone, left femur, reamings:Metastatic poorly differentiated carcinoma.   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 RECOMMENDATION AND PLAN by Dr. Khadijah Cuba on 9/28/2021:  Diagnosis is stage IV metastatic poorly differentiated carcinoma with sarcomatoid features. · I called and spoke to Dr. Smith Prior regarding radiation therapy for pain control and stabilization of the left femur and left hip, he will be seeing her immediately after leaving this clinic today, 9/28/2021 to get started. · Treatment will not be curable but rather palliative and Jordan Liang desires systemic therapy  · Marichuy Harris requested to look for actionable mutations  · Pathology specimen from 9/23/2021 requested to be sent to [facility] for NGS, MSI and PD-L1 testing  · DANNA/Marlon Hardy Final general surgery for port placement    · PET scan  · Cycle #1 of chemotherapy with Carboplatin AUC of 5/Alimta 500 mg/m²/Keytruda standard dosing  began on 10/7/2021,     Initial consultation with Dr. Smith Prior on 9/28/2021 for consideration of palliative radiation therapy for pain control and stabilization of the left femur and left hip. Dr. Maureen Vigil recommended to treat the left leg with palliative radiation therapy for an anticipated a dose of 3000 cGy over 10 fractions, final course pending completion of planning. Palliative XRT to the left femur and left hip was initiated on 10/6/2021 for a total dose of 3000 cGy over 10 treatment fractions, completed 10/19/2021    Port-A-Cath placed to right IJ on 10/4/2021 by Dr. Vamsi Estrada    Primary induction systemic chemotherapy cycle #1 of Carboplatin AUC of 5/Alimta 500 mg/m²/Keytruda on 10/7/2021. She tolerated cycle #1 of Carboplatin AUC of 5/Alimta 500 mg/m²/Keytruda very poorly.     PET CT SKULL BASE TO MID THIGH 10/12/2021 :  · 2.9 x 5.5VL hypermetabolic mass in the superior segment of the right lower lobe, maximum SUV of 20.8   · 1.7 cm posterior right hilum there is a separate hypermetabolic mass,most likely posterior right hilar neoplastic        Adenopathy  · Lytic lesion with abnormal hypermetabolism within the anterior left acetabulum/left distal superior pubic ramus compatible with a metastatic lesion  · Intense hypermetabolism associated with the known lesion within the distal left femur, only partially visualized in the field-of-view obtained. [facility] NGS sequencing reported a positive MET (exon 14 skipping) mutation on 10/19/2021. An actionable MET mutation was documented after delivery of cycle #1 of chemoimmunotherapy. She tolerated cycle #1 of Carboplatin AUC of 5/Alimta 500 mg/m²/Keytruda very poorly. Decision was made on 10/28/2021 to begin capmatinib (trabecta) 400 mg BID. Trabecta (capmatinib) had been reduced to 200 mg/day at one time, and more recently increased to 200 mg BID. Trabecta (capmatinib) was discontinued 2 weeks ago due to elevated LFTs and elevated creatinine. Shona Serrano is here accompanied  by her son Darnell Deleon, who lives in [facility]. She has been receiving IV fluid and electrolyte replacement with Trabecta (capmatinib) on hold due to elevated liver function test (LFTs) and elevated creatinine. TREATMENT SUMMARY:  · Palliative XRT to the left femur and left hip was initiated on 10/6/2021 for a total dose of 3000 cGy over 10 treatment fractions, completed 10/19/2021  · Cycle #1 of palliative chemotherapy with Carboplatin AUC of 5/Alimta 500 mg/m²/Keytruda was started on 10/7/2021  · Capmatinib (trabecta) 400 mg BID was started on 11/22/2021    Allergies:  Patient has no known allergies.     Medicines:  Current Outpatient Medications   Medication Sig Dispense Refill    oxybutynin (DITROPAN) 5 MG tablet TAKE 1 TABLET BY MOUTH TWO TIMES A  tablet 3  pantoprazole (PROTONIX) 40 MG tablet Take 1 tablet by mouth daily 90 tablet 1    Capmatinib HCl (TABRECTA) 200 MG TABS Take 400 mg by mouth 2 times daily 120 tablet 3    metoclopramide (REGLAN) 10 MG tablet Take 1 tablet by mouth 4 times daily 120 tablet 1    ondansetron (ZOFRAN-ODT) 8 MG TBDP disintegrating tablet Place 1 tablet under the tongue every 8 hours as needed for Nausea or Vomiting 30 tablet 2    lidocaine-prilocaine (EMLA) 2.5-2.5 % cream Apply topically as needed. 30 g 3    promethazine (PHENERGAN) 25 MG tablet Take 1 tablet by mouth every 8 hours as needed for Nausea 30 tablet 2    folic acid (FOLVITE) 1 MG tablet Take 1 tablet by mouth daily 90 tablet 0    apixaban (ELIQUIS) 5 MG TABS tablet Take 5 mg by mouth every 12 hours      HYDROcodone-acetaminophen (NORCO)  MG per tablet Take 1 tablet by mouth every 4 hours as needed.  pravastatin (PRAVACHOL) 80 MG tablet TAKE 1 TABLET BY MOUTH AT BEDTIME 90 tablet 3    losartan (COZAAR) 50 MG tablet TAKE 1 TABLET BY MOUTH DAILY 90 tablet 3    triamterene-hydrochlorothiazide (MAXZIDE-25) 37.5-25 MG per tablet Take 0.5 tablets by mouth daily 45 tablet 3    calcium carbonate (OSCAL) 500 MG TABS tablet Take 500 mg by mouth daily      allopurinol (ZYLOPRIM) 100 MG tablet Take 100 mg by mouth daily      Cholecalciferol (VITAMIN D) 2000 units CAPS capsule Take by mouth       No current facility-administered medications for this visit.      Facility-Administered Medications Ordered in Other Visits   Medication Dose Route Frequency Provider Last Rate Last Admin    sodium chloride flush 0.9 % injection 5-40 mL  5-40 mL IntraVENous PRN Barber Ryan MD        heparin flush 100 UNIT/ML injection 500 Units  500 Units IntraCATHeter PRN Barber Ryan MD           Past Medical History:      Diagnosis Date    Acid reflux     LORA (acute kidney injury) ([address] 7/28/2018    HIGH CHOLESTEROL     Hypertension     Metastatic lung cancer (metastasis from lung to other site) [facility]) 2021    Metastatic lung cancer (metastasis from lung to other site) [facility]) 2021    Urinary incontinence     UTI (urinary tract infection)     Vertigo         Past Surgical History:      Procedure Laterality Date   [facility] COLONOSCOPY      Dr Maite Escobar 2019    Dr Brett Canchola disease-Tubular AP (-) dysplasia, 5 yr recall    HYSTERECTOMY      PORT SURGERY N/A 10/4/2021    SINGLE LUMEN PORT PLMT WITH ULTRASOUND AND FLURO performed by Howard Cruz MD at [address] [place] [address]         Family History:      Problem Relation Age of Onset    Cancer Mother     Colon Cancer Mother     Heart Disease Father     Stroke Brother     Colon Polyps Neg Hx     Esophageal Cancer Neg Hx     Liver Cancer Neg Hx     Liver Disease Neg Hx     Rectal Cancer Neg Hx     Stomach Cancer Neg Hx         Social History  Social History     Tobacco Use    Smoking status: Former Smoker     Packs/day: 0.50     Years: 40.00     Pack years: 20.00     Types: Cigarettes     Start date:      Quit date:      Years since quittin.0    Smokeless tobacco: Never Used   Vaping Use    Vaping Use: Never used   Substance Use Topics    Alcohol use: No    Drug use: No             Wt Readings from Last 3 Encounters:   21 152 lb 14.4 oz (69.4 kg)   21 153 lb 11.2 oz (69.7 kg)   12/10/21 154 lb 8 oz (70.1 kg)        Objective:  Vital Signs: There were no vitals taken for this visit. Labs:  BMP:   Recent Labs     22  1441      K 4.7      CO2 26   BUN 13   CREATININE 1.2*   CALCIUM 9.0     CBC:   Recent Labs     22  1503   WBC 5.08   HGB 10.0*   HCT 31.4*   MCV 97.8*        PT/INR: No results for input(s): PROTIME, INR in the last 72 hours. APTT: No results for input(s): APTT in the last 72 hours.   Magnesium:No results for input(s): MG in the last 72 hours.  Phosphorus:No results for input(s): PHOS in the last 72 hours. Hepatic:   Recent Labs     01/13/22  1441   ALKPHOS 106*   *   *   PROT 5.8*   BILITOT 0.7   LABALBU 3.5       Cultures:   No results for input(s): CULTURE in the last 72 hours. Lab Results   Component Value Date    WBC 5.08 01/13/2022    HGB 10.0 (L) 01/13/2022    HCT 31.4 (L) 01/13/2022    MCV 97.8 (H) 01/13/2022     01/13/2022     Lab Results   Component Value Date    NEUTROABS 3.19 01/13/2022     Lab Results   Component Value Date     01/13/2022    K 4.7 01/13/2022     01/13/2022    CO2 26 01/13/2022    BUN 13 01/13/2022    CREATININE 1.2 (H) 01/13/2022    GLUCOSE 70 (L) 01/13/2022    CALCIUM 9.0 01/13/2022    PROT 5.8 (L) 01/13/2022    LABALBU 3.5 01/13/2022    BILITOT 0.7 01/13/2022    ALKPHOS 106 (H) 01/13/2022     (H) 01/13/2022     (H) 01/13/2022    LABGLOM 43 (A) 01/13/2022    GFRAA 44 (L) 01/07/2022    AGRATIO 2.4 (H) 12/10/2021    GLOB 2.3 01/13/2022             Radiology reports as per the Radiologist  Radiology:  None       ASSESSMENT AND PLAN:    #1   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 Palliative XRT to the left femur and left hip was initiated on 10/6/2021 for a total dose of 3000 cGy over 10 treatment fractions, completed 10/19/2021    Primary induction systemic chemotherapy cycle #1 of Carboplatin AUC of 5/Alimta 500 mg/m²/Keytruda on 10/7/2021. She tolerated cycle #1 of Carboplatin AUC of 5/Alimta 500 mg/m²/Keytruda very poorly. [facility] NGS sequencing reported a positive MET (exon 14 skipping) mutation on 10/19/2021. Decision was made on 10/28/2021 to begin capmatinib (trabecta) 400 mg PO BID. Trabecta (capmatinib) 400 mg PO BID was initiated on 11/22/2021    Trabecta (capmatinib) had been reduced to 200 mg/day at one time, and more recently increased to 200 mg BID. Trabecta (capmatinib) was discontinued 2 weeks ago due to elevated LFTs and elevated creatinine. Sheila Amezquita is here accompanied  by her son Jeana Dillard, who lives in [facility]. She has been receiving IV fluid and electrolyte replacement with Trabecta (capmatinib) on hold due to elevated liver function test (LFTs) and elevated creatinine. TARGET LESIONS:  · 4.1 x 3.6 cm spiculated superior segment right lower lobe lung mass suspicious for primary malignancy. · Right hilar adenopathy. · Nondisplaced fracture at the left acetabulum and at the root of the left superior pubic ramus  · Expansile lytic lesion involving the more distal shaft of the left femur with extraosseous extension      Wt Readings from Last 3 Encounters:   12/30/21 152 lb 14.4 oz (69.4 kg)   12/16/21 153 lb 11.2 oz (69.7 kg)   12/10/21 154 lb 8 oz (70.1 kg)       Physical examination today, 1/13/2022 She is accompanied by her son Pete Borden. HEENT is without asymmetry extraocular movements are intact. Neck is supple no JVD no palpable lymphadenopathy, specifically no supraclavicular lymphadenopathy on the left or the right. Lungs clear to auscultation  Abdominal  with positive bowel sounds  Musculoskeletal:  Continues to have pain to her distal left femur. Moderate lower extremity edema about the ankles and lower legs. Neurological exam is grossly intact    CBC today 1/13/2022 reveals a WBC of 5.08 Hgb is 110.0 with an MCV of  97.8 and platelet count of 322,408. Creatinine is down today from 2.7 on 12/9/2021 to  1.2, which is probably better than her baseline. She has some lower extremity edema from aggressive hydration.   Further IV fluids will not be delivered today but rather I have instructed her and her son that she needs to raise her feet above the level of her heart to get the extra fluid off and into her system. Although some of the LFTs have continued to drop, the AST went back up a little bit today at 181. CT scan of the abdomen in September had a normal liver. I am going to go ahead and get an ultrasound of the liver ASAP, I have been able to schedule that for 1st thing in the morning. LFTs will again be repeated tomorrow also. I have an appointment to see her next week for continued close monitoring. 1/20/2022 at 10 AM    Extended discussion conferencing was undertaken with Natacha Ospina and her son Beckie Nuñez today all other questions were answered to their understanding satisfaction. #2  TUMOR SCREENING AND HEALTH MAINTENANCE    Bone Health  - DEXA BONE DENSITY AXIAL SKELETON on 8/13/2021 revealed Osteopenia. Patient takes Calcium + Vitamin D as indicated. Breast cancer screening - RADHA DIGITAL SCREEN W OR WO CAD BILATERAL on  8/13/2021 was BI-RADS category 1     GI cancer screening - Colonoscopy per Dr Lilia Escamilla 6/24/19 with a benign 6mm sessile polyp in the the descending colon. 5 year recall. GYN cancer screening - JUAN ALBERTO & BSO 1970s      #3  Immunization History   COVID-19, Pfizer, PF, 30mcg/0.3mL 02/22/2021, 03/15/2021   Influenza, High Dose (Fluzone 65 yrs and older) 11/09/2018, 10/11/2019, 09/10/2020   Pneumococcal Conjugate 13-valent ([id number]) 07/13/2015   Pneumococcal Polysaccharide ([id number]) 05/09/2019   Zoster Live (Zostavax) 12/09/2016   Zoster Recombinant (Shingrix) 05/09/2019, 09/25/2019          Natacha Ospina was seen today for follow-up. Diagnoses and all orders for this visit:    Elevated liver function tests  -     US LIVER; Future    Primary malignant neoplasm of lung metastatic to other site, unspecified laterality ([address]    Lytic bone lesion of left femur    Health care maintenance        Orders Placed This Encounter   Procedures    US LIVER     This procedure can be scheduled via [email or web address].   Access your [email or web address] account by visiting [employer]. Standing Status:   Future     Standing Expiration Date:   1/13/2023     Scheduling Instructions:      At \[facility]\""     Order Specific Question:   Reason for exam:     Answer:   elebated liver funtion tests       No orders of the defined types were placed in this encounter. No follow-ups on file.

## 2022-01-19 NOTE — PROGRESS NOTES
Skipping)  · Right hilar adenopathy. · Nondisplaced fracture at the left acetabulum and at the root of the left superior pubic ramus. · Expansile lytic lesion involving the more distal shaft of the left femur with extraosseous extension    TUMOR HISTORY:  Keron Gaffney was seen in initial oncology consultation on 9/20/2021 referred by Dr Robert Bo with a nondisplaced fracture of the left superior pubic ramus, expansile lytic lesion of the distal third shaft of the left femur and a 4.1 x 3.6 x 3.5 cm  lung mass suspicious for primary malignancy with metastasis. Keron Gaffney had an office visit with PCP, Jonathan Lloyd on 8/20/21 with complaints of left hip pain radiating from groin down left thigh. Over the next several weeks, the pain progressed down the leg to the distal left thigh causing difficulty walking, utilizing a cane, and eventually a wheelchair. She denies any trauma. Plain film imaging was ordered and orthopaedic referral was made. XR HIP 2-3 VW W PELVIS LEFT on 8/30/2021 at [facility] documented:  · No acute bony abnormality. · Mild acetabular and femoral head spurring on the left side    XR FEMUR LEFT (MIN 2 VIEWS) 8/30/2021 at [facility] documented:  · No acute bony abnormality. Orthopaedic consultation with Dr Robert Bo on 9/7/2021 to address left hip and groin pain over 1 month period, ambulating with a walker. Denies any injury and describes a stabbing pain going down her left leg when standing. MRI PELVIS on 9/8/2021 at [address] [place] [address] documented:  Along the  Helder-medial aspect of the left acetabulum and at the root of the left superior pubic ramus, there is definite evidence of a nondisplaced fracture. It should be noted that there is expansion of the cortex in this region and some ulecpymcrmuq-vw-emjuhzxky STIR signal with diminished T1 signal in the marrow at this location, as well as possible expansion of the cortex.   The degree of marrow edema surrounding the fracture site is very Jose Roberto Christianson on 9/23/2021  Pathology reported to:  · Soft tissue and bone, left femur, excision:Hemorrhagic bone and soft tissue. · Bone, left femur, reamings:Metastatic poorly differentiated carcinoma. 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 RECOMMENDATION AND PLAN by Dr. 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 · I called and spoke to Dr. Macey Mclean regarding radiation therapy for pain control and stabilization of the left femur and left hip, he will be seeing her immediately after leaving this clinic today, 9/28/2021 to get started. · Treatment will not be curable but rather palliative and Stacia Shore desires systemic therapy  · Marichuy Harris requested to look for actionable mutations  · Pathology specimen from 9/23/2021 requested to be sent to [facility] for NGS, MSI and PD-L1 testing  · DANNA/Marlon Hardy Final general surgery for port placement    · PET scan  · Cycle #1 of chemotherapy with Carboplatin AUC of 5/Alimta 500 mg/m²/Keytruda standard dosing  began on 10/7/2021,     Initial consultation with Dr. Macey Mclean on 9/28/2021 for consideration of palliative radiation therapy for pain control and stabilization of the left femur and left hip. Dr. Jerry Reyes recommended to treat the left leg with palliative radiation therapy for an anticipated a dose of 3000 cGy over 10 fractions, final course pending completion of planning.       Palliative XRT to the left femur and left hip was initiated on 10/6/2021 for a total dose of 3000 cGy over 10 treatment fractions, completed 10/19/2021    Port-A-Cath placed to right IJ on 10/4/2021 by Dr. Tone Reina    Primary induction systemic chemotherapy cycle #1 of Carboplatin AUC of 5/Alimta 500 mg/m²/Keytruda on 10/7/2021. She tolerated cycle #1 of Carboplatin AUC of 5/Alimta 500 mg/m²/Keytruda very poorly. PET CT SKULL BASE TO MID THIGH 10/12/2021 :  · 2.9 x 9.3HH hypermetabolic mass in the superior segment of the right lower lobe, maximum SUV of 20.8   · 1.7 cm posterior right hilum there is a separate hypermetabolic mass,most likely posterior right hilar neoplastic        Adenopathy  · Lytic lesion with abnormal hypermetabolism within the anterior left acetabulum/left distal superior pubic ramus compatible with a metastatic lesion  · Intense hypermetabolism associated with the known lesion within the distal left femur, only partially visualized in the field-of-view obtained. 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 An actionable MET mutation was documented after delivery of cycle #1 of chemoimmunotherapy. She tolerated cycle #1 of Carboplatin AUC of 5/Alimta 500 mg/m²/Keytruda very poorly. Decision was made on 10/28/2021 to begin capmatinib (trabecta) 400 mg BID. Trabecta (capmatinib) had been reduced to 200 mg/day at one time, and more recently increased to 200 mg BID. Trabecta (capmatinib) was discontinued at the end of December 2021 due to elevated LFTs and elevated creatinine. US LIVER at [facility] on 1/14/2022 :  · Heterogeneous liver echotexture without a discrete mass. · Previous cholecystectomy. No biliary ductal dilatation is identified. · Right-sided renal cysts including a complex septated cyst at the lateral right mid kidney measuring up to 1.3 cm. Amalia Sacramento has a son Fernie Darnell, who lives in [facility], and a son Silvia Costa who lives in [place] who frequently accompany her to the clinic.       TREATMENT SUMMARY:  · Palliative XRT to the left femur and left hip was initiated on 10/6/2021 for a total dose of 3000 cGy over 10 treatment fractions, completed 10/19/2021  · Cycle #1 of palliative chemotherapy with Carboplatin AUC of 5/Alimta 500 mg/m²/Keytruda was started on 10/7/2021  · Capmatinib (trabecta) 400 mg BID was started on 11/22/2021    Allergies:  Patient has no known allergies. Medicines:  Current Outpatient Medications   Medication Sig Dispense Refill    folic acid (FOLVITE) 1 MG tablet TAKE 1 TABLET BY MOUTH DAILY 30 tablet 0    oxybutynin (DITROPAN) 5 MG tablet TAKE 1 TABLET BY MOUTH TWO TIMES A  tablet 3    pantoprazole (PROTONIX) 40 MG tablet Take 1 tablet by mouth daily 90 tablet 1    Capmatinib HCl (TABRECTA) 200 MG TABS Take 400 mg by mouth 2 times daily 120 tablet 3    metoclopramide (REGLAN) 10 MG tablet Take 1 tablet by mouth 4 times daily 120 tablet 1    ondansetron (ZOFRAN-ODT) 8 MG TBDP disintegrating tablet Place 1 tablet under the tongue every 8 hours as needed for Nausea or Vomiting 30 tablet 2    lidocaine-prilocaine (EMLA) 2.5-2.5 % cream Apply topically as needed. 30 g 3    promethazine (PHENERGAN) 25 MG tablet Take 1 tablet by mouth every 8 hours as needed for Nausea 30 tablet 2    apixaban (ELIQUIS) 5 MG TABS tablet Take 5 mg by mouth every 12 hours      HYDROcodone-acetaminophen (NORCO)  MG per tablet Take 1 tablet by mouth every 4 hours as needed.  pravastatin (PRAVACHOL) 80 MG tablet TAKE 1 TABLET BY MOUTH AT BEDTIME 90 tablet 3    losartan (COZAAR) 50 MG tablet TAKE 1 TABLET BY MOUTH DAILY 90 tablet 3    triamterene-hydrochlorothiazide (MAXZIDE-25) 37.5-25 MG per tablet Take 0.5 tablets by mouth daily 45 tablet 3    calcium carbonate (OSCAL) 500 MG TABS tablet Take 500 mg by mouth daily      allopurinol (ZYLOPRIM) 100 MG tablet Take 100 mg by mouth daily      Cholecalciferol (VITAMIN D) 2000 units CAPS capsule Take by mouth       No current facility-administered medications for this visit.        Past Medical History:      Diagnosis Date    Acid reflux     LORA (acute kidney injury) ([facility] [address] 2018    HIGH CHOLESTEROL     Hypertension     Metastatic lung cancer (metastasis from lung to other site) [facility]) 2021    Metastatic lung cancer (metastasis from lung to other site) [facility]) 2021    Urinary incontinence     UTI (urinary tract infection)     Vertigo         Past Surgical History:      Procedure Laterality Date   Ingrid Lundberg COLONOSCOPY      Dr Sahil Sheriff 2019    Dr [employer] disease-Tubular AP (-) dysplasia, 5 yr recall    HYSTERECTOMY      PORT SURGERY N/A 10/4/2021    SINGLE LUMEN PORT PLMT WITH ULTRASOUND AND FLURO performed by Felicia Catherine MD at [address] [place] [address]         Family History:      Problem Relation Age of Onset    Cancer Mother     Colon Cancer Mother     Heart Disease Father     Stroke Brother     Colon Polyps Neg Hx     Esophageal Cancer Neg Hx     Liver Cancer Neg Hx     Liver Disease Neg Hx     Rectal Cancer Neg Hx     Stomach Cancer Neg Hx         Social History  Social History     Tobacco Use    Smoking status: Former Smoker     Packs/day: 0.50     Years: 40.00     Pack years: 20.00     Types: Cigarettes     Start date:      Quit date:      Years since quittin.0    Smokeless tobacco: Never Used   Vaping Use    Vaping Use: Never used   Substance Use Topics    Alcohol use: No    Drug use: No             Wt Readings from Last 3 Encounters:   22 154 lb 14.4 oz (70.3 kg)   21 152 lb 14.4 oz (69.4 kg)   21 153 lb 11.2 oz (69.7 kg)        Objective:  Vital Signs: There were no vitals taken for this visit. Labs:  BMP:   No results for input(s): NA, K, CL, CO2, PHOS, BUN, CREATININE, CALCIUM in the last 72 hours. CBC:   No results for input(s): WBC, HGB, HCT, MCV, PLT in the last 72 hours. PT/INR: No results for input(s): PROTIME, INR in the last 72 hours. APTT: No results for input(s): APTT in the last 72 hours.   Magnesium:No results for input(s): MG in the last 72 hours. Phosphorus:No results for input(s): PHOS in the last 72 hours. Hepatic:   No results for input(s): ALKPHOS, ALT, AST, PROT, BILITOT, BILIDIR, LABALBU in the last 72 hours. Cultures:   No results for input(s): CULTURE in the last 72 hours. Lab Results   Component Value Date    WBC 5.38 01/14/2022    HGB 10.7 (L) 01/14/2022    HCT 34.1 01/14/2022    MCV 98.8 (H) 01/14/2022     01/14/2022     Lab Results   Component Value Date    NEUTROABS 3.66 01/14/2022     Lab Results   Component Value Date     01/14/2022    K 4.8 01/14/2022     01/14/2022    CO2 27 01/14/2022    BUN 13 01/14/2022    CREATININE 1.3 (H) 01/14/2022    GLUCOSE 87 01/14/2022    CALCIUM 9.7 01/14/2022    PROT 5.7 (L) 01/14/2022    LABALBU 3.4 (L) 01/14/2022    BILITOT 0.8 01/14/2022    ALKPHOS 108 (H) 01/14/2022     (H) 01/14/2022     (H) 01/14/2022    LABGLOM 39 (A) 01/14/2022    GFRAA 44 (L) 01/07/2022    AGRATIO 2.4 (H) 12/10/2021    GLOB 2.2 01/14/2022             Radiology reports as per the Radiologist  Radiology:  None       ASSESSMENT AND PLAN:    #1   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 Bob Kaiser is an 79year old female managed with primary and secondary diagnoses as outlined:    · Stage IV, 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 · Intolerant to Trabecta (capmatinib) due to liver toxicity    Palliative XRT to the left femur and left hip was initiated on 10/6/2021 for a total dose of 3000 cGy over 10 treatment fractions, completed 10/19/2021    Primary induction systemic chemotherapy cycle #1 of Carboplatin AUC of 5/Alimta 500 mg/m²/Keytruda on 10/7/2021. She tolerated cycle #1 of Carboplatin AUC of 5/Alimta 500 mg/m²/Keytruda very poorly.     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 Decision was made on 10/28/2021 to begin capmatinib (trabecta) 400 mg PO BID. Trabecta (capmatinib) 400 mg PO BID was initiated on 11/22/2021    Trabecta (capmatinib) had been reduced to 200 mg/day at one time, then increased to 200 mg BID. Trabecta (capmatinib) was discontinued at the end of December 2021 due to elevated LFTs and elevated creatinine. Katia Granados is here accompanied  by her son Adilia King, who lives in [place], for further IV fluid and electrolyte repletion, monitoring of hepatotoxicity from [address] (capmatinib) and decision for treatment change. TARGET LESIONS:  · 4.1 x 3.6 cm spiculated superior segment right lower lobe lung mass suspicious for primary malignancy. · Right hilar adenopathy. · Nondisplaced fracture at the left acetabulum and at the root of the left superior pubic ramus  · Expansile lytic lesion involving the more distal shaft of the left femur with extraosseous extension      Wt Readings from Last 3 Encounters:   01/20/22 154 lb 14.4 oz (70.3 kg)   12/30/21 152 lb 14.4 oz (69.4 kg)   12/16/21 153 lb 11.2 oz (69.7 kg)   Physical examination today, 1/20/2022 She is accompanied by her son Adilia King. HEENT is without asymmetry extraocular movements are intact. Neck is supple no JVD no palpable lymphadenopathy, specifically no supraclavicular lymphadenopathy on the left or the right. Lungs clear to auscultation  Abdominal  with positive bowel sounds  Musculoskeletal:  Continues to have pain to her distal left femur. Moderate lower extremity edema about the ankles and lower legs, but more so on the left today. Neurological exam is grossly intact    CBC today 1/20/2022 reveals a WBC of 7.15 Hgb is 10.5 with an MCV of 99.4 and platelet count of 104,297. [facility] LIVER at [facility] on 1/14/2022 :  · Heterogeneous liver echotexture without a discrete mass. · Previous cholecystectomy. No biliary ductal dilatation is identified.   · Right-sided renal cysts including a complex septated cyst at the lateral right mid kidney measuring up to 1.3 cm. Creatinine is down today from 2.7 on 12/9/2021 to  1.2, which is probably her baseline. She has left lower extremity edema > right. LFTs continue to drop having discontinued Trabecta (capmatinib) over 3 weeks ago (end of December 2021). She and her family are concerned about the fact that since starting Trabecta (capmatinib) she has probably been off of the medication is much as she has been on. I believe she has failed a trial of Trabecta (capmatinib) and other alternatives need to be considered. Plan will be to rechallenge with decreased dosages of carboplatin (AUC of 3) Alimta 250 mg/M2 and the same dose Keytruda. An appointment was made for 1/25/2022 for delayed cycle #2 of chemoimmunotherapy    Extended discussion conferencing was undertaken with Blank Sweeney and her son Kadi Sutton today all other questions were answered to their understanding satisfaction. The side effects toxicities expectations of response again were discussed as part of this meeting. #2  TUMOR SCREENING AND HEALTH MAINTENANCE    Bone Health  - DEXA BONE DENSITY AXIAL SKELETON on 8/13/2021 revealed Osteopenia. Patient takes Calcium + Vitamin D as indicated. Breast cancer screening - RADHA DIGITAL SCREEN W OR WO CAD BILATERAL on  8/13/2021 was BI-RADS category 1     GI cancer screening - Colonoscopy per Dr Sharon Delarosa 6/24/19 with a benign 6mm sessile polyp in the the descending colon. 5 year recall.     GYN cancer screening - JUAN ALBERTO & BSO 1970s      #3  Immunization History  Immunization History   Administered Date(s) Administered    COVID-19, Pfizer Purple top, DILUTE for use, 12+ yrs, 30mcg/0.3mL dose 02/22/2021, 03/15/2021    Influenza, High Dose (Fluzone 65 yrs and older) 11/09/2018, 10/11/2019, 09/10/2020    Pneumococcal Conjugate 13-valent ([id number]) 07/13/2015    Pneumococcal Polysaccharide ([id number]) 05/09/2019    Zoster Live (Zostavax) 12/09/2016    Zoster Recombinant (Shingrix) 05/09/2019, 09/25/2019     #4  Left lower extremity DVT    Donn Fountain brings to my attention today, 1/20/2022, the fact that her left leg is a bit more swollen than the right. Noninvasive venous studies were done of the left lower extremity today, 1/20/2022. This was done due to unilateral edema compared to the right. The study was positive for DVT. She is placed on Eliquis today, 1/20/2022. Donn Fountain was seen today for follow-up. Diagnoses and all orders for this visit:    Primary malignant neoplasm of lung metastatic to other site, unspecified laterality ([facility] [address]    Elevated liver function tests    Lytic bone lesion of left femur    Encounter for chemotherapy management    Health care maintenance        No orders of the defined types were placed in this encounter. No orders of the defined types were placed in this encounter. No follow-ups on file.

## 2022-01-20 NOTE — TELEPHONE ENCOUNTER
Isaac Gardner with Radiology phoned to report blood clot noted on NIVS LLE. Official report pending. Rx Eliquis escribed to pharmacy. Spoke with Preston Valentine who states she took Eliquis x 1 month in Feb 2020 but was without RF and did not continue Rx, but takes ASA daily. Explained findings on NIVS and she understands that she will restart Eliquis as indicated. She verbalized understanding and will call with any questions or concerns.

## 2022-01-21 NOTE — PROGRESS NOTES
Patient:  Peng Anaya  YOB: 1940  Date of Service: 1/25/2022  MRN: [id number]    Primary Care Physician: Justine Nelson MD    Chief Complaint   Patient presents with    Follow-up     Malignant Neoplasm of the lung metastatic to bones    Chemotherapy       Patient Seen, Chart, Consults notes, Labs, Radiology studies reviewed. 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 · Intolerant to Trabecta (capmatinib) due to liver toxicity  · LLL DVT on noninvasive study 1/20/2022, on Eliquis    Palliative XRT to the left femur and left hip was initiated on 10/6/2021 for a total dose of 3000 cGy over 10 treatment fractions, completed 10/19/2021    Primary induction systemic chemotherapy cycle #1 of Carboplatin AUC of 5/Alimta 500 mg/m²/Keytruda on 10/7/2021. She tolerated cycle #1 of Carboplatin AUC of 5/Alimta 500 mg/m²/Keytruda very poorly. [address] [facility] NGS sequencing reported a positive MET (exon 14 skipping) mutation on 10/19/2021. Decision was made on 10/28/2021 to begin capmatinib (trabecta) 400 mg PO BID. Trabecta (capmatinib) 400 mg PO BID was initiated on 11/22/2021    Trabecta (capmatinib) had been reduced to 200 mg/day at one time, then increased to 200 mg BID. Trabecta (capmatinib) was discontinued at the end of December 2021 due to elevated LFTs and elevated creatinine. Kenyatta Santizo and her family were concerned about the fact that since starting Trabecta (capmatinib) she had probably been off of the medication is much as she has been on it due to increased LFTs and creatinine. Her LFTs and creatinine normalized over about a 3-week period of time of being off of Trabecta (capmatinib).    I believe she has failed a trial of Trabecta (capmatinib) and other alternatives need to be considered. Plan will be to rechallenge with decreased dosages of carboplatin (AUC of 3) Alimta 250 mg/M2 and Keytruda 200 mg on 21-day cycle. Donn Fountain is here accompanied  by her son Satinder Rg, who lives in [place],  to plan, review and deliver chemoimmunotherapy at reduced dosages for cycle #1 of carboplatin (AUC of 3) Alimta 250 mg/M2 and Keytruda 200 mg. TARGET LESIONS:  · 4.1 x 3.6 cm spiculated superior segment RLL lung mass consistent with the primary  · MET+ (Exon 14 Skipping)  · Right hilar adenopathy. · Nondisplaced fracture at the left acetabulum and at the root of the left superior pubic ramus. · Expansile lytic lesion involving the more distal shaft of the left femur with extraosseous extension    TUMOR HISTORY:  Donn Fountain was seen in initial oncology consultation on 9/20/2021 referred by Dr Daniel Villar with a nondisplaced fracture of the left superior pubic ramus, expansile lytic lesion of the distal third shaft of the left femur and a 4.1 x 3.6 x 3.5 cm  lung mass suspicious for primary malignancy with metastasis. Donn Fountain had an office visit with PCP, Jamee Habermann on 8/20/21 with complaints of left hip pain radiating from groin down left thigh. Over the next several weeks, the pain progressed down the leg to the distal left thigh causing difficulty walking, utilizing a cane, and eventually a wheelchair. She denies any trauma. Plain film imaging was ordered and orthopaedic referral was made. XR HIP 2-3 VW W PELVIS LEFT on 8/30/2021 at [address] documented:  · No acute bony abnormality. · Mild acetabular and femoral head spurring on the left side    XR FEMUR LEFT (MIN 2 VIEWS) 8/30/2021 at [address] documented:  · No acute bony abnormality. Orthopaedic consultation with Dr Daniel Villar on 9/7/2021 to address left hip and groin pain over 1 month period, ambulating with a walker. Denies any injury and describes a stabbing pain going down her left leg when standing.     MRI PELVIS on 9/8/2021 at [facility] documented:  Along the  Helder-medial aspect of the left acetabulum and at the root of the left superior pubic ramus, there is definite evidence of a nondisplaced fracture. It should be noted that there is expansion of the cortex in this region and some [id number] STIR signal with diminished T1 signal in the marrow at this location, as well as possible expansion of the cortex. The degree of marrow edema surrounding the fracture site is very abruptly cut off between normal signal, and I am suspicious that there may be an underlying bone lesion with pathologic fracture rather than a simple fracture. Dr. Willam Foley called me with the above information desiring consultation. Further imaging was recommended including a CT scan of the chest abdomen and pelvis. MRI LEFT FEMUR WO CONTRAST on 9/14/21 at [address] documented: There is an expansile lytic lesion involving the more distal shaft of the left femur at the juncture of the proximal two-thirds with the distal one-third. This is creating permeative changes within the more lateral cortex of the femur at this level and on T1 sequencing there appears to be extraosseous extension of the process. There is surrounding edema associated with this lesion. Given the permeative appearance of the more lateral cortex I feel this patient would certainly be at a high risk for impending pathologic fracture. No additional lesions are present. CT CHEST WO CONTRAST DIAGNOSTIC on  9/9/2021 at [facility] documented:  · 4.1 x 3.6 cm spiculated superior segment right lower lobe lung mass suspicious for primary malignancy. · Questionable right hilar adenopathy. · Emphysema. CT ABDOMEN PELVIS WO CONTRAST on 9/9/2021 at [facility] documented:  · Nonenhanced imaging of the abdomen and pelvis reduces assessment of the visceral organs. No convincing intra-abdominal or pelvic metastasis. Probable left renal cyst. Colonic diverticulosis.    · Left perineural sacral cyst. No pathologic fractures. Appointment Note from 9/21/2021 visit to Dr. Idania Gloria:   She was set up an appointment for Left cephalomedullary nailing of her left pathologic femur fracture to protect her femur. Plan to biopsy  From distal femoral lesion including a small sample of bone and tissue. A cephalomedullary nailing of the left pathologic femoral shaft fracture and open biopsy of a left femoral shaft bone and soft tissue mass was performed by Dr. Beto Velásquez on 9/23/2021  Pathology reported to:  · Soft tissue and bone, left femur, excision:Hemorrhagic bone and soft tissue. · Bone, left femur, reamings:Metastatic poorly differentiated carcinoma. 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 RECOMMENDATION AND PLAN by Dr. Saranya Nguyen on 9/28/2021:  Diagnosis is stage IV metastatic poorly differentiated carcinoma with sarcomatoid features. · I called and spoke to Dr. Renetta Melo regarding radiation therapy for pain control and stabilization of the left femur and left hip, he will be seeing her immediately after leaving this clinic today, 9/28/2021 to get started.   · Treatment will not be curable but rather palliative and Anjelica Patricia desires systemic therapy  · [facility] requested to look for actionable mutations  · Pathology specimen from 9/23/2021 requested to be sent to [facility] for NGS, MSI and PD-L1 testing  · DANNA/Marlon Hardy Final general surgery for port placement    · PET scan  · Cycle #1 of chemotherapy with Carboplatin AUC of 5/Alimta 500 mg/m²/Keytruda standard dosing  began on 10/7/2021,     Initial consultation with Dr. Renetta Melo on 9/28/2021 for consideration of palliative radiation therapy for pain control and stabilization of the left femur and left hip. Dr. Ariana Steiner recommended to treat the left leg with palliative radiation therapy for an anticipated a dose of 3000 cGy over 10 fractions, final course pending completion of planning. Palliative XRT to the left femur and left hip was initiated on 10/6/2021 for a total dose of 3000 cGy over 10 treatment fractions, completed 10/19/2021    Port-A-Cath placed to right IJ on 10/4/2021 by Dr. Ranjith Lundberg    Primary induction systemic chemotherapy cycle #1 of Carboplatin AUC of 5/Alimta 500 mg/m²/Keytruda on 10/7/2021. She tolerated cycle #1 of Carboplatin AUC of 5/Alimta 500 mg/m²/Keytruda very poorly. PET CT SKULL BASE TO MID THIGH 10/12/2021 :  · 2.9 x 4.2ME hypermetabolic mass in the superior segment of the right lower lobe, maximum SUV of 20.8   · 1.7 cm posterior right hilum there is a separate hypermetabolic mass,most likely posterior right hilar neoplastic        Adenopathy  · Lytic lesion with abnormal hypermetabolism within the anterior left acetabulum/left distal superior pubic ramus compatible with a metastatic lesion  · Intense hypermetabolism associated with the known lesion within the distal left femur, only partially visualized in the field-of-view obtained. [address] [facility] NGS sequencing reported a positive MET (exon 14 skipping) mutation on 10/19/2021. An actionable MET mutation was documented after delivery of cycle #1 of chemoimmunotherapy. She tolerated cycle #1 of Carboplatin AUC of 5/Alimta 500 mg/m²/Keytruda very poorly. Decision was made on 10/28/2021 to begin capmatinib (trabecta) 400 mg BID. Trabecta (capmatinib) had been reduced to 200 mg/day at one time, and more recently increased to 200 mg BID. Trabecta (capmatinib) was discontinued at the end of December 2021 due to elevated LFTs and elevated creatinine. US LIVER at [address] on 1/14/2022 :  · Heterogeneous liver echotexture without a discrete mass.   · Previous cholecystectomy. No biliary ductal dilatation is identified. · Right-sided renal cysts including a complex septated cyst at the lateral right mid kidney measuring up to 1.3 cm. Sheila Amezquita and her family were concerned about the fact that since starting Trabecta (capmatinib) she had probably been off of the medication is much as she has been on it due to increased LFTs and creatinine. Her LFTs and creatinine normalized over about a 3-week period of time of being off of Trabecta (capmatinib). I believe she has failed a trial of Trabecta (capmatinib) and other alternatives need to be considered. Plan will be to rechallenge with decreased dosages of carboplatin (AUC of 3) Alimta 250 mg/M2 and Keytruda 200 mg on 21-day cycle. Cycle #1 of carboplatin (AUC of 3) Alimta 250 mg/M2 and Keytruda 200 mg, was delivered on 1/25/2022        Sheila Amezquita has a son Jeana Dillard, who lives in [facility], and a son Pete Borden who lives in [place] who frequently accompany her to the clinic. TREATMENT SUMMARY:  · Palliative XRT to the left femur and left hip was initiated on 10/6/2021 for a total dose of 3000 cGy over 10 treatment fractions, completed 10/19/2021  · Cycle #1 of palliative chemotherapy with Carboplatin AUC of 5/Alimta 500 mg/m²/Keytruda was started on 10/7/2021  · Capmatinib (trabecta) 400 mg BID was started on 11/22/2021 and discontinued at the end of December 2021 due to elevated LFTs and creatinine  · Cycle #1 of carboplatin (AUC of 3) Alimta 250 mg/M2 and Keytruda 200 mg, was delivered on 1/25/2022        Allergies:  Patient has no known allergies.     Medicines:  Current Outpatient Medications   Medication Sig Dispense Refill    dexamethasone (DECADRON) 4 MG tablet Take 1 tablet by mouth See Admin Instructions for 24 doses Take 4mg BID the day before, of and after chemotherapy every 21 days 24 tablet 0    apixaban starter pack (ELIQUIS DVT/PE STARTER PACK) 5 MG TBPK tablet Take 1 tablet by mouth See Admin Instructions 74 tablet 0    folic acid (FOLVITE) 1 MG tablet TAKE 1 TABLET BY MOUTH DAILY 30 tablet 0    oxybutynin (DITROPAN) 5 MG tablet TAKE 1 TABLET BY MOUTH TWO TIMES A  tablet 3    pantoprazole (PROTONIX) 40 MG tablet Take 1 tablet by mouth daily 90 tablet 1    Capmatinib HCl (TABRECTA) 200 MG TABS Take 400 mg by mouth 2 times daily 120 tablet 3    metoclopramide (REGLAN) 10 MG tablet Take 1 tablet by mouth 4 times daily (Patient not taking: Reported on 1/25/2022) 120 tablet 1    ondansetron (ZOFRAN-ODT) 8 MG TBDP disintegrating tablet Place 1 tablet under the tongue every 8 hours as needed for Nausea or Vomiting (Patient not taking: Reported on 1/25/2022) 30 tablet 2    lidocaine-prilocaine (EMLA) 2.5-2.5 % cream Apply topically as needed. (Patient not taking: Reported on 1/25/2022) 30 g 3    HYDROcodone-acetaminophen (NORCO)  MG per tablet Take 1 tablet by mouth every 4 hours as needed.  pravastatin (PRAVACHOL) 80 MG tablet TAKE 1 TABLET BY MOUTH AT BEDTIME 90 tablet 3    losartan (COZAAR) 50 MG tablet TAKE 1 TABLET BY MOUTH DAILY 90 tablet 3    triamterene-hydrochlorothiazide (MAXZIDE-25) 37.5-25 MG per tablet Take 0.5 tablets by mouth daily 45 tablet 3    calcium carbonate (OSCAL) 500 MG TABS tablet Take 500 mg by mouth daily      allopurinol (ZYLOPRIM) 100 MG tablet Take 100 mg by mouth daily      Cholecalciferol (VITAMIN D) 2000 units CAPS capsule Take by mouth daily        No current facility-administered medications for this visit.      Facility-Administered Medications Ordered in Other Visits   Medication Dose Route Frequency Provider Last Rate Last Admin    0.9 % sodium chloride infusion  20 mL/hr IntraVENous Once Milagros Hendrickson MD 20 mL/hr at 01/25/22 1215 20 mL/hr at 01/25/22 1215    CARBOplatin (PARAPLATIN) 200 mg in sodium chloride 0.9 % 250 mL chemo IVPB  200 mg IntraVENous Once Milagros Hendrickson  mL/hr at 01/25/22 1348 200 mg at 01/25/22 1348    heparin flush 100 UNIT/ML injection 500 Units  500 Units IntraCATHeter PRN Lucy Vasquez MD        sodium chloride flush 0.9 % injection 5-40 mL  5-40 mL IntraVENous PRN Lucy Vasquez MD           Past Medical History:      Diagnosis Date    Acid reflux     LORA [facility]acute kidney [facility]) ([facility] [address] 2018    HIGH CHOLESTEROL     Hypertension     Metastatic lung cancer (metastasis from lung to other site) [facility]) 2021    Metastatic lung cancer (metastasis from lung to other site) [facility]) 2021    Urinary incontinence     UTI (urinary tract infection)     Vertigo         Past Surgical History:      Procedure Laterality Date   Efe Velasquez COLONOSCOPY      Dr Lorrie Santillan 2019    Dr Bipin Hernandez disease-Tubular AP (-) dysplasia, 5 yr recall    HYSTERECTOMY      PORT SURGERY N/A 10/4/2021    SINGLE LUMEN PORT PLMT WITH ULTRASOUND AND FLURO performed by Edwin Parr MD at [address] [place] [address]         Family History:      Problem Relation Age of Onset    Cancer Mother     Colon Cancer Mother     Heart Disease Father     Stroke Brother     Colon Polyps Neg Hx     Esophageal Cancer Neg Hx     Liver Cancer Neg Hx     Liver Disease Neg Hx     Rectal Cancer Neg Hx     Stomach Cancer Neg Hx         Social History  Social History     Tobacco Use    Smoking status: Former Smoker     Packs/day: 0.50     Years: 40.00     Pack years: 20.00     Types: Cigarettes     Start date:      Quit date:      Years since quittin.0    Smokeless tobacco: Never Used   Vaping Use    Vaping Use: Never used   Substance Use Topics    Alcohol use: No    Drug use: No             Wt Readings from Last 3 Encounters:   22 153 lb 9.6 oz (69.7 kg)   22 153 lb 12.8 oz (69.8 kg)   22 154 lb 14.4 oz (70.3 kg)        Objective:  Vital Signs: Blood pressure (!) 155/63, pulse 68, temperature 98.8 °F (37.1 °C), resp.  rate 18, height 5' 5\" (1.651 m), weight 153 lb 9.6 oz (69.7 kg), SpO2 99 %. Labs:  BMP:   Recent Labs     01/25/22  1150      K 4.9      CO2 25   BUN 24*   CREATININE 1.1*   CALCIUM 9.0     CBC:   Recent Labs     01/25/22  1205   WBC 11.23*   HGB 11.1*   HCT 34.3   MCV 97.2*        PT/INR: No results for input(s): PROTIME, INR in the last 72 hours. APTT: No results for input(s): APTT in the last 72 hours. Magnesium:No results for input(s): MG in the last 72 hours. Phosphorus:No results for input(s): PHOS in the last 72 hours. Hepatic:   Recent Labs     01/25/22  1150   ALKPHOS 77   ALT 55*   AST 34   PROT 6.4   BILITOT 0.5   LABALBU 4.0       Cultures:   No results for input(s): CULTURE in the last 72 hours. Lab Results   Component Value Date    WBC 11.23 (H) 01/25/2022    HGB 11.1 (L) 01/25/2022    HCT 34.3 01/25/2022    MCV 97.2 (H) 01/25/2022     01/25/2022     Lab Results   Component Value Date    NEUTROABS 10.22 (H) 01/25/2022     Lab Results   Component Value Date     01/25/2022    K 4.9 01/25/2022     01/25/2022    CO2 25 01/25/2022    BUN 24 (H) 01/25/2022    CREATININE 1.1 (H) 01/25/2022    GLUCOSE 92 01/25/2022    CALCIUM 9.0 01/25/2022    PROT 6.4 01/25/2022    LABALBU 4.0 01/25/2022    BILITOT 0.5 01/25/2022    ALKPHOS 77 01/25/2022    AST 34 01/25/2022    ALT 55 (H) 01/25/2022    LABGLOM 48 (A) 01/25/2022    GFRAA 58 (L) 01/20/2022    AGRATIO 2.4 (H) 12/10/2021    GLOB 2.5 01/25/2022             Radiology reports as per the Radiologist  Radiology:  None       ASSESSMENT AND PLAN:    #1   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    · Intolerant to Trabecta (capmatinib) due to liver toxicity  · LLL DVT on noninvasive study 1/20/2022, on Eliquis    Palliative XRT to the left femur and left hip was initiated on 10/6/2021 for a total dose of 3000 cGy over 10 treatment fractions, completed 10/19/2021    Primary induction systemic chemotherapy cycle #1 of Carboplatin AUC of 5/Alimta 500 mg/m²/Keytruda on 10/7/2021. She tolerated cycle #1 of Carboplatin AUC of 5/Alimta 500 mg/m²/Keytruda very poorly. [address] [facility] NGS sequencing reported a positive MET (exon 14 skipping) mutation on 10/19/2021. Decision was made on 10/28/2021 to begin capmatinib (trabecta) 400 mg PO BID. Trabecta (capmatinib) 400 mg PO BID was initiated on 11/22/2021    Trabecta (capmatinib) had been reduced to 200 mg/day at one time, then increased to 200 mg BID. Trabecta (capmatinib) was discontinued at the end of December 2021 due to elevated LFTs and elevated creatinine. Amalia Zabala and her family were concerned about the fact that since starting Trabecta (capmatinib) she had probably been off of the medication is much as she has been on it due to increased LFTs and creatinine. Her LFTs and creatinine normalized over about a 3-week period of time of being off of Trabecta (capmatinib). I believe she has failed a trial of Trabecta (capmatinib) and other alternatives need to be considered. Plan will be to rechallenge with decreased dosages of carboplatin (AUC of 3) Alimta 250 mg/M2 and Keytruda 200 mg on 21-day cycle. Amalia Zabala is here accompanied  by her son Silvia Costa, who lives in [place],  to plan, review and deliver chemoimmunotherapy at reduced dosages for cycle #1 of carboplatin (AUC of 3) Alimta 250 mg/M2 and Keytruda 200 mg. TARGET LESIONS:  · 4.1 x 3.6 cm spiculated superior segment right lower lobe lung mass suspicious for primary malignancy. · Right hilar adenopathy.    · Nondisplaced fracture at the left acetabulum and at the root of the left superior pubic ramus  · Expansile lytic lesion involving the more distal shaft of the left femur with extraosseous extension    Wt Readings from Last 3 Encounters:   01/25/22 153 lb 9.6 oz (69.7 kg)   01/25/22 153 lb 12.8 oz (69.8 kg)   01/20/22 154 lb 14.4 oz (70.3 kg)   Physical examination today, 1/20/2022 She is accompanied by her son Jacob Berumen. HEENT is without asymmetry extraocular movements are intact. Neck is supple no JVD no palpable lymphadenopathy, specifically no supraclavicular lymphadenopathy on the left or the right. Lungs clear to auscultation  Abdominal  with positive bowel sounds  Musculoskeletal:  Continues to have pain to her distal left femur. Resolution of LLE edema  Neurological exam is grossly intact    Yuniel presents today, 1/24/22,  for Carboplatin (AUC3) + Alimta (250mg/m2) + Keytruda (200mg) Q21 days. The left lower extremity edema has resolved and the symptoms associated with the left heavy leg have resolved as well on Eliquis. She is very pleased about this. CBC today 1/24/22 reveals a WBC of 11.23. Hgb is  11.1 with an MCV of 972 and platelet count of [phone]. Cycle #1 of Carboplatin (AUC3) + Alimta (250mg/m2) + Keytruda (200mg) Q21 days will be delivered today. Opportunity for questions was provided and all other questions were answered to their understanding and satisfaction. #2  TUMOR SCREENING AND HEALTH MAINTENANCE    Bone Health  - DEXA BONE DENSITY AXIAL SKELETON on 8/13/2021 revealed Osteopenia. Patient takes Calcium + Vitamin D as indicated. Breast cancer screening - RADHA DIGITAL SCREEN W OR WO CAD BILATERAL on  8/13/2021 was BI-RADS category 1     GI cancer screening - Colonoscopy per Dr Latasha Decker 6/24/19 with a benign 6mm sessile polyp in the the descending colon. 5 year recall.     GYN cancer screening - JUAN ALBERTO & BSO 1970s      #3  Immunization History  Immunization History   Administered Date(s) Administered    COVID-19, Pfizer Purple top, DILUTE for use, 12+ yrs, 30mcg/0.3mL dose 02/22/2021, 03/15/2021    Influenza, High Dose (Fluzone 65 yrs and older) 11/09/2018, 10/11/2019, 09/10/2020, 09/25/2021    Pneumococcal Conjugate 13-valent ([id number]) 07/13/2015    Pneumococcal Polysaccharide ([id number]) 05/09/2019    Zoster Live (Zostavax) 12/09/2016    Zoster Recombinant (Shingrix) 05/09/2019, 09/25/2019     #4  Left lower extremity DVT    Stacia Shore brings to my attention today, 1/20/2022, the fact that her left leg is a bit more swollen than the right. Noninvasive venous studies were done of the left lower extremity today, 1/20/2022. This was done due to unilateral edema compared to the right. The study was positive for DVT. She is placed on Eliquis today, 1/20/2022. There are no diagnoses linked to this encounter. No orders of the defined types were placed in this encounter. No orders of the defined types were placed in this encounter. Return in about 3 weeks (around 2/15/2022) for treatment and see Dr. Isha Collins

## 2022-01-25 PROBLEM — N18.30 CHRONIC RENAL FAILURE, STAGE 3 (MODERATE), UNSPECIFIED WHETHER STAGE 3A OR 3B CKD (HCC): Status: ACTIVE | Noted: 2022-01-01

## 2022-01-25 NOTE — PROGRESS NOTES
Chief Complaint   Patient presents with    Medicare AWV       HPI: Ondina Abernathy is a 79 y.o. female is here for her annual Medicare wellness exam.  Her functional status is good. She has not had any recent falls. She has no concerns in regards to safety, hearing, or cognition. She is seeing Dr. Fantasma Agarwal for history of lung cancer. She is seeing Dr. Lillian Mcgill for chronic kidney disease and Dr. Luz Fajardo for history of bone metastasis to the left femur. She denies any difficulty with breathing. She is seeing Dr. Fantasma Agarwal. He has adjusting her medications. Her liver function test and creatinine had become elevated on try Trabecta therapy. They have since improved. He will likely go back to IV chemotherapy with carboplatin and [place] in the very near future. She also developed DVT. She is now on Eliquis therapy. Oxybutynin is helpful for her overactive bladder. Her acid reflux is well controlled. She does take hydrocodone as needed for pain. However, she has not needed to hydrocodone recently. Her blood pressure is well controlled. She denies any complaints of chest pain or chest pressure. She also has a history of hyperlipidemia and is on pravastatin therapy. She has not had any gout flares. Her acid reflux is well controlled. She also has a skin lesion to her left cheek that is scaly and changing in color. We will refer her to dermatology. Routine screening is as follows:  Eye exam yearly.   Flu vaccine is up-to-date  Pap smear: Declined  Mammogram was done in August 2021  Colonoscopy: Patient is 79 years  DEXA August 20 21  Pneumovax is up-to-date    Past Medical History:   Diagnosis Date    Acid reflux     LORA (acute kidney injury) ([facility] 7/28/2018    LORA (acute kidney injury) ([facility] 7/28/2018    HIGH CHOLESTEROL     Hypertension     Metastatic lung cancer (metastasis from lung to other site) [facility]) 9/28/2021    Metastatic lung cancer (metastasis from lung to other site) [facility] 2021    Urinary incontinence     UTI (urinary tract infection)     Vertigo       Past Surgical History:   Procedure Laterality Date   Mel[facility] Brain COLONOSCOPY      Dr Marc Duke 2019    Dr SARAH Juarez-Diverticular disease-Tubular AP (-) dysplasia, 5 yr recall    HYSTERECTOMY      PORT SURGERY N/A 10/4/2021    SINGLE LUMEN PORT PLMT WITH ULTRASOUND AND FLURO performed by Manny William MD at [address] [place] [address]       Social History     Socioeconomic History    Marital status:      Spouse name: Deanna Ramos Number of children: None    Years of education: None    Highest education level: None   Occupational History     Employer: RETIRED   Tobacco Use    Smoking status: Former Smoker     Packs/day: 0.50     Years: 40.00     Pack years: 20.00     Types: Cigarettes     Start date:      Quit date:      Years since quittin.0    Smokeless tobacco: Never Used   Vaping Use    Vaping Use: Never used   Substance and Sexual Activity    Alcohol use: No    Drug use: No    Sexual activity: None   Other Topics Concern    None   Social History Narrative    None     Social Determinants of Health     Financial Resource Strain: Low Risk     Difficulty of Paying Living Expenses: Not hard at all   Food Insecurity: No Food Insecurity    Worried About [facility] in the Last Year: Never true    Светлана of Food in the Last Year: Never true   Transportation Needs: No Transportation Needs    Lack of Transportation (Medical): No    Lack of Transportation (Non-Medical):  No   Physical Activity:     Days of Exercise per Week: Not on file    Minutes of Exercise per Session: Not on file   Stress:     Feeling of Stress : Not on file   Social Connections:     Frequency of Communication with Friends and Family: Not on file    Frequency of Social Gatherings with Friends and Family: Not on file    Attends [employer] Services: Not on file   Lilli Xiong tablet Take 100 mg by mouth daily      Cholecalciferol (VITAMIN D) 2000 units CAPS capsule Take by mouth daily       Capmatinib HCl (TABRECTA) 200 MG TABS Take 400 mg by mouth 2 times daily 120 tablet 3    metoclopramide (REGLAN) 10 MG tablet Take 1 tablet by mouth 4 times daily (Patient not taking: Reported on 1/25/2022) 120 tablet 1    ondansetron (ZOFRAN-ODT) 8 MG TBDP disintegrating tablet Place 1 tablet under the tongue every 8 hours as needed for Nausea or Vomiting (Patient not taking: Reported on 1/25/2022) 30 tablet 2    lidocaine-prilocaine (EMLA) 2.5-2.5 % cream Apply topically as needed. (Patient not taking: Reported on 1/25/2022) 30 g 3     No current facility-administered medications for this visit. Patient Active Problem List   Diagnosis    Wrist pain, right    Distal radius fracture    Fall from other slipping, tripping, or stumbling    Hypertension    Pneumonia    Metabolic acidosis, increased anion gap (IAG)    Colorectal polyps    Chronic kidney disease    Metastatic lung cancer (metastasis from lung to other site) [facility])    Chronic renal failure, stage 3 (moderate), unspecified whether stage 3a or 3b CKD ([facility]    Deep vein thrombosis (DVT) of proximal lower extremity (HCC)    Bone metastases (HCC)        Review of Systems   Constitutional: Negative for activity change, appetite change, chills, diaphoresis, fatigue, fever and unexpected weight change. HENT: Negative for congestion, ear pain, hearing loss, mouth sores, nosebleeds, postnasal drip, rhinorrhea, sinus pressure, sinus pain, sneezing, sore throat, tinnitus, trouble swallowing and voice change. Eyes: Negative for photophobia, pain, discharge, redness, itching and visual disturbance. Respiratory: Negative for cough, chest tightness, shortness of breath and wheezing. Cardiovascular: Negative for chest pain, palpitations and leg swelling.    Gastrointestinal: Negative for abdominal distention, abdominal pain, rhinorrhea. Mouth/Throat:      Mouth: Mucous membranes are dry. Pharynx: Oropharynx is clear. No oropharyngeal exudate or posterior oropharyngeal erythema. Eyes:      General: No scleral icterus. Right eye: No discharge. Left eye: No discharge. Extraocular Movements: Extraocular movements intact. Conjunctiva/sclera: Conjunctivae normal.      Pupils: Pupils are equal, round, and reactive to light. Neck:      Thyroid: No thyromegaly. Vascular: No JVD. Trachea: No tracheal deviation. Cardiovascular:      Rate and Rhythm: Normal rate and regular rhythm. Pulses: Normal pulses. Heart sounds: Normal heart sounds. No murmur heard. No friction rub. No gallop. Pulmonary:      Effort: Pulmonary effort is normal. No respiratory distress. Breath sounds: Normal breath sounds. No wheezing or rales. Chest:      Chest wall: No tenderness. Abdominal:      General: Bowel sounds are normal. There is no distension. Palpations: Abdomen is soft. There is no mass. Tenderness: There is no abdominal tenderness. There is no guarding or rebound. Musculoskeletal:         General: Tenderness present. No swelling, deformity or signs of injury. Normal range of motion. Cervical back: Normal range of motion and neck supple. Right lower leg: No edema. Left lower leg: No edema. Comments: Gait is antalgic. She does have quite a bit of pain on palpation of the left groin site. Lymphadenopathy:      Cervical: No cervical adenopathy. Skin:     General: Skin is warm and dry. Coloration: Skin is not jaundiced or pale. Findings: Lesion present. No bruising, erythema or rash. Comments: Scaly  brown lesion to left cheek   Neurological:      General: No focal deficit present. Mental Status: She is alert and oriented to person, place, and time. Mental status is at baseline. Cranial Nerves: No cranial nerve deficit.       Sensory: No sensory deficit. Motor: No weakness or abnormal muscle tone. Coordination: Coordination normal.      Gait: Gait normal.      Deep Tendon Reflexes: Reflexes are normal and symmetric. Reflexes normal.   Psychiatric:         Mood and Affect: Mood normal.         Behavior: Behavior normal.         Thought Content: Thought content normal.         Judgment: Judgment normal.         1. Routine adult health maintenance    2. Chronic renal failure, stage 3 (moderate), unspecified whether stage 3a or 3b CKD ([address]    3. Primary malignant neoplasm of lung metastatic to other site, unspecified laterality ([address]    4. Vitamin D deficiency    5. Hyperlipidemia, unspecified hyperlipidemia type    6. Skin lesion    7. Deep vein thrombosis (DVT) of proximal lower extremity, unspecified chronicity, unspecified laterality ([address]    8. OAB (overactive bladder)    9. Gastroesophageal reflux disease without esophagitis    10. Bone metastases ([address]    11. Primary hypertension    12. Hyperuricemia        ASSESSMENT/PLAN:    79-year-old woman here for follow-up    1. Health maintenance: Routine screening is as per HPI. Labs discussed with patient today    2. Skin lesion: Refer to dermatology    3. Metastatic lung cancer with metastasis to the bones: As per Dr. Lisa Kenny. She states that he is probably going to be taking her off of the Trabecta altogether and starting her on IV chemotherapy. She is taking Decadron tablets    4. DVT: On Eliquis    5. Overactive bladder: Continue oxybutynin as prescribed    6. Acid reflux: Stable on Protonix    7. Bone metastasis: Continue hydrocodone as needed    8. Hyperlipidemia: Continue pravastatin as prescribed    9. Hypertension: Blood pressure well controlled on losartan and triamterene hydrochlorothiazide    10. Hyperuricemia: Continue allopurinol    11. Vitamin D deficiency: Continue cholecalciferol    12.   Chronic kidney disease: Renal parameters are currently stable      [facility] was [facility] today for medicare awv. Diagnoses and all orders for this visit:    Routine adult health maintenance    Chronic renal failure, stage 3 (moderate), unspecified whether stage 3a or 3b CKD ([facility]  -     Comprehensive Metabolic Panel; Future    Primary malignant neoplasm of lung metastatic to other site, unspecified laterality (HCC)  -     CBC Auto Differential; Future    Vitamin D deficiency  -     Vitamin D 25 Hydroxy; Future    Hyperlipidemia, unspecified hyperlipidemia type  -     Lipid Panel; Future  -     TSH without Reflex; Future    Skin lesion  -     External Referral To Dermatology    Deep vein thrombosis (DVT) of proximal lower extremity, unspecified chronicity, unspecified laterality (HCC)    OAB (overactive bladder)    Gastroesophageal reflux disease without esophagitis    Bone metastases ([facility]    Primary hypertension    Hyperuricemia          Return in about 6 months (around 7/25/2022).      Orders Placed This Encounter   Procedures    CBC Auto Differential     Fast 12 hours     Standing Status:   Future     Standing Expiration Date:   1/25/2023    Comprehensive Metabolic Panel     Fasting 12 hours     Standing Status:   Future     Standing Expiration Date:   1/25/2023    Lipid Panel     Standing Status:   Future     Standing Expiration Date:   1/25/2023     Order Specific Question:   Is Patient Fasting?/# of Hours     Answer:   12    TSH without Reflex     Fast 12 hours     Standing Status:   Future     Standing Expiration Date:   1/25/2023    Vitamin D 25 Hydroxy     Standing Status:   Future     Standing Expiration Date:   1/25/2023    External Referral To Dermatology     Referral Priority:   Routine     Referral Type:   Eval and Treat     Referral Reason:   Specialty Services Required     Requested Specialty:   Dermatology     Number of Visits Requested:   MD Barbara     Medicare Annual Wellness Visit - Subsequent    Name: Sandra [place] Date: 1/30/2022   MRN: [id number] Sex: Female   Age: 79 y.o. Ethnicity: Non- / Non    : 1940 Race: White (non-)      Junior Green is here for   Chief Complaint   Patient presents with    Medicare AW        Screenings for behavioral, psychosocial and functional/safety risks, and cognitive dysfunction are all negative except as indicated below. These results, as well as other patient data from the [address] form, are documented in Flowsheets linked to this Encounter. No Known Allergies    Prior to Visit Medications    Medication Sig Taking? Authorizing Provider   dexamethasone (DECADRON) 4 MG tablet Take 1 tablet by mouth See Admin Instructions for 24 doses Take 4mg BID the day before, of and after chemotherapy every 21 days Yes Yuki Parkinson MD   apixaban starter pack (ELIQUIS DVT/PE STARTER PACK) 5 MG TBPK tablet Take 1 tablet by mouth See Admin Instructions Yes Yuki Parkinson MD   folic acid (FOLVITE) 1 MG tablet TAKE 1 TABLET BY MOUTH DAILY Yes Yuki Parkinson MD   oxybutynin (DITROPAN) 5 MG tablet TAKE 1 TABLET BY MOUTH TWO TIMES A DAY Yes Lakshmi De León MD   pantoprazole (PROTONIX) 40 MG tablet Take 1 tablet by mouth daily Yes Lakshmi De León MD   HYDROcodone-acetaminophen (NORCO)  MG per tablet Take 1 tablet by mouth every 4 hours as needed.  Yes Historical Provider, MD   pravastatin (PRAVACHOL) 80 MG tablet TAKE 1 TABLET BY MOUTH AT BEDTIME Yes Lakshmi De León MD   losartan (COZAAR) 50 MG tablet TAKE 1 TABLET BY MOUTH DAILY Yes Lakshmi De León MD   triamterene-hydrochlorothiazide (MAXZIDE-25) 37.5-25 MG per tablet Take 0.5 tablets by mouth daily Yes Lakshmi De León MD   calcium carbonate (OSCAL) 500 MG TABS tablet Take 500 mg by mouth daily Yes Historical Provider, MD   allopurinol (ZYLOPRIM) 100 MG tablet Take 100 mg by mouth daily Yes Historical Provider, MD   Cholecalciferol (VITAMIN D) 2000 units CAPS capsule Take by mouth daily  Yes Historical Provider, MD Capmatinib HCl (TABRECTA) 200 MG TABS Take 400 mg by mouth 2 times daily  Barber Ryan MD   metoclopramide (REGLAN) 10 MG tablet Take 1 tablet by mouth 4 times daily  Patient not taking: Reported on 1/25/2022  DAVIDSON Mancia   ondansetron (ZOFRAN-ODT) 8 MG TBDP disintegrating tablet Place 1 tablet under the tongue every 8 hours as needed for Nausea or Vomiting  Patient not taking: Reported on 1/25/2022  DAVIDSON Travis   lidocaine-prilocaine (EMLA) 2.5-2.5 % cream Apply topically as needed.   Patient not taking: Reported on 1/25/2022  DAVIDSON Waggoner       Past Medical History:   Diagnosis Date    Acid reflux     LORA (acute kidney [facility]) ([facility] 7/28/2018    LORA (acute kidney injury) ([facility] 7/28/2018    HIGH CHOLESTEROL     Hypertension     Metastatic lung cancer (metastasis from lung to other site[facility]) 9/28/2021    Metastatic lung cancer (metastasis from lung to other site) [facility]) 9/28/2021    Urinary incontinence     UTI (urinary tract infection)     Vertigo        Past Surgical History:   Procedure Laterality Date   [place] [facility] COLONOSCOPY      Dr Yajaira Call 6/24/2019    Dr SARAH Juarez-Diverticular disease-Tubular AP (-) dysplasia, 5 yr recall    HYSTERECTOMY      PORT SURGERY N/A 10/4/2021    SINGLE LUMEN PORT PLMT WITH ULTRASOUND AND FLURO performed by Daxa Long MD at [address] [place] [address]        Family History   Problem Relation Age of Onset    Cancer Mother     Colon Cancer Mother     Heart Disease Father     Stroke Brother     Colon Polyps Neg Hx     Esophageal Cancer Neg Hx     Liver Cancer Neg Hx     Liver Disease Neg Hx     Rectal Cancer Neg Hx     Stomach Cancer Neg Hx        CareTeam (Including outside providers/suppliers regularly involved in providing care):   Patient Care Team:  Yvonne Melgoza MD as PCP - General (Family Medicine)  Yvonne Melgoza MD as PCP - [facility] Empaneled Provider    Wt Readings from Last hard at all   Food Insecurity: No Food Insecurity    Worried About [facility] in the Last Year: Never true    Светлана of Food in the Last Year: Never true   Transportation Needs: No Transportation Needs    Lack of Transportation (Medical): No    Lack of Transportation (Non-Medical): No   Physical Activity:     Days of Exercise per Week: Not on file    Minutes of Exercise per Session: Not on file   Stress:     Feeling of Stress : Not on file   Social Connections:     Frequency of Communication with Friends and Family: Not on file    Frequency of Social Gatherings with Friends and Family: Not on file    Attends Spiritism Services: Not on file    Active Member of [address] [place] [address] or Organizations: Not on file    Attends Club or Organization Meetings: Not on file    Marital Status: Not on file   Intimate Partner Violence:     Fear of Current or Ex-Partner: Not on file    Emotionally Abused: Not on file    Physically Abused: Not on file    Sexually Abused: Not on file   Housing Stability:     Unable to Pay for Housing in the Last Year: Not on file    Number of [place] in the Last Year: Not on file    Unstable Housing in the Last Year: Not on file     Audit Questionnaire: Screen for Alcohol Misuse  How often do you have a drink containing alcohol?: Never  Substance Abuse Interventions:  · no concerns    Health Risk Assessment:     General  In general, how would you say your health is?: (!) Poor  In the past 7 days, have you experienced any of the following?  New or Increased Pain, New or Increased Fatigue, Loneliness, Social Isolation, Stress or Anger?: None of These  Do you get the social and emotional support that you need?: Yes  Do you have a Living Will?: (!) No  General Health Risk Interventions:  · no concerns    Health Habits/Nutrition  Do you exercise for at least 20 minutes 2-3 times per week?: (!) No  Have you lost any weight without trying in the past 3 months?: (!) Yes  Do you eat only one meal per day?: No  Have you seen the dentist within the past year?: Yes  Body mass index is 25.59 kg/m². Health Habits/Nutrition Interventions:  · no concern    Hearing/Vision  Do you or your family notice any trouble with your hearing that hasn't been managed with hearing aids?: No  Do you have difficulty driving, watching TV, or doing any of your daily activities because of your eyesight?: No  Have you had an eye exam within the past year?: (!) No  Hearing/Vision Interventions:  · no concerns    Safety  Do you have working smoke detectors?: Yes  Have all throw rugs been removed or fastened?: Yes  Do you have non-slip mats or surfaces in all bathtubs/showers?: (!) No  Do all of your stairways have a railing or banister?: Yes  Are your doorways, halls and stairs free of clutter?: Yes  Do you always fasten your seatbelt when you are in a car?: Yes  Safety Interventions:  · Patient declines any further evaluation/treatment for this issue    ADLs  In the past 7 days, did you need help from others to perform any of the following everyday activities? Eating, dressing, grooming, bathing, toileting, or walking/balance?: None  In the past 7 days, did you need help from others to take care of any of the following?  Laundry, housekeeping, banking/finances, shopping, telephone use, food preparation, transportation, or taking medications?: (!) Shopping,Food Preparation  ADL Interventions:  · Patient declines any further evaluation/treatment for this issue    Personalized Preventive Plan   Current Health Maintenance Status  Immunization History   Administered Date(s) Administered    COVID-19, Pfizer Purple top, DILUTE for use, 12+ yrs, 30mcg/0.3mL dose 02/22/2021, 03/15/2021    Influenza, High Dose (Fluzone 65 yrs and older) 11/09/2018, 10/11/2019, 09/10/2020, 09/25/2021    Pneumococcal Conjugate 13-valent ([id number]) 07/13/2015    Pneumococcal Polysaccharide ([id number]) 05/09/2019    Zoster Live (Zostavax) 12/09/2016  Zoster Recombinant (Shingrix) 05/09/2019, 09/25/2019        Health Maintenance   Topic Date Due    DTaP/Tdap/Td vaccine (1 - Tdap) Never done    COVID-19 Vaccine (3 - Pfizer risk 4-dose series) 04/12/2021    Annual Wellness Visit (AWV)  01/21/2022    Depression Screen  01/25/2023    Potassium monitoring  01/25/2023    Creatinine monitoring  01/25/2023    Flu vaccine  Completed    Shingles Vaccine  Completed    Pneumococcal 65+ yrs at Risk Vaccine  Completed    Hepatitis A vaccine  Aged Out    Hepatitis B vaccine  Aged Out    Hib vaccine  Aged Out    Meningococcal (ACWY) vaccine  Aged Out       Recommendations for [employer] Due: see orders.   Recommended screening schedule for the next 5-10 years is provided to the patient in written form: see Patient Instructions/AVS.

## 2022-01-30 PROBLEM — I82.4Y9 DEEP VEIN THROMBOSIS (DVT) OF PROXIMAL LOWER EXTREMITY (HCC): Status: ACTIVE | Noted: 2022-01-01

## 2022-01-30 PROBLEM — C79.51 BONE METASTASES: Status: ACTIVE | Noted: 2022-01-01

## 2022-01-30 PROBLEM — N17.9 AKI (ACUTE KIDNEY INJURY) (HCC): Status: RESOLVED | Noted: 2018-07-28 | Resolved: 2022-01-01

## 2022-02-11 NOTE — PROGRESS NOTES
Patient:  Marie Buckley  YOB: 1940  Date of Service: 2/15/2022  MRN: [id number]    Primary Care Physician: Angel Fischer MD    Chief Complaint   Patient presents with    Follow-up     Malignant Metastatic neoplasm of lung    Chemotherapy       Patient Seen, Chart, Consults notes, Labs, Radiology studies reviewed. 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 · Intolerant to Trabecta (capmatinib) due to liver toxicity  · LLL DVT on noninvasive study 1/20/2022, on Eliquis    Palliative XRT to the left femur and left hip was initiated on 10/6/2021 for a total dose of 3000 cGy over 10 treatment fractions, completed 10/19/2021    Primary induction systemic chemotherapy cycle #1 of Carboplatin AUC of 5/Alimta 500 mg/m²/Keytruda on 10/7/2021. She tolerated cycle #1 of Carboplatin AUC of 5/Alimta 500 mg/m²/Keytruda very poorly. [address] [facility] NGS sequencing reported a positive MET (exon 14 skipping) mutation on 10/19/2021. Decision was made on 10/28/2021 to begin capmatinib (trabecta) 400 mg PO BID. Trabecta (capmatinib) 400 mg PO BID was initiated on 11/22/2021    Trabecta (capmatinib) had been reduced to 200 mg/day at one time, then increased to 200 mg BID. Trabecta (capmatinib) was discontinued at the end of December 2021 due to elevated LFTs and elevated creatinine. Odessa Thao and her family were concerned about the fact that since starting Trabecta (capmatinib) she had probably been off of the medication is much as she has been on it due to increased LFTs and creatinine. Her LFTs and creatinine normalized over about a 3-week period of time of being off of Trabecta (capmatinib). I believe she has failed a trial of Trabecta (capmatinib) and other alternatives need to be considered.     Plan will be to rechallenge with decreased dosages of carboplatin (AUC of 3) Alimta 250 mg/M2 and Keytruda 200 mg on 21-day cycle. Cycle #1 of carboplatin (AUC of 3) Alimta 250 mg/M2 and Keytruda 200 mg 1/25/2022  She tolerated cycle #1 with minimal side effects. Andrew Gong is here accompanied  by her son Matteo Porter, who lives in [place], for cycle #2 of carboplatin (AUC of 3) Alimta 250 mg/M2 and Keytruda 200 mg. TARGET LESIONS:  · 4.1 x 3.6 cm spiculated superior segment RLL lung mass consistent with the primary  · MET+ (Exon 14 Skipping)  · Right hilar adenopathy. · Nondisplaced fracture at the left acetabulum and at the root of the left superior pubic ramus. · Expansile lytic lesion involving the more distal shaft of the left femur with extraosseous extension    TUMOR HISTORY:  Andrew Gong was seen in initial oncology consultation on 9/20/2021 referred by Dr Guero Cook with a nondisplaced fracture of the left superior pubic ramus, expansile lytic lesion of the distal third shaft of the left femur and a 4.1 x 3.6 x 3.5 cm  lung mass suspicious for primary malignancy with metastasis. Andrew Gong had an office visit with PCP, William Martinez on 8/20/21 with complaints of left hip pain radiating from groin down left thigh. Over the next several weeks, the pain progressed down the leg to the distal left thigh causing difficulty walking, utilizing a cane, and eventually a wheelchair. She denies any trauma. Plain film imaging was ordered and orthopaedic referral was made. XR HIP 2-3 VW W PELVIS LEFT on 8/30/2021 at [facility] documented:  · No acute bony abnormality. · Mild acetabular and femoral head spurring on the left side    XR FEMUR LEFT (MIN 2 VIEWS) 8/30/2021 at [facility] documented:  · No acute bony abnormality. Orthopaedic consultation with Dr Guero Cook on 9/7/2021 to address left hip and groin pain over 1 month period, ambulating with a walker.   Denies any injury and describes a stabbing pain going down her left leg when standing. MRI PELVIS on 9/8/2021 at [facility] documented:  Along the  Helder-medial aspect of the left acetabulum and at the root of the left superior pubic ramus, there is definite evidence of a nondisplaced fracture. It should be noted that there is expansion of the cortex in this region and some [id number] STIR signal with diminished T1 signal in the marrow at this location, as well as possible expansion of the cortex. The degree of marrow edema surrounding the fracture site is very abruptly cut off between normal signal, and I am suspicious that there may be an underlying bone lesion with pathologic fracture rather than a simple fracture. Dr. Amy Ordaz called me with the above information desiring consultation. Further imaging was recommended including a CT scan of the chest abdomen and pelvis. MRI LEFT FEMUR WO CONTRAST on 9/14/21 at [facility] documented: There is an expansile lytic lesion involving the more distal shaft of the left femur at the juncture of the proximal two-thirds with the distal one-third. This is creating permeative changes within the more lateral cortex of the femur at this level and on T1 sequencing there appears to be extraosseous extension of the process. There is surrounding edema associated with this lesion. Given the permeative appearance of the more lateral cortex I feel this patient would certainly be at a high risk for impending pathologic fracture. No additional lesions are present. CT CHEST WO CONTRAST DIAGNOSTIC on  9/9/2021 at [facility] documented:  · 4.1 x 3.6 cm spiculated superior segment right lower lobe lung mass suspicious for primary malignancy. · Questionable right hilar adenopathy. · Emphysema. CT ABDOMEN PELVIS WO CONTRAST on 9/9/2021 at [facility] documented:  · Nonenhanced imaging of the abdomen and pelvis reduces assessment of the visceral organs. No convincing intra-abdominal or pelvic metastasis.  Probable left renal cyst. Colonic diverticulosis. · Left perineural sacral cyst. No pathologic fractures. Appointment Note from 9/21/2021 visit to Dr. Nasra Charles:   She was set up an appointment for Left cephalomedullary nailing of her left pathologic femur fracture to protect her femur. Plan to biopsy  From distal femoral lesion including a small sample of bone and tissue. A cephalomedullary nailing of the left pathologic femoral shaft fracture and open biopsy of a left femoral shaft bone and soft tissue mass was performed by Dr. Efrain Nichols on 9/23/2021  Pathology reported to:  · Soft tissue and bone, left femur, excision:Hemorrhagic bone and soft tissue. · Bone, left femur, reamings:Metastatic poorly differentiated carcinoma. 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 RECOMMENDATION AND PLAN by Dr. Dennise Corona on 9/28/2021:  Diagnosis is stage IV metastatic poorly differentiated carcinoma with sarcomatoid features. · I called and spoke to Dr. Shar Morrell regarding radiation therapy for pain control and stabilization of the left femur and left hip, he will be seeing her immediately after leaving this clinic today, 9/28/2021 to get started.   · Treatment will not be curable but rather palliative and Jeramie Miramontes desires systemic therapy  · [facility] requested to look for actionable mutations  · Pathology specimen from 9/23/2021 requested to be sent to [facility] for NGS, MSI and PD-L1 testing  · DANNA/Marlon Hardy Final general surgery for port placement    · PET scan  · Cycle #1 of chemotherapy with Carboplatin AUC of 5/Alimta 500 mg/m²/Keytruda standard dosing  began on 10/7/2021,     Initial consultation with Dr. Shar Morrell on 9/28/2021 for consideration of palliative radiation therapy for pain control and stabilization of the left femur and left hip. Dr. Blanquita Bal recommended to treat the left leg with palliative radiation therapy for an anticipated a dose of 3000 cGy over 10 fractions, final course pending completion of planning. Palliative XRT to the left femur and left hip was initiated on 10/6/2021 for a total dose of 3000 cGy over 10 treatment fractions, completed 10/19/2021    Port-A-Cath placed to right IJ on 10/4/2021 by Dr. Eugenio Washington    Primary induction systemic chemotherapy cycle #1 of Carboplatin AUC of 5/Alimta 500 mg/m²/Keytruda on 10/7/2021. She tolerated cycle #1 of Carboplatin AUC of 5/Alimta 500 mg/m²/Keytruda very poorly. PET CT SKULL BASE TO MID THIGH 10/12/2021 :  · 2.9 x 0.5KU hypermetabolic mass in the superior segment of the right lower lobe, maximum SUV of 20.8   · 1.7 cm posterior right hilum there is a separate hypermetabolic mass,most likely posterior right hilar neoplastic        Adenopathy  · Lytic lesion with abnormal hypermetabolism within the anterior left acetabulum/left distal superior pubic ramus compatible with a metastatic lesion  · Intense hypermetabolism associated with the known lesion within the distal left femur, only partially visualized in the field-of-view obtained. [address] [facility] NGS sequencing reported a positive MET (exon 14 skipping) mutation on 10/19/2021. An actionable MET mutation was documented after delivery of cycle #1 of chemoimmunotherapy. She tolerated cycle #1 of Carboplatin AUC of 5/Alimta 500 mg/m²/Keytruda very poorly. Decision was made on 10/28/2021 to begin capmatinib (trabecta) 400 mg BID. Trabecta (capmatinib) had been reduced to 200 mg/day at one time, and more recently increased to 200 mg BID. Trabecta (capmatinib) was discontinued at the end of December 2021 due to elevated LFTs and elevated creatinine.     [facility]IVER at [facility] on 1/14/2022 :  · Heterogeneous liver echotexture without a discrete mass. · Previous cholecystectomy. No biliary ductal dilatation is identified. · Right-sided renal cysts including a complex septated cyst at the lateral right mid kidney measuring up to 1.3 cm. Donnell Quiñones and her family were concerned about the fact that since starting Trabecta (capmatinib) she had probably been off of the medication is much as she has been on it due to increased LFTs and creatinine. Her LFTs and creatinine normalized over about a 3-week period of time of being off of Trabecta (capmatinib). I believe she has failed a trial of Trabecta (capmatinib) and other alternatives need to be considered. Plan will be to rechallenge with decreased dosages of carboplatin (AUC of 3) Alimta 250 mg/M2 and Keytruda 200 mg on 21-day cycle. Cycle #1 of carboplatin (AUC of 3) Alimta 250 mg/M2 and Keytruda 200 mg, was delivered on 1/25/2022        Donnell Quiñones has a son Sarah Catherine, who lives in [place], and a son Naina Corey who lives in [place] who frequently accompany her to the clinic. TREATMENT SUMMARY:  · Palliative XRT to the left femur and left hip was initiated on 10/6/2021 for a total dose of 3000 cGy over 10 treatment fractions, completed 10/19/2021  · Cycle #1 of palliative chemotherapy with Carboplatin AUC of 5/Alimta 500 mg/m²/Keytruda was started on 10/7/2021  · Capmatinib (trabecta) 400 mg BID was started on 11/22/2021 and discontinued at the end of December 2021 due to elevated LFTs and creatinine  · Cycle #1 of carboplatin (AUC of 3) Alimta 250 mg/M2 and Keytruda 200 mg, was delivered on 1/25/2022        Allergies:  Patient has no known allergies.     Medicines:  Current Outpatient Medications   Medication Sig Dispense Refill    apixaban (ELIQUIS) 5 MG TABS tablet Take 1 tablet by mouth 2 times daily 60 tablet 5    dexamethasone (DECADRON) 4 MG tablet Take 1 tablet by mouth See Admin Instructions for 24 doses Take 4mg BID the day before, of and after chemotherapy every 21 days 24 tablet 0    folic acid (FOLVITE) 1 MG tablet TAKE 1 TABLET BY MOUTH DAILY 30 tablet 0    oxybutynin (DITROPAN) 5 MG tablet TAKE 1 TABLET BY MOUTH TWO TIMES A  tablet 3    pantoprazole (PROTONIX) 40 MG tablet Take 1 tablet by mouth daily 90 tablet 1    Capmatinib HCl (TABRECTA) 200 MG TABS Take 400 mg by mouth 2 times daily 120 tablet 3    metoclopramide (REGLAN) 10 MG tablet Take 1 tablet by mouth 4 times daily (Patient not taking: Reported on 1/25/2022) 120 tablet 1    ondansetron (ZOFRAN-ODT) 8 MG TBDP disintegrating tablet Place 1 tablet under the tongue every 8 hours as needed for Nausea or Vomiting (Patient not taking: Reported on 1/25/2022) 30 tablet 2    lidocaine-prilocaine (EMLA) 2.5-2.5 % cream Apply topically as needed. (Patient not taking: Reported on 1/25/2022) 30 g 3    HYDROcodone-acetaminophen (NORCO)  MG per tablet Take 1 tablet by mouth every 4 hours as needed.  pravastatin (PRAVACHOL) 80 MG tablet TAKE 1 TABLET BY MOUTH AT BEDTIME 90 tablet 3    losartan (COZAAR) 50 MG tablet TAKE 1 TABLET BY MOUTH DAILY 90 tablet 3    triamterene-hydrochlorothiazide (MAXZIDE-25) 37.5-25 MG per tablet Take 0.5 tablets by mouth daily 45 tablet 3    calcium carbonate (OSCAL) 500 MG TABS tablet Take 500 mg by mouth daily      allopurinol (ZYLOPRIM) 100 MG tablet Take 100 mg by mouth daily      Cholecalciferol (VITAMIN D) 2000 units CAPS capsule Take by mouth daily        No current facility-administered medications for this visit.      Facility-Administered Medications Ordered in Other Visits   Medication Dose Route Frequency Provider Last Rate Last Admin    0.9 % sodium chloride infusion  20 mL/hr IntraVENous Once Milagros Hendrickson MD        fosaprepitant (EMEND) 150 mg in sodium chloride 0.9 % 250 mL IVPB  150 mg IntraVENous Once Milagros Hendrickson MD        palonosetron (ALOXI) injection 0.25 mg  0.25 mg IntraVENous Once Milagros Hendrickson MD        dexamethasone (PF) (DECADRON) injection 10 mg  10 mg IntraVENous Once Bennie Cantu MD        pembrolizumab [facility]) 200 mg in sodium chloride 0.9 % 100 mL chemo IVPB  200 mg IntraVENous Once Bennie Cantu MD        PEMEtrexed (ALIMTA) 450 mg in sodium chloride 0.9 % 100 mL chemo infusion  250 mg/m2 (Treatment Plan Recorded) IntraVENous Once eBnnie Cantu MD        CARBOplatin (PARAPLATIN) 192 mg in sodium chloride 0.9 % 250 mL chemo IVPB  192 mg IntraVENous Once Bennie Cantu MD        sodium chloride flush 0.9 % injection 5-40 mL  5-40 mL IntraVENous PRN Bennie Cantu MD        heparin flush 100 UNIT/ML injection 500 Units  500 Units IntraCATHeter PRN Bennie Cantu MD           Past Medical History:      Diagnosis Date    Acid reflux     LORA (acute kidney injury) ([facility] [address] 7/28/2018    LORA (acute kidney injury) ([facility] [address] 7/28/2018    HIGH CHOLESTEROL     Hypertension     Metastatic lung cancer (metastasis from lung to other site) [facility]) 9/28/2021    Metastatic lung cancer (metastasis from lung to other site) [facility]) 9/28/2021    Urinary incontinence     UTI (urinary tract infection)     Vertigo         Past Surgical History:      Procedure Laterality Date   Aysha Devries 6/24/2019    Dr Carl Jarrett disease-Tubular AP (-) dysplasia, 5 yr recall    HYSTERECTOMY      PORT SURGERY N/A 10/4/2021    SINGLE LUMEN PORT PLMT WITH ULTRASOUND AND FLURO performed by Jevon Whitley MD at [address] [place] [address]         Family History:      Problem Relation Age of Onset    Cancer Mother     Colon Cancer Mother     Heart Disease Father     Stroke Brother     Colon Polyps Neg Hx     Esophageal Cancer Neg Hx     Liver Cancer Neg Hx     Liver Disease Neg Hx     Rectal Cancer Neg Hx     Stomach Cancer Neg Hx         Social History  Social History     Tobacco Use    Smoking status: Former Smoker     Packs/day: 0.50     Years: 40.00     Pack years: 20.00     Types: Cigarettes     Start date: 55     Quit date:      Years since quittin.1    Smokeless tobacco: Never Used   Vaping Use    Vaping Use: Never used   Substance Use Topics    Alcohol use: No    Drug use: No             Wt Readings from Last 3 Encounters:   02/15/22 152 lb 6.4 oz (69.1 kg)   22 153 lb 9.6 oz (69.7 kg)   22 153 lb 12.8 oz (69.8 kg)        Objective:  Vital Signs: Blood pressure (!) 150/86, pulse 78, temperature 98.2 °F (36.8 °C), temperature source Oral, height 5' 5\" (1.651 m), weight 152 lb 6.4 oz (69.1 kg), SpO2 99 %. Labs:  BMP:   Recent Labs     02/15/22  1240      K 4.7      CO2 24   BUN 15   CREATININE 1.2*   CALCIUM 9.1     CBC:   Recent Labs     02/15/22  1240   WBC 4.19   HGB 10.2*   HCT 31.4*   MCV 97.5*   *     PT/INR: No results for input(s): PROTIME, INR in the last 72 hours. APTT: No results for input(s): APTT in the last 72 hours. Magnesium:No results for input(s): MG in the last 72 hours. Phosphorus:No results for input(s): PHOS in the last 72 hours. Hepatic:   Recent Labs     02/15/22  1240   ALKPHOS 78   ALT 17   AST 29   PROT 5.9*   BILITOT 0.4   LABALBU 3.6       Cultures:   No results for input(s): CULTURE in the last 72 hours.     Lab Results   Component Value Date    WBC 4.19 02/15/2022    HGB 10.2 (L) 02/15/2022    HCT 31.4 (L) 02/15/2022    MCV 97.5 (H) 02/15/2022     (H) 02/15/2022     Lab Results   Component Value Date    NEUTROABS 2.50 02/15/2022     Lab Results   Component Value Date     02/15/2022    K 4.7 02/15/2022     02/15/2022    CO2 24 02/15/2022    BUN 15 02/15/2022    CREATININE 1.2 (H) 02/15/2022    GLUCOSE 79 02/15/2022    CALCIUM 9.1 02/15/2022    PROT 5.9 (L) 02/15/2022    LABALBU 3.6 02/15/2022    BILITOT 0.4 02/15/2022    ALKPHOS 78 02/15/2022    AST 29 02/15/2022    ALT 17 02/15/2022    LABGLOM 43 (A) 02/15/2022    GFRAA 58 (L) 01/20/2022    AGRATIO 2.4 (H) 12/10/2021    GLOB 2.3 02/15/2022             Radiology reports as per the Radiologist  Radiology:  None       ASSESSMENT AND PLAN:    #1   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 · Intolerant to Trabecta (capmatinib) due to liver toxicity  · LLL DVT on noninvasive study 1/20/2022, on Eliquis    Palliative XRT to the left femur and left hip was initiated on 10/6/2021 for a total dose of 3000 cGy over 10 treatment fractions, completed 10/19/2021    Primary induction systemic chemotherapy cycle #1 of Carboplatin AUC of 5/Alimta 500 mg/m²/Keytruda on 10/7/2021. She tolerated cycle #1 of Carboplatin AUC of 5/Alimta 500 mg/m²/Keytruda very poorly. [address] [facility] NGS sequencing reported a positive MET (exon 14 skipping) mutation on 10/19/2021. Decision was made on 10/28/2021 to begin capmatinib (trabecta) 400 mg PO BID. Trabecta (capmatinib) 400 mg PO BID was initiated on 11/22/2021    Trabecta (capmatinib) had been reduced to 200 mg/day at one time, then increased to 200 mg BID. Trabecta (capmatinib) was discontinued at the end of December 2021 due to elevated LFTs and elevated creatinine. Natacha Ospina and her family were concerned about the fact that since starting Trabecta (capmatinib) she had probably been off of the medication is much as she has been on it due to increased LFTs and creatinine. Her LFTs and creatinine normalized over about a 3-week period of time of being off of Trabecta (capmatinib). I believe she has failed a trial of Trabecta (capmatinib) and other alternatives need to be considered.     Plan will be to rechallenge with decreased dosages of carboplatin (AUC of 3) Alimta 250 mg/M2 and Keytruda 200 mg on 21-day cycle. Cycle #1 of carboplatin (AUC of 3) Alimta 250 mg/M2 and Keytruda 200 mg 1/25/2022  She tolerated cycle #1 with minimal side effects. Robin Rawls is here accompanied  by her son Arabella Kumar, who lives in [place], for cycle #2 of carboplatin (AUC of 3) Alimta 250 mg/M2 and Keytruda 200 mg. TARGET LESIONS:  · 4.1 x 3.6 cm spiculated superior segment right lower lobe lung mass suspicious for primary malignancy. · Right hilar adenopathy. · Nondisplaced fracture at the left acetabulum and at the root of the left superior pubic ramus  · Expansile lytic lesion involving the more distal shaft of the left femur with extraosseous extension    Wt Readings from Last 3 Encounters:   02/15/22 152 lb 6.4 oz (69.1 kg)   01/25/22 153 lb 9.6 oz (69.7 kg)   01/25/22 153 lb 12.8 oz (69.8 kg)       Physical examination today, 2/15/2022 She is accompanied by her son Marylin Blue. HEENT is without asymmetry extraocular movements are intact. Neck is supple no JVD no palpable lymphadenopathy, specifically no supraclavicular lymphadenopathy on the left or the right. Lungs clear to auscultation  Abdominal  with positive bowel sounds  Musculoskeletal:  Continues to have pain to her distal left femur. Resolution of LLE edema  Neurological exam is grossly intact    CBC today 2/15/2022 reveals a WBC of  4.19 Hgb is 10.2 with an MCV of 97.5  and platelet count of 480,383. Cycle #2 of Carboplatin (AUC3) + Alimta (250mg/m2) + Keytruda (200mg) Q21 days will be delivered today. Opportunity for questions was provided and all other questions were answered to their understanding and satisfaction. She will get cycle #3 of chemotherapy today, cycle #4  3 weeks and I will see her in 6 weeks. #2  TUMOR SCREENING AND HEALTH MAINTENANCE    [facility]  - DEXA BONE DENSITY AXIAL SKELETON on 8/13/2021 revealed Osteopenia. Patient takes Calcium + Vitamin D as indicated.     Breast cancer screening - RAD[facility] DIGITAL SCREEN W OR WO CAD

## 2022-03-08 NOTE — TELEPHONE ENCOUNTER
Sheila Amezquita called requesting a refill of the below medication which has been pended for you:     Requested Prescriptions     Pending Prescriptions Disp Refills    pantoprazole (PROTONIX) 40 MG tablet [Pharmacy Med Name: PANTOPRAZOLE 40 MG T 40 Tablet] 90 tablet 1     Sig: TAKE 1 TABLET BY MOUTH DAILY       Last Appointment Date: 1/25/2022  Next Appointment Date: 7/25/2022    No Known Allergies

## 2022-03-13 PROBLEM — N17.9 AKI (ACUTE KIDNEY INJURY) (HCC): Status: ACTIVE | Noted: 2022-01-01

## 2022-03-13 PROBLEM — E86.0 DEHYDRATION: Status: ACTIVE | Noted: 2022-01-01

## 2022-03-13 PROBLEM — N39.0 UTI (URINARY TRACT INFECTION): Status: ACTIVE | Noted: 2022-01-01

## 2022-03-13 PROBLEM — R50.81 NEUTROPENIC FEVER (HCC): Status: ACTIVE | Noted: 2022-01-01

## 2022-03-13 PROBLEM — D70.9 NEUTROPENIC FEVER (HCC): Status: ACTIVE | Noted: 2022-01-01

## 2022-03-13 NOTE — PLAN OF CARE
Patient seen and examined independently, I agree with the H&P as documented. The patient states she is not feeling well and continues to have nausea and weakness. She denies any fevers or other complaints at this time. Her U/A is consistent with a UTI, but her BMP appears to be near baseline. We will start imodium for diarrhea in light of negative C. Diff. Oncology has been consulted, appreciate recs. We are awaiting urine cultures, but patient is currently on rocephin.

## 2022-03-13 NOTE — ED PROVIDER NOTES
[facility] - [facility] EMERGENCY DEPT  eMERGENCY dEPARTMENT eNCOUnter      Pt Name: Conchis Hayes  MRN: [id number]  [place] 1940  Date of evaluation: 3/12/2022  Provider: Hayes West MD    [address]       Chief Complaint   Patient presents with    Emesis    Diarrhea         HISTORY OF PRESENT ILLNESS   (Location/Symptom, Timing/Onset,Context/Setting, Quality, Duration, Modifying Factors, Severity)  Note limiting factors. Conchis Hayes is a 79 y.o. female who presents to the emergency department with nausea vomiting and diarrhea. Patient has a history of stage IV lung cancer. She had chemotherapy on Tuesday. She is been having diarrhea for the last few days and today has been vomiting and unable to hold anything down. She called her oncologist who recommended she come to the ER. Patient denies any pain or fever. HPI    NursingNotes were reviewed. REVIEW OF SYSTEMS    (2-9 systems for level 4, 10 or more for level 5)     Review of Systems   Constitutional: Negative for chills and fever. HENT: Negative for rhinorrhea and sore throat. Respiratory: Negative for shortness of breath. Cardiovascular: Negative for chest pain and leg swelling. Gastrointestinal: Positive for diarrhea, nausea and vomiting. Negative for abdominal pain. Genitourinary: Negative for difficulty urinating. Musculoskeletal: Negative for back pain and neck pain. Skin: Negative for rash. Neurological: Negative for weakness and headaches. Psychiatric/Behavioral: Negative for confusion. A complete review of systems was performed and is negative except as noted above in the HPI.        PAST MEDICAL HISTORY     Past Medical History:   Diagnosis Date    Acid reflux     L[facility]) [facility] [address] 7/28/2018    LORA (acute kidney injury) ([facility] [address] 7/28/2018    HIGH CHOLESTEROL     Hypertension     Metast[facility] lung [facility]) 9/28/2021    Metastatic lung cancer [facility] lung [facility] other [facility]) [facility] 9/28/2021    Urinary incontinence     UTI (urinary tract infection)     Vertigo          SURGICAL HISTORY       Past Surgical History:   Procedure Laterality Date    CHOLECYSTECTOMY      COLONOSCOPY      Dr Ahsan Leon 6/24/2019    Dr SARAH Juarez-Diverticular disease-Tubular AP (-) dysplasia, 5 yr recall    HYSTERECTOMY      PORT SURGERY N/A 10/4/2021    SINGLE LUMEN PORT PLMT WITH ULTRASOUND AND FLURO performed by Noah Brooks MD at [address] [place] [address]       Previous Medications    ALLOPURINOL (ZYLOPRIM) 100 MG TABLET    Take 100 mg by mouth daily    APIXABAN (ELIQUIS) 5 MG TABS TABLET    Take 1 tablet by mouth 2 times daily    CALCIUM CARBONATE (OSCAL) 500 MG TABS TABLET    Take 500 mg by mouth daily    CAPMATINIB HCL (TABRECTA) 200 MG TABS    Take 400 mg by mouth 2 times daily    CHOLECALCIFEROL (VITAMIN D) 2000 UNITS CAPS CAPSULE    Take by mouth daily     DEXAMETHASONE (DECADRON) 4 MG TABLET    Take 1 tablet by mouth See Admin Instructions for 24 doses Take 4mg BID the day before, of and after chemotherapy every 21 days    FOLIC ACID (FOLVITE) 1 MG TABLET    TAKE 1 TABLET BY MOUTH DAILY    HYDROCODONE-ACETAMINOPHEN (NORCO)  MG PER TABLET    Take 1 tablet by mouth every 4 hours as needed. LIDOCAINE-PRILOCAINE (EMLA) 2.5-2.5 % CREAM    Apply topically as needed.     LOSARTAN (COZAAR) 50 MG TABLET    TAKE 1 TABLET BY MOUTH DAILY    METOCLOPRAMIDE (REGLAN) 10 MG TABLET    Take 1 tablet by mouth 4 times daily    ONDANSETRON (ZOFRAN-ODT) 8 MG TBDP DISINTEGRATING TABLET    Place 1 tablet under the tongue every 8 hours as needed for Nausea or Vomiting    OXYBUTYNIN (DITROPAN) 5 MG TABLET    TAKE 1 TABLET BY MOUTH TWO TIMES A DAY    PANTOPRAZOLE (PROTONIX) 40 MG TABLET    TAKE 1 TABLET BY MOUTH DAILY    PRAVASTATIN (PRAVACHOL) 80 MG TABLET    TAKE 1 TABLET BY MOUTH AT BEDTIME    TRIAMTERENE-HYDROCHLOROTHIAZIDE (MAXZIDE-25) 37.5-25 MG PER TABLET    Take 0.5 tablets by mouth daily       ALLERGIES     Patient has no known allergies. FAMILY HISTORY       Family History   Problem Relation Age of Onset    Cancer Mother     Colon Cancer Mother     Heart Disease Father     Stroke Brother     Colon Polyps Neg Hx     Esophageal Cancer Neg Hx     Liver Cancer Neg Hx     Liver Disease Neg Hx     Rectal Cancer Neg Hx     Stomach Cancer Neg Hx           SOCIAL HISTORY       Social History     Socioeconomic History    Marital status:      Spouse name: Gavino Fischer Number of children: None    Years of education: None    Highest education level: None   Occupational History     Employer: RETIRED   Tobacco Use    Smoking status: Former Smoker     Packs/day: 0.50     Years: 40.00     Pack years: 20.00     Types: Cigarettes     Start date:      Quit date:      Years since quittin.2    Smokeless tobacco: Never Used   Vaping Use    Vaping Use: Never used   Substance and Sexual Activity    Alcohol use: No    Drug use: No    Sexual activity: None   Other Topics Concern    None   Social History Narrative    None     Social Determinants of Health     Financial Resource Strain: Low Risk     Difficulty of Paying Living Expenses: Not hard at all   Food Insecurity: No Food Insecurity    Worried About [address] [place] [address] in the Last Year: Never true    Светлана of Food in the Last Year: Never true   Transportation Needs: No Transportation Needs    Lack of Transportation (Medical): No    Lack of Transportation (Non-Medical):  No   Physical Activity:     Days of Exercise per Week: Not on file    Minutes of Exercise per Session: Not on file   Stress:     Feeling of Stress : Not on file   Social Connections:     Frequency of Communication with Friends and Family: Not on file    Frequency of Social Gatherings with Friends and Family: Not on file    Attends Mormonism Services: Not on file   [employer] of Clubs or Organizations: Not on file    Attends Club or Organization Meetings: Not on file    Marital Status: Not on file   Intimate Partner Violence:     Fear of Current or Ex-Partner: Not on file    Emotionally Abused: Not on file    Physically Abused: Not on file    Sexually Abused: Not on file   Housing Stability:     Unable to Pay for Housing in the Last Year: Not on file    Number of [place] in the Last Year: Not on file    Unstable Housing in the Last Year: Not on file       SCREENINGS    Kiran Coma Scale  Eye Opening: Spontaneous  Best Verbal Response: Oriented  Best Motor Response: Obeys commands  Kiran Coma Scale Score: 15        PHYSICAL EXAM    (up to 7 for level 4, 8 or more for level 5)     ED Triage Vitals [03/12/22 2315]   BP Temp Temp Source Pulse Resp SpO2 Height Weight   (!) 105/58 98.1 °F (36.7 °C) Oral 103 20 95 % 5' 6\" (1.676 m) 145 lb (65.8 kg)       Physical Exam  Vitals and nursing note reviewed. Constitutional:       General: She is not in acute distress. Appearance: She is well-developed. She is ill-appearing. She is not diaphoretic. HENT:      Head: Normocephalic and atraumatic. Eyes:      Pupils: Pupils are equal, round, and reactive to light. Cardiovascular:      Rate and Rhythm: Normal rate and regular rhythm. Heart sounds: Normal heart sounds. Pulmonary:      Effort: Pulmonary effort is normal. No respiratory distress. Breath sounds: Normal breath sounds. Abdominal:      General: Bowel sounds are normal. There is no distension. Palpations: Abdomen is soft. Tenderness: There is no abdominal tenderness. Musculoskeletal:         General: Normal range of motion. Cervical back: Normal range of motion and neck supple. Skin:     General: Skin is warm and dry. Findings: No rash. Neurological:      Mental Status: She is alert and oriented to person, place, and time. Cranial Nerves: No cranial nerve deficit.       Motor: No abnormal muscle tone. Coordination: Coordination normal.   Psychiatric:         Behavior: Behavior normal.         DIAGNOSTIC RESULTS     EKG: All EKG's are interpreted by the Emergency Department Physician who either signs or Co-signs this chart in the absence of a cardiologist.        RADIOLOGY:   Non-plain film images such as CT, Ultrasound and MRI are read by the radiologist. Norman Brito images are visualized and preliminarily interpreted by the emergency physician with the below findings:        Interpretation per the Radiologist below, if available at the time of this note:    CT ABDOMEN PELVIS W IV CONTRAST Additional Contrast? None    (Results Pending)     CT ABDOMEN & PELVIS With Contrast:    Mild diffuse wall thickening of multiple small bowel loops consistent with nonspecific enteritis. No evidence of bowel obstruction. There is also mild thickening of the distal stomach. Some portions of the colon may also show mild wall thickening. There is diverticulosis without evidence of diverticulitis. No evidence of appendicitis. Trace free fluid. No free intraperitoneal air. Cholecystectomy.       ED BEDSIDE ULTRASOUND:   Performed by ED Physician - none    LABS:  Labs Reviewed   CBC WITH AUTO DIFFERENTIAL - Abnormal; Notable for the following components:       Result Value    WBC 0.9 (*)     RBC 4.12 (*)     MCHC 31.2 (*)     RDW 15.8 (*)     Platelets 584 (*)     Neutrophils Absolute 0.5 (*)     Lymphocytes Absolute 0.3 (*)     All other components within normal limits   COMPREHENSIVE METABOLIC PANEL - Abnormal; Notable for the following components:    CO2 16 (*)     Glucose 191 (*)     BUN 35 (*)     CREATININE 1.4 (*)     GFR Non- 36 (*)     GFR  44 (*)     Calcium 8.4 (*)     Total Protein 5.4 (*)     All other components within normal limits   URINALYSIS WITH REFLEX TO CULTURE - Abnormal; Notable for the following components:    Ketones, Urine TRACE (*) Blood, Urine TRACE (*)     Protein, UA TRACE (*)     Leukocyte Esterase, Urine MODERATE (*)     All other components within normal limits   MICROSCOPIC URINALYSIS - Abnormal; Notable for the following components:    Bacteria, UA 2+ (*)     Crystals, UA NEG (*)     All other components within normal limits   CULTURE, URINE   GASTROINTESTINAL PANEL, MOLECULAR   LIPASE       All other labs were within normal range or not returned as of this dictation. EMERGENCY DEPARTMENT COURSE and DIFFERENTIALDIAGNOSIS/MDM:   Vitals:    Vitals:    03/12/22 2315 03/13/22 0132 03/13/22 0302   BP: (!) 105/58 (!) 146/62 135/76   Pulse: 103 78 79   Resp: 20 19 20   Temp: 98.1 °F (36.7 °C)     TempSrc: Oral     SpO2: 95% 96% 97%   Weight: 145 lb (65.8 kg)     Height: 5' 6\" (1.676 m)         MDM  D/w Dr Sarah Dubon and he states he would prefer for pt to be admitted given she continues to have diarrhea. Would like cath UA as the urine specimen appears to be contaminated. D/w Dr Robbin Meckel for admission    CONSULTS:  IP CONSULT TO ONCOLOGY    PROCEDURES:  Unless otherwise notedbelow, none     Procedures    FINAL IMPRESSION     1. Nausea vomiting and diarrhea    2.  Neutropenia, unspecified type ([address]          DISPOSITION/PLAN   DISPOSITION        PATIENT REFERRED TO:  @FUP@    DISCHARGE MEDICATIONS:  New Prescriptions    No medications on file          (Please note that portions of this note were completed with a voice recognition program.  Efforts were made to edit the dictations butoccasionally words are mis-transcribed.)    Rangel Holloway MD (electronically signed)  AttendingEmergency Physician         Rangel Holloway MD  03/13/22 0672

## 2022-03-13 NOTE — PROGRESS NOTES
Pharmacy Renal Adjustment    Abdoulaye Sweet is a 79 y.o. female. Pharmacy has renally adjusted medications per protocol. Recent Labs     03/12/22  2335   BUN 35*       Recent Labs     03/12/22  2335   CREATININE 1.4*       Estimated Creatinine Clearance: 29 mL/min (A) (based on SCr of 1.4 mg/dL (H)). Height:   Ht Readings from Last 1 Encounters:   03/12/22 5' 6\" (1.676 m)     Weight:  Wt Readings from Last 1 Encounters:   03/12/22 145 lb (65.8 kg)         Plan: Adjust the following medications based on renal function:           Pepcid from 20mg IV q12hr to 20mg IV daily.     Electronically signed by Tawana Jacobo [facility] on 3/13/2022 at 7:47 AM

## 2022-03-13 NOTE — H&P
HISTORY AND PHYSICAL             Date: 3/13/2022        Patient Name: Abdoulaye Sweet     YOB: 1940      Age:  79 y.o. Chief Complaint     Chief Complaint   Patient presents with    Emesis    Diarrhea      Patient with dx of stage IV lung cancer with diffuse/distant mets to left femur. 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 She is under the care of Dr. Chema Adair. This is the second round of chemotherapy she has taken. And post chemotherapy she has not been doing well. She has had nausea , vomiting, diarrhea, and dehydration and she has felt weak and not eating. She comes in secondary to above sx. History Obtained From   Patient and chart. History of Present Illness   Patient with long standing history of ckd, hypertension, tobacco abuse, with recent dx of poorly differentiated lung cancer, stage iv with sarcomatoid features with distant spread (left femur). She has also developed left leg dvt  And is on eliquis. She is on her second round of chemotherapy. Chemotherapy is being given for palliation. She has developed sx of nausea, and vomiting and diarrhea, and dehydration and is being admitted for that and neutropenic fever. She wishes to remain full code  Status.          Past Medical History     Past Medical History:   Diagnosis Date    Acid reflux     LORA (acute kidney injury) ([facility] [address] 7/28/2018    LORA (acute kidney injury) ([facility] [address] 7/28/2018    HIGH CHOLESTEROL     Hypertension     Metast[facility] lung [facility]) 9/28/2021    Metastatic lung cancer (metastasis from lung to other site) [facility]) 9/28/2021    Urinary incontinence     UTI (urinary tract infection)     Vertigo         Past Surgical History     Past Surgical History:   Procedure Laterality Date   [facility] COLONOSCOPY      Dr Lindsey Myers 6/24/2019    Dr SARAH Juarez-Diverticular disease-Tubular AP (-) dysplasia, 5 yr recall   Kendra Katz HYSTERECTOMY      PORT SURGERY N/A 10/4/2021    SINGLE LUMEN PORT PLMT WITH ULTRASOUND AND FLURO performed by Tina Garcia MD at [place] [address]      laser         Medications Prior to Admission     Prior to Admission medications    Medication Sig Start Date End Date Taking? Authorizing Provider   pantoprazole (PROTONIX) 40 MG tablet TAKE 1 TABLET BY MOUTH DAILY 3/8/22  Yes Rudy Palencia MD   folic acid (FOLVITE) 1 MG tablet TAKE 1 TABLET BY MOUTH DAILY 2/23/22 5/24/22 Yes Zamzam Ross MD   apixaban (ELIQUIS) 5 MG TABS tablet Take 1 tablet by mouth 2 times daily 2/15/22  Yes Zamzam Ross MD   dexamethasone (DECADRON) 4 MG tablet Take 1 tablet by mouth See Admin Instructions for 24 doses Take 4mg BID the day before, of and after chemotherapy every 21 days 1/21/22  Yes Zamzam Ross MD   oxybutynin (DITROPAN) 5 MG tablet TAKE 1 TABLET BY MOUTH TWO TIMES A DAY 12/9/21  Yes Rudy Palencia MD   Capmatinib HCl (TABRECTA) 200 MG TABS Take 400 mg by mouth 2 times daily 10/28/21  Yes Zamzam Ross MD   metoclopramide (REGLAN) 10 MG tablet Take 1 tablet by mouth 4 times daily 10/14/21  Yes DAVIDSON Travis   ondansetron (ZOFRAN-ODT) 8 MG TBDP disintegrating tablet Place 1 tablet under the tongue every 8 hours as needed for Nausea or Vomiting 10/12/21  Yes DAVIDSON Travis   HYDROcodone-acetaminophen (NORCO)  MG per tablet Take 1 tablet by mouth every 4 hours as needed.  9/23/21  Yes Historical Provider, MD   pravastatin (PRAVACHOL) 80 MG tablet TAKE 1 TABLET BY MOUTH AT BEDTIME 7/6/21  Yes Rudy Palencia MD   losartan (COZAAR) 50 MG tablet TAKE 1 TABLET BY MOUTH DAILY 5/21/21  Yes Rudy Palencia MD   triamterene-hydrochlorothiazide (MAXZIDE-25) 37.5-25 MG per tablet Take 0.5 tablets by mouth daily 5/17/19  Yes Rudy Palencia MD   calcium carbonate (OSCAL) 500 MG TABS tablet Take 500 mg by mouth daily   Yes Historical Provider, MD   allopurinol (ZYLOPRIM) 100 MG tablet Take 100 mg by mouth daily   Yes Historical Provider, MD   Cholecalciferol (VITAMIN D) 2000 units CAPS capsule Take by mouth daily    Yes Historical Provider, MD   lidocaine-prilocaine (EMLA) 2.5-2.5 % cream Apply topically as needed. Patient not taking: Reported on 1/25/2022 10/7/21   DAVIDSON Srinivasan        Allergies   Patient has no known allergies. Social History     Social History     Tobacco History     Smoking Status  Former Smoker Smoking Start Date  1/1/1964 Quit date  1/1/2003 Smoking Frequency  0.5 packs/day for 40 years (21 pk yrs)    Smoking Tobacco Type  Cigarettes    Smokeless Tobacco Use  Never Used          Alcohol History     Alcohol Use Status  No          Drug Use     Drug Use Status  No          Sexual Activity     Sexually Active  Not Asked                Family History     Family History   Problem Relation Age of Onset    Cancer Mother     Colon Cancer Mother     Heart Disease Father     Stroke Brother     Colon Polyps Neg Hx     Esophageal Cancer Neg Hx     Liver Cancer Neg Hx     Liver Disease Neg Hx     Rectal Cancer Neg Hx     Stomach Cancer Neg Hx        Review of Systems   Review of Systems   Constitutional: Positive for activity change. HENT: Positive for congestion. Eyes: Negative. Respiratory: Positive for cough and shortness of breath. Cardiovascular: Positive for palpitations. Gastrointestinal: Positive for abdominal pain, diarrhea, nausea and vomiting. Endocrine: Negative. Genitourinary: Negative. Musculoskeletal: Positive for arthralgias. Allergic/Immunologic: Negative. Hematological: Negative. Psychiatric/Behavioral: Negative. All other systems reviewed and are negative. Physical Exam   BP (!) 141/60   Pulse 78   Temp 98.1 °F (36.7 °C) (Oral)   Resp 22   Ht 5' 6\" (1.676 m)   Wt 145 lb (65.8 kg)   SpO2 92%   BMI 23.40 kg/m²     Physical Exam  Vitals and nursing note reviewed.    Constitutional:       General: She is in acute distress. Appearance: She is ill-appearing. HENT:      Head: Normocephalic and atraumatic. Nose: Nose normal.      Mouth/Throat:      Mouth: Mucous membranes are moist.   Eyes:      Conjunctiva/sclera: Conjunctivae normal.   Cardiovascular:      Rate and Rhythm: Regular rhythm. Tachycardia present. Pulses: Normal pulses. Heart sounds: Normal heart sounds. Pulmonary:      Effort: Pulmonary effort is normal.   Abdominal:      Comments: Bowel sounds are high pitched    Musculoskeletal:      Cervical back: Normal range of motion. Right lower leg: Edema present. Left lower leg: Edema present. Skin:     General: Skin is warm. Neurological:      General: No focal deficit present.    Psychiatric:         Mood and Affect: Mood normal.         Labs      Recent Results (from the past 24 hour(s))   CBC with Auto Differential    Collection Time: 03/12/22 11:35 PM   Result Value Ref Range    WBC 0.9 (L) 4.8 - 10.8 K/uL    RBC 4.12 (L) 4.20 - 5.40 M/uL    Hemoglobin 12.4 12.0 - 16.0 g/dL    Hematocrit 39.7 37.0 - 47.0 %    MCV 96.4 81.0 - 99.0 fL    MCH 30.1 27.0 - 31.0 pg    MCHC 31.2 (L) 33.0 - 37.0 g/dL    RDW 15.8 (H) 11.5 - 14.5 %    Platelets 675 (H) 019 - 400 K/uL    MPV 10.9 9.4 - 12.3 fL    PLATELET SLIDE REVIEW Adequate     Neutrophils % 55.0 50.0 - 65.0 %    Lymphocytes % 33.0 20.0 - 40.0 %    Monocytes % 6.0 0.0 - 10.0 %    Eosinophils % 5.0 0.0 - 5.0 %    Basophils % 0.0 0.0 - 1.0 %    Neutrophils Absolute 0.5 (L) 1.5 - 7.5 K/uL    Immature Granulocytes # 0.0 K/uL    Lymphocytes Absolute 0.3 (L) 1.1 - 4.5 K/uL    Monocytes Absolute 0.10 0.00 - 0.90 K/uL    Eosinophils Absolute 0.05 0.00 - 0.60 K/uL    Basophils Absolute 0.00 0.00 - 0.20 K/uL    Atypical Lymphocytes Relative 1 0 - 8 %   Comprehensive Metabolic Panel    Collection Time: 03/12/22 11:35 PM   Result Value Ref Range    Sodium 138 136 - 145 mmol/L    Potassium 4.4 3.5 - 5.0 mmol/L    Chloride 106 98 - 111 mmol/L    CO2 16 (L) 22 - 29 mmol/L    Anion Gap 16 7 - 19 mmol/L    Glucose 191 (H) 74 - 109 mg/dL    BUN 35 (H) 8 - 23 mg/dL    CREATININE 1.4 (H) 0.5 - 0.9 mg/dL    GFR Non- 36 (A) >60    GFR  44 (L) >59    Calcium 8.4 (L) 8.8 - 10.2 mg/dL    Total Protein 5.4 (L) 6.6 - 8.7 g/dL    Albumin 3.8 3.5 - 5.2 g/dL    Total Bilirubin 0.5 0.2 - 1.2 mg/dL    Alkaline Phosphatase 67 35 - 104 U/L    ALT 14 5 - 33 U/L    AST 32 5 - 32 U/L   Lipase    Collection Time: 03/12/22 11:35 PM   Result Value Ref Range    Lipase 22 13 - 60 U/L   Urinalysis with Reflex to Culture    Collection Time: 03/13/22  1:24 AM    Specimen: Urine, straight catheter   Result Value Ref Range    Color, UA YELLOW Straw/Yellow    Clarity, UA Clear Clear    Glucose, Ur Negative Negative mg/dL    Bilirubin Urine Negative Negative    Ketones, Urine TRACE (A) Negative mg/dL    Specific Gravity, UA >=1.045 1.005 - 1.030    Blood, Urine TRACE (A) Negative    pH, UA 5.5 5.0 - 8.0    Protein, UA TRACE (A) Negative mg/dL    Urobilinogen, Urine 0.2 <2.0 E.U./dL    Nitrite, Urine Negative Negative    Leukocyte Esterase, Urine MODERATE (A) Negative   Microscopic Urinalysis    Collection Time: 03/13/22  1:24 AM   Result Value Ref Range    WBC, UA 10-15 0 - 5 /HPF    Epithelial Cells, UA 5-10 /HPF    Bacteria, UA 2+ (A) None Seen /HPF    Crystals, UA NEG (A) None Seen /HPF   COVID-19, Rapid    Collection Time: 03/13/22  4:46 AM    Specimen: Nasopharyngeal Swab   Result Value Ref Range    SARS-CoV-2, NAAT Not Detected Not Detected   Urinalysis with Reflex to Culture    Collection Time: 03/13/22  4:46 AM    Specimen: Urine, straight catheter   Result Value Ref Range    Color, UA YELLOW Straw/Yellow    Clarity, UA Clear Clear    Glucose, Ur Negative Negative mg/dL    Bilirubin Urine Negative Negative    Ketones, Urine Negative Negative mg/dL    Specific Gravity, UA >=1.045 1.005 - 1.030    Blood, Urine Negative Negative    pH, UA 5.5 5.0 - 8.0    Protein, UA TRACE (A) Negative mg/dL    Urobilinogen, Urine 0.2 <2.0 E.U./dL    Nitrite, Urine Negative Negative    Leukocyte Esterase, Urine SMALL (A) Negative   Microscopic Urinalysis    Collection Time: 03/13/22  4:46 AM   Result Value Ref Range    Bacteria, UA NEGATIVE (A) None Seen /HPF    Crystals, UA NEG (A) None Seen /HPF    Hyaline Casts, UA 8 0 - 8 /HPF    WBC, UA 32 (H) 0 - 5 /HPF    RBC, UA 3 0 - 4 /HPF    Epithelial Cells, UA 11 0 - 5 /HPF        Imaging/Diagnostics Last 24 Hours   seechart    Assessment      Hospital Problems           Last Modified POA    * (Principal) Neutropenic fever ([address] 3/13/2022 Yes    Hypertension (Chronic) 3/13/2022 Yes    Chronic kidney disease 3/13/2022 Yes    Metastatic lung [facility] (metastasis from lung to other site) [facility]) 3/13/2022 Yes    Chronic renal failure, stage 3 (moderate), unspecified whether stage 3a or 3b CKD ([address] 3/13/2022 Yes    Deep vein thrombosis (DVT) of proximal lower extremity ([address] 3/13/2022 Yes    UTI (urinary tract infection) 3/13/2022 Yes    LORA (acute kidney injury) ([address] 3/13/2022 Yes    Dehydration 3/13/2022 Yes          Plan   Patient with stage iv lung cancer   S/p second round of chemotherapy   Has not done well post chemotherapy   Dehydration   Neutropenia   Fever,and chills   Diarrhea  Dehydration   Admit for fluids   neulasta  And replete electrolytes   Treat diarrhea, with immodium     uti could be responsible for some of the diarrhea, cath specimen sent for cx and start abx therapy    Fluids    Comfort meds    Scheduled to take oral steroids   Change to iv steroids     Gi protective meds     dvt prophylaxis has been on eliquis for treatment of dvt already and will resume     Consult dr. Kris Delacruz.        Consultations Ordered:  IP CONSULT TO ONCOLOGY    Electronically signed by Daria Kocher, MD on 3/13/22 at 5:10 AM CDT

## 2022-03-13 NOTE — CONSULTS
Reason for Consult: Continuity of care regarding a diagnosis of lung cancer    Requesting Physician: Dr. 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 He developed nausea vomiting and diarrhea on 2/10/2022 that has been getting progressively worse. She has not had this side effect with any of her previous  chemotherapy treatments. I received a call in the middle of the night regarding these symptoms and instructed her to be evaluated in the ED for possible admission. Carole Louis was seen in the ED at [facility] on 3/12/2022 and admitted with nausea vomiting diarrhea dehydration neutropenia for management. Oncology consultation called in continuity of care. 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 · Intolerant to Trabecta (capmatinib) due to liver toxicity  · LLL DVT on noninvasive study 1/20/2022, on Eliquis     Palliative XRT to the left femur and left hip was initiated on 10/6/2021 for a total dose of 3000 cGy over 10 treatment fractions, completed 10/19/2021     Primary induction systemic chemotherapy cycle #1 of Carboplatin AUC of 5/Alimta 500 mg/m²/Keytruda on 10/7/2021.    She tolerated cycle #1 of Carboplatin AUC of 5/Alimta 500 mg/m²/Keytruda very poorly.     [employer] NGS sequencing reported a positive MET (exon 14 skipping) mutation on 10/19/2021. Decision was made on 10/28/2021 to begin capmatinib (trabecta) 400 mg PO BID. Trabecta (capmatinib) 400 mg PO BID was initiated on 11/22/2021     Trabecta (capmatinib) had been reduced to 200 mg/day at one time, then increased to 200 mg BID. Trabecta (capmatinib) was discontinued at the end of December 2021 due to elevated LFTs and elevated creatinine.     Conan Sever was rechallenged with decreased dosages of carboplatin (AUC of 3) Alimta 250 mg/M2 and Keytruda 200 mg on 21-day cycle.     Cycle #1 of carboplatin (AUC of 3) Alimta 250 mg/M2 and Keytruda 200 mg 1/25/2022  She tolerated cycle #1 with minimal side effects.     Adele received cycle #3 of this lower dose treatment with carboplatin (AUC of 3) Alimta 250 mg/M2 and Keytruda 200 mg on 3/8/2022.        TARGET LESIONS:  · 4.1 x 3.6 cm spiculated superior segment RLL lung mass consistent with the primary  · MET+ (Exon 14 Skipping)  · Right hilar adenopathy. · Nondisplaced fracture at the left acetabulum and at the root of the left superior pubic ramus. · Expansile lytic lesion involving the more distal shaft of the left femur with extraosseous extension     TUMOR HISTORY:  Conan Sever was seen in initial oncology consultation on 9/20/2021 referred by Dr Cata Shoemaker with a nondisplaced fracture of the left superior pubic ramus, expansile lytic lesion of the distal third shaft of the left femur and a 4.1 x 3.6 x 3.5 cm  lung mass suspicious for primary malignancy with metastasis.       Conan Sever had an office visit with PCP, Katelyn Stauffer on 8/20/21 with complaints of left hip pain radiating from groin down left thigh. Over the next several weeks, the pain progressed down the leg to the distal left thigh causing difficulty walking, utilizing a cane, and eventually a wheelchair. She denies any trauma.     Plain film imaging was ordered and orthopaedic referral was made.     XR HIP 2-3 VW W PELVIS LEFT on 8/30/2021 at [facility] documented:  · No acute bony abnormality. · Mild acetabular and femoral head spurring on the left side     XR FEMUR LEFT (MIN 2 VIEWS) 8/30/2021 at [facility] documented:  · No acute bony abnormality.     Orthopaedic consultation with Dr Palacios Points on 9/7/2021 to address left hip and groin pain over 1 month period, ambulating with a walker. Denies any injury and describes a stabbing pain going down her left leg when standing.     MRI PELVIS on 9/8/2021 at [facility] documented:  Along the  Helder-medial aspect of the left acetabulum and at the root of the left superior pubic ramus, there is definite evidence of a nondisplaced fracture. It should be noted that there is expansion of the cortex in this region and some gthfzhevdlfm-hv-rulsglled STIR signal with diminished T1 signal in the marrow at this location, as well as possible expansion of the cortex. The degree of marrow edema surrounding the fracture site is very abruptly cut off between normal signal, and I am suspicious that there may be an underlying bone lesion with pathologic fracture rather than a simple fracture.     Dr. Ramon Hampton called me with the above information desiring consultation. Further imaging was recommended including a CT scan of the chest abdomen and pelvis.     MRI LEFT FEMUR WO CONTRAST on 9/14/21 at [facility] documented: There is an expansile lytic lesion involving the more distal shaft of the left femur at the juncture of the proximal two-thirds with the distal one-third. This is creating permeative changes within the more lateral cortex of the femur at this level and on T1 sequencing there appears to be extraosseous extension of the process. There is surrounding edema associated with this lesion. Given the permeative appearance of the more lateral cortex I feel this patient would certainly be at a high risk for impending pathologic fracture.  No additional lesions are present.     CT CHEST WO CONTRAST DIAGNOSTIC on  9/9/2021 at [facility] documented:  · 4.1 x 3.6 cm spiculated superior segment right lower lobe lung mass suspicious for primary malignancy. · Questionable right hilar adenopathy. · Emphysema.      CT ABDOMEN PELVIS WO CONTRAST on 9/9/2021 at [facility] documented:  · Nonenhanced imaging of the abdomen and pelvis reduces assessment of the visceral organs. No convincing intra-abdominal or pelvic metastasis. Probable left renal cyst. Colonic diverticulosis. · Left perineural sacral cyst. No pathologic fractures.      Appointment Note from 9/21/2021 visit to Dr. Kayla Clarke:   She was set up an appointment for Left cephalomedullary nailing of her left pathologic femur fracture to protect her femur. Plan to biopsy  From distal femoral lesion including a small sample of bone and tissue.     A cephalomedullary nailing of the left pathologic femoral shaft fracture and open biopsy of a left femoral shaft bone and soft tissue mass was performed by Dr. Marea Collet on 9/23/2021  Pathology reported to:  · Soft tissue and bone, left femur, excision:Hemorrhagic bone and soft tissue. · Bone, left femur, reamings:Metastatic poorly differentiated carcinoma. 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        RECOMMENDATION AND PLAN by Dr. Ina Garcia on 9/28/2021:  Diagnosis is stage IV metastatic poorly differentiated carcinoma with sarcomatoid features.      · I called and spoke to Dr. Maurizio Wilkerson regarding radiation therapy for pain control and stabilization of the left femur and left hip, he will be seeing her immediately after leaving this clinic today, 9/28/2021 to get started.   · Treatment will not be curable but rather palliative and Eligha Cowden desires systemic therapy  · [employer] requested to look for actionable mutations  · Pathology specimen from 9/23/2021 requested to be sent to [facility] for NGS, MSI and PD-L1 testing  · DANNA/Marlon Hardy Final general surgery for port placement    · PET scan  · Cycle #1 of chemotherapy with Carboplatin AUC of 5/Alimta 500 mg/m²/Keytruda standard dosing  began on 10/7/2021,      Initial consultation with Dr. Maurizio Wilkerson on 9/28/2021 for consideration of palliative radiation therapy for pain control and stabilization of the left femur and left hip. Dr. Kandy Vazquez recommended to treat the left leg with palliative radiation therapy for an anticipated a dose of 3000 cGy over 10 fractions, final course pending completion of planning.       Palliative XRT to the left femur and left hip was initiated on 10/6/2021 for a total dose of 3000 cGy over 10 treatment fractions, completed 10/19/2021     Port-A-Cath placed to right IJ on 10/4/2021 by Dr. Andrés Valencia     Primary induction systemic chemotherapy cycle #1 of Carboplatin AUC of 5/Alimta 500 mg/m²/Keytruda on 10/7/2021. She tolerated cycle #1 of Carboplatin AUC of 5/Alimta 500 mg/m²/Keytruda very poorly.     PET CT SKULL BASE TO MID THIGH 10/12/2021 :  · 2.9 x 4.5OS hypermetabolic mass in the superior segment of the right lower lobe, maximum SUV of 20.8   · 1.7 cm posterior right hilum there is a separate hypermetabolic mass,most likely posterior right hilar neoplastic        Adenopathy  · Lytic lesion with abnormal hypermetabolism within the anterior left acetabulum/left distal superior pubic ramus compatible with a metastatic lesion  · Intense hypermetabolism associated with the known lesion within the distal left femur, only partially visualized in the field-of-view obtained.     [employer] NGS sequencing reported a positive MET (exon 14 skipping) mutation on 10/19/2021.          An actionable MET mutation was documented after delivery of cycle #1 of chemoimmunotherapy.    She tolerated cycle #1 of Carboplatin AUC of 5/Alimta 500 mg/m²/Keytruda very poorly.     Decision was made on 10/28/2021 to begin capmatinib (trabecta) 400 mg BID.      Trabecta (capmatinib) had been reduced to 200 mg/day at one time, and more recently increased to 200 mg BID. Trabecta (capmatinib) was discontinued at the end of December 2021 due to elevated LFTs and elevated creatinine.     US LIVER at [facility] on 1/14/2022 :  · Heterogeneous liver echotexture without a discrete mass. · Previous cholecystectomy. No biliary ductal dilatation is identified. · Right-sided renal cysts including a complex septated cyst at the lateral right mid kidney measuring up to 1.3 cm.     Staci Turner and her family were concerned about the fact that since starting Trabecta (capmatinib) she had probably been off of the medication is much as she has been on it due to increased LFTs and creatinine. Her LFTs and creatinine normalized over about a 3-week period of time of being off of Trabecta (capmatinib).    I believe she has failed a trial of Trabecta (capmatinib) and other alternatives need to be considered.     Plan will be to rechallenge with decreased dosages of carboplatin (AUC of 3) Alimta 250 mg/M2 and Keytruda 200 mg on 21-day cycle.     Cycle #1 of carboplatin (AUC of 3) Alimta 250 mg/M2 and Keytruda 200 mg, was delivered on 1/25/2022     Staci Turner has a son Sangeeta Quintanilla, who lives in [facility], and a son Ysabel Reyes who lives in [place] who frequently accompany her to the clinic.       TREATMENT SUMMARY:  · Palliative XRT to the left femur and left hip was initiated on 10/6/2021 for a total dose of 3000 cGy over 10 treatment fractions, completed 10/19/2021  · Cycle #1 of palliative chemotherapy with Carboplatin AUC of 5/Alimta 500 mg/m²/Keytruda was started on 10/7/2021  · Capmatinib (trabecta) 400 mg BID was started on 11/22/2021 and discontinued at the end of December 2021 due to elevated LFTs and creatinine  · Cycle #1 of carboplatin (AUC of 3) Alimta 250 mg/M2 and Keytruda 200 mg, was delivered on 1/25/2022            Past Medical History:    Past Medical History:   Diagnosis Date    Acid reflux     LORA [facility]acute kidney injury) ([facility] [address] 7/28/2018    LORA (acute kidney injury) ([facility] [address] 7/28/2018    HIGH CHOLESTEROL     Hypertension     Metastatic lung cancer (metastasis from lung to other site) ([facility] [address] 9/28/2021    Metastatic lung cancer (metastasis from lung to other site) ([facility] [address] 9/28/2021    Urinary incontinence     UTI (urinary tract infection)     Vertigo          Past Surgical History:    Past Surgical History:   Procedure Laterality Date   Hailey Chappell COLONOSCOPY      Dr Donny Lopez 6/24/2019    Dr SARAH Juarez-Diverticular disease-Tubular AP (-) dysplasia, 5 yr recall    HYSTERECTOMY      PORT SURGERY N/A 10/4/2021    SINGLE LUMEN PORT PLMT WITH ULTRASOUND AND FLURO performed by Gerson Bey MD at [address] [place] [address]          Immunizations:    Immunization History   Administered Date(s) Administered    COVID-19, Pfizer Purple top, DILUTE for use, 12+ yrs, 30mcg/0.3mL dose 02/22/2021, 03/15/2021    Influenza, High Dose (Fluzone 65 yrs and older) 11/09/2018, 10/11/2019, 09/10/2020, 09/25/2021    Pneumococcal Conjugate 13-valent ([id number]) 07/13/2015    Pneumococcal Polysaccharide ([id number]) 05/09/2019    Zoster Live (Zostavax) 12/09/2016    Zoster Recombinant (Shingrix) 05/09/2019, 09/25/2019         Current Hospital Medications:    Current Facility-Administered Medications   Medication Dose Route Frequency Provider Last Rate Last Admin    sodium chloride flush 0.9 % injection 5-40 mL  5-40 mL IntraVENous 2 times per day Pollo Hebert MD        sodium chloride flush 0.9 % injection 5-40 mL  5-40 mL IntraVENous PRN Pollo Hebert MD        0.9 % sodium chloride infusion  25 mL IntraVENous PRN Pollo Hebert MD        ondansetron (ZOFRAN-ODT) disintegrating tablet 4 mg  4 mg Oral Q8H PRN Mila Cole MD        Or    ondansetron [facility]) injection 4 mg  4 mg IntraVENous Q6H PRN Mila Cole MD   4 mg at 22 0919    polyethylene glycol (GLYCOLAX) packet 17 g  17 g Oral Daily PRN Mila Cole MD        acetaminophen (TYLENOL) tablet 650 mg  650 mg Oral Q6H PRN Mila Cole MD        Or    acetaminophen (TYLENOL) suppository 650 mg  650 mg Rectal Q6H PRN Mila Cole MD        cefTRIAXone (ROCEPHIN) 2000 mg in 50 mL IVPB (premix)  2,000 mg IntraVENous Q24H Mila Cole  mL/hr at 22 0922 2,000 mg at 22 9684    metronidazole (FLAGYL) 500 mg in NaCl 100 mL IVPB premix  500 mg IntraVENous Q8H Mila Cole  mL/hr at 22 0800 500 mg at 22 0800    loperamide (IMODIUM) capsule 2 mg  2 mg Oral 4x Daily PRN Mila Cole MD   2 mg at 22 0919    lactated ringers bolus  500 mL IntraVENous Once Mila Cole MD        famotidine (PEPCID) injection 20 mg  20 mg IntraVENous Daily Mila Cole MD   20 mg at 22 0920         Allergies: No Known Allergies      Social History:    Social History     Socioeconomic History    Marital status:       Spouse name: Early Prey Number of children: None    Years of education: None    Highest education level: None   Occupational History     Employer: RETIRED   Tobacco Use    Smoking status: Former Smoker     Packs/day: 0.50     Years: 40.00     Pack years: 20.00     Types: Cigarettes     Start date:      Quit date:      Years since quittin.2    Smokeless tobacco: Never Used   Vaping Use    Vaping Use: Never used   Substance and Sexual Activity    Alcohol use: No    Drug use: No    Sexual activity: None   Other Topics Concern    None   Social History Narrative    None     Social Determinants of Health     Financial Resource Strain: Low Risk     Difficulty of Paying Living Expenses: Not hard at all   Food Insecurity: No Food Insecurity    Worried About [facility] in the Last Year: Never true    Ran Out of Food in the Last Year: Never true   Transportation Needs: No Transportation Needs    Lack of Transportation (Medical): No    Lack of Transportation (Non-Medical): No   Physical Activity:     Days of Exercise per Week: Not on file    Minutes of Exercise per Session: Not on file   Stress:     Feeling of Stress : Not on file   Social Connections:     Frequency of Communication with Friends and Family: Not on file    Frequency of Social Gatherings with Friends and Family: Not on file    Attends Christianity Services: Not on file    Active Member of [address] [place] [address] or Organizations: Not on file    Attends Club or Organization Meetings: Not on file    Marital Status: Not on file   Intimate Partner Violence:     Fear of Current or Ex-Partner: Not on file    Emotionally Abused: Not on file    Physically Abused: Not on file    Sexually Abused: Not on file   Housing Stability:     Unable to Pay for Housing in the Last Year: Not on file    Number of [place] in the Last Year: Not on file    Unstable Housing in the Last Year: Not on file         Family History:   Family History   Problem Relation Age of Onset    Cancer Mother     Colon Cancer Mother     Heart Disease Father     Stroke Brother     Colon Polyps Neg Hx     Esophageal Cancer Neg Hx     Liver Cancer Neg Hx     Liver Disease Neg Hx     Rectal Cancer Neg Hx     Stomach Cancer Neg Hx        Review of Systems:  Constitutional: Negative for chills, fever, positive for fatigue and malaise. HENT: Negative for congestion, hearing loss, nosebleeds or sore throat. Eyes: Negative for photophobia, pain, discharge, redness and visual disturbance. Respiratory: Negative for cough, shortness of breath, or wheezing. Cardiovascular: Negative for chest pain, palpitations or leg swelling.    Gastrointestinal: Abdominal cramping, nausea vomiting diarrhea  Genitourinary: Negative for dysuria, flank pain, frequency, hematuria or urgency. Musculoskeletal: Generalized muscle aches and pains. Skin: Negative for rash or petechiae. Neurological: Negative for tremors, seizures, syncope, weakness or headaches. Hematological: Neutropenia. Psychiatric/Behavioral: Negative for hallucinations. Objective:  Vitals:    Vitals:    03/13/22 0915   BP:    Pulse:    Resp:    Temp: 97.8 °F (36.6 °C)   SpO2:        Physical Exam   Constitutional: Oriented to person, place, and time. Uncomfortable but in no acute distress. Head: Normocephalic and atraumatic. Nose: Nose normal.   Mouth/Throat: Oropharynx is clear and moist. No oropharyngeal exudate. Eyes: Pupils are equal and round. Conjunctivae and EOM are normal. No scleral icterus. Neck: Normal range of motion. Neck supple. No JVD. No appreciable thyromegaly. Cardiovascular: Normal rate, regular rhythm, normal heart sounds and intact distal pulses. Exam reveals no gallop, murmurs or friction rub. Pulmonary/Chest: Effort normal and breath sounds normal. No respiratory distress. No wheezes. Abdominal: Abdomen mildly tender, not acute. No rebound, positive bowel sounds. Musculoskeletal: Normal range of motion. No edema or tenderness. Lymphadenopathy: No cervical, axillary or inguinal lymphadenopathy. Neurological: Alert and oriented to person, place, and time. Cranial nerves are intact. Neurological exam is nonfocal  Skin: Skin is warm and dry. No rash noted. No erythema. No pallor.    Psychiatric: Judgment normal.         DATA:    Labs:  General Labs:  CBC:   Lab Results   Component Value Date    WBC 0.9 (L) 03/12/2022    HGB 12.4 03/12/2022    HCT 39.7 03/12/2022    MCV 96.4 03/12/2022     (H) 03/12/2022       CMP:    Lab Results   Component Value Date     03/12/2022    K 4.4 03/12/2022     03/12/2022    CO2 16 (L) 03/12/2022    BUN 35 (H) 03/12/2022    CREATININE 1.4 (H) 03/12/2022    GLUCOSE 191 (H) 03/12/2022    CALCIUM 8.4 (L) 03/12/2022    PROT 5.4 (L) 03/12/2022    LABALBU 3.8 03/12/2022    BILITOT 0.5 03/12/2022    ALKPHOS 67 03/12/2022    AST 32 03/12/2022    ALT 14 03/12/2022    LABGLOM 36 (A) 03/12/2022    GFRAA 44 (L) 03/12/2022    AGRATIO 2.4 (H) 12/10/2021    GLOB 2.5 03/08/2022         30 Day lookback of cultures:    Blood Culture Recent: No results for input(s): BC in the last 720 hours. Gram Stain Recent: No results for input(s): LABGRAM in the last 720 hours. Resp Culture Recent: No results for input(s): CULTRESP in the last 720 hours. Body Fluid Recent : No results for input(s): BFCX in the last 720 hours. MRSA Recent : No results for input(s): [address] in the last 720 hours. Urine Culture Recent : No results for input(s): LABURIN in the last 720 hours. Organism Recent : No results for input(s): ORG in the last 720 hours. 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 Oncology consultation called in continuity of care. Admission CBC on 3/12/2022 has a WBC of 0.9 with 55% lymphocytes and an ANC of 0.5. Hemoglobin is 12.4 with a platelet count of 103,848. She was afebrile on admission and continues to be afebrile this morning.   I have requested the nursing taker temperature every 4 hours  Daily CBC ordered    Neulasta 6 mg SQ delivered this morning around 9 AM.    COVID-19 rapid testing on 3/13/2022 is NEGATIVE  Urine culture is pending  Blood culture will be drawn    Rocephin/Flagyl initiated 3/13/2022 AM    #2  Nausea vomiting diarrhea    Eligha Cowden developed nausea vomiting and diarrhea on 2/10/2022 that has been getting progressively worse. She has not had this side effect with any of her previous  chemotherapy treatments. I received a call in the middle of the night regarding these symptoms and instructed her to be evaluated in the ED for possible admission. Cody Mcknight was seen in the ED at [facility] on 3/12/2022 and admitted with nausea vomiting diarrhea dehydration neutropenia for management. IV fluids and electrolyte monitoring will continue.   Imodium has been written for symptomatic management of diarrhea    Continuing supportive care    Reynold Levy MD    03/13/22  9:31 AM

## 2022-03-14 NOTE — PROGRESS NOTES
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 She developed nausea vomiting and diarrhea on 2/10/2022 that has been getting progressively worse. She has not had this side effect with any of her previous  chemotherapy treatments. I received a call in the middle of the night regarding these symptoms and instructed her to be evaluated in the ED for possible admission. Branden Blanco was seen in the ED at [facility] on 3/12/2022 and admitted with nausea vomiting diarrhea dehydration neutropenia for management. Oncology consultation called in continuity of care. 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 · Intolerant to Trabecta (capmatinib) due to liver toxicity  · LLL DVT on noninvasive study 1/20/2022, on Eliquis     Palliative XRT to the left femur and left hip was initiated on 10/6/2021 for a total dose of 3000 cGy over 10 treatment fractions, completed 10/19/2021     Primary induction systemic chemotherapy cycle #1 of Carboplatin AUC of 5/Alimta 500 mg/m²/Keytruda on 10/7/2021. She tolerated cycle #1 of Carboplatin AUC of 5/Alimta 500 mg/m²/Keytruda very poorly.     [facility] NGS sequencing reported a positive MET (exon 14 skipping) mutation on 10/19/2021. Decision was made on 10/28/2021 to begin capmatinib (trabecta) 400 mg PO BID.    Trabecta (capmatinib) 400 mg PO BID was initiated on 11/22/2021     Trabecta (capmatinib) had been reduced to 200 mg/day at one time, then increased to 200 mg BID. Trabecta (capmatinib) was discontinued at the end of December 2021 due to elevated LFTs and elevated creatinine.     Africa Wolfe was rechallenged with decreased dosages of carboplatin (AUC of 3) Alimta 250 mg/M2 and Keytruda 200 mg on 21-day cycle.     Cycle #1 of carboplatin (AUC of 3) Alimta 250 mg/M2 and Keytruda 200 mg 1/25/2022  She tolerated cycle #1 with minimal side effects.     Adele received cycle #3 of this lower dose treatment with carboplatin (AUC of 3) Alimta 250 mg/M2 and Keytruda 200 mg on 3/8/2022.        TARGET LESIONS:  · 4.1 x 3.6 cm spiculated superior segment RLL lung mass consistent with the primary  · MET+ (Exon 14 Skipping)  · Right hilar adenopathy. · Nondisplaced fracture at the left acetabulum and at the root of the left superior pubic ramus. · Expansile lytic lesion involving the more distal shaft of the left femur with extraosseous extension     TUMOR HISTORY:  Africa Wolfe was seen in initial oncology consultation on 9/20/2021 referred by Dr Sushma Bird with a nondisplaced fracture of the left superior pubic ramus, expansile lytic lesion of the distal third shaft of the left femur and a 4.1 x 3.6 x 3.5 cm  lung mass suspicious for primary malignancy with metastasis.       Africa Wolfe had an office visit with PCP, Ashley Mcrae on 8/20/21 with complaints of left hip pain radiating from groin down left thigh. Over the next several weeks, the pain progressed down the leg to the distal left thigh causing difficulty walking, utilizing a cane, and eventually a wheelchair. She denies any trauma. Plain film imaging was ordered and orthopaedic referral was made.     XR HIP 2-3 VW W PELVIS LEFT on 8/30/2021 at [address] documented:  · No acute bony abnormality.   · Mild acetabular and femoral head spurring on the left side     XR FEMUR LEFT (MIN 2 VIEWS) 8/30/2021 at [address] documented:  · No acute bony abnormality.     Orthopaedic consultation with Dr Braxton Ga Quinn Britt on 9/7/2021 to address left hip and groin pain over 1 month period, ambulating with a walker. Denies any injury and describes a stabbing pain going down her left leg when standing.     MRI PELVIS on 9/8/2021 at [facility] documented:  Along the  Helder-medial aspect of the left acetabulum and at the root of the left superior pubic ramus, there is definite evidence of a nondisplaced fracture. It should be noted that there is expansion of the cortex in this region and some baxjqoltyvlj-qb-yixiavijf STIR signal with diminished T1 signal in the marrow at this location, as well as possible expansion of the cortex. The degree of marrow edema surrounding the fracture site is very abruptly cut off between normal signal, and I am suspicious that there may be an underlying bone lesion with pathologic fracture rather than a simple fracture.     Dr. Quinn Britt called me with the above information desiring consultation. Further imaging was recommended including a CT scan of the chest abdomen and pelvis.     MRI LEFT FEMUR WO CONTRAST on 9/14/21 at [facility] documented: There is an expansile lytic lesion involving the more distal shaft of the left femur at the juncture of the proximal two-thirds with the distal one-third. This is creating permeative changes within the more lateral cortex of the femur at this level and on T1 sequencing there appears to be extraosseous extension of the process. There is surrounding edema associated with this lesion. Given the permeative appearance of the more lateral cortex I feel this patient would certainly be at a high risk for impending pathologic fracture. No additional lesions are present.     CT CHEST WO CONTRAST DIAGNOSTIC on  9/9/2021 at [facility] documented:  · 4.1 x 3.6 cm spiculated superior segment right lower lobe lung mass suspicious for primary malignancy. · Questionable right hilar adenopathy.    · Emphysema.      CT ABDOMEN PELVIS WO CONTRAST on 9/9/2021 at [facility] documented:  · Nonenhanced imaging of the abdomen and pelvis reduces assessment of the visceral organs. No convincing intra-abdominal or pelvic metastasis. Probable left renal cyst. Colonic diverticulosis. · Left perineural sacral cyst. No pathologic fractures.      Appointment Note from 9/21/2021 visit to Dr. Latosha Gamez:   She was set up an appointment for Left cephalomedullary nailing of her left pathologic femur fracture to protect her femur. Plan to biopsy  From distal femoral lesion including a small sample of bone and tissue.     A cephalomedullary nailing of the left pathologic femoral shaft fracture and open biopsy of a left femoral shaft bone and soft tissue mass was performed by Dr. Jose Vidal on 9/23/2021  Pathology reported to:  · Soft tissue and bone, left femur, excision:Hemorrhagic bone and soft tissue. · Bone, left femur, reamings:Metastatic poorly differentiated carcinoma. 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        RECOMMENDATION AND PLAN by Dr. Maverick Pollard on 9/28/2021:  Diagnosis is stage IV metastatic poorly differentiated carcinoma with sarcomatoid features.      · I called and spoke to Dr. Reinaldo Esposito regarding radiation therapy for pain control and stabilization of the left femur and left hip, he will be seeing her immediately after leaving this clinic today, 9/28/2021 to get started.   · Treatment will not be curable but rather palliative and Jesús Campuzano desires systemic therapy  · [facility] requested to look for actionable mutations  · Pathology specimen from 9/23/2021 requested to be sent to [facility] for NGS, MSI and PD-L1 testing  · DANNA/Marlon Hardy Final general surgery for port placement    · PET scan  · Cycle #1 of chemotherapy with Carboplatin AUC of 5/Alimta 500 mg/m²/Keytruda standard dosing  began on 10/7/2021,      Initial consultation with Dr. Vianey Lawrence on 9/28/2021 for consideration of palliative radiation therapy for pain control and stabilization of the left femur and left hip. Dr. Yahaira Whitehead recommended to treat the left leg with palliative radiation therapy for an anticipated a dose of 3000 cGy over 10 fractions, final course pending completion of planning.       Palliative XRT to the left femur and left hip was initiated on 10/6/2021 for a total dose of 3000 cGy over 10 treatment fractions, completed 10/19/2021     Port-A-Cath placed to right IJ on 10/4/2021 by Dr. Griselda Weller     Primary induction systemic chemotherapy cycle #1 of Carboplatin AUC of 5/Alimta 500 mg/m²/Keytruda on 10/7/2021. She tolerated cycle #1 of Carboplatin AUC of 5/Alimta 500 mg/m²/Keytruda very poorly.     PET CT SKULL BASE TO MID THIGH 10/12/2021 :  · 2.9 x 6.1YU hypermetabolic mass in the superior segment of the right lower lobe, maximum SUV of 20.8   · 1.7 cm posterior right hilum there is a separate hypermetabolic mass,most likely posterior right hilar neoplastic        Adenopathy  · Lytic lesion with abnormal hypermetabolism within the anterior left acetabulum/left distal superior pubic ramus compatible with a metastatic lesion  · Intense hypermetabolism associated with the known lesion within the distal left femur, only partially visualized in the field-of-view obtained.     [employer] NGS sequencing reported a positive MET (exon 14 skipping) mutation on 10/19/2021.          An actionable MET mutation was documented after delivery of cycle #1 of chemoimmunotherapy.    She tolerated cycle #1 of Carboplatin AUC of 5/Alimta 500 mg/m²/Keytruda very poorly.     Decision was made on 10/28/2021 to begin capmatinib (trabecta) 400 mg BID.      Trabecta (capmatinib) had been reduced to 200 mg/day at one time, and more recently increased to 200 mg BID. Trabecta (capmatinib) was discontinued at the end of December 2021 due to elevated LFTs and elevated creatinine.     US LIVER at [facility] on 1/14/2022 :  · Heterogeneous liver echotexture without a discrete mass. · Previous cholecystectomy. No biliary ductal dilatation is identified. · Right-sided renal cysts including a complex septated cyst at the lateral right mid kidney measuring up to 1.3 cm.     Dominic Campos and her family were concerned about the fact that since starting Trabecta (capmatinib) she had probably been off of the medication is much as she has been on it due to increased LFTs and creatinine. Her LFTs and creatinine normalized over about a 3-week period of time of being off of Trabecta (capmatinib).    I believe she has failed a trial of Trabecta (capmatinib) and other alternatives need to be considered.     Plan will be to rechallenge with decreased dosages of carboplatin (AUC of 3) Alimta 250 mg/M2 and Keytruda 200 mg on 21-day cycle.     Cycle #1 of carboplatin (AUC of 3) Alimta 250 mg/M2 and Keytruda 200 mg, was delivered on 1/25/2022     Dominic Campos has a son Hollis Armstrong, who lives in [facility], and a son Chemo Lara who lives in [place] who frequently accompany her to the clinic.       TREATMENT SUMMARY:  · Palliative XRT to the left femur and left hip was initiated on 10/6/2021 for a total dose of 3000 cGy over 10 treatment fractions, completed 10/19/2021  · Cycle #1 of palliative chemotherapy with Carboplatin AUC of 5/Alimta 500 mg/m²/Keytruda was started on 10/7/2021  · Capmatinib (trabecta) 400 mg BID was started on 11/22/2021 and discontinued at the end of December 2021 due to elevated LFTs and creatinine  · Cycle #1 of carboplatin (AUC of 3) Alimta 250 mg/M2 and Keytruda 200 mg, was delivered on 1/25/2022            Past Medical History:    Past Medical History:   Diagnosis Date    Acid reflux     LORA (acute kidney injury) ([facility] [address] 7/28/2018    LORA (acute kidney injury) ([facility] [address] 7/28/2018    HIGH CHOLESTEROL     Hypertension     Metastatic lung cancer (metastasis from lung to other site) [facility]) 9/28/2021    Metastatic lung cancer (metastasis from lung to other site) [facility]) 9/28/2021    Urinary incontinence     UTI (urinary tract infection)     Vertigo          Past Surgical History:    Past Surgical History:   Procedure Laterality Date   Lanis Poster COLONOSCOPY      Dr Ozzy Jenkins N/A 6/24/2019    Dr SARAH Juarez-Diverticular disease-Tubular AP (-) dysplasia, 5 yr recall    HYSTERECTOMY      PORT SURGERY N/A 10/4/2021    SINGLE LUMEN PORT PLMT WITH ULTRASOUND AND FLURO performed by Beverley Levy MD at [address] [place] [address]          Immunizations:    Immunization History   Administered Date(s) Administered    COVID-19, Pfizer Purple top, DILUTE for use, 12+ yrs, 30mcg/0.3mL dose 02/22/2021, 03/15/2021    Influenza, High Dose (Fluzone 65 yrs and older) 11/09/2018, 10/11/2019, 09/10/2020, 09/25/2021    Pneumococcal Conjugate 13-valent ([id number]) 07/13/2015    Pneumococcal Polysaccharide ([id number]) 05/09/2019    Zoster Live (Zostavax) 12/09/2016    Zoster Recombinant (Shingrix) 05/09/2019, 09/25/2019         Current Hospital Medications:    Current Facility-Administered Medications   Medication Dose Route Frequency Provider Last Rate Last Admin    sodium chloride flush 0.9 % injection 5-40 mL  5-40 mL IntraVENous 2 times per day Alaina Jules MD        sodium chloride flush 0.9 % injection 5-40 mL  5-40 mL IntraVENous PRN Alaina Jules MD        0.9 % sodium chloride infusion  25 mL IntraVENous PRN Alaina Jules MD        ondansetron (ZOFRAN-ODT) disintegrating tablet 4 mg  4 mg Oral Q8H PRN Alaina Jules MD        Or    ondansetron (ZOFRAN) injection 4 mg  4 mg IntraVENous Q6H PRN Alaina Jules MD   4 mg at 03/13/22 0919    polyethylene glycol (GLYCOLAX) packet 17 g  17 g Oral Daily PRN Adonis Ovalles MD        acetaminophen (TYLENOL) tablet 650 mg  650 mg Oral Q6H PRN Adonis Ovalles MD        Or    acetaminophen (TYLENOL) suppository 650 mg  650 mg Rectal Q6H PRN Adonis Ovalles MD        cefTRIAXone (ROCEPHIN) 2000 mg in 50 mL IVPB (premix)  2,000 mg IntraVENous Q24H Adonis Ovalles MD   Stopped at 03/13/22 1107    metronidazole (FLAGYL) 500 mg in NaCl 100 mL IVPB premix  500 mg IntraVENous Anna David MD   Stopped at 03/14/22 0130    loperamide (IMODIUM) capsule 2 mg  2 mg Oral 4x Daily PRN Adonis Ovalles MD   2 mg at 03/14/22 0242    allopurinol (ZYLOPRIM) tablet 100 mg  100 mg Oral Daily Adonis Ovalles MD   100 mg at 03/13/22 1306    apixaban (ELIQUIS) tablet 5 mg  5 mg Oral BID Adonis Ovalles MD   5 mg at 03/13/22 2030    Capmatinib HCl TABS 400 mg  400 mg Oral BID Adonis Ovalles MD        dexamethasone (DECADRON) injection 4 mg  4 mg IntraVENous Q24H Adonis Ovalles MD   4 mg at 92/18/97 4526    folic acid (FOLVITE) tablet 1 mg  1 mg Oral Daily Adonis Ovalles MD   1 mg at 03/13/22 1306    HYDROcodone-acetaminophen (NORCO)  MG per tablet 1 tablet  1 tablet Oral Q6H PRN Adonis Ovalles MD        metoclopramide (REGLAN) tablet 5 mg  5 mg Oral 4x Daily Adonis Ovalles MD   5 mg at 03/13/22 2030    famotidine (PEPCID) injection 20 mg  20 mg IntraVENous Daily Adonis Ovalles MD   20 mg at 03/13/22 0920    0.9 % sodium chloride infusion   IntraVENous Continuous Miguelangel Hendrickson MD 75 mL/hr at 03/13/22 0958 New Bag at 03/13/22 0958         Allergies: No Known Allergies      Social History:    Social History     Socioeconomic History    Marital status:       Spouse name: Shakira Paiz Number of children: None    Years of education: None    Highest education level: None   Occupational History     Employer: RETIRED   Tobacco Use    Smoking status: Former Smoker     Packs/day: 0.50     Years: 40.00     Pack years: 20.00     Types: Cigarettes     Start date:      Quit date:      Years since quittin.2    Smokeless tobacco: Never Used   Vaping Use    Vaping Use: Never used   Substance and Sexual Activity    Alcohol use: No    Drug use: No    Sexual activity: None   Other Topics Concern    None   Social History Narrative    None     Social Determinants of Health     Financial Resource Strain: Low Risk     Difficulty of Paying Living Expenses: Not hard at all   Food Insecurity: No Food Insecurity    Worried About Running Out of Food in the Last Year: Never true    Светлана of Food in the Last Year: Never true   Transportation Needs: No Transportation Needs    Lack of Transportation (Medical): No    Lack of Transportation (Non-Medical):  No   Physical Activity:     Days of Exercise per Week: Not on file    Minutes of Exercise per Session: Not on file   Stress:     Feeling of Stress : Not on file   Social Connections:     Frequency of Communication with Friends and Family: Not on file    Frequency of Social Gatherings with Friends and Family: Not on file    Attends Mosque Services: Not on file    Active Member of [address] [place] [address] [employer] or Organizations: Not on file    Attends Club or Organization Meetings: Not on file    Marital Status: Not on file   Intimate Partner Violence:     Fear of Current or Ex-Partner: Not on file    Emotionally Abused: Not on file    Physically Abused: Not on file    Sexually Abused: Not on file   Housing Stability:     Unable to Pay for Housing in the Last Year: Not on file    Number of [place] in the Last Year: Not on file    Unstable Housing in the Last Year: Not on file         Family History:   Family History   Problem Relation Age of Onset    Cancer Mother     Colon Cancer Mother     Heart Disease Father     Stroke Brother     Colon Polyps Neg Hx     Esophageal Cancer Neg Hx     Liver Cancer Neg Hx     Liver Disease Neg Hx     Rectal Cancer Neg Hx     Stomach Cancer Neg Hx        Objective:  Vitals:    Vitals:    03/14/22 0537   BP: (!) 154/71   Pulse: 85   Resp: 16   Temp: 96.4 °F (35.8 °C)   SpO2: 98%       Physical Exam   Constitutional: Oriented to person, place, and time. Uncomfortable but in no acute distress. Head: Normocephalic and atraumatic. Nose: Nose normal.   Mouth/Throat: Oropharynx is clear and moist. No oropharyngeal exudate. Eyes: Pupils are equal and round. Conjunctivae and EOM are normal. No scleral icterus. Neck: Normal range of motion. Neck supple. No JVD. No appreciable thyromegaly. Cardiovascular: Normal rate, regular rhythm, normal heart sounds and intact distal pulses. Exam reveals no gallop, murmurs or friction rub. Pulmonary/Chest: Effort normal and breath sounds normal. No respiratory distress. No wheezes. Abdominal: Abdomen mildly tender, not acute. No rebound, positive bowel sounds. Musculoskeletal: Normal range of motion. No edema or tenderness. Lymphadenopathy: No cervical, axillary or inguinal lymphadenopathy. Neurological: Alert and oriented to person, place, and time. Cranial nerves are intact. Neurological exam is nonfocal  Skin: Skin is warm and dry. No rash noted. No erythema. No pallor.    Psychiatric: Judgment normal.         DATA:    Labs:  General Labs:  CBC:   Lab Results   Component Value Date    WBC 1.8 (L) 03/14/2022    HGB 11.9 (L) 03/14/2022    HCT 36.0 (L) 03/14/2022    MCV 95.5 03/14/2022     03/14/2022     Lab Results   Component Value Date    NEUTROABS 1.1 (L) 03/14/2022         CMP:    Lab Results   Component Value Date     03/14/2022    K 3.2 (L) 03/14/2022     03/14/2022    CO2 18 (L) 03/14/2022    BUN 26 (H) 03/14/2022    CREATININE 1.4 (H) 03/14/2022    GLUCOSE 154 (H) 03/14/2022    CALCIUM 7.5 (L) 03/14/2022    PROT 4.8 (L) 03/14/2022    LABALBU 2.8 (L) 03/14/2022    BILITOT 0.3 03/14/2022 ALKPHOS 47 03/14/2022    AST 16 03/14/2022    ALT 9 03/14/2022    LABGLOM 36 (A) 03/14/2022    GFRAA 44 (L) 03/14/2022    AGRATIO 2.4 (H) 12/10/2021    GLOB 2.5 03/08/2022          IMPRESSION/RECOMMENDATIONS:      #Non-small cell lung cancer, adenocarcinoma stage IV  Hollie Villegas is a very pleasant 24-year-old  female with a diagnosis of Stage IV, 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 Cycle #2 chemotherapy 2/15/2022    Oncology consultation called in continuity of care. Admission CBC on 3/12/2022 has a WBC of 0.9 with 55% lymphocytes and an ANC of 0.5. Hemoglobin is 12.4 with a platelet count of 828,788. Status post Neulasta 6 mg SQ on 3/13/2022  COVID-19 rapid testing on 3/13/2022 is NEGATIVE  Urine culture-negative to date  Blood culture-in process    Rocephin initiated 3/13/2022 AM    #Nausea vomiting diarrhea    Hollie Villegas developed nausea vomiting and diarrhea on 2/10/2022 that has been getting progressively worse. She has not had this side effect with any of her previous  chemotherapy treatments. I received a call in the middle of the night regarding these symptoms and instructed her to be evaluated in the ED for possible admission. Hollie Villegas was seen in the ED at [address] [place] [address] on 3/12/2022 and admitted with nausea vomiting diarrhea dehydration neutropenia for management. IV fluids and electrolyte monitoring will continue.   Imodium has been written for symptomatic management of diarrhea    Continuing supportive care    Normocytic anemia   -hemoglobin 11.9/MCV 95    History of DVT left lower extremity, January 2022  -Eliquis 5 mg p.o. twice daily    CKD stage IIIB  Creatinine baseline1.1  -Creatinine 1.4/GFR 36    Plan  Continue antibiotics  Hypokalemiaas per hospitalist  Continue Eliquis for DVT  Continue antiemetics          Jan Daniels MD    03/14/22  7:15 AM

## 2022-03-14 NOTE — PROGRESS NOTES
Physician Progress Note      Conor Lucas  [facility] #:                  [id number]  :                       1940  ADMIT DATE:       3/12/2022 11:20 PM  [address] [place] DATE:  RESPONDING  PROVIDER #:        Janelle Vragas          QUERY TEXT:    Patient admitted with nausea, vomiting. diarrhea, and dehydration. Documentation reflects LORA in H&P. CR results are 1.4 with no baseline   creatinine level documented. Pt has history of CKD 3. If possible, please   document in the progress notes and discharge summary if LORA was: The medical record reflects the following:  Risk Factors: HTN, CKD 3, dehydration, nausea and vomiting, cancer currently   on chemo  Clinical Indicators: BUN/CR/GFR 35/1.4/36 26/1./36  Treatment: Serial chemistry panels,  ml IV bolus, 0.9%  ml bolus,   then @ 75 ml/hr. Options provided:  -- LORA confirmed after study  -- LORA treated and resolved  -- LORA ruled out after study  -- Other - I will add my own diagnosis  -- Disagree - Not applicable / Not valid  -- Disagree - Clinically unable to determine / Unknown  -- Refer to Clinical Documentation Reviewer    PROVIDER RESPONSE TEXT:    LORA ruled out after study.     Query created by: Taty Iverson on 3/14/2022 1:04 PM      Electronically signed by:  Janelle Vargas 3/14/2022 2:46 PM

## 2022-03-14 NOTE — CARE COORDINATION
Initial CM Assessment    Initial Assessment Completed at bedside with:    [x]   Patient  []   Family/Caregiver/Guardian   []   Other:      Patient Contact Information:  [address] [place] [address]  [phone] (home)   Above information verified? [x]   Yes  []   No    ADLS:    Independent - PTOT pending   Support System:   Children  Plan to return to current housing:   [x]   Yes  []   No    Transportation plan for Discharge:  Son     Do you have any unmet social needs that would keep you from returning home safely:  []   Yes  [x]   No              Unmet Social Needs Notes:       Had [address] [facility] prior to admission:    []   Yes  [x]   No    Currently ACTIVE with Home Health CARE:    []   Yes  [x]   No  []   Interested at discharge  [facility]:      Current PCP:  Angelica Burnette MD  PCP verified? [x]   Yes  []   No    Have you been vaccinated for COVID-19 (SARS-CoV-2)? [x]   Yes  []   No                   If so, when? Which :  []   Pf[employer]  []   Moderna  []   [employer]  []   Other:       Pharmacy:    [facility] [phone] [facility] [phone]  [facility] [address]  Phone: [phone] Fax: [phone]    [facility] [address] [place] [address], [address] [place] [address]  Phone: [phone] Fax: [phone]    Prefer to use Meds to Bed? []   Yes  [x]   No  Potential assistance purchasing medications?      []   Yes  [x]   No    Active with HD/PD prior to admission:           []   Yes  [x]   No  HD Center:       Financial:  Payor: MEDICARE / Plan: MEDICARE PART A AND B / Product Type: *No Product type* /     Pre-Cert required for SNF:   []   Yes  [x]   No    Patient Deficits:  []   Yes   [x]   No    If yes:  []   Confusion/Memory  []   Visual  []   Motor/Sensory         []   Right arm         [] Right leg         []   Left arm         []   Left leg  []   Language/Speech         []   Aphasia         []   Dysarthria         []   Swallow         Kiran Coma Scale  Eye Opening: Spontaneous  Best Verbal Response: Oriented  Best Motor Response: Obeys commands  Kiran Coma Scale Score: 15    Patient Deficit Notes: Additional CM/SW Notes:    Spoke with pt to assess dc plans/needs. She reports that her son has moved in with her, he works from home and is able to provide assistance for her while she is sick. She denies any needs at this time, PTOT pending. SW will follow.      Tavo Feliciano and/or her family were provided with choice of provider:  [x]   Yes   []   No      Patient Admission Status:       [address]

## 2022-03-14 NOTE — PROGRESS NOTES
Daily Progress Note    Date:3/14/2022  Patient: Armand Ball  : 1940  MATT:[id number]  CODE:Full Code No additional code details  Zev Ma MD    Admit Date: 3/12/2022 11:20 PM   LOS: 1 day       Subjective: Patient states she is continuing to feel lousy. She is still having persistent diarrhea despite loperamide, and nausea despite zofran. She is feeling very weak as well. She denies any other complaints at this time.     Review of Systems  Negative unless noted above    Objective:      Vital signs in last 24 hours:  Patient Vitals for the past 24 hrs:   BP Temp Temp src Pulse Resp SpO2   22 0537 (!) 154/71 96.4 °F (35.8 °C) Temporal 85 16 98 %   22 2319 (!) 133/59 96.5 °F (35.8 °C)  80 16 98 %   22 1330 121/65 98.2 °F (36.8 °C) Temporal 91 18 98 %   22 0930 116/74 97.9 °F (36.6 °C) Temporal 88 20 97 %         Lab Review   Recent Results (from the past 24 hour(s))   CBC with Auto Differential    Collection Time: 22 10:58 AM   Result Value Ref Range    WBC 0.6 (L) 4.8 - 10.8 K/uL    RBC 3.76 (L) 4.20 - 5.40 M/uL    Hemoglobin 11.6 (L) 12.0 - 16.0 g/dL    Hematocrit 36.2 (L) 37.0 - 47.0 %    MCV 96.3 81.0 - 99.0 fL    MCH 30.9 27.0 - 31.0 pg    MCHC 32.0 (L) 33.0 - 37.0 g/dL    RDW 15.8 (H) 11.5 - 14.5 %    Platelets 910 536 - 498 K/uL    MPV 10.7 9.4 - 12.3 fL    PLATELET SLIDE REVIEW Adequate     Neutrophils % 38.0 (L) 50.0 - 65.0 %    Lymphocytes % 46.0 (H) 20.0 - 40.0 %    Monocytes % 13.0 (H) 0.0 - 10.0 %    Eosinophils % 0.0 0.0 - 5.0 %    Basophils % 0.0 0.0 - 1.0 %    Neutrophils Absolute 0.2 (L) 1.5 - 7.5 K/uL    Immature Granulocytes # 0.0 K/uL    Lymphocytes Absolute 0.3 (L) 1.1 - 4.5 K/uL    Monocytes Absolute 0.10 0.00 - 0.90 K/uL    Eosinophils Absolute 0.00 0.00 - 0.60 K/uL    Basophils Absolute 0.00 0.00 - 0.20 K/uL    Bands Relative 2 0 - 5 %    Atypical Lymphocytes Relative 1 0 - 8 %    Anisocytosis 1+ (A)     Microcytes 1+ (A)     Poikilocytes 1+ (A)     Ovalocytes Occasional (A)     Tear Drop Cells Occasional (A)    Comprehensive Metabolic Panel w/ Reflex to MG    Collection Time: 03/14/22  2:20 AM   Result Value Ref Range    Sodium 139 136 - 145 mmol/L    Potassium reflex Magnesium 3.2 (L) 3.5 - 5.0 mmol/L    Chloride 107 98 - 111 mmol/L    CO2 18 (L) 22 - 29 mmol/L    Anion Gap 14 7 - 19 mmol/L    Glucose 154 (H) 74 - 109 mg/dL    BUN 26 (H) 8 - 23 mg/dL    CREATININE 1.4 (H) 0.5 - 0.9 mg/dL    GFR Non- 36 (A) >60    GFR  44 (L) >59    Calcium 7.5 (L) 8.8 - 10.2 mg/dL    Total Protein 4.8 (L) 6.6 - 8.7 g/dL    Albumin 2.8 (L) 3.5 - 5.2 g/dL    Total Bilirubin 0.3 0.2 - 1.2 mg/dL    Alkaline Phosphatase 47 35 - 104 U/L    ALT 9 5 - 33 U/L    AST 16 5 - 32 U/L   Lactic Acid    Collection Time: 03/14/22  2:20 AM   Result Value Ref Range    Lactic Acid 2.5 (HH) 0.5 - 1.9 mmol/L   Magnesium    Collection Time: 03/14/22  2:20 AM   Result Value Ref Range    Magnesium 1.5 (L) 1.6 - 2.4 mg/dL   CBC with Auto Differential    Collection Time: 03/14/22  4:27 AM   Result Value Ref Range    WBC 1.8 (L) 4.8 - 10.8 K/uL    RBC 3.77 (L) 4.20 - 5.40 M/uL    Hemoglobin 11.9 (L) 12.0 - 16.0 g/dL    Hematocrit 36.0 (L) 37.0 - 47.0 %    MCV 95.5 81.0 - 99.0 fL    MCH 31.6 (H) 27.0 - 31.0 pg    MCHC 33.1 33.0 - 37.0 g/dL    RDW 15.7 (H) 11.5 - 14.5 %    Platelets 880 662 - 922 K/uL    MPV 10.9 9.4 - 12.3 fL    PLATELET SLIDE REVIEW Adequate     Neutrophils % 61.0 50.0 - 65.0 %    Lymphocytes % 26.0 20.0 - 40.0 %    Monocytes % 7.0 0.0 - 10.0 %    Eosinophils % 1.0 0.0 - 5.0 %    Basophils % 0.0 0.0 - 1.0 %    Neutrophils Absolute 1.1 (L) 1.5 - 7.5 K/uL    Immature Granulocytes # 0.2 K/uL    Lymphocytes Absolute 0.6 (L) 1.1 - 4.5 K/uL    Monocytes Absolute 0.10 0.00 - 0.90 K/uL    Eosinophils Absolute 0.02 0.00 - 0.60 K/uL    Basophils Absolute 0.00 0.00 - 0.20 K/uL    Atypical Lymphocytes Relative 5 0 - 8 %    Ovalocytes Occasional (A)        I/O last 3 completed shifts: In: 360 [P.O.:360]  Out: -   No intake/output data recorded.       Current Facility-Administered Medications:     diphenoxylate-atropine (LOMOTIL) 2.5-0.025 MG per tablet 1 tablet, 1 tablet, Oral, 4x Daily PRN, Lazarus Santo MD, 1 tablet at 03/14/22 0800    ondansetron (ZOFRAN) injection 8 mg, 8 mg, IntraVENous, Q8H PRN, Shan Castro MD    sodium chloride flush 0.9 % injection 5-40 mL, 5-40 mL, IntraVENous, 2 times per day, Kathrine Jay MD, 10 mL at 03/14/22 0856    sodium chloride flush 0.9 % injection 5-40 mL, 5-40 mL, IntraVENous, PRN, Kathrine Jay MD    0.9 % sodium chloride infusion, 25 mL, IntraVENous, PRN, Kathrine Jay MD    polyethylene glycol (GLYCOLAX) packet 17 g, 17 g, Oral, Daily PRN, Kathrine Jay MD    acetaminophen (TYLENOL) tablet 650 mg, 650 mg, Oral, Q6H PRN **OR** acetaminophen (TYLENOL) suppository 650 mg, 650 mg, Rectal, Q6H PRN, Kathrine Jay MD    cefTRIAXone (ROCEPHIN) 2000 mg in 50 mL IVPB (premix), 2,000 mg, IntraVENous, Q24H, Kathrine Jay MD, Last Rate: 100 mL/hr at 03/14/22 0856, 2,000 mg at 03/14/22 0856    metronidazole (FLAGYL) 500 mg in NaCl 100 mL IVPB premix, 500 mg, IntraVENous, Q8H, Kathrine Jay MD, Last Rate: 100 mL/hr at 03/14/22 0856, 500 mg at 03/14/22 0856    loperamide (IMODIUM) capsule 2 mg, 2 mg, Oral, 4x Daily PRN, Kathrine Jay MD, 2 mg at 03/14/22 6660    allopurinol (ZYLOPRIM) tablet 100 mg, 100 mg, Oral, Daily, Kathrine Jay MD, 100 mg at 03/14/22 0856    apixaban (ELIQUIS) tablet 5 mg, 5 mg, Oral, BID, Kathrine Jay MD, 5 mg at 03/13/22 2030    dexamethasone (DECADRON) injection 4 mg, 4 mg, IntraVENous, Q24H, Kathrine Jay MD, 4 mg at 94/53/56 1406    folic acid (FOLVITE) tablet 1 mg, 1 mg, Oral, Daily, Kathrine Jay MD, 1 mg at 03/14/22 0855    HYDROcodone-acetaminophen (NORCO)  MG per tablet 1 tablet, 1 tablet, Oral, Q6H PRN, Mila Cole MD    metoclopramide (REGLAN) tablet 5 mg, 5 mg, Oral, 4x Daily, Mila Cole MD, 5 mg at 03/14/22 0855    famotidine (PEPCID) injection 20 mg, 20 mg, IntraVENous, Daily, Mila Cole MD, 20 mg at 03/14/22 0856    0.9 % sodium chloride infusion, , IntraVENous, Continuous, Enid Mcginnis MD, Last Rate: 75 mL/hr at 03/13/22 0958, New Bag at 03/13/22 2508      Physical Exam     General - NAD, AAOx3  HEENT - AT NC, EOMI, dry oral mucosa  Neck - No JVD  CVS - S1/S2 present, RRR, no M/R/G  Lungs - CTA B/L  Abdomen - soft, NT, ND, BS+  Ext - no edema, diminished capillary refill  Skin - no rashes, bruises or discolorations  Neuro - no focal deficits  Psych - not agitated    Assessment and Plan    1) fever  - no evidence of fever since admission  - U/A consistent with UTI  - urine cultures pending  - continue empiric ceftriaxone  - monitor    2) neutropenia  - improving  - no longer requires contact isolation  - continue to monitor with daily CBC    3) UTI  - U/A minimally suggestive of UTI  - cultures pending  - IV NS  - ceftriaxone  - monitor    4) diarrhea  - unclear etiology, suspect this is related to chemo side effects  - C.  Diff negative, will discontinue flagyl  - imodium    5) persistent nausea  - suspect this is chemo related  - zofran, reglan    6) stave IV lung cancer  - manage per oncology  - continue dexamethasone    7) hx DVT  - eliquis    8) GI ppx  - pepcid          nEid Mcginnis MD 3/14/2022 9:19 AM

## 2022-03-15 NOTE — TELEPHONE ENCOUNTER
S/w Domitila Patricia (son; on [employer]), pt has stage 4 lung cancer and cannot keep anything down. Son states pt is on Raudel ice but only eats half of it and them vomits. She has lost 15 lbs. This started Thursday and pt was admitted to hospital on Saturday. The hospital is telling the family that the chemo is causing this but son is saying the oncologist is telling the family that chemo is not the cause. An MRI of the brain will be performed to see if the cancer has spread.

## 2022-03-15 NOTE — PROGRESS NOTES
Medical oncology progress note    Susan Gotti  1940  ROOM: 425      Subjective: Continues with nausea vomiting, diarrhea. 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 She developed nausea vomiting and diarrhea on 2/10/2022 that has been getting progressively worse. She has not had this side effect with any of her previous  chemotherapy treatments. I received a call in the middle of the night regarding these symptoms and instructed her to be evaluated in the ED for possible admission. Pavan Hawkins was seen in the ED at [facility] on 3/12/2022 and admitted with nausea vomiting diarrhea dehydration neutropenia for management. Oncology consultation called in continuity of care. 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 Intolerant to Trabecta (capmatinib) due to liver toxicity  LLL DVT on noninvasive study 1/20/2022, on Eliquis     Palliative XRT to the left femur and left hip was initiated on 10/6/2021 for a total dose of 3000 cGy over 10 treatment fractions, completed 10/19/2021     Primary induction systemic chemotherapy cycle #1 of Carboplatin AUC of 5/Alimta 500 mg/m²/Keytruda on 10/7/2021. She tolerated cycle #1 of Carboplatin AUC of 5/Alimta 500 mg/m²/Keytruda very poorly. [facility] NGS sequencing reported a positive MET (exon 14 skipping) mutation on 10/19/2021. Decision was made on 10/28/2021 to begin capmatinib (trabecta) 400 mg PO BID.    Trabecta (capmatinib) 400 mg PO BID was initiated on 11/22/2021     Trabecta (capmatinib) had been reduced to 200 mg/day at one time, then increased to 200 mg BID. Trabecta (capmatinib) was discontinued at the end of December 2021 due to elevated LFTs and elevated creatinine. Damien Ramos was rechallenged with decreased dosages of carboplatin (AUC of 3) Alimta 250 mg/M2 and Keytruda 200 mg on 21-day cycle. Cycle #1 of carboplatin (AUC of 3) Alimta 250 mg/M2 and Keytruda 200 mg 1/25/2022  She tolerated cycle #1 with minimal side effects. Damien Ramos received cycle #3 of this lower dose treatment with carboplatin (AUC of 3) Alimta 250 mg/M2 and Keytruda 200 mg on 3/8/2022. TARGET LESIONS:  4.1 x 3.6 cm spiculated superior segment RLL lung mass consistent with the primary  MET+ (Exon 14 Skipping)  Right hilar adenopathy. Nondisplaced fracture at the left acetabulum and at the root of the left superior pubic ramus. Expansile lytic lesion involving the more distal shaft of the left femur with extraosseous extension     TUMOR HISTORY:  Damien Ramos was seen in initial oncology consultation on 9/20/2021 referred by Dr Mariaa Soni with a nondisplaced fracture of the left superior pubic ramus, expansile lytic lesion of the distal third shaft of the left femur and a 4.1 x 3.6 x 3.5 cm  lung mass suspicious for primary malignancy with metastasis. Damien Ramos had an office visit with PCP, Nicole Serrano on 8/20/21 with complaints of left hip pain radiating from groin down left thigh. Over the next several weeks, the pain progressed down the leg to the distal left thigh causing difficulty walking, utilizing a cane, and eventually a wheelchair. She denies any trauma. Plain film imaging was ordered and orthopaedic referral was made. XR HIP 2-3 VW W PELVIS LEFT on 8/30/2021 at [facility] documented:  No acute bony abnormality. Mild acetabular and femoral head spurring on the left side     XR FEMUR LEFT (MIN 2 VIEWS) 8/30/2021 at [facility] documented:  No acute bony abnormality. Orthopaedic consultation with Dr Kingsley Babinski on 9/7/2021 to address left hip and groin pain over 1 month period, ambulating with a walker. Denies any injury and describes a stabbing pain going down her left leg when standing. MRI PELVIS on 9/8/2021 at [facility] documented:  Along the  Helder-medial aspect of the left acetabulum and at the root of the left superior pubic ramus, there is definite evidence of a nondisplaced fracture. It should be noted that there is expansion of the cortex in this region and some [id number] STIR signal with diminished T1 signal in the marrow at this location, as well as possible expansion of the cortex. The degree of marrow edema surrounding the fracture site is very abruptly cut off between normal signal, and I am suspicious that there may be an underlying bone lesion with pathologic fracture rather than a simple fracture. Dr. Dutch Granger called me with the above information desiring consultation. Further imaging was recommended including a CT scan of the chest abdomen and pelvis. MRI LEFT FEMUR WO CONTRAST on 9/14/21 at [facility] documented: There is an expansile lytic lesion involving the more distal shaft of the left femur at the juncture of the proximal two-thirds with the distal one-third. This is creating permeative changes within the more lateral cortex of the femur at this level and on T1 sequencing there appears to be extraosseous extension of the process. There is surrounding edema associated with this lesion. Given the permeative appearance of the more lateral cortex I feel this patient would certainly be at a high risk for impending pathologic fracture. No additional lesions are present. CT CHEST WO CONTRAST DIAGNOSTIC on  9/9/2021 at [facility] documented:  4.1 x 3.6 cm spiculated superior segment right lower lobe lung mass suspicious for primary malignancy. Questionable right hilar adenopathy. Emphysema.       CT ABDOMEN PELVIS WO CONTRAST on 9/9/2021 at [facility] documented:  Nonenhanced imaging of the abdomen and pelvis reduces assessment of the visceral organs. No convincing intra-abdominal or pelvic metastasis. Probable left renal cyst. Colonic diverticulosis. Left perineural sacral cyst. No pathologic fractures. Appointment Note from 9/21/2021 visit to Dr. Sinan Birch:   She was set up an appointment for Left cephalomedullary nailing of her left pathologic femur fracture to protect her femur. Plan to biopsy  From distal femoral lesion including a small sample of bone and tissue. A cephalomedullary nailing of the left pathologic femoral shaft fracture and open biopsy of a left femoral shaft bone and soft tissue mass was performed by Dr. Cata Shoemaker on 9/23/2021  Pathology reported to: Soft tissue and bone, left femur, excision:Hemorrhagic bone and soft tissue. Bone, left femur, reamings:Metastatic poorly differentiated carcinoma. Periosteum of bone, left femur, excision:Metastatic poorly differentiated carcinoma with sarcomatoid features. Bone periosteum, left femur, excision:Metastatic poorly differentiated carcinoma with sarcomatoid features. Bone, distal femur, excision:Metastatic poorly differentiated carcinoma with sarcomatoid features. RECOMMENDATION AND PLAN by Dr. Alexx Lewis on 9/28/2021:  Diagnosis is stage IV metastatic poorly differentiated carcinoma with sarcomatoid features. I called and spoke to Dr. Dimitrios Guan regarding radiation therapy for pain control and stabilization of the left femur and left hip, he will be seeing her immediately after leaving this clinic today, 9/28/2021 to get started.   Treatment will not be curable but rather palliative and Conan Sever desires systemic therapy  [facility] requested to look for actionable mutations  Pathology specimen from 9/23/2021 requested to be sent to [facility] for NGS, MSI and PD-L1 testing  C/Marlon Hardy Final general surgery for port placement    PET scan  Cycle #1 of chemotherapy with Carboplatin AUC of 5/Alimta 500 mg/m²/Keytruda standard dosing  began on 10/7/2021,      Initial consultation with Dr. Dena Kamara on 9/28/2021 for consideration of palliative radiation therapy for pain control and stabilization of the left femur and left hip. Dr. Robel Coughlin recommended to treat the left leg with palliative radiation therapy for an anticipated a dose of 3000 cGy over 10 fractions, final course pending completion of planning. Palliative XRT to the left femur and left hip was initiated on 10/6/2021 for a total dose of 3000 cGy over 10 treatment fractions, completed 10/19/2021     Port-A-Cath placed to right IJ on 10/4/2021 by Dr. Vitaliy De Souza     Primary induction systemic chemotherapy cycle #1 of Carboplatin AUC of 5/Alimta 500 mg/m²/Keytruda on 10/7/2021. She tolerated cycle #1 of Carboplatin AUC of 5/Alimta 500 mg/m²/Keytruda very poorly. PET CT SKULL BASE TO MID THIGH 10/12/2021 :  2.9 x 3.5MP hypermetabolic mass in the superior segment of the right lower lobe, maximum SUV of 20.8   1.7 cm posterior right hilum there is a separate hypermetabolic mass,most likely posterior right hilar neoplastic        Adenopathy  Lytic lesion with abnormal hypermetabolism within the anterior left acetabulum/left distal superior pubic ramus compatible with a metastatic lesion  Intense hypermetabolism associated with the known lesion within the distal left femur, only partially visualized in the field-of-view obtained. [facility] NGS sequencing reported a positive MET (exon 14 skipping) mutation on 10/19/2021. An actionable MET mutation was documented after delivery of cycle #1 of chemoimmunotherapy. She tolerated cycle #1 of Carboplatin AUC of 5/Alimta 500 mg/m²/Keytruda very poorly. Decision was made on 10/28/2021 to begin capmatinib (trabecta) 400 mg BID.       Trabecta (capmatinib) had been reduced to 200 mg/day at one time, and more recently increased to 200 mg BID. Trabecta (capmatinib) was discontinued at the end of December 2021 due to elevated LFTs and elevated creatinine. US LIVER at [facility] on 1/14/2022 :  Heterogeneous liver echotexture without a discrete mass. Previous cholecystectomy. No biliary ductal dilatation is identified. Right-sided renal cysts including a complex septated cyst at the lateral right mid kidney measuring up to 1.3 cm. Jesús Campuzano and her family were concerned about the fact that since starting Trabecta (capmatinib) she had probably been off of the medication is much as she has been on it due to increased LFTs and creatinine. Her LFTs and creatinine normalized over about a 3-week period of time of being off of Trabecta (capmatinib). I believe she has failed a trial of Trabecta (capmatinib) and other alternatives need to be considered. Plan will be to rechallenge with decreased dosages of carboplatin (AUC of 3) Alimta 250 mg/M2 and Keytruda 200 mg on 21-day cycle. Cycle #1 of carboplatin (AUC of 3) Alimta 250 mg/M2 and Keytruda 200 mg, was delivered on 1/25/2022     Jesús Campuzano has a son Carlin Hoyt, who lives in [facility], and a son Trev Knapp who lives in [place] who frequently accompany her to the clinic.        TREATMENT SUMMARY:  Palliative XRT to the left femur and left hip was initiated on 10/6/2021 for a total dose of 3000 cGy over 10 treatment fractions, completed 10/19/2021  Cycle #1 of palliative chemotherapy with Carboplatin AUC of 5/Alimta 500 mg/m²/Keytruda was started on 10/7/2021  Capmatinib (trabecta) 400 mg BID was started on 11/22/2021 and discontinued at the end of December 2021 due to elevated LFTs and creatinine  Cycle #1 of carboplatin (AUC of 3) Alimta 250 mg/M2 and Keytruda 200 mg, was delivered on 1/25/2022         Current Facility-Administered Medications   Medication Dose Route Frequency Provider Last Rate Last Admin    diphenoxylate-atropine (LOMOTIL) 2.5-0.025 MG per tablet 1 tablet  1 tablet Oral 4x Daily PRN Ely Powell MD   1 tablet at 03/14/22 0800    ondansetron (ZOFRAN) injection 8 mg  8 mg IntraVENous Q8H PRN Sherin Garcia MD   8 mg at 03/15/22 0500    sodium chloride flush 0.9 % injection 5-40 mL  5-40 mL IntraVENous 2 times per day Puneet Figueroa MD   10 mL at 03/14/22 0856    sodium chloride flush 0.9 % injection 5-40 mL  5-40 mL IntraVENous PRN Puneet Figueroa MD        0.9 % sodium chloride infusion  25 mL IntraVENous PRN Puneet Figueroa MD        polyethylene glycol (GLYCOLAX) packet 17 g  17 g Oral Daily PRN Puneet Figueroa MD        acetaminophen (TYLENOL) tablet 650 mg  650 mg Oral Q6H PRN Puneet Figueroa MD        Or    acetaminophen (TYLENOL) suppository 650 mg  650 mg Rectal Q6H PRN Puneet Figueroa MD        cefTRIAXone (ROCEPHIN) 2000 mg in 50 mL IVPB (premix)  2,000 mg IntraVENous Q24H Puneet Figueroa MD   Stopped at 03/14/22 0949    loperamide (IMODIUM) capsule 2 mg  2 mg Oral 4x Daily PRN Puneet Figueroa MD   2 mg at 03/14/22 2107    allopurinol (ZYLOPRIM) tablet 100 mg  100 mg Oral Daily Puneet Figueroa MD   100 mg at 03/14/22 0856    apixaban (ELIQUIS) tablet 5 mg  5 mg Oral BID Puneet Figueroa MD   5 mg at 03/14/22 2102    dexamethasone (DECADRON) injection 4 mg  4 mg IntraVENous Q24H Puneet Figueroa MD   4 mg at 54/07/29 8793    folic acid (FOLVITE) tablet 1 mg  1 mg Oral Daily Puneet Figueroa MD   1 mg at 03/14/22 0855    HYDROcodone-acetaminophen (NORCO)  MG per tablet 1 tablet  1 tablet Oral Q6H PRN Puneet Figueroa MD        metoclopramide (REGLAN) tablet 5 mg  5 mg Oral 4x Daily Puneet Figueroa MD   5 mg at 03/14/22 2102    famotidine (PEPCID) injection 20 mg  20 mg IntraVENous Daily Puneet Figueroa MD   20 mg at 03/14/22 0856    0.9 % sodium chloride infusion   IntraVENous Continuous Sherin Garcia MD 75 mL/hr at 03/13/22 0958 New Bag at 03/13/22 0958         Allergies: No Known Allergies      Vitals:    Vitals:    03/15/22 0614   BP: (!) 147/79   Pulse: 90   Resp: 18   Temp: 97.4 °F (36.3 °C)   SpO2: 98%       Physical Exam   Constitutional: Oriented to person, place, and time. Uncomfortable but in no acute distress. Head: Normocephalic and atraumatic. Nose: Nose normal.   Mouth/Throat: Oropharynx is clear and moist. No oropharyngeal exudate. Eyes: Pupils are equal and round. Conjunctivae and EOM are normal. No scleral icterus. Neck: Normal range of motion. Neck supple. No JVD. No appreciable thyromegaly. Cardiovascular: Normal rate, regular rhythm, normal heart sounds and intact distal pulses. Exam reveals no gallop, murmurs or friction rub. Pulmonary/Chest: Effort normal and breath sounds normal. No respiratory distress. No wheezes. Abdominal: Abdomen mildly tender, not acute. No rebound, positive bowel sounds. Musculoskeletal: Normal range of motion. No edema or tenderness. Lymphadenopathy: No cervical, axillary or inguinal lymphadenopathy. Neurological: Alert and oriented to person, place, and time. Cranial nerves are intact. Neurological exam is nonfocal  Skin: Skin is warm and dry. No rash noted. No erythema. No pallor.    Psychiatric: Judgment normal.         DATA:    Labs:  General Labs:  CBC:   Lab Results   Component Value Date    WBC 1.8 (L) 03/14/2022    HGB 11.9 (L) 03/14/2022    HCT 36.0 (L) 03/14/2022    MCV 95.5 03/14/2022     03/14/2022     Lab Results   Component Value Date    NEUTROABS 1.1 (L) 03/14/2022         CMP:    Lab Results   Component Value Date     03/14/2022    K 3.2 (L) 03/14/2022     03/14/2022    CO2 18 (L) 03/14/2022    BUN 26 (H) 03/14/2022    CREATININE 1.4 (H) 03/14/2022    GLUCOSE 154 (H) 03/14/2022    CALCIUM 7.5 (L) 03/14/2022    PROT 4.8 (L) 03/14/2022    LABALBU 2.8 (L) 03/14/2022    BILITOT 0.3 03/14/2022    ALKPHOS 47 03/14/2022    AST 16 03/14/2022    ALT 9 03/14/2022    LABGLOM 36 (A) 03/14/2022    GFRAA 44 (L) 03/14/2022    AGRATIO 2.4 (H) 12/10/2021    GLOB 2.5 03/08/2022          IMPRESSION/RECOMMENDATIONS:      #Non-small cell lung cancer, adenocarcinoma stage IV  Anthony Mars is a very pleasant 71-year-old  female with a diagnosis of Stage IV, 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 Cycle #2 chemotherapy 2/15/2022    Oncology consultation called in continuity of care. Admission CBC on 3/12/2022 has a WBC of 0.9 with 55% lymphocytes and an ANC of 0.5. Hemoglobin is 12.4 with a platelet count of 414,891. Status post Neulasta 6 mg SQ on 3/13/2022  COVID-19 rapid testing on 3/13/2022 is NEGATIVE  Urine cx 3/13/2022 8000 CFU/mL strep agalactiae  Blood cx 3/13/2022 -  no growth to date    Rocephin initiated 3/13/2022 AM      #Neutropenia secondary to chemotherapy  Status post Neulasta 6 mg SQ on 3/13/2022        #Nausea vomiting diarrhea    Anthony Mars developed nausea vomiting and diarrhea on 3/10/2022 that has been getting progressively worse. She has not had this side effect with any of her previous  chemotherapy treatments. I received a call in the middle of the night regarding these symptoms and instructed her to be evaluated in the ED for possible admission. Anthony Mars was seen in the ED at [facility] on 3/12/2022 and admitted with nausea vomiting diarrhea dehydration neutropenia for management. IV fluids and electrolyte monitoring will continue.   Lomotil scheduled, Imodium as needed  Zofran/dexamethasone scheduled  Check MRI brain    Continuing supportive care    #Normocytic anemia       #History of DVT left lower extremity, January 2022  -Eliquis 5 mg p.o. twice daily    #CKD stage IIIB  Creatinine baseline-1.1      #Hypokalemia            Plan  Continue antibiotics  MRI brain  Lomotil scheduled  Zofran/next scheduled  Hypokalemia-as per hospitalist  Continue Eliquis for DVT  Continue antiemetics          Brandi Mtz PA-C    03/15/22  7:06 AM  Physician's attestation/substantial contribution:  I, Dr Ange Caldera, independently performed an evaluation on [facility]. I have reviewed relevant medical information/data to include but not limited to medication list, relevant appropriate labs and imaging when applicable. I reviewed other physician's notes, ancillary services and nurses assessment. I have reviewed the above documentation completed by the Nurse Practitioner or Physician Assistant. Please see my additional contributions to the history of present illness, physical examination, and assessment/medical decision-making that reflect my findings and impressions. I have seen and examined the patient and the key elements of all parts of the encounter have been performed by me. I agree with the assessment and plan as outlined by the ARNP/PA. Subjective-persistent nausea/vomiting and diarrhea  [id number]  Assessment/plan:  We will need to obtain a brain imaging to rule out brain metastasis due to her persistent nausea vomiting. GI toxicity secondary to chemotherapy-still persistent nausea/vomiting. We will schedule Lomotil and increase frequency of Imodium.       Ange Caldera MD

## 2022-03-15 NOTE — PROGRESS NOTES
Daily Progress Note    Date:3/15/2022  Patient: Sylvia Thakkar  : 1940  ZEJ:[id number]  CODE:Full Code No additional code details  Shefali Saldaña MD    Admit Date: 3/12/2022 11:20 PM   LOS: 2 days       Hospital Course: Patient with a history of stage IV lung cancer was admitted 3/13 for significant diarrhea, poor oral intake, nausea, vomiting and dehydration. She was started on empiric abx and IV hydration. Her C. Diff came back negative. Her urine culture is growing multiple abnormal organisms, but with low cfu counts. She is being continued on rocephin at this time. Her nausea has been refractory to zofran and reglan, and her diarrhea has not improved symptomatically with lomotil. Subjective: Patient states she isn't feeling well today. She continues to have nausea, vomiting and diarrhea. She notes that several days ago some of her top teeth \"fell out\", and she has been having blurred vision for several days. She denies any other complaints at this time. She is scheduled for a MRI brain  later per oncology to r/o brain mets. No other events. Review of Systems  Negative unless noted above    Objective:      Vital signs in last 24 hours:  Patient Vitals for the past 24 hrs:   BP Temp Temp src Pulse Resp SpO2   03/15/22 0614 (!) 147/79 97.4 °F (36.3 °C)  90 18 98 %   03/15/22 0042 (!) 146/75 97.1 °F (36.2 °C)  92 18 97 %   22 1115 (!) 141/76 96.7 °F (35.9 °C) Temporal 91 18 99 %         Lab Review   No results found for this or any previous visit (from the past 24 hour(s)). I/O last 3 completed shifts: In: 3473 [P.O.:1280; I.V.:2193]  Out: 100 [Emesis/NG output:100]  No intake/output data recorded.       Current Facility-Administered Medications:     ondansetron (ZOFRAN) injection 8 mg, 8 mg, IntraVENous, 3 times per day, Koffi Betts PA-C    dexamethasone (DECADRON) injection 4 mg, 4 mg, IntraVENous, Q8H, Riley Richards PA-C    diphenoxylate-atropine (LOMOTIL) 2.5-0.025 MG per tablet 2 tablet, 2 tablet, Oral, 4x Daily, Gina Upshur, PA-C    sodium chloride flush 0.9 % injection 5-40 mL, 5-40 mL, IntraVENous, 2 times per day, Moriah Rain MD, 10 mL at 03/14/22 0856    sodium chloride flush 0.9 % injection 5-40 mL, 5-40 mL, IntraVENous, PRN, Moriah Rain MD    0.9 % sodium chloride infusion, 25 mL, IntraVENous, PRN, Moriah Rain MD    polyethylene glycol (GLYCOLAX) packet 17 g, 17 g, Oral, Daily PRN, Moriah Rain MD    acetaminophen (TYLENOL) tablet 650 mg, 650 mg, Oral, Q6H PRN **OR** acetaminophen (TYLENOL) suppository 650 mg, 650 mg, Rectal, Q6H PRN, Moriah Rain MD    cefTRIAXone (ROCEPHIN) 2000 mg in 50 mL IVPB (premix), 2,000 mg, IntraVENous, Q24H, Moriah Rain MD, Stopped at 03/14/22 0949    loperamide (IMODIUM) capsule 2 mg, 2 mg, Oral, 4x Daily PRN, Moriah Rain MD, 2 mg at 03/14/22 2107    allopurinol (ZYLOPRIM) tablet 100 mg, 100 mg, Oral, Daily, Moriah Rain MD, 100 mg at 03/14/22 0856    apixaban (ELIQUIS) tablet 5 mg, 5 mg, Oral, BID, Moriah Rain MD, 5 mg at 62/24/88 6762    folic acid (FOLVITE) tablet 1 mg, 1 mg, Oral, Daily, Moriah Rain MD, 1 mg at 03/14/22 0855    HYDROcodone-acetaminophen (NORCO)  MG per tablet 1 tablet, 1 tablet, Oral, Q6H PRN, Moriah Rain MD    famotidine (PEPCID) injection 20 mg, 20 mg, IntraVENous, Daily, Moriah Rain MD, 20 mg at 03/14/22 0856    0.9 % sodium chloride infusion, , IntraVENous, Continuous, Vitaliy Fu MD, Last Rate: 75 mL/hr at 03/13/22 0958, New Bag at 03/13/22 1097      Physical Exam     General - NAD, AAOx3  HEENT - AT NC, EOMI, poor dentation  Neck - No JVD  CVS - S1/S2 present, RRR, no M/R/G  Lungs - bilateral expiratory wheezing  Abdomen - soft, NT, ND, BS+  Ext - no edema, diminished capillary refill  Skin - no rashes, bruises or discolorations  Neuro - no focal deficits  Psych - not agitated    Assessment and Plan    1) fever  - no evidence of fever since admission  - urine cultures growing mixed skin nataliya and low cfu organisms, suspect this is a contaminated sample but will continue to treat for now  - continue empiric ceftriaxone  - monitor    2) neutropenia  - improving  - no longer requires contact isolation  - continue to monitor with daily CBC    3) UTI  - U/A minimally suggestive of UTI  - cultures as above  - IV NS  - ceftriaxone  - monitor    4) diarrhea  - unclear etiology, suspect this is related to chemo side effects  - C.  Diff negative, flagyl discontinued  - imodium    5) persistent nausea/vomiting  - suspect this is chemo related  - zofran, reglan  - MRI brain pending to r/o metastatic cause    6) stage IV lung cancer  - manage per oncology  - continue dexamethasone    7) hx DVT  - eliquis    8) GI ppx  - pepcid          Sarah Godwin MD 3/15/2022 8:31 AM

## 2022-03-15 NOTE — PROGRESS NOTES
Physical Therapy  Attempted PT eval, but Pt declined due to                    Nausea and generally not feeling well. RN states pt has been up to BR several times.   Electronically signed by Jai Galo PT on 3/15/2022 at 1:50 PM

## 2022-03-15 NOTE — PROGRESS NOTES
MRI tech called to notify that MRI of brain will be rescheduled for 3/16/2022 due to influx of other emergent cases. Primary nurse made aware of change in timing.

## 2022-03-16 PROBLEM — Z51.5 PALLIATIVE CARE PATIENT: Status: ACTIVE | Noted: 2022-01-01

## 2022-03-16 PROBLEM — B96.20 E. COLI UTI: Status: ACTIVE | Noted: 2022-01-01

## 2022-03-16 PROBLEM — B95.1 GROUP B STREPTOCOCCAL UTI: Status: ACTIVE | Noted: 2022-01-01

## 2022-03-16 PROBLEM — N39.0 GROUP B STREPTOCOCCAL UTI: Status: ACTIVE | Noted: 2022-01-01

## 2022-03-16 PROBLEM — N18.32 ACUTE RENAL FAILURE SUPERIMPOSED ON STAGE 3B CHRONIC KIDNEY DISEASE (HCC): Status: ACTIVE | Noted: 2021-01-20

## 2022-03-16 PROBLEM — N17.9 ACUTE RENAL FAILURE SUPERIMPOSED ON STAGE 3B CHRONIC KIDNEY DISEASE (HCC): Status: ACTIVE | Noted: 2021-01-20

## 2022-03-16 PROBLEM — I63.9 ACUTE STROKE OF BASAL GANGLIA (HCC): Status: ACTIVE | Noted: 2022-01-01

## 2022-03-16 PROBLEM — E87.6 HYPOKALEMIA: Status: ACTIVE | Noted: 2022-01-01

## 2022-03-16 NOTE — TELEPHONE ENCOUNTER
Pts son stated that he was called again this morning stated that she is vomiting violently. He stated they did not do the MRI yesterday and has rescheduled it for today. I explained to Maya Minaya that PCP's do not have hospital privileges so Luba Ramos can not order anything while she is in the hospital. Maya Minaya is going to find out the name of the Hospitalitis that is caring for his mother and let me know so that I can reach out to them to see what is going on.  Maya Minaya is afraid that if they keep going the way they are going his mother is not going to make it out of the Hospital.

## 2022-03-16 NOTE — PROGRESS NOTES
Daily Progress Note    Date:3/16/2022  Patient: Gena Ayala  : 1940  IIK:[id number]  CODE:Full Code No additional code details  Shauna Dia MD    Admit Date: 3/12/2022 11:20 PM   LOS: 3 days       Hospital Course: Patient with a history of stage IV lung cancer was admitted 3/13 for significant diarrhea, poor oral intake, nausea, vomiting and dehydration. She was started on empiric abx and IV hydration. Her C. Diff came back negative. Urine culture speciated E. coli and group B strep, treated with Rocephin. Patient developed acute renal failure related to dehydration from GI losses, increased IV fluids with addition of bicarb in setting of worsening metabolic acidosis. Nephrology consulted. Developed hypocalcemia and hypokalemia with electrolytes repleted. Subjective: No fevers or chills overnight. Continues with generalized malaise with nausea and diarrhea.         Review of Systems  14 point review of systems completed and is negative except as otherwise noted      Objective:      Vital signs in last 24 hours:  Patient Vitals for the past 24 hrs:   BP Temp Temp src Pulse Resp SpO2 Weight   22 1602    86      22 1442    90      22 1252 (!) 141/75 97.1 °F (36.2 °C) Temporal 88 20 98 % 154 lb 7 oz (70.1 kg)   22 0725    86      22 0535 (!) 145/72 96.3 °F (35.7 °C)  86 18 97 %    03/15/22 2344 134/68 97.4 °F (36.3 °C)  99 18 97 %    03/15/22 1719 (!) 142/72 97.5 °F (36.4 °C) Temporal 87  99 %      Physical exam    General: Resting in no acute distress, fatigued/ill-appearing  HEENT: NC/AT, no scleral icterus  Psych: Normal mood and affect  Cardiovascular: Normal rate, pulses 2+ and equal  Respiratory: Normal effort, no wheezes or rales  Abdomen: Soft, nontender, bowel sounds present  Neurologic: Alert, follows commands, no dysarthria or aphasia, no lateralizing weakness  Extremities: No clubbing cyanosis or edema  Musculoskeletal: No deformities  Skin: Warm and dry, well perfused      Lab Review   Recent Results (from the past 24 hour(s))   Basic Metabolic Panel    Collection Time: 03/16/22  6:00 AM   Result Value Ref Range    Sodium 139 136 - 145 mmol/L    Potassium 2.7 (LL) 3.5 - 5.0 mmol/L    Chloride 107 98 - 111 mmol/L    CO2 14 (L) 22 - 29 mmol/L    Anion Gap 18 7 - 19 mmol/L    Glucose 146 (H) 74 - 109 mg/dL    BUN 44 (H) 8 - 23 mg/dL    CREATININE 3.8 (H) 0.5 - 0.9 mg/dL    GFR Non- 11 (A) >60    GFR  14 (L) >59    Calcium 6.9 (LL) 8.8 - 10.2 mg/dL   CBC with Auto Differential    Collection Time: 03/16/22  6:00 AM   Result Value Ref Range    WBC 7.3 4.8 - 10.8 K/uL    RBC 4.08 (L) 4.20 - 5.40 M/uL    Hemoglobin 12.5 12.0 - 16.0 g/dL    Hematocrit 38.6 37.0 - 47.0 %    MCV 94.6 81.0 - 99.0 fL    MCH 30.6 27.0 - 31.0 pg    MCHC 32.4 (L) 33.0 - 37.0 g/dL    RDW 15.7 (H) 11.5 - 14.5 %    Platelets 182 757 - 526 K/uL    MPV 11.6 9.4 - 12.3 fL    PLATELET SLIDE REVIEW Adequate     Neutrophils % 45.0 (L) 50.0 - 65.0 %    Lymphocytes % 7.0 (L) 20.0 - 40.0 %    Monocytes % 26.0 (H) 0.0 - 10.0 %    Eosinophils % 0.0 0.0 - 5.0 %    Basophils % 1.0 0.0 - 1.0 %    Neutrophils Absolute 4.8 1.5 - 7.5 K/uL    Immature Granulocytes # 0.0 K/uL    Lymphocytes Absolute 0.5 (L) 1.1 - 4.5 K/uL    Monocytes Absolute 1.90 (H) 0.00 - 0.90 K/uL    Eosinophils Absolute 0.00 0.00 - 0.60 K/uL    Basophils Absolute 0.10 0.00 - 0.20 K/uL    Bands Relative 21 (H) 0 - 5 %    Ovalocytes Occasional (A)     Tear Drop Cells Occasional (A)    Magnesium    Collection Time: 03/16/22  6:00 AM   Result Value Ref Range    Magnesium 1.6 1.6 - 2.4 mg/dL   Vitamin D 25 Hydroxy    Collection Time: 03/16/22  8:00 AM   Result Value Ref Range    Vit D, 25-Hydroxy 35.1 >=30 ng/mL   PTH, Intact    Collection Time: 03/16/22 12:30 PM   Result Value Ref Range    .0 (H) 15.0 - 65.0 pg/mL       I/O last 3 completed shifts:   In: 3546 [P.O.:200; I.V.:3355]  Out: 400 [Emesis/NG output:400]  I/O this shift:  In: 240 [P.O.:240]  Out: 250 [Emesis/NG output:250]      Current Facility-Administered Medications:     promethazine (PHENERGAN) 25 mg in sodium chloride 0.9 % 50 mL IVPB, 25 mg, IntraVENous, Q6H PRN, Fazal Jackson PA-C    sodium bicarbonate 100 mEq in sodium chloride 0.45 % 1,000 mL infusion, , IntraVENous, Continuous, Peg Garland MD, Last Rate: 150 mL/hr at 03/16/22 1514, New Bag at 03/16/22 1514    cefTRIAXone (ROCEPHIN) 1,000 mg in sterile water 10 mL IV syringe, 1,000 mg, IntraVENous, Q24H, Peg Garland MD, 1,000 mg at 03/16/22 1621    magnesium sulfate 2000 mg in 50 mL IVPB premix, 2,000 mg, IntraVENous, Once, Peg Garland MD, Last Rate: 25 mL/hr at 03/16/22 1519, 2,000 mg at 03/16/22 1519    HYDROmorphone HCl PF (DILAUDID) injection 0.5 mg, 0.5 mg, IntraVENous, Q3H PRN, Peg Garland MD, 0.5 mg at 03/16/22 1239    calcium gluconate 1,000 mg in dextrose 5 % 100 mL IVPB, 1,000 mg, IntraVENous, Once, Peg Garland MD    ondansetron [facility]) injection 8 mg, 8 mg, IntraVENous, 3 times per day, Fazal Jackson PA-C, 8 mg at 03/16/22 1308    dexamethasone (DECADRON) injection 4 mg, 4 mg, IntraVENous, Q8H, Riley Richards PA-C, 4 mg at 03/16/22 1308    diphenoxylate-atropine (LOMOTIL) 2.5-0.025 MG per tablet 2 tablet, 2 tablet, Oral, 4x Daily, Fazal Jackson PA-C, 2 tablet at 03/15/22 2011    ipratropium-albuterol (DUONEB) nebulizer solution 1 ampule, 1 ampule, Inhalation, Q4H PRN, Niecy Simeon MD    sodium chloride flush 0.9 % injection 5-40 mL, 5-40 mL, IntraVENous, 2 times per day, Marielos Nolan MD, 10 mL at 03/15/22 1127    sodium chloride flush 0.9 % injection 5-40 mL, 5-40 mL, IntraVENous, PRN, Marielos Nolan MD    0.9 % sodium chloride infusion, 25 mL, IntraVENous, PRN, Marielos Nolan MD    polyethylene glycol (GLYCOLAX) packet 17 g, 17 g, Oral, Daily PRN, Marielos Nolan, MD    acetaminophen (TYLENOL) tablet 650 mg, 650 mg, Oral, Q6H PRN **OR** acetaminophen (TYLENOL) suppository 650 mg, 650 mg, Rectal, Q6H PRN, Agusto Solitario MD    loperamide (IMODIUM) capsule 2 mg, 2 mg, Oral, 4x Daily PRN, Agusto Solitario MD, 2 mg at 03/16/22 0930    allopurinol (ZYLOPRIM) tablet 100 mg, 100 mg, Oral, Daily, Agusto Solitario MD, 100 mg at 03/15/22 1126    apixaban (ELIQUIS) tablet 5 mg, 5 mg, Oral, BID, Agusto Solitario MD, 5 mg at 32/16/00 4901    folic acid (FOLVITE) tablet 1 mg, 1 mg, Oral, Daily, Agusto Solitario MD, 1 mg at 03/15/22 1127    HYDROcodone-acetaminophen (NORCO)  MG per tablet 1 tablet, 1 tablet, Oral, Q6H PRN, Agusto Solitario MD, 1 tablet at 03/16/22 0930    famotidine (PEPCID) injection 20 mg, 20 mg, IntraVENous, Daily, Agusto Solitario MD, 20 mg at 03/15/22 1127            Assessment and Plan  Principal Problem:    Acute renal failure superimposed on stage 3b chronic kidney disease ([facility]  Active Problems:    Dehydration    Hypertension    Metastatic lung cancer (metastasis from lung to other site) [facility])    Chronic renal failure, stage 3 (moderate), unspecified whether stage 3a or 3b CKD (HCC)    Deep vein thrombosis (DVT) of proximal lower extremity (HCC)    Neutropenia (HCC)    E. coli UTI    LORA (acute kidney injury) ([facility]    Group B streptococcal UTI    Hypokalemia  Resolved Problems:    * No resolved hospital problems.  *      Acute renal failure with metabolic acidosis, suspect prerenal, nausea/vomiting/diarrhea  As needed antiemetics and antidiarrheals, C. difficile negative  Change to IV fluids with bicarb and increase fluid rate  Follow urine output and renal function  Urine electrolytes  Renal ultrasound  Nephrology consult    UTI, E. coli and group B strep from urine culture  Susceptibilities reviewed  Continue Rocephin    neutropenia  Follow CBC    Hypokalemia, hypocalcemia  Monitor and replete electrolytes    stage IV lung cancer  Follow-up MRI brain to rule out metastatic disease  - manage per oncology  - continue dexamethasone    hx DVT  - edmar Buchanan MD 3/16/2022 5:16 PM

## 2022-03-16 NOTE — CONSULTS
Nephrology ([facility]) Consult Note      Patient:  Vannesa Query  YOB: 1940  Date of Service: 3/16/2022  MRN: [id number]   Acct: [de-identified]   Primary Care Physician: Suellen Parekh MD  Advance Directive: Full Code  Admit Date: 3/12/2022       Hospital Day: 3  Referring Provider: Nick Carrasquillo MD    Patient independently seen and examined, Chart, Consults, Notes, Operative notes, Labs, Cardiology, and Radiology studies reviewed as available. Chief complaint: Abnormal labs. Subjective:  Vannesa Query is a 79 y.o. female for whom we were consulted for evaluation and treatment of acute kidney injury/chronic kidney disease. Patient has stage IIIb chronic kidney disease baseline due to hypertensive nephrosclerosis. She follows Dr. Trinidad Walker in the office. Presented with increasing nausea vomiting and diarrhea for the last several days. On admission her serum creatinine 1.5 mg. Patient also had intravenous contrast dye with CT scan of the abdomen. Serum creatinine is steadily going up. Her creatinine is now 3.8 mg and nephrology is consulted. Patient has history of stage IV lung cancer with metastatic disease to the bone and currently receiving chemotherapy. She has been sick lately due to chemo and has been throwing up. Allergies:  Patient has no known allergies.     Medicines:  Current Facility-Administered Medications   Medication Dose Route Frequency Provider Last Rate Last Admin    promethazine (PHENERGAN) 25 mg in sodium chloride 0.9 % 50 mL IVPB  25 mg IntraVENous Q6H PRN Marisa Cox PA-C        sodium bicarbonate 100 mEq in sodium chloride 0.45 % 1,000 mL infusion   IntraVENous Continuous Nick Carrasquillo MD        cefTRIAXone (ROCEPHIN) 1,000 mg in sterile water 10 mL IV syringe  1,000 mg IntraVENous Q24H Nick Carrasquillo MD        potassium chloride 10 mEq/100 mL IVPB (Peripheral Line)  10 mEq IntraVENous Q1H MD Vipul Linares potassium bicarb-citric acid (EFFER-K) effervescent tablet 40 mEq  40 mEq Oral Once Alanna Buchanan MD        magnesium sulfate 2000 mg in 50 mL IVPB premix  2,000 mg IntraVENous Once Alanna Buchanan MD        calcium gluconate 1000 mg in sodium chloride 50 mL  1,000 mg IntraVENous Once Alanna Buchanan MD        ondansetron [facility]) injection 8 mg  8 mg IntraVENous 3 times per day Everett Nelson PA-C   8 mg at 03/16/22 0558    dexamethasone (DECADRON) injection 4 mg  4 mg IntraVENous Q8H Everett Nelson PA-C   4 mg at 03/16/22 0558    diphenoxylate-atropine (LOMOTIL) 2.5-0.025 MG per tablet 2 tablet  2 tablet Oral 4x Daily Everett Nelson PA-C   2 tablet at 03/15/22 2011    ipratropium-albuterol (DUONEB) nebulizer solution 1 ampule  1 ampule Inhalation Q4H PRN Sherin Garcia MD        sodium chloride flush 0.9 % injection 5-40 mL  5-40 mL IntraVENous 2 times per day Puneet Figueroa MD   10 mL at 03/15/22 1127    sodium chloride flush 0.9 % injection 5-40 mL  5-40 mL IntraVENous PRN Puneet Figueroa MD        0.9 % sodium chloride infusion  25 mL IntraVENous PRN Puneet Figueroa MD        polyethylene glycol (GLYCOLAX) packet 17 g  17 g Oral Daily PRN Puneet Figueroa MD        acetaminophen (TYLENOL) tablet 650 mg  650 mg Oral Q6H PRN Puneet Figueroa MD        Or    acetaminophen (TYLENOL) suppository 650 mg  650 mg Rectal Q6H PRN Puneet Figueroa MD        loperamide (IMODIUM) capsule 2 mg  2 mg Oral 4x Daily PRN Puneet Figueroa MD   2 mg at 03/16/22 0930    allopurinol (ZYLOPRIM) tablet 100 mg  100 mg Oral Daily Puneet Figueroa MD   100 mg at 03/15/22 1126    apixaban (ELIQUIS) tablet 5 mg  5 mg Oral BID Puneet Figueroa MD   5 mg at 47/96/77 7511    folic acid (FOLVITE) tablet 1 mg  1 mg Oral Daily Puneet Figueroa MD   1 mg at 03/15/22 1127    HYDROcodone-acetaminophen (NORCO)  MG per tablet 1 tablet  1 tablet Oral Q6H PRN Agusto Solitario MD   1 tablet at 22 0930    famotidine (PEPCID) injection 20 mg  20 mg IntraVENous Daily Agusto Solitario MD   20 mg at 03/15/22 1127       Past Medical History:  Past Medical History:   Diagnosis Date    Acid reflux     LORA (acute kidney injury) ([facility] 2018    LORA (acute kidney injury) ([facility] [facility] 2018    HIGH CHOLESTEROL     Hypertension     Metastatic lung cancer (metastasis from lung to other site) [facility]) 2021    Metastatic lung cancer (metastasis from lung to other site) [facility]) 2021    Urinary incontinence     UTI (urinary tract infection)     Vertigo        Past Surgical History:  Past Surgical History:   Procedure Laterality Date   Lan Lucas COLONOSCOPY      Dr Jag Webster 2019    Dr SARAH Juarez-Diverticular disease-Tubular AP (-) dysplasia, 5 yr recall    HYSTERECTOMY      PORT SURGERY N/A 10/4/2021    SINGLE LUMEN PORT PLMT WITH ULTRASOUND AND FLURO performed by Tyrell Soliz MD at [address] [place] [address]        Family History  Family History   Problem Relation Age of Onset    Cancer Mother     Colon Cancer Mother     Heart Disease Father     Stroke Brother     Colon Polyps Neg Hx     Esophageal Cancer Neg Hx     Liver Cancer Neg Hx     Liver Disease Neg Hx     Rectal Cancer Neg Hx     Stomach Cancer Neg Hx        Social History  Social History     Socioeconomic History    Marital status:       Spouse name: Dylan Haddad Number of children: Not on file    Years of education: Not on file    Highest education level: Not on file   Occupational History     Employer: RETIRED   Tobacco Use    Smoking status: Former Smoker     Packs/day: 0.50     Years: 40.00     Pack years: 20.00     Types: Cigarettes     Start date:      Quit date:      Years since quittin.2    Smokeless tobacco: Never Used   Vaping Use    Vaping Use: Never used   Substance and Sexual Activity    Alcohol use: No    Drug use: No    Sexual activity: Not on file   Other Topics Concern    Not on file   Social History Narrative    Not on file     Social Determinants of Health     Financial Resource Strain: Low Risk     Difficulty of Paying Living Expenses: Not hard at all   Food Insecurity: No Food Insecurity    Worried About Running Out of Food in the Last Year: Never true    Светлана of Food in the Last Year: Never true   Transportation Needs: No Transportation Needs    Lack of Transportation (Medical): No    Lack of Transportation (Non-Medical):  No   Physical Activity:     Days of Exercise per Week: Not on file    Minutes of Exercise per Session: Not on file   Stress:     Feeling of Stress : Not on file   Social Connections:     Frequency of Communication with Friends and Family: Not on file    Frequency of Social Gatherings with Friends and Family: Not on file    Attends Evangelical Services: Not on file    Active Member of [address] [place] [address] or Organizations: Not on file    Attends Club or Organization Meetings: Not on file    Marital Status: Not on file   Intimate Partner Violence:     Fear of Current or Ex-Partner: Not on file    Emotionally Abused: Not on file    Physically Abused: Not on file    Sexually Abused: Not on file   Housing Stability:     Unable to Pay for Housing in the Last Year: Not on file    Number of [place] in the Last Year: Not on file    Unstable Housing in the Last Year: Not on file         Review of Systems:  History obtained from chart review and the patient  General ROS: No fever or chills  Respiratory ROS: No cough, shortness of breath, wheezing  Cardiovascular ROS: No chest pain or palpitations  Gastrointestinal ROS: positive for - nausea/vomiting  Genito-Urinary ROS: No dysuria or hematuria  Musculoskeletal ROS: No joint pain or swelling   14 point ROS reviewed with the patient and negative except as noted above and in the HPI unless unable to obtain. Objective:  Patient Vitals for the past 24 hrs:   BP Temp Temp src Pulse Resp SpO2   03/16/22 0725    86     03/16/22 0535 (!) 145/72 96.3 °F (35.7 °C)  86 18 97 %   03/15/22 2344 134/68 97.4 °F (36.3 °C)  99 18 97 %   03/15/22 1719 (!) 142/72 97.5 °F (36.4 °C) Temporal 87  99 %   03/15/22 1159 (!) 146/64 97.3 °F (36.3 °C) Temporal 90 18 99 %       Intake/Output Summary (Last 24 hours) at 3/16/2022 1105  Last data filed at 3/16/2022 1036  Gross per 24 hour   Intake 1162 ml   Output 550 ml   Net 612 ml     General: awake/alert    HEENT: Normocephalic atraumatic head  Neck: Supple with no JVD or carotid bruits. Chest:  clear to auscultation bilaterally  CVS: regular rate and rhythm  Abdominal: soft, nontender, normal bowel sounds  Extremities: no cyanosis or edema  Skin: warm and dry without rash      Labs:  BMP:   Recent Labs     03/14/22 0220 03/16/22  0600    139   K 3.2* 2.7*    107   CO2 18* 14*   BUN 26* 44*   CREATININE 1.4* 3.8*   CALCIUM 7.5* 6.9*     CBC:   Recent Labs     03/14/22  0427 03/16/22  0600   WBC 1.8* 7.3   HGB 11.9* 12.5   HCT 36.0* 38.6   MCV 95.5 94.6    156     LIVER PROFILE:   Recent Labs     03/14/22 0220   AST 16   ALT 9   BILITOT 0.3   ALKPHOS 47     PT/INR: No results for input(s): PROTIME, INR in the last 72 hours. APTT: No results for input(s): APTT in the last 72 hours. BNP:  No results for input(s): BNP in the last 72 hours. Ionized Calcium:No results for input(s): IONCA in the last 72 hours. Magnesium:  Recent Labs     03/14/22 0220 03/16/22  0600   MG 1.5* 1.6     Phosphorus:No results for input(s): PHOS in the last 72 hours. HgbA1C: No results for input(s): LABA1C in the last 72 hours.   Hepatic:   Recent Labs     03/14/22 0220   ALKPHOS 47   ALT 9   AST 16   PROT 4.8*   BILITOT 0.3   LABALBU 2.8*     Lactic Acid:   Recent Labs     03/14/22 0220   LACTA 2.5*     Troponin: No results for input(s): CKTOTAL, CKMB, TROPONINT in the last 72 hours. ABGs: No results for input(s): PH, PCO2, PO2, HCO3, O2SAT in the last 72 hours. CRP:  No results for input(s): CRP in the last 72 hours. Sed Rate:  No results for input(s): SEDRATE in the last 72 hours. Cultures:   No results for input(s): CULTURE in the last 72 hours. No results for input(s): BC, Laurence Alpers in the last 72 hours. No results for input(s): CXSURG in the last 72 hours. Radiology reports as per the Radiologist  Radiology: CT ABDOMEN PELVIS W IV CONTRAST Additional Contrast? None    Result Date: 3/13/2022  EXAMINATION: CT ABDOMEN PELVIS W IV CONTRAST 3/13/2022 8:37 AM HISTORY: CT ABDOMEN PELVIS W IV CONTRAST 3/13/2022 12:02 AM HISTORY: COMPARISON: September 9, 2021. DLP: 959 mGy cm Automated exposure control was also utilized to decrease patient radiation dose TECHNIQUE: Following the intravenous administration of contrast, helical CT tomographic images of the abdomen and pelvis were acquired. Coronal reformatted images were also provided for review. FINDINGS: The lung bases and base of the heart are unremarkable. LIVER: No focal liver lesion. The hepatic vasculature is patent. BILIARY SYSTEM: Surgical clips are present from previous cholecystectomy. Edward Salter PANCREAS: No focal pancreatic lesion. SPLEEN: Unremarkable. KIDNEYS AND ADRENALS: Adrenal glands are visualized. The renal contours are normal in size shape and position. Bilateral renal cysts are present similar to September 9, 2021. The largest on the left is 37 mm. In the upper pole of the right kidney the largest cyst is 2 cm. . The ureters are decompressed and normal in appearance. RETROPERITONEUM: No mass, lymphadenopathy or hemorrhage. GI TRACT: There is thickening of the wall of the distal small bowel. This is most likely enteritis. . Diverticulosis is present. There is no obvious diverticulitis. . OTHER: There is no mesenteric mass, lymphadenopathy or fluid collection.  Scattered calcifications are present in the abdominal aorta and iliac arteries. . The osseous structures and soft tissues demonstrate no worrisome lesions. PELVIS: No mass lesion, fluid collection or significant lymphadenopathy is seen in the pelvis. The urinary bladder is normal in appearance. 1. Thickening and edema in the wall of the small bowel. Most likely this represents enteritis. 2. Diverticulosis 3. Cholecystectomy. These images initially reviewed by stat rad 3:55 AM. Signed by Dr Blas Griggs   1. Acute kidney injury stage II. 2.  Intravascular volume depletion versus ATN. 3.  Stage IIIb chronic kidney disease baseline. 4.  Hypokalemia  5. Metabolic acidemia. 6.  Hypocalcemia. 7.  Stage IV lung cancer. Plan:  1. Change IV fluid composition. 2.  Potassium supplement. 3.  Calcium supplement. 4.  Urinary electrolyte. 5.  Baseline renal ultrasound. Thank you for the consult, we appreciate the opportunity to provide care to your patients. Feel free to contact me if I can be of any further assistance.       Elisabet Carmichael MD  03/16/22  11:05 AM

## 2022-03-16 NOTE — PROGRESS NOTES
Medical oncology progress note    [facility]  1940  ROOM: 425      Subjective: Slept well. Nausea a little better - but nauseated after drinking water this am.  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 She developed nausea vomiting and diarrhea on 2/10/2022 that has been getting progressively worse. She has not had this side effect with any of her previous  chemotherapy treatments. I received a call in the middle of the night regarding these symptoms and instructed her to be evaluated in the ED for possible admission. Agustin Leone was seen in the ED at [facility] on 3/12/2022 and admitted with nausea vomiting diarrhea dehydration neutropenia for management. Oncology consultation called in continuity of care. 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 · Intolerant to Trabecta (capmatinib) due to liver toxicity  · LLL DVT on noninvasive study 1/20/2022, on Eliquis     Palliative XRT to the left femur and left hip was initiated on 10/6/2021 for a total dose of 3000 cGy over 10 treatment fractions, completed 10/19/2021     Primary induction systemic chemotherapy cycle #1 of Carboplatin AUC of 5/Alimta 500 mg/m²/Keytruda on 10/7/2021. She tolerated cycle #1 of Carboplatin AUC of 5/Alimta 500 mg/m²/Keytruda very poorly. [facility] NGS sequencing reported a positive MET (exon 14 skipping) mutation on 10/19/2021.     Decision was made on 10/28/2021 to begin capmatinib (trabecta) 400 mg PO BID. Trabecta (capmatinib) 400 mg PO BID was initiated on 11/22/2021     Trabecta (capmatinib) had been reduced to 200 mg/day at one time, then increased to 200 mg BID. Trabecta (capmatinib) was discontinued at the end of December 2021 due to elevated LFTs and elevated creatinine. Ara Valentine was rechallenged with decreased dosages of carboplatin (AUC of 3) Alimta 250 mg/M2 and Keytruda 200 mg on 21-day cycle. Cycle #1 of carboplatin (AUC of 3) Alimta 250 mg/M2 and Keytruda 200 mg 1/25/2022  She tolerated cycle #1 with minimal side effects. Ara Valentine received cycle #3 of this lower dose treatment with carboplatin (AUC of 3) Alimta 250 mg/M2 and Keytruda 200 mg on 3/8/2022. TARGET LESIONS:  · 4.1 x 3.6 cm spiculated superior segment RLL lung mass consistent with the primary  · MET+ (Exon 14 Skipping)  · Right hilar adenopathy. · Nondisplaced fracture at the left acetabulum and at the root of the left superior pubic ramus. · Expansile lytic lesion involving the more distal shaft of the left femur with extraosseous extension     TUMOR HISTORY:  Ara aVlentine was seen in initial oncology consultation on 9/20/2021 referred by Dr Zeyad Ge with a nondisplaced fracture of the left superior pubic ramus, expansile lytic lesion of the distal third shaft of the left femur and a 4.1 x 3.6 x 3.5 cm  lung mass suspicious for primary malignancy with metastasis. Ara Valentine had an office visit with PCP, Francisco J Aguero on 8/20/21 with complaints of left hip pain radiating from groin down left thigh. Over the next several weeks, the pain progressed down the leg to the distal left thigh causing difficulty walking, utilizing a cane, and eventually a wheelchair. She denies any trauma. Plain film imaging was ordered and orthopaedic referral was made. XR HIP 2-3 VW W PELVIS LEFT on 8/30/2021 at [facility] documented:  · No acute bony abnormality.   · Mild acetabular and femoral head spurring on the left side     XR FEMUR LEFT (MIN 2 VIEWS) 8/30/2021 at [facility] documented:  · No acute bony abnormality. Orthopaedic consultation with Dr Ramon Bhat on 9/7/2021 to address left hip and groin pain over 1 month period, ambulating with a walker. Denies any injury and describes a stabbing pain going down her left leg when standing. MRI PELVIS on 9/8/2021 at [facility] documented:  Along the  Helder-medial aspect of the left acetabulum and at the root of the left superior pubic ramus, there is definite evidence of a nondisplaced fracture. It should be noted that there is expansion of the cortex in this region and some [id number] STIR signal with diminished T1 signal in the marrow at this location, as well as possible expansion of the cortex. The degree of marrow edema surrounding the fracture site is very abruptly cut off between normal signal, and I am suspicious that there may be an underlying bone lesion with pathologic fracture rather than a simple fracture. Dr. Quinn Britt called me with the above information desiring consultation. Further imaging was recommended including a CT scan of the chest abdomen and pelvis. MRI LEFT FEMUR WO CONTRAST on 9/14/21 at [facility] documented: There is an expansile lytic lesion involving the more distal shaft of the left femur at the juncture of the proximal two-thirds with the distal one-third. This is creating permeative changes within the more lateral cortex of the femur at this level and on T1 sequencing there appears to be extraosseous extension of the process. There is surrounding edema associated with this lesion. Given the permeative appearance of the more lateral cortex I feel this patient would certainly be at a high risk for impending pathologic fracture. No additional lesions are present.      CT CHEST WO CONTRAST DIAGNOSTIC on  9/9/2021 [facility] documented:  · 4.1 x 3.6 cm spiculated superior segment right lower lobe lung mass suspicious for primary malignancy. · Questionable right hilar adenopathy. · Emphysema. CT ABDOMEN PELVIS WO CONTRAST on 9/9/2021 at [facility] documented:  · Nonenhanced imaging of the abdomen and pelvis reduces assessment of the visceral organs. No convincing intra-abdominal or pelvic metastasis. Probable left renal cyst. Colonic diverticulosis. · Left perineural sacral cyst. No pathologic fractures. Appointment Note from 9/21/2021 visit to Dr. Anita Simons:   She was set up an appointment for Left cephalomedullary nailing of her left pathologic femur fracture to protect her femur. Plan to biopsy  From distal femoral lesion including a small sample of bone and tissue. A cephalomedullary nailing of the left pathologic femoral shaft fracture and open biopsy of a left femoral shaft bone and soft tissue mass was performed by Dr. Kamlesh Lara on 9/23/2021  Pathology reported to:  · Soft tissue and bone, left femur, excision:Hemorrhagic bone and soft tissue. · Bone, left femur, reamings:Metastatic poorly differentiated carcinoma. 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 RECOMMENDATION AND PLAN by Dr. Turner Rosas on 9/28/2021:  Diagnosis is stage IV metastatic poorly differentiated carcinoma with sarcomatoid features. · I called and spoke to Dr. Marcelo Shah regarding radiation therapy for pain control and stabilization of the left femur and left hip, he will be seeing her immediately after leaving this clinic today, 9/28/2021 to get started.   · Treatment will not be curable but rather palliative and Sarabjit Given desires systemic therapy  · [facility] requested to look for actionable mutations  · Pathology specimen from 9/23/2021 requested to be sent [facility] for NGS, MSI and PD-L1 testing  · DANNA/Malron Hardy Final general surgery for port placement    · PET scan  · Cycle #1 of chemotherapy with Carboplatin AUC of 5/Alimta 500 mg/m²/Keytruda standard dosing  began on 10/7/2021,      Initial consultation with Dr. Sena Mcgee on 9/28/2021 for consideration of palliative radiation therapy for pain control and stabilization of the left femur and left hip. Dr. Jamila West recommended to treat the left leg with palliative radiation therapy for an anticipated a dose of 3000 cGy over 10 fractions, final course pending completion of planning. Palliative XRT to the left femur and left hip was initiated on 10/6/2021 for a total dose of 3000 cGy over 10 treatment fractions, completed 10/19/2021     Port-A-Cath placed to right IJ on 10/4/2021 by Dr. Gregorio Ordoñez     Primary induction systemic chemotherapy cycle #1 of Carboplatin AUC of 5/Alimta 500 mg/m²/Keytruda on 10/7/2021. She tolerated cycle #1 of Carboplatin AUC of 5/Alimta 500 mg/m²/Keytruda very poorly. PET CT SKULL BASE TO MID THIGH 10/12/2021 :  · 2.9 x 8.2KN hypermetabolic mass in the superior segment of the right lower lobe, maximum SUV of 20.8   · 1.7 cm posterior right hilum there is a separate hypermetabolic mass,most likely posterior right hilar neoplastic        Adenopathy  · Lytic lesion with abnormal hypermetabolism within the anterior left acetabulum/left distal superior pubic ramus compatible with a metastatic lesion  · Intense hypermetabolism associated with the known lesion within the distal left femur, only partially visualized in the field-of-view obtained. [address] [facility] NGS sequencing reported a positive MET (exon 14 skipping) mutation on 10/19/2021. An actionable MET mutation was documented after delivery of cycle #1 of chemoimmunotherapy. She tolerated cycle #1 of Carboplatin AUC of 5/Alimta 500 mg/m²/Keytruda very poorly.      Decision was made on 10/28/2021 to begin capmatinib (trabecta) 400 mg BID. Trabecta (capmatinib) had been reduced to 200 mg/day at one time, and more recently increased to 200 mg BID. Trabecta (capmatinib) was discontinued at the end of December 2021 due to elevated LFTs and elevated creatinine. US LIVER at [facility] on 1/14/2022 :  · Heterogeneous liver echotexture without a discrete mass. · Previous cholecystectomy. No biliary ductal dilatation is identified. · Right-sided renal cysts including a complex septated cyst at the lateral right mid kidney measuring up to 1.3 cm. Zhen Romo and her family were concerned about the fact that since starting Trabecta (capmatinib) she had probably been off of the medication is much as she has been on it due to increased LFTs and creatinine. Her LFTs and creatinine normalized over about a 3-week period of time of being off of Trabecta (capmatinib). I believe she has failed a trial of Trabecta (capmatinib) and other alternatives need to be considered. Plan will be to rechallenge with decreased dosages of carboplatin (AUC of 3) Alimta 250 mg/M2 and Keytruda 200 mg on 21-day cycle. Cycle #1 of carboplatin (AUC of 3) Alimta 250 mg/M2 and Keytruda 200 mg, was delivered on 1/25/2022     Zhen Romo has a son Heather Boland, who lives in [place], and a son Agus Alicia who lives in [place] who frequently accompany her to the clinic.        TREATMENT SUMMARY:  · Palliative XRT to the left femur and left hip was initiated on 10/6/2021 for a total dose of 3000 cGy over 10 treatment fractions, completed 10/19/2021  · Cycle #1 of palliative chemotherapy with Carboplatin AUC of 5/Alimta 500 mg/m²/Keytruda was started on 10/7/2021  · Capmatinib (trabecta) 400 mg BID was started on 11/22/2021 and discontinued at the end of December 2021 due to elevated LFTs and creatinine  · Cycle #1 of carboplatin (AUC of 3) Alimta 250 mg/M2 and Keytruda 200 mg, was delivered on 1/25/2022         Current Facility-Administered Medications   Medication Dose Route Frequency Provider Last Rate Last Admin    ondansetron (ZOFRAN) injection 8 mg  8 mg IntraVENous 3 times per day Gissel Barrera PA-C   8 mg at 03/16/22 0558    dexamethasone (DECADRON) injection 4 mg  4 mg IntraVENous Q8H Gissel Barrera PA-C   4 mg at 03/16/22 0558    diphenoxylate-atropine (LOMOTIL) 2.5-0.025 MG per tablet 2 tablet  2 tablet Oral 4x Daily Gissel Barrear PA-C   2 tablet at 03/15/22 2011    ipratropium-albuterol (DUONEB) nebulizer solution 1 ampule  1 ampule Inhalation Q4H PRN Sheila Brown MD        sodium chloride flush 0.9 % injection 5-40 mL  5-40 mL IntraVENous 2 times per day Sydney Alegre MD   10 mL at 03/15/22 1127    sodium chloride flush 0.9 % injection 5-40 mL  5-40 mL IntraVENous PRN Sydney Alegre MD        0.9 % sodium chloride infusion  25 mL IntraVENous PRN Sydney Alegre MD        polyethylene glycol (GLYCOLAX) packet 17 g  17 g Oral Daily PRN Sydney Alegre MD        acetaminophen (TYLENOL) tablet 650 mg  650 mg Oral Q6H PRN Sydney Alegre MD        Or    acetaminophen (TYLENOL) suppository 650 mg  650 mg Rectal Q6H PRN Sydney Alegre MD        cefTRIAXone (ROCEPHIN) 2000 mg in 50 mL IVPB (premix)  2,000 mg IntraVENous Q24H Sydney Alegre MD   Stopped at 03/15/22 1255    loperamide (IMODIUM) capsule 2 mg  2 mg Oral 4x Daily PRN Sydney Alegre MD   2 mg at 03/15/22 1126    allopurinol (ZYLOPRIM) tablet 100 mg  100 mg Oral Daily Sydney Alegre MD   100 mg at 03/15/22 1126    apixaban (ELIQUIS) tablet 5 mg  5 mg Oral BID Sydney Alegre MD   5 mg at 21/76/30 0648    folic acid (FOLVITE) tablet 1 mg  1 mg Oral Daily Sydney Alegre MD   1 mg at 03/15/22 1127    HYDROcodone-acetaminophen (NORCO)  MG per tablet 1 tablet  1 tablet Oral Q6H PRN Sydney Alegre MD        famotidine (PEPCID) injection 20 mg  20 mg IntraVENous Daily Reta MONTES MD Hussain   20 mg at 03/15/22 1127    0.9 % sodium chloride infusion   IntraVENous Continuous Dane Morales MD 75 mL/hr at 03/16/22 0215 New Bag at 03/16/22 0215         Allergies: No Known Allergies      Vitals:    Vitals:    03/16/22 0535   BP: (!) 145/72   Pulse: 86   Resp: 18   Temp: 96.3 °F (35.7 °C)   SpO2: 97%       Physical Exam   Constitutional: Oriented to person, place, and time. Uncomfortable but in no acute distress. Head: Normocephalic and atraumatic. Nose: Nose normal.   Mouth/Throat: Oropharynx is clear and moist. No oropharyngeal exudate. Eyes: Pupils are equal and round. Conjunctivae and EOM are normal. No scleral icterus. Neck: Normal range of motion. Neck supple. No JVD. No appreciable thyromegaly. Cardiovascular: Normal rate, regular rhythm, normal heart sounds and intact distal pulses. Exam reveals no gallop, murmurs or friction rub. Pulmonary/Chest: Effort normal and breath sounds normal. No respiratory distress. No wheezes. Abdominal: Abdomen mildly tender, not acute. No rebound, positive bowel sounds. Musculoskeletal: Normal range of motion. No edema or tenderness. Lymphadenopathy: No cervical, axillary or inguinal lymphadenopathy. Neurological: Alert and oriented to person, place, and time. Cranial nerves are intact. Neurological exam is nonfocal  Skin: Skin is warm and dry. No rash noted. No erythema. No pallor.    Psychiatric: Judgment normal.         DATA:    Labs:  General Labs:  CBC:   Lab Results   Component Value Date    WBC 7.3 03/16/2022    HGB 12.5 03/16/2022    HCT 38.6 03/16/2022    MCV 94.6 03/16/2022     03/16/2022     Lab Results   Component Value Date    NEUTROABS 1.1 (L) 03/14/2022         CMP:    Lab Results   Component Value Date     03/16/2022    K 3.2 (L) 03/14/2022     03/16/2022    CO2 14 (L) 03/16/2022    BUN 26 (H) 03/14/2022    CREATININE 1.4 (H) 03/14/2022    GLUCOSE 146 (H) 03/16/2022    CALCIUM 7.5 (L) 03/14/2022    PROT 4.8 (L) 03/14/2022    LABALBU 2.8 (L) 03/14/2022    BILITOT 0.3 03/14/2022    ALKPHOS 47 03/14/2022    AST 16 03/14/2022    ALT 9 03/14/2022    LABGLOM 36 (A) 03/14/2022    GFRAA 44 (L) 03/14/2022    AGRATIO 2.4 (H) 12/10/2021    GLOB 2.5 03/08/2022          IMPRESSION/RECOMMENDATIONS:      #Non-small cell lung cancer, adenocarcinoma stage IV  Cody Mcknight is a very pleasant 79-year-old  female with a diagnosis of Stage IV, 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 Cycle #2 chemotherapy 2/15/2022    Oncology consultation called in continuity of care. Admission CBC on 3/12/2022 has a WBC of 0.9 with 55% lymphocytes and an ANC of 0.5. Hemoglobin is 12.4 with a platelet count of 190,038. Status post Neulasta 6 mg SQ on 3/13/2022  COVID-19 rapid testing on 3/13/2022 is NEGATIVE  Urine cx 3/13/2022 8000 CFU/mL strep agalactiae  Blood cx 3/13/2022 -  no growth to date    Rocephin initiated 3/13/2022 AM      #Neutropenia secondary to chemotherapy  Status post Neulasta 6 mg SQ on 3/13/2022        ANC     #Nausea vomiting diarrhea    Cody Mcknight developed nausea vomiting and diarrhea on 3/10/2022 that has been getting progressively worse. She has not had this side effect with any of her previous  chemotherapy treatments. I received a call in the middle of the night regarding these symptoms and instructed her to be evaluated in the ED for possible admission. Cody Mcknight was seen in the ED at [facility] on 3/12/2022 and admitted with nausea vomiting diarrhea dehydration neutropenia for management. IV fluids and electrolyte monitoring will continue.   Lomotil scheduled, Imodium as needed  Zofran/dexamethasone scheduled  Phenergan    MRI brain this am    Continuing supportive care    #Normocytic anemia #History of DVT left lower extremity, January 2022  -Eliquis 5 mg p.o. twice daily    #CKD stage IIIB  Creatinine baseline1.1      #Hypokalemia            Plan  Continue antibiotics  MRI brain today  Lomotil scheduled  Zofran/dex scheduled  Phenergan prn  Hypokalemiaas per hospitalist  Continue Eliquis for DVT      Terri Louise PA-C    03/16/22  7:12 AM   Physician's attestation/substantial contribution:  I, Dr Sanya Hernandez, independently performed an evaluation on Sara Kevin. I have reviewed relevant medical information/data to include but not limited to medication list, relevant appropriate labs and imaging when applicable. I reviewed other physician's notes, ancillary services and nurses assessment. I have reviewed the above documentation completed by the Nurse Practitioner or Physician Assistant. Please see my additional contributions to the history of present illness, physical examination, and assessment/medical decision-making that reflect my findings and impressions. I have seen and examined the patient and the key elements of all parts of the encounter have been performed by me. I agree with the assessment and plan as outlined by the ARNP/PA. Subjective-nausea and vomiting proving. Diarrhea improving  Objectivechronically appearing  Assessment/plan:  Brain MRI today. Continue current supportive care.     Sanya Hernandez MD

## 2022-03-16 NOTE — ACP (ADVANCE CARE PLANNING)
Advance Care Planning     Advance Care Planning Activator (Inpatient)  Conversation Note      Date of ACP Conversation: 3/16/2022     Conversation Conducted with: Pt's son Ivy Borergo    ACP Activator: Talbert Brittle, RN        Health Care Decision Maker:     Current Designated Health Care Decision Maker:     Primary Decision Maker: Rosenda Gilbert - Child - [phone]    Primary Decision Maker: Solo Reis - Child      Care Preferences    Ventilation: \"If you were in your present state of health and suddenly became very ill and were unable to breathe on your own, what would your preference be about the use of a ventilator (breathing machine) if it were available to you? \"      Would the patient desire the use of ventilator (breathing machine)?: YES          Resuscitation  \"CPR works best to restart the heart when there is a sudden event, like a heart attack, in someone who is otherwise healthy. Unfortunately, CPR does not typically restart the heart for people who have serious health conditions or who are very sick. \"    \"In the event your heart stopped as a result of an underlying serious health condition, would you want attempts to be made to restart your heart (answer \"yes\" for attempt to resuscitate) or would you prefer a natural death (answer \"no\" for do not attempt to resuscitate)? \"   YES         Conversation Outcomes:  [x] ACP discussion completed

## 2022-03-16 NOTE — CONSULTS
Palliative Care:   Initiated for support and goals of care. Met briefly with pt as she was being prepared to go radiology. 78 y/o lady with stage IV lung cancer with mets to the bone, currently receiving chemotherapy. Stayed and met with her son Emily Conway who is present. Review of pt's history and life at home. Another  son Mariah Shannon is now living with pt to provide care as needed. Noted, she has continued to have vomiting today and feels miserable. Son reports they are not sure what is causing this, if it's treatments or something else. Until recent, pt has been up and about able to do some cooking. Support to pt's son Emily Conway and review of Palliative Care role to support them as they navigate options. Past Medical History:        Past Medical History:   Diagnosis Date    Acid reflux     LORA (acute kidney injury) ([facility] [address] 7/28/2018    LORA (acute kidney injury) ([facility] [address] 7/28/2018    HIGH CHOLESTEROL     Hypertension     Metastatic lung [facility] (metastasis from lung to other site) [facility]) 9/28/2021    Metastatic lung cancer (metastasis from lung to other site) [facility]) 9/28/2021    Urinary incontinence     UTI (urinary tract infection)     Vertigo        Advance Directives:   Full code   ACP note completed. Pain/Other Symptoms:   Continued nausea, vomiting. Psychological/Spiritual:      [facility] ([facility]) , pt has been unable to participate in person since the pandemic. Plan:   Symptom management     Patient/family discussion r/t goals:  Family goal is for pt to return home and have good quality of life. Review of any needed services:  TBD    Encouragement and support provided. Palliative Care will continue to follow POC.             Electronically signed by Nathalie Lindo RN on 3/16/2022 at 2:32 PM

## 2022-03-17 NOTE — PROGRESS NOTES
Nephrology ([facility]) Progress Note      Patient:  Helena Mehta  YOB: 1940  Date of Service: 3/17/2022  MRN: [id number]   Acct: [de-identified]   Primary Care Physician: Tisha Castillo MD  Advance Directive: Full Code  Admit Date: 3/12/2022       Hospital Day: 4  Referring Provider: Bucky Thibodeaux MD    Patient independently seen and examined, Chart, Consults, Notes, Operative notes, Labs, Cardiology, and Radiology studies reviewed as available. Chief complaint: Abnormal labs. Subjective:  Helena Mehta is a 79 y.o. female for whom we were consulted for evaluation and treatment of acute kidney injury/chronic kidney disease. Patient has stage IIIb chronic kidney disease baseline due to hypertensive nephrosclerosis. She follows Dr. Rashi Haji in the office. Presented with increasing nausea vomiting and diarrhea for the last several days. On admission her serum creatinine 1.5 mg. Patient also had intravenous contrast dye with CT scan of the abdomen. Serum creatinine is steadily going up. Her creatinine is now 3.8 mg and nephrology is consulted. Patient has history of stage IV lung cancer with metastatic disease to the bone and currently receiving chemotherapy. She has been sick lately due to chemo and has been throwing up. Hospital course markable for IV fluid administration and has no improvement of renal function. Her serum creatinine is worsened today. This morning she is complaining of profound fatigue, lack of energy and severe tiredness. Her son is at the bedside. Allergies:  Patient has no known allergies.     Medicines:  Current Facility-Administered Medications   Medication Dose Route Frequency Provider Last Rate Last Admin    potassium chloride 10 mEq/100 mL IVPB (Peripheral Line)  10 mEq IntraVENous Q1H Bucky Thibodeaux  mL/hr at 03/17/22 1007 10 mEq at 03/17/22 1007    heparin (porcine) injection 5,000 Units  5,000 Units SubCUTAneous 3 times per day Marisa Cox PA-C   5,000 Units at 03/17/22 5226    famotidine (PEPCID) 10 mg in sodium chloride (PF) 10 mL injection  10 mg IntraVENous Daily Nick Carrasquillo MD   10 mg at 03/17/22 0842    promethazine (PHENERGAN) 25 mg in sodium chloride 0.9 % 50 mL IVPB  25 mg IntraVENous Q6H PRN Marisa Cox PA-C   Stopped at 03/17/22 0340    sodium bicarbonate 100 mEq in sodium chloride 0.45 % 1,000 mL infusion   IntraVENous Continuous Nick Carrasquillo  mL/hr at 03/17/22 1005 New Bag at 03/17/22 1005    cefTRIAXone (ROCEPHIN) 1,000 mg in sterile water 10 mL IV syringe  1,000 mg IntraVENous Q24H Nick Carrasquillo MD   1,000 mg at 03/17/22 0843    HYDROmorphone HCl PF (DILAUDID) injection 0.5 mg  0.5 mg IntraVENous Q3H PRN Nick Carrasquillo MD   0.5 mg at 03/16/22 1239    ondansetron (ZOFRAN) injection 8 mg  8 mg IntraVENous 3 times per day Marisa Cox PA-C   8 mg at 03/17/22 0527    dexamethasone (DECADRON) injection 4 mg  4 mg IntraVENous Q8H Riley Richards PA-C   4 mg at 03/17/22 5607    [Held by provider] diphenoxylate-atropine (LOMOTIL) 2.5-0.025 MG per tablet 2 tablet  2 tablet Oral 4x Daily Marisa Cox PA-C   2 tablet at 03/16/22 2123    ipratropium-albuterol (DUONEB) nebulizer solution 1 ampule  1 ampule Inhalation Q4H PRN Lj Chris MD        sodium chloride flush 0.9 % injection 5-40 mL  5-40 mL IntraVENous 2 times per day Haseeb Ivey MD   10 mL at 03/17/22 0843    sodium chloride flush 0.9 % injection 5-40 mL  5-40 mL IntraVENous PRN Haseeb Ivey MD        0.9 % sodium chloride infusion  25 mL IntraVENous PRN Haseeb Ivey MD        polyethylene glycol (GLYCOLAX) packet 17 g  17 g Oral Daily PRN Haseeb Ivey MD        acetaminophen (TYLENOL) tablet 650 mg  650 mg Oral Q6H PRN Haseeb Ivey MD        Or    acetaminophen (TYLENOL) suppository 650 mg  650 mg Rectal Q6H PRN Haseeb Ivey MD       [employer] loperamide (IMODIUM) capsule 2 mg  2 mg Oral 4x Daily PRN Reena Mclean MD   2 mg at 03/16/22 0930    allopurinol (ZYLOPRIM) tablet 100 mg  100 mg Oral Daily Reena Mclean MD   100 mg at 00/32/80 0890    folic acid (FOLVITE) tablet 1 mg  1 mg Oral Daily Reena Mclean MD   1 mg at 03/15/22 1127    HYDROcodone-acetaminophen ([address])  MG per tablet 1 tablet  1 tablet Oral Q6H PRN Reena Mclean MD   1 tablet at 03/16/22 0930       Past Medical History:  Past Medical History:   Diagnosis Date    Acid reflux     LORA (acute kidney injury) ([facility] [address] 7/28/2018    LORA (acute kidney injury) ([facility] [address] 7/28/2018    HIGH CHOLESTEROL     Hypertension     Metastatic lung [facility] ([facility]astasis from lung to other [facility]) 9/28/2021    Metastatic lung cancer (metastasis from lung to other site) [facility]) 9/28/2021    Urinary incontinence     UTI (urinary tract infection)     Vertigo        Past Surgical History:  Past Surgical History:   Procedure Laterality Date   Denilson Abdi COLONOSCOPY      Dr Lemuel Sequeira 6/24/2019    Dr SARAH Juarez-Diverticular disease-Tubular AP (-) dysplasia, 5 yr recall    HYSTERECTOMY      PORT SURGERY N/A 10/4/2021    SINGLE LUMEN PORT PLMT WITH ULTRASOUND AND FLURO performed by Torri Garcia MD at [address] [place] [address]        Family History  Family History   Problem Relation Age of Onset    Cancer Mother     Colon Cancer Mother     Heart Disease Father     Stroke Brother     Colon Polyps Neg Hx     Esophageal Cancer Neg Hx     Liver Cancer Neg Hx     Liver Disease Neg Hx     Rectal Cancer Neg Hx     Stomach Cancer Neg Hx        Social History  Social History     Socioeconomic History    Marital status:       Spouse name: Ina Hassan Number of children: Not on file    Years of education: Not on file    Highest education level: Not on file   Occupational History     Employer: RETIRED   Tobacco Use    Smoking status: Former Smoker     Packs/day: 0.50     Years: 40.00     Pack years: 20.00     Types: Cigarettes     Start date:      Quit date:      Years since quittin.2    Smokeless tobacco: Never Used   Vaping Use    Vaping Use: Never used   Substance and Sexual Activity    Alcohol use: No    Drug use: No    Sexual activity: Not on file   Other Topics Concern    Not on file   Social History Narrative    Not on file     Social Determinants of Health     Financial Resource Strain: Low Risk     Difficulty of Paying Living Expenses: Not hard at all   Food Insecurity: No Food Insecurity    Worried About [address] [employer] in the Last Year: Never true    [employer] in the Last Year: Never true   Transportation Needs: No Transportation Needs    Lack of Transportation (Medical): No    Lack of Transportation (Non-Medical):  No   Physical Activity:     Days of Exercise per Week: Not on file    Minutes of Exercise per Session: Not on file   Stress:     Feeling of Stress : Not on file   Social Connections:     Frequency of Communication with Friends and Family: Not on file    Frequency of Social Gatherings with Friends and Family: Not on file    Attends [employer] Services: Not on file    Active Member of [address] [place] [address] or Organizations: Not on file    Attends Club or Organization Meetings: Not on file    Marital Status: Not on file   Intimate Partner Violence:     Fear of Current or Ex-Partner: Not on file    Emotionally Abused: Not on file    Physically Abused: Not on file    Sexually Abused: Not on file   Housing Stability:     Unable to Pay for Housing in the Last Year: Not on file    Number of [place] in the Last Year: Not on file    Unstable Housing in the Last Year: Not on file         Review of Systems:  History obtained from chart review and the patient  General ROS: No fever or chills  Respiratory ROS: No cough, shortness of breath, wheezing  Cardiovascular ROS: No chest pain or palpitations  Gastrointestinal ROS: positive for - nausea/vomiting  Genito-Urinary ROS: No dysuria or hematuria  Musculoskeletal ROS: No joint pain or swelling   14 point ROS reviewed with the patient and negative except as noted above and in the HPI unless unable to obtain. Objective:  Patient Vitals for the past 24 hrs:   BP Temp Temp src Pulse Resp SpO2 Weight   03/17/22 0513 97/69 97.5 °F (36.4 °C)  119 16 96 %    03/17/22 0016 129/80 99.3 °F (37.4 °C)  109 16 97 %    03/16/22 1819    86      03/16/22 1818 131/64 97.3 °F (36.3 °C) Temporal 80 18 97 %    03/16/22 1602    86      03/16/22 1442    90      03/16/22 1252 (!) 141/75 97.1 °F (36.2 °C) Temporal 88 20 98 % 154 lb 7 oz (70.1 kg)       Intake/Output Summary (Last 24 hours) at 3/17/2022 1024  Last data filed at 3/17/2022 1016  Gross per 24 hour   Intake 240 ml   Output 250 ml   Net -10 ml     General: awake/alert    HEENT: Normocephalic atraumatic head  Neck: Supple with no JVD or carotid bruits. Chest:  clear to auscultation bilaterally  CVS: regular rate and rhythm  Abdominal: soft, nontender, normal bowel sounds  Extremities: no cyanosis or edema  Skin: warm and dry without rash      Labs:  BMP:   Recent Labs     03/16/22  0600 03/17/22  0540    130*   K 2.7* 2.7*    97*   CO2 14* 10*   BUN 44* 52*   CREATININE 3.8* 4.6*   CALCIUM 6.9* 7.2*     CBC:   Recent Labs     03/16/22  0600 03/17/22  0540   WBC 7.3 3.7*   HGB 12.5 12.3   HCT 38.6 38.1   MCV 94.6 93.4    124*     LIVER PROFILE:   Recent Labs     03/17/22  0540   AST 43*   ALT 57*   BILITOT 0.4   ALKPHOS 75     PT/INR: No results for input(s): PROTIME, INR in the last 72 hours. APTT: No results for input(s): APTT in the last 72 hours. BNP:  No results for input(s): BNP in the last 72 hours. Ionized Calcium:No results for input(s): IONCA in the last 72 hours.   Magnesium:  Recent Labs     03/16/22  0600 03/17/22  0540   MG 1.6 2.1 Phosphorus:No results for input(s): PHOS in the last 72 hours. HgbA1C: No results for input(s): LABA1C in the last 72 hours. Hepatic:   Recent Labs     03/17/22  0540   ALKPHOS 75   ALT 57*   AST 43*   PROT 4.2*   BILITOT 0.4   LABALBU 2.1*     Lactic Acid:   No results for input(s): LACTA in the last 72 hours. Troponin: No results for input(s): CKTOTAL, CKMB, TROPONINT in the last 72 hours. ABGs: No results for input(s): PH, PCO2, PO2, HCO3, O2SAT in the last 72 hours. CRP:  No results for input(s): CRP in the last 72 hours. Sed Rate:  No results for input(s): SEDRATE in the last 72 hours. Cultures:   No results for input(s): CULTURE in the last 72 hours. No results for input(s): BC, [place] in the last 72 hours. No results for input(s): CXSURG in the last 72 hours. Radiology reports as per the Radiologist  Radiology: CT ABDOMEN PELVIS W IV CONTRAST Additional Contrast? None    Result Date: 3/13/2022  EXAMINATION: CT ABDOMEN PELVIS W IV CONTRAST 3/13/2022 8:37 AM HISTORY: CT ABDOMEN PELVIS W IV CONTRAST 3/13/2022 12:02 AM HISTORY: COMPARISON: September 9, 2021. DLP: 959 mGy cm Automated exposure control was also utilized to decrease patient radiation dose TECHNIQUE: Following the intravenous administration of contrast, helical CT tomographic images of the abdomen and pelvis were acquired. Coronal reformatted images were also provided for review. FINDINGS: The lung bases and base of the heart are unremarkable. LIVER: No focal liver lesion. The hepatic vasculature is patent. BILIARY SYSTEM: Surgical clips are present from previous cholecystectomy. Kyaw Confer PANCREAS: No focal pancreatic lesion. SPLEEN: Unremarkable. KIDNEYS AND ADRENALS: Adrenal glands are visualized. The renal contours are normal in size shape and position. Bilateral renal cysts are present similar to September 9, 2021. The largest on the left is 37 mm. In the upper pole of the right kidney the largest cyst is 2 cm. . The ureters are decompressed and normal in appearance. RETROPERITONEUM: No mass, lymphadenopathy or hemorrhage. GI TRACT: There is thickening of the wall of the distal small bowel. This is most likely enteritis. . Diverticulosis is present. There is no obvious diverticulitis. . OTHER: There is no mesenteric mass, lymphadenopathy or fluid collection. Scattered calcifications are present in the abdominal aorta and iliac arteries. . The osseous structures and soft tissues demonstrate no worrisome lesions. PELVIS: No mass lesion, fluid collection or significant lymphadenopathy is seen in the pelvis. The urinary bladder is normal in appearance. 1. Thickening and edema in the wall of the small bowel. Most likely this represents enteritis. 2. Diverticulosis 3. Cholecystectomy. These images initially reviewed by stat rad 3:55 AM. Signed by Dr Colin So   1. Acute kidney injury stage II. 2.  Intravascular volume depletion versus ATN. 3.  Stage IIIb chronic kidney disease baseline. 4.  Hypokalemia  5. Metabolic acidemia. 6.  Hypocalcemia. 7.  Stage IV lung cancer. 8.  Hyponatremia. 9.  Gastroenteritis. Plan:  1. Change IV fluid composition. 2.  Renal ultrasound reviewed  3.   May need dialysis if no renal recovery      Ibrahima Romero MD  03/17/22  10:24 AM

## 2022-03-17 NOTE — PROGRESS NOTES
Medical oncology progress note    Cassie SHAVER [facility]  1940  ROOM: [phone]      Subjective:  N.V.D better. 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 She developed nausea vomiting and diarrhea on 2/10/2022 that has been getting progressively worse. She has not had this side effect with any of her previous  chemotherapy treatments. I received a call in the middle of the night regarding these symptoms and instructed her to be evaluated in the ED for possible admission. Kelli Bustamante was seen in the ED at [facility] on 3/12/2022 and admitted with nausea vomiting diarrhea dehydration neutropenia for management. Oncology consultation called in continuity of care. 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 Intolerant to Trabecta (capmatinib) due to liver toxicity  LLL DVT on noninvasive study 1/20/2022, on Eliquis     Palliative XRT to the left femur and left hip was initiated on 10/6/2021 for a total dose of 3000 cGy over 10 treatment fractions, completed 10/19/2021     Primary induction systemic chemotherapy cycle #1 of Carboplatin AUC of 5/Alimta 500 mg/m²/Keytruda on 10/7/2021. She tolerated cycle #1 of Carboplatin AUC of 5/Alimta 500 mg/m²/Keytruda very poorly. [facility] NGS sequencing reported a positive MET (exon 14 skipping) mutation on 10/19/2021. Decision was made on 10/28/2021 to begin capmatinib (trabecta) 400 mg PO BID.    Trabecta (capmatinib) 400 mg PO BID was initiated on 11/22/2021 Trabecta (capmatinib) had been reduced to 200 mg/day at one time, then increased to 200 mg BID. Trabecta (capmatinib) was discontinued at the end of December 2021 due to elevated LFTs and elevated creatinine. Sarabjit Alvares was rechallenged with decreased dosages of carboplatin (AUC of 3) Alimta 250 mg/M2 and Keytruda 200 mg on 21-day cycle. Cycle #1 of carboplatin (AUC of 3) Alimta 250 mg/M2 and Keytruda 200 mg 1/25/2022  She tolerated cycle #1 with minimal side effects. Sarabjit Alvares received cycle #3 of this lower dose treatment with carboplatin (AUC of 3) Alimta 250 mg/M2 and Keytruda 200 mg on 3/8/2022. TARGET LESIONS:  4.1 x 3.6 cm spiculated superior segment RLL lung mass consistent with the primary  MET+ (Exon 14 Skipping)  Right hilar adenopathy. Nondisplaced fracture at the left acetabulum and at the root of the left superior pubic ramus. Expansile lytic lesion involving the more distal shaft of the left femur with extraosseous extension     TUMOR HISTORY:  Sarabjit Alvares was seen in initial oncology consultation on 9/20/2021 referred by Dr Kamlesh Lara with a nondisplaced fracture of the left superior pubic ramus, expansile lytic lesion of the distal third shaft of the left femur and a 4.1 x 3.6 x 3.5 cm  lung mass suspicious for primary malignancy with metastasis. Sarabjit Alvares had an office visit with PCP, Pete Freire on 8/20/21 with complaints of left hip pain radiating from groin down left thigh. Over the next several weeks, the pain progressed down the leg to the distal left thigh causing difficulty walking, utilizing a cane, and eventually a wheelchair. She denies any trauma. Plain film imaging was ordered and orthopaedic referral was made. XR HIP 2-3 VW W PELVIS LEFT on 8/30/2021 at [address] documented:  No acute bony abnormality. Mild acetabular and femoral head spurring on the left side     XR FEMUR LEFT (MIN 2 VIEWS) 8/30/2021 at [facility] documented:  No acute bony abnormality.      Orthopaedic consultation with Dr Emilee Peraza on 9/7/2021 to address left hip and groin pain over 1 month period, ambulating with a walker. Denies any injury and describes a stabbing pain going down her left leg when standing. MRI PELVIS on 9/8/2021 at [address] [facility] documented:  Along the  Helder-medial aspect of the left acetabulum and at the root of the left superior pubic ramus, there is definite evidence of a nondisplaced fracture. It should be noted that there is expansion of the cortex in this region and some pjazdrlawssy-ke-eudjbyhge STIR signal with diminished T1 signal in the marrow at this location, as well as possible expansion of the cortex. The degree of marrow edema surrounding the fracture site is very abruptly cut off between normal signal, and I am suspicious that there may be an underlying bone lesion with pathologic fracture rather than a simple fracture. Dr. Delphine Bose called me with the above information desiring consultation. Further imaging was recommended including a CT scan of the chest abdomen and pelvis. MRI LEFT FEMUR WO CONTRAST on 9/14/21 at [address] [facility] documented: There is an expansile lytic lesion involving the more distal shaft of the left femur at the juncture of the proximal two-thirds with the distal one-third. This is creating permeative changes within the more lateral cortex of the femur at this level and on T1 sequencing there appears to be extraosseous extension of the process. There is surrounding edema associated with this lesion. Given the permeative appearance of the more lateral cortex I feel this patient would certainly be at a high risk for impending pathologic fracture. No additional lesions are present. CT CHEST WO CONTRAST DIAGNOSTIC on  9/9/2021 at [facility] documented:  4.1 x 3.6 cm spiculated superior segment right lower lobe lung mass suspicious for primary malignancy. Questionable right hilar adenopathy. Emphysema.       CT ABDOMEN PELVIS WO CONTRAST on 9/9/2021 at [facility] documented:  Nonenhanced imaging of the abdomen and pelvis reduces assessment of the visceral organs. No convincing intra-abdominal or pelvic metastasis. Probable left renal cyst. Colonic diverticulosis. Left perineural sacral cyst. No pathologic fractures. Appointment Note from 9/21/2021 visit to Dr. Quinn Britt:   She was set up an appointment for Left cephalomedullary nailing of her left pathologic femur fracture to protect her femur. Plan to biopsy  From distal femoral lesion including a small sample of bone and tissue. A cephalomedullary nailing of the left pathologic femoral shaft fracture and open biopsy of a left femoral shaft bone and soft tissue mass was performed by Dr. Ramon Bhat on 9/23/2021  Pathology reported to: Soft tissue and bone, left femur, excision:Hemorrhagic bone and soft tissue. Bone, left femur, reamings:Metastatic poorly differentiated carcinoma. Periosteum of bone, left femur, excision:Metastatic poorly differentiated carcinoma with sarcomatoid features. Bone periosteum, left femur, excision:Metastatic poorly differentiated carcinoma with sarcomatoid features. Bone, distal femur, excision:Metastatic poorly differentiated carcinoma with sarcomatoid features. RECOMMENDATION AND PLAN by Dr. Demetria Nance on 9/28/2021:  Diagnosis is stage IV metastatic poorly differentiated carcinoma with sarcomatoid features. I called and spoke to Dr. Emili Tanner regarding radiation therapy for pain control and stabilization of the left femur and left hip, he will be seeing her immediately after leaving this clinic today, 9/28/2021 to get started.   Treatment will not be curable but rather palliative and Gino Rodriguez desires systemic therapy  [address] requested to look for actionable mutations  Pathology specimen from 9/23/2021 requested to be sent to [facility] for NGS, MSI and PD-L1 testing  C/Marlon Hardy Final general surgery for port placement    PET scan  Cycle #1 of chemotherapy with Carboplatin AUC of 5/Alimta 500 mg/m²/Keytruda standard dosing  began on 10/7/2021,      Initial consultation with Dr. Jamie Espinosa on 9/28/2021 for consideration of palliative radiation therapy for pain control and stabilization of the left femur and left hip. Dr. Andrea Edwards recommended to treat the left leg with palliative radiation therapy for an anticipated a dose of 3000 cGy over 10 fractions, final course pending completion of planning. Palliative XRT to the left femur and left hip was initiated on 10/6/2021 for a total dose of 3000 cGy over 10 treatment fractions, completed 10/19/2021     Port-A-Cath placed to right IJ on 10/4/2021 by Dr. Janett Biggs     Primary induction systemic chemotherapy cycle #1 of Carboplatin AUC of 5/Alimta 500 mg/m²/Keytruda on 10/7/2021. She tolerated cycle #1 of Carboplatin AUC of 5/Alimta 500 mg/m²/Keytruda very poorly. PET CT SKULL BASE TO MID THIGH 10/12/2021 :  2.9 x 8.8ZU hypermetabolic mass in the superior segment of the right lower lobe, maximum SUV of 20.8   1.7 cm posterior right hilum there is a separate hypermetabolic mass,most likely posterior right hilar neoplastic        Adenopathy  Lytic lesion with abnormal hypermetabolism within the anterior left acetabulum/left distal superior pubic ramus compatible with a metastatic lesion  Intense hypermetabolism associated with the known lesion within the distal left femur, only partially visualized in the field-of-view obtained. [address] [facility] NGS sequencing reported a positive MET (exon 14 skipping) mutation on 10/19/2021. An actionable MET mutation was documented after delivery of cycle #1 of chemoimmunotherapy. She tolerated cycle #1 of Carboplatin AUC of 5/Alimta 500 mg/m²/Keytruda very poorly. Decision was made on 10/28/2021 to begin capmatinib (trabecta) 400 mg BID.       Trabecta (capmatinib) had been reduced to 200 mg/day at one time, and more recently increased to 200 mg BID. Trabecta (capmatinib) was discontinued at the end of December 2021 due to elevated LFTs and elevated creatinine. US LIVER at [address] on 1/14/2022 :  Heterogeneous liver echotexture without a discrete mass. Previous cholecystectomy. No biliary ductal dilatation is identified. Right-sided renal cysts including a complex septated cyst at the lateral right mid kidney measuring up to 1.3 cm. Ingrid Hogan and her family were concerned about the fact that since starting Trabecta (capmatinib) she had probably been off of the medication is much as she has been on it due to increased LFTs and creatinine. Her LFTs and creatinine normalized over about a 3-week period of time of being off of Trabecta (capmatinib). I believe she has failed a trial of Trabecta (capmatinib) and other alternatives need to be considered. Plan will be to rechallenge with decreased dosages of carboplatin (AUC of 3) Alimta 250 mg/M2 and Keytruda 200 mg on 21-day cycle. Cycle #1 of carboplatin (AUC of 3) Alimta 250 mg/M2 and Keytruda 200 mg, was delivered on 1/25/2022     Ingrid Hogan has a son Domitila Patricia, who lives in [facility], and a son Linh Godfrey who lives in [place] who frequently accompany her to the clinic.        TREATMENT SUMMARY:  Palliative XRT to the left femur and left hip was initiated on 10/6/2021 for a total dose of 3000 cGy over 10 treatment fractions, completed 10/19/2021  Cycle #1 of palliative chemotherapy with Carboplatin AUC of 5/Alimta 500 mg/m²/Keytruda was started on 10/7/2021  Capmatinib (trabecta) 400 mg BID was started on 11/22/2021 and discontinued at the end of December 2021 due to elevated LFTs and creatinine  Cycle #1 of carboplatin (AUC of 3) Alimta 250 mg/M2 and Keytruda 200 mg, was delivered on 1/25/2022         Current Facility-Administered Medications   Medication Dose Route Frequency Provider Last Rate Last Admin    promethazine (PHENERGAN) 25 mg in sodium chloride 0.9 % 50 mL IVPB  25 mg IntraVENous Q6H PRN Royal Jazmin PA-C   Stopped at 03/17/22 0340    sodium bicarbonate 100 mEq in sodium chloride 0.45 % 1,000 mL infusion   IntraVENous Continuous Mook Ramirez  mL/hr at 03/16/22 2349 New Bag at 03/16/22 2349    cefTRIAXone (ROCEPHIN) 1,000 mg in sterile water 10 mL IV syringe  1,000 mg IntraVENous Q24H Mook Ramirez MD   1,000 mg at 03/16/22 1621    HYDROmorphone HCl PF (DILAUDID) injection 0.5 mg  0.5 mg IntraVENous Q3H PRN Mook Ramirez MD   0.5 mg at 03/16/22 1239    ondansetron (ZOFRAN) injection 8 mg  8 mg [facility] per day [facility]   8 mg at 03/17/22 0527    dexamethasone (DECADRON) injection 4 mg  4 mg IntraVENous Q8H [facility]   4 mg at 03/17/22 2462    diphenoxylate-atropine (LOMOTIL) 2.5-0.025 MG per tablet 2 tablet  2 tablet Oral 4x Daily [place] [facility]   2 tablet at 03/16/22 2123    ipratropium-albuterol (DUONEB) nebulizer solution 1 ampule  1 ampule Inhalation Q4H PRN Fernando Marquez MD        sodium chloride flush 0.9 % injection 5-40 mL  5-40 mL IntraVENous 2 times per day Rosie Quezada MD   10 mL at 03/15/22 1127    sodium chloride flush 0.9 % injection 5-40 mL  5-40 mL IntraVENous PRN Rosie Quezada MD        0.9 % sodium chloride infusion  25 mL IntraVENous PRN Rosie Quezada MD        polyethylene glycol (GLYCOLAX) packet 17 g  17 g Oral Daily PRN Rosie Quezada MD        acetaminophen (TYLENOL) tablet 650 mg  650 mg Oral Q6H PRN Rosie Quezada MD        Or    acetaminophen (TYLENOL) suppository 650 mg  650 mg Rectal Q6H PRN Rosie Quezada MD        loperamide (IMODIUM) capsule 2 mg  2 mg Oral 4x Daily PRN Rosie Quezada MD   2 mg at 03/16/22 0930    allopurinol (ZYLOPRIM) tablet 100 mg  100 mg Oral Daily Rosie Quezada MD   100 mg at 03/15/22 1126    apixaban (ELIQUIS) tablet 5 mg  5 mg Oral BID Rosie Quezada MD   5 mg at 83/79/37 3269    folic acid (FOLVITE) tablet 1 mg  1 mg Oral Daily Ethyl Soulier, MD   1 mg at 03/15/22 1127    HYDROcodone-acetaminophen (NORCO)  MG per tablet 1 tablet  1 tablet Oral Q6H PRN Ethyl Soulier, MD   1 tablet at 03/16/22 0930    famotidine (PEPCID) injection 20 mg  20 mg IntraVENous Daily Ethyl Soulier, MD   20 mg at 03/15/22 1127         Allergies: No Known Allergies      Vitals:    Vitals:    03/17/22 0513   BP: 97/69   Pulse: 119   Resp: 16   Temp: 97.5 °F (36.4 °C)   SpO2: 96%       Physical Exam   Constitutional: Lethargic  Uncomfortable but in no acute distress. Head: Normocephalic and atraumatic. Nose: Nose normal.    Eyes:   No scleral icterus. Neck: Normal range of motion. Neck supple. No JVD. No appreciable thyromegaly. Cardiovascular: Tachy 110 - regular or friction rub. Pulmonary/Chest: Effort normal and breath sounds normal. No respiratory distress. No wheezes. Abdominal: Abdomen mildly tender, not acute. No rebound, positive bowel sounds. Musculoskeletal: Normal range of motion. No edema or tenderness. Lymphadenopathy: No cervical, axillary or inguinal lymphadenopathy. Neurological:Lethargic  Skin: Skin is warm and dry. No rash noted. No erythema. No pallor.    Psychiatric: Judgment normal.         DATA:    Labs:  General Labs:  CBC:   Lab Results   Component Value Date    WBC 7.3 03/16/2022    HGB 12.5 03/16/2022    HCT 38.6 03/16/2022    MCV 94.6 03/16/2022     03/16/2022     Lab Results   Component Value Date    NEUTROABS 4.8 03/16/2022         CMP:    Lab Results   Component Value Date     03/16/2022    K 2.7 (LL) 03/16/2022     03/16/2022    CO2 14 (L) 03/16/2022    BUN 44 (H) 03/16/2022    CREATININE 3.8 (H) 03/16/2022    GLUCOSE 146 (H) 03/16/2022    CALCIUM 6.9 (LL) 03/16/2022    PROT 4.8 (L) 03/14/2022    LABALBU 2.8 (L) 03/14/2022    BILITOT 0.3 03/14/2022    ALKPHOS 47 03/14/2022    AST 16 03/14/2022    ALT 9 03/14/2022    LABGLOM 11 (A) 03/16/2022    GFRAA 14 (L) 03/16/2022    AGRATIO 2.4 (H) 12/10/2021    GLOB 2.5 03/08/2022        Narrative   MRI BRAIN W WO CONTRAST 3/16/2022 12:00 PM   HISTORY: Lung cancer, nausea and vomiting   Comparison: MRI dated 12/7/2016     Technique: Multiplanar imaging of the brain was performed in a routine   fashion before and after the intravenous injection of gadolinium   contrast. 13 mL MultiHance IV contrast.   Findings:    12 mm right basal ganglia lacunar infarct in the region of the   internal capsule posterior limb. Advanced chronic small vessel   ischemic change. There is no intra-axial or extra-axial hemorrhage. No   visualized mass lesion or pathologic enhancement. Normal size and   configuration of the ventricular system for patient age. The posterior   fossa structures are unremarkable. The pituitary gland and sella are   unremarkable. The major intracranial flow voids are preserved. The orbits are unremarkable. The paranasal sinuses and mastoids are   clear. Marrow signal in the calvarium and skull base appears normal.       Impression   Impression:   1. 1.2 cm acute right basal ganglia lacunar infarct in the region of   the internal capsule posterior limb. 2. Advanced chronic small vessel ischemic change. 3. No intracranial mass lesion or pathologic enhancement. Signed by Dr Bertrand Cardona     Narrative   EXAMINATION: US RENAL COMPLETE 3/16/2022 4:28 PM   HISTORY: US RENAL COMPLETE 3/16/2022 2:45 PM   HISTORY: LORA   COMPARISON: None     TECHNIQUE: Multiple longitudinal and transverse realtime sonographic   images of the kidneys and urinary bladder are obtained. FINDINGS:    The right kidney measures 4.5 x 3.8 x 8.2 cm. The right kidney is   small in size, but normal shape, contour and position. The cortical   thickness is normal, with preservation of the corticomedullary   differentiation. 2 cysts are present. One is 1.8 x 1.7 x 1.5 cm. A   second is 1.1 x 1.2 x 1.4 cm.  The central echo complex is compact with   no evidence for hydronephrosis. No nephrolithiasis or abnormal   perinephric fluid collections are seen. No hydroureter. The left kidney measures 3.4 x 3.4 x 8.5 cm. The left kidney is small,   with preservation of the corticomedullary differentiation. A   hypoechoic cyst is present. This is 3.9 x 3.3 x 3.4 cm in the mid left   kidney. The central echo complex is compact with no evidence for   hydronephrosis. No nephrolithiasis or abnormal perinephric fluid   collections are seen. No hydroureter. Scanning through the pelvis reveals the bladder to be moderately   distended with anechoic urine. The wall thickness and contour are   normal. There is no surrounding ascites. Impression   1. Bilateral renal cysts are noted as described above. 2. The renal contours are small. 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 Cycle #2 chemotherapy 2/15/2022    Oncology consultation called in continuity of care. Admission CBC on 3/12/2022 has a WBC of 0.9 with 55% lymphocytes and an ANC of 0.5. Hemoglobin is 12.4 with a platelet count of 167,783. Status post Neulasta 6 mg SQ on 3/13/2022  COVID-19 rapid testing on 3/13/2022 is NEGATIVE  Urine cx 3/13/2022 8000 CFU/mL strep agalactiae  Blood cx 3/13/2022 -  no growth to date    Rocephin initiated 3/13/2022 AM      #Neutropenia secondary to chemotherapy  Status post Neulasta 6 mg SQ on 3/13/2022        ANC 4.8 on 3/16/2022    #Nausea vomiting diarrhea    Wilfred Chance developed nausea vomiting and diarrhea on 3/10/2022 that has been getting progressively worse. She has not had this side effect with any of her previous  chemotherapy treatments. I received a call in the middle of the night regarding these symptoms and instructed her to be evaluated in the ED for possible admission. Jesús Campuzano was seen in the ED at [address] on 3/12/2022 and admitted with nausea vomiting diarrhea dehydration neutropenia for management. CT abdomen and pelvis with contrast 3/13/2022  Thickening and edema in the wall of the small bowel. Most likely  this represents enteritis. Diverticulosis  Cholecystectomy. MRI brain with and without 3/16/2022  1. 1.2 cm acute right basal ganglia lacunar infarct in the region of  the internal capsule posterior limb. 2. Advanced chronic small vessel ischemic change. 3. No intracranial mass lesion or pathologic enhancement. PLAN  IV fluids and electrolyte monitoring will continue. Lomotil scheduled, Imodium as needed  Zofran/dexamethasone scheduled  Phenergan  Continuing supportive care      #Acute right basal ganglia lacunar infarct    MRI brain with and without 3/16/2022  1. 1.2 cm acute right basal ganglia lacunar infarct in the region of  the internal capsule posterior limb. 2. Advanced chronic small vessel ischemic change. 3. No intracranial mass lesion or pathologic enhancement. Neurology consult      #Normocytic anemia         #History of DVT left lower extremity, January 2022  -Eliquis 5 mg p.o. twice daily - d/c with worsening renal fxn  -heparin 5000 subcu every 8 hours      #CKD stage IIIB  Creatinine baseline-1.1        Renal u/s 3/16/2022  Bilateral renal cysts are noted as described above. The renal contours are small.   No Hydro    Suspect related to volume depletion from nausea, vomiting, diarrhea and component IV contrast nephropathy from CT 3/13/2022      1/2 NS H2CO3 150 cc/h  Dr. Richard Montgomery following    #Hypokalemia        K runs per hospitalist    Plan  Continue antibiotics  IVF  Lomotil scheduled  Zofran/dex scheduled  Phenergan prn  Hypokalemia-as per hospitalist  D/c eliquis due to renal fxn  Start heparin 5000 subcu every 8 hours    Discussed with son Stu Foots at bedside      Ariel Plata PA-C    03/17/22  6:44 AM   Physician's attestation/substantial contribution:  I, Dr Jai Ritchie, independently performed an evaluation on Abby Oh. I have reviewed relevant medical information/data to include but not limited to medication list, relevant appropriate labs and imaging when applicable. I reviewed other physician's notes, ancillary services and nurses assessment. I have reviewed the above documentation completed by the Nurse Practitioner or Physician Assistant. Please see my additional contributions to the history of present illness, physical examination, and assessment/medical decision-making that reflect my findings and impressions. I have seen and examined the patient and the key elements of all parts of the encounter have been performed by me. I agree with the assessment and plan as outlined by the ARNP/PA. Subjective-nausea and vomiting have improved. Objective-as above  Assessment/plan:  Acute stroke-as per neurology/hospitalist team  Nausea vomiting.   Improved  Continue current supportive care    Jai Ritchie MD

## 2022-03-17 NOTE — PROGRESS NOTES
Patient up to [facility], very weak and SOA when getting back to bed. Oxygen saturation 95% on RA, Oxygen 2L placed for patient's comfort. Patient responded that it was helping.

## 2022-03-17 NOTE — PROGRESS NOTES
Aide turned and changed pt, Pt moaned as if in discomfort when being cleaned up. Aide did a bladder scan on pt and it showed 518 ml.   Electronically signed by [employer]  on 3/17/2022 at 6:11 PM

## 2022-03-17 NOTE — PROGRESS NOTES
Aide was doing personal care for pt and pt moaned as though she was in pain in the area of her bladder.  Aide bladder scanned pt to be sure and the result was 234, aide scanned a second time to be sure and got the same result of 234  Electronically signed by Daisy Harden on 3/17/2022 at 10:52 AM

## 2022-03-17 NOTE — PROGRESS NOTES
Daily Progress Note    Date:3/17/2022  Patient: Abby Oh  : 1940  THB:[id number]  CODE:Full Code No additional code details  Erin Umaña MD    Admit Date: 3/12/2022 11:20 PM   LOS: 4 days         Subjective:    She is more somnolent today, but arousable. Mildly confused with worsening renal function, decreased urine output.         Review of Systems  Unable to complete comprehensive ROS due to AMS      Objective:      Vital signs in last 24 hours:  Patient Vitals for the past 24 hrs:   BP Temp Temp src Pulse Resp SpO2   22 1203 116/66 96.8 °F (36 °C) Temporal 100  95 %   22 0513 97/69 97.5 °F (36.4 °C)  119 16 96 %   22 0016 129/80 99.3 °F (37.4 °C)  109 16 97 %   22 1819    86     22 1818 131/64 97.3 °F (36.3 °C) Temporal 80 18 97 %   22 1602    86       Physical exam    General: Somnolent, Fatigued/ill-appearing  HEENT: NC/AT, no scleral icterus  Cardiovascular: Normal rate, pulses 2+ and equal  Respiratory: Normal effort, no wheezes or rales  Abdomen: Soft, nontender, bowel sounds present  Neurologic: Somnolent but arousable  Extremities: No clubbing cyanosis or edema  Musculoskeletal: No deformities  Skin: Warm and dry, well perfused      Lab Review   Recent Results (from the past 24 hour(s))   CBC with Auto Differential    Collection Time: 22  5:40 AM   Result Value Ref Range    WBC 3.7 (L) 4.8 - 10.8 K/uL    RBC 4.08 (L) 4.20 - 5.40 M/uL    Hemoglobin 12.3 12.0 - 16.0 g/dL    Hematocrit 38.1 37.0 - 47.0 %    MCV 93.4 81.0 - 99.0 fL    MCH 30.1 27.0 - 31.0 pg    MCHC 32.3 (L) 33.0 - 37.0 g/dL    RDW 15.6 (H) 11.5 - 14.5 %    Platelets 831 (L) 340 - 400 K/uL    MPV 13.0 (H) 9.4 - 12.3 fL    PLATELET SLIDE REVIEW Adequate     Neutrophils % 33.0 (L) 50.0 - 65.0 %    Lymphocytes % 7.0 (L) 20.0 - 40.0 %    Monocytes % 16.0 (H) 0.0 - 10.0 %    Eosinophils % 1.0 0.0 - 5.0 %    Basophils % 0.0 0.0 - 1.0 %    Neutrophils Absolute 2.8 1.5 - 7.5 K/uL    Immature Granulocytes # 0.0 K/uL    Lymphocytes Absolute 0.3 (L) 1.1 - 4.5 K/uL    Monocytes Absolute 0.60 0.00 - 0.90 K/uL    Eosinophils Absolute 0.04 0.00 - 0.60 K/uL    Basophils Absolute 0.00 0.00 - 0.20 K/uL    Bands Relative 40 (H) 0 - 5 %    Atypical Lymphocytes Relative 1 0 - 8 %    Metamyelocytes Relative 2 (A) %    Anisocytosis 1+ (A)     Ovalocytes Occasional (A)     Tear Drop Cells Occasional (A)    Magnesium    Collection Time: 03/17/22  5:40 AM   Result Value Ref Range    Magnesium 2.1 1.6 - 2.4 mg/dL   Comprehensive Metabolic Panel    Collection Time: 03/17/22  5:40 AM   Result Value Ref Range    Sodium 130 (L) 136 - 145 mmol/L    Potassium 2.7 (LL) 3.5 - 5.0 mmol/L    Chloride 97 (L) 98 - 111 mmol/L    CO2 10 (L) 22 - 29 mmol/L    Anion Gap 23 (H) 7 - 19 mmol/L    Glucose 213 (H) 74 - 109 mg/dL    BUN 52 (H) 8 - 23 mg/dL    CREATININE 4.6 (H) 0.5 - 0.9 mg/dL    GFR Non-African American 9 (A) >60    GFR  11 (L) >59    Calcium 7.2 (L) 8.8 - 10.2 mg/dL    Albumin 2.1 (L) 3.5 - 5.2 g/dL    Total Protein 4.2 (L) 6.6 - 8.7 g/dL    Total Bilirubin 0.4 0.2 - 1.2 mg/dL    Alkaline Phosphatase 75 35 - 104 U/L    ALT 57 (H) 5 - 33 U/L    AST 43 (H) 5 - 32 U/L   POCT Glucose    Collection Time: 03/17/22  8:25 AM   Result Value Ref Range    POC Glucose 138 (H) 70 - 99 mg/dl    Performed on AccuChek    POCT Glucose    Collection Time: 03/17/22 12:01 PM   Result Value Ref Range    POC Glucose 124 (H) 70 - 99 mg/dl    Performed on AccuChek        I/O last 3 completed shifts: In: 6967 [P.O.:240; I.V.:1162]  Out: 550 [Emesis/NG output:550]  No intake/output data recorded.       Current Facility-Administered Medications:     heparin (porcine) injection 5,000 Units, 5,000 Units, SubCUTAneous, 3 times per day, Theo Bhat PA-C, 5,000 Units at 03/17/22 0842    famotidine (PEPCID) 10 mg in sodium chloride (PF) 10 mL injection, 10 mg, IntraVENous, Daily, Adilson Pro MD, 10 mg at 03/17/22 2910    promethazine (PHENERGAN) 25 mg in sodium chloride 0.9 % 50 mL IVPB, 25 mg, IntraVENous, Q6H PRN, Brandi Mtz PA-C, Stopped at 03/17/22 0340    sodium bicarbonate 100 mEq in sodium chloride 0.45 % 1,000 mL infusion, , IntraVENous, Continuous, Tyrese Red MD, Last Rate: 150 mL/hr at 03/17/22 1005, New Bag at 03/17/22 1005    cefTRIAXone (ROCEPHIN) 1,000 mg in sterile water 10 mL IV syringe, 1,000 mg, IntraVENous, Q24H, Tyrese Red MD, 1,000 mg at 03/17/22 0843    HYDROmorphone HCl PF (DILAUDID) injection 0.5 mg, 0.5 mg, IntraVENous, Q3H PRN, Tyrese Red MD, 0.5 mg at 03/16/22 1239    ondansetron (ZOFRAN) injection 8 mg, 8 mg, IntraVENous, 3 times per day, Brandi Mtz PA-C, 8 mg at 03/17/22 0527    dexamethasone (DECADRON) injection 4 mg, 4 mg, IntraVENous, Q8H, Riley Richards PA-C, 4 mg at 03/17/22 5856    [Held by provider] diphenoxylate-atropine (LOMOTIL) 2.5-0.025 MG per tablet 2 tablet, 2 tablet, Oral, 4x Daily, Brandi Mtz PA-C, 2 tablet at 03/16/22 2123    ipratropium-albuterol (DUONEB) nebulizer solution 1 ampule, 1 ampule, Inhalation, Q4H PRN, Madalyn Cazares MD    sodium chloride flush 0.9 % injection 5-40 mL, 5-40 mL, IntraVENous, 2 times per day, Gopi Herron MD, 10 mL at 03/17/22 0843    sodium chloride flush 0.9 % injection 5-40 mL, 5-40 mL, IntraVENous, PRN, Gopi Herron MD    0.9 % sodium chloride infusion, 25 mL, IntraVENous, PRN, Gopi Herron MD    polyethylene glycol (GLYCOLAX) packet 17 g, 17 g, Oral, Daily PRN, Gopi Herron MD    acetaminophen (TYLENOL) tablet 650 mg, 650 mg, Oral, Q6H PRN **OR** acetaminophen (TYLENOL) suppository 650 mg, 650 mg, Rectal, Q6H PRN, Gopi Herron MD    loperamide (IMODIUM) capsule 2 mg, 2 mg, Oral, 4x Daily PRN, Gopi Herron MD, 2 mg at 03/16/22 0930    allopurinol (ZYLOPRIM) tablet 100 mg, 100 mg, Oral, Daily, Gopi Herron MD, 100 mg at 45/87/62 9740    folic acid (FOLVITE) tablet 1 mg, 1 mg, Oral, Daily, Puneet Figueroa MD, 1 mg at 03/15/22 1127    HYDROcodone-acetaminophen (NORCO)  MG per tablet 1 tablet, 1 tablet, Oral, Q6H PRN, Puneet Figueroa MD, 1 tablet at 03/16/22 0930            Assessment and Plan  Principal Problem:    Acute renal failure superimposed on stage 3b chronic kidney disease (HCC)  Active Problems:    Dehydration    Acute stroke of basal ganglia (HCC)    Hypertension    Metastatic lung cancer (metastasis from lung to other site) [facility])    Chronic renal failure, stage 3 (moderate), unspecified whether stage 3a or 3b CKD (HCC)    Deep vein thrombosis (DVT) of proximal lower extremity (HCC)    Neutropenia (HCC)    E. coli UTI    LORA (acute kidney injury) ([facility] [address]    Group B streptococcal UTI    Hypokalemia  Resolved Problems:    * No resolved hospital problems. [facility] Course: Patient with a history of stage IV lung cancer was admitted 3/13 for significant diarrhea, poor oral intake, nausea, vomiting and dehydration. She was started on empiric abx and IV hydration. Her C. Diff came back negative. Urine culture speciated E. coli and group B strep, treated with Rocephin. Patient developed acute renal failure related to dehydration from GI losses, increased IV fluids with addition of bicarb in setting of worsening metabolic acidosis. Nephrology consulted. Developed hypocalcemia and hypokalemia with electrolytes repleted.        Acute renal failure with metabolic acidosis  --Worsening  Continue IV fluids with bicarb  Follow urine output and renal function closely, monitor for renal recovery  Nephrology following    AMS with somnolence, suspect metabolic encephalopathy with renal failure    UTI, E. coli and group B strep from urine culture  Susceptibilities reviewed  Continue Rocephin    neutropenia  Follow CBC    Hypokalemia, hypocalcemia  Repleted, continue to monitor    stage IV lung cancer  Follow-up MRI brain to rule out metastatic disease  -Management per oncology  - continue dexamethasone    hx DVT  -Holding Eliquis due to renal failure and potential need for dialysis catheter if renal function continues to decline    DVT prophylaxis with heparin for now          Jaja Dodson MD 3/17/2022 3:41 PM

## 2022-03-17 NOTE — CARE COORDINATION
Provided patient and her son, Tim Laws, information about [employer]. Patient believes she is already enrolled in program.  Tim Laws stated he would review information. Stroke education booklet reviewed and given to patient's son, Tim Laws. All questions answered appropriately.   Electronically signed by Santino Hooper RN on 3/17/2022 at 12:42 PM

## 2022-03-17 NOTE — PROGRESS NOTES
Physical Therapy   Name: Clara Khalil  MRN:  [id number]  Date of service:  3/17/2022  Pt's RN, Derrick Lopez, requested PT hold therapy today due to pt not feeling well. Will f/u at a later time.   Electronically signed by Beverly Manriquez PT on 3/17/2022 at 11:24 AM

## 2022-03-18 NOTE — PROGRESS NOTES
This  attempted to visit with pt. Visited with pt's sons; pt was sleeping. Pt's son's say pt's  visited this morning. Provided spiritual care with support, nurtured hope, and prayer. Pt did not awaken during the visit. Pt's sons expressed gratitude for spiritual care.     Electronically signed by Migel Au on 3/18/2022 at 1:56 PM

## 2022-03-18 NOTE — PROGRESS NOTES
The pts son signed the adm forms admitting the pt to the [place] [address].. It is ok to dc the pt, call [phone] after neg covid test to give report then transfer the pt to the [place] [address].. Emotional and sc support provided.

## 2022-03-18 NOTE — PROGRESS NOTES
Pt continues to have nausea vomiting. She is very fatigued, with worsening renal function. Family meeting with pt and her 4 sons. They do not want dialysis and wish for comfort measures with symptom control. Sana Cm has met with all and we will proceed with requesting transfer to the [place] [address].. Visit to room to support family and they are appreciative of our help. Approval received from Dr. Jairo Bey and pt will need rapid covid test, prior to moving.     Electronically signed by Conchita Wright RN on 3/18/2022 at 3:12 PM

## 2022-03-18 NOTE — CONSULTS
[address]  [place], [place]  Phone [phone]     Neurology Consultation     Date of Admission: 3/12/2022 11:20 PM  Date of Consultation: 3/17/22    Attending Provider: Jaja Dodson MD  Consulting Provider: Leila Menard M.D. Patient: Russ Essex  :  1940  Age:  79 y.o. MRN:  [id number]    CHIEF COMPLAINT:  Intractable nausea    History Source: History obtained from chart review, the patient and son. PCP: Madhu Nguyen MD    HISTORY OF PRESENT ILLNESS:   Russ Essex is a 79y.o. year old woman with a history of metastatic lung adenocarcinoma, acute on chronic kidney injury, and intractable nausea and emesis who was admitted 3/12 with the intractable nausea, generalized weakness, and worsening kidney function and was found to have an acute stroke on MRI head. She has had no prior stroke. She denies focal weakness, numbness, dysarthria. She has been getting chemotherapy and has had worsening nausea and emesis. She has not been able to eat or drink much. She has been very weak generally. She feels ill. Her kidney functioning is worsening. Her son says that yesterday, her mental status was impaired. It seems a little better today. She had an MRI head yesterday in workup for her chronic intractable nausea and it showed a small acute right basal ganglia infarct.     PAST MEDICAL HISTORY:    Medical History:      Diagnosis Date    Acid reflux     [facility]) ([facility] [address] 2018    LORA (acute kidney injury) ([facility] [address] 2018    HIGH CHOLESTEROL     Hypertension     [facility]    [facility]ast[facility]    Urinary incontinence     UTI (urinary tract infection)     Vertigo        Surgical History:      Procedure Laterality Date    CHOLECYSTECTOMY      COLONOSCOPY      Dr Mago Carey COLONOSCOPY N/A 2019 Dr Harriet Rojo disease-Tubular AP (-) dysplasia, 5 yr recall    HYSTERECTOMY      PORT SURGERY N/A 10/4/2021    SINGLE LUMEN PORT PLMT WITH ULTRASOUND AND FLURO performed by Tina Garcia MD at [place] [address]      laser        Medications Prior to Admission:    Prior to Admission medications    Medication Sig Start Date End Date Taking? Authorizing Provider   pantoprazole (PROTONIX) 40 MG tablet TAKE 1 TABLET BY MOUTH DAILY 3/8/22  Yes Rudy Palencia MD   folic acid (FOLVITE) 1 MG tablet TAKE 1 TABLET BY MOUTH DAILY 2/23/22 5/24/22 Yes Zamzam Ross MD   apixaban (ELIQUIS) 5 MG TABS tablet Take 1 tablet by mouth 2 times daily 2/15/22  Yes Zamzam Ross MD   dexamethasone (DECADRON) 4 MG tablet Take 1 tablet by mouth See Admin Instructions for 24 doses Take 4mg BID the day before, of and after chemotherapy every 21 days 1/21/22  Yes Zamzam Ross MD   oxybutynin (DITROPAN) 5 MG tablet TAKE 1 TABLET BY MOUTH TWO TIMES A DAY 12/9/21  Yes Rudy Palencia MD   Capmatinib HCl (TABRECTA) 200 MG TABS Take 400 mg by mouth 2 times daily 10/28/21  Yes Zamzam Ross MD   metoclopramide (REGLAN) 10 MG tablet Take 1 tablet by mouth 4 times daily 10/14/21  Yes DAVIDSON Travis   ondansetron (ZOFRAN-ODT) 8 MG TBDP disintegrating tablet Place 1 tablet under the tongue every 8 hours as needed for Nausea or Vomiting 10/12/21  Yes DAVIDSON Travis   HYDROcodone-acetaminophen (NORCO)  MG per tablet Take 1 tablet by mouth every 4 hours as needed.  9/23/21  Yes Historical Provider, MD   pravastatin (PRAVACHOL) 80 MG tablet TAKE 1 TABLET BY MOUTH AT BEDTIME 7/6/21  Yes Rudy Palencia MD   losartan (COZAAR) 50 MG tablet TAKE 1 TABLET BY MOUTH DAILY 5/21/21  Yes Rudy Palencia MD   triamterene-hydrochlorothiazide (MAXZIDE-25) 37.5-25 MG per tablet Take 0.5 tablets by mouth daily 5/17/19  Yes Rudy Palencia MD   calcium carbonate (OSCAL) 500 MG TABS tablet Take 500 mg by mouth daily   Yes Historical Provider, MD   allopurinol (ZYLOPRIM) 100 MG tablet Take 100 mg by mouth daily   Yes Historical Provider, MD   Cholecalciferol (VITAMIN D) 2000 units CAPS capsule Take by mouth daily    Yes Historical Provider, MD   lidocaine-prilocaine (EMLA) 2.5-2.5 % cream Apply topically as needed.   Patient not taking: Reported on 1/25/2022 10/7/21   DAVIDSON Kay       Current Medications:  Current Facility-Administered Medications: heparin (porcine) injection 5,000 Units, 5,000 Units, SubCUTAneous, 3 times per day  famotidine (PEPCID) 10 mg in sodium chloride (PF) 10 mL injection, 10 mg, IntraVENous, Daily  promethazine (PHENERGAN) 25 mg in sodium chloride 0.9 % 50 mL IVPB, 25 mg, IntraVENous, Q6H PRN  sodium bicarbonate 100 mEq in sodium chloride 0.45 % 1,000 mL infusion, , IntraVENous, Continuous  cefTRIAXone (ROCEPHIN) 1,000 mg in sterile water 10 mL IV syringe, 1,000 mg, IntraVENous, Q24H  HYDROmorphone HCl PF (DILAUDID) injection 0.5 mg, 0.5 mg, IntraVENous, Q3H PRN  ondansetron (ZOFRAN) injection 8 mg, 8 mg, IntraVENous, 3 times per day  dexamethasone (DECADRON) injection 4 mg, 4 mg, IntraVENous, Q8H  [Held by provider] diphenoxylate-atropine (LOMOTIL) 2.5-0.025 MG per tablet 2 tablet, 2 tablet, Oral, 4x Daily  ipratropium-albuterol (DUONEB) nebulizer solution 1 ampule, 1 ampule, Inhalation, Q4H PRN  sodium chloride flush 0.9 % injection 5-40 mL, 5-40 mL, IntraVENous, 2 times per day  sodium chloride flush 0.9 % injection 5-40 mL, 5-40 mL, IntraVENous, PRN  0.9 % sodium chloride infusion, 25 mL, IntraVENous, PRN  polyethylene glycol (GLYCOLAX) packet 17 g, 17 g, Oral, Daily PRN  acetaminophen (TYLENOL) tablet 650 mg, 650 mg, Oral, Q6H PRN **OR** acetaminophen (TYLENOL) suppository 650 mg, 650 mg, Rectal, Q6H PRN  loperamide (IMODIUM) capsule 2 mg, 2 mg, Oral, 4x Daily PRN  allopurinol (ZYLOPRIM) tablet 100 mg, 100 mg, Oral, Daily  folic acid (FOLVITE) tablet 1 mg, 1 mg, Oral, Daily  HYDROcodone-acetaminophen (NORCO)  MG per tablet 1 tablet, 1 tablet, Oral, Q6H PRN    Allergies:  Patient has no known allergies. Habits:  TOBACCO:   reports that she quit smoking about 19 years ago. Her smoking use included cigarettes. She started smoking about 58 years ago. She has a 20.00 pack-year smoking history. She has never used smokeless tobacco.  ETOH:   reports no history of alcohol use. DRUGS:    Social History     Substance and Sexual Activity   Drug Use No       SOCIAL HISTORY:   Clara Khalil is , lives in [place], [place], and is retired. FAMILY HISTORY:       Problem Relation Age of Onset    Cancer Mother     Colon Cancer Mother     Heart Disease Father     Stroke Brother     Colon Polyps Neg Hx     Esophageal Cancer Neg Hx     Liver Cancer Neg Hx     Liver Disease Neg Hx     Rectal Cancer Neg Hx     Stomach Cancer Neg Hx        REVIEW OF SYSTEMS:  Review of Systems   Constitutional: Positive for fatigue and unexpected weight change. Negative for chills and fever. HENT: Positive for hearing loss. Negative for tinnitus. Eyes: Negative for photophobia and visual disturbance. Respiratory: Positive for cough and shortness of breath. Cardiovascular: Positive for palpitations. Negative for chest pain. Gastrointestinal: Positive for abdominal pain, nausea and vomiting. Endocrine: Negative for polydipsia and polyuria. Genitourinary: Positive for frequency and urgency. Musculoskeletal: Positive for arthralgias and back pain. Skin: Negative for rash and wound. Allergic/Immunologic: Negative for environmental allergies and food allergies. Neurological: Positive for weakness. Negative for dizziness, tremors, seizures, speech difficulty, numbness and headaches. Hematological: Negative for adenopathy. Bruises/bleeds easily. Psychiatric/Behavioral: Negative for dysphoric mood. The patient is not nervous/anxious.         PHYSICAL EXAMINATION:  Vitals: BP 129/66   Pulse 99   Temp 98.1 °F (36.7 °C) (Temporal)   Resp 16   Ht 5' 6\" (1.676 m)   Wt 154 lb 7 oz (70.1 kg)   SpO2 92%   BMI 24.93 kg/m²   General appearance:  She is an elderly ill appearing woman who appears mildly distressed. Skin: Skin color, texture, turgor normal. No rashes or lesions  HEENT: Head: Normal, normocephalic, atraumatic. Neck:no adenopathy, no carotid bruit, no JVD, supple, symmetrical, trachea midline and thyroid not enlarged, symmetric, no tenderness/mass/nodules  Lungs: rhonchi bilaterally  Heart: regular rate and rhythm, S1, S2 normal, no murmur, click, rub or gallop  Abdomen: soft, non-tender; bowel sounds normal; no masses,  no organomegaly  Extremities: extremities normal, atraumatic, no cyanosis or edema      NEUROLOGIC EXAMINATION:  Neurologic Exam     Mental Status   Oriented to person, place, and time. Speech: speech is normal   Level of consciousness: drowsy ,  arousable by verbal stimuli  Able to name object. Able to repeat. Speech is fluent. Cranial Nerves     CN II   Visual fields full to confrontation. CN III, IV, VI   Pupils are equal, round, and reactive to light. Extraocular motions are normal.     CN VII   Facial expression full, symmetric.      CN VIII   Hearing: intact    CN IX, X   Palate: symmetric    CN XI   Right sternocleidomastoid strength: weak  Left sternocleidomastoid strength: weak  Right trapezius strength: weak  Left trapezius strength: weak    CN XII   Tongue: not atrophic  Fasciculations: absent  Tongue deviation: none    Motor Exam   Muscle bulk: normal  Overall muscle tone: normal  Right arm pronator drift: absent  Left arm pronator drift: absent    Strength   Right neck flexion: 4/5  Left neck flexion: 4/5  Right neck extension: 4/5  Left neck extension: 4/5  Right deltoid: 4/5  Left deltoid: 4/5  Right biceps: 4/5  Left biceps: 4/5  Right triceps: 4/5  Left triceps: 4/5  Right wrist flexion: 4/5  Left wrist flexion: 4/5  Right wrist extension: 4/5  Left wrist extension: 4/5  Right interossei: 4/5  Left interossei: 4/5  Right iliopsoas: 4/5  Left iliopsoas: 4/5  Right quadriceps: 4/5  Left quadriceps: 4/5  Right glutei: 4/5  Left glutei: 4/5  Right anterior tibial: 4/5  Left anterior tibial: 4/5  Right posterior tibial: 4/5  Left posterior tibial: 4/5  Right peroneal: 4/5  Left peroneal: 4/5  Right gastroc: 4/5  Left gastroc: 4/5    Sensory Exam   Light touch normal.   Vibration normal.   Pinprick normal.     Gait, Coordination, and Reflexes     Reflexes   Right brachioradialis: 0  Left brachioradialis: 0  Right biceps: 0  Left biceps: 0  Right triceps: 0  Left triceps: 0  Right patellar: 0  Left patellar: 0  Right achilles: 0  Left achilles: 0  Right plantar: normal  Left plantar: normal  Right Becerra: absent  Left Becerra: absent  Right ankle clonus: absent  Left ankle clonus: absent  Rapid alternating movements were normal.       ADDITIONAL REVIEW:  CBC:    Recent Labs     03/16/22  0600 03/17/22  0540   WBC 7.3 3.7*   HGB 12.5 12.3    124*     BMP:     Recent Labs     03/16/22  0600 03/17/22  0540    130*   K 2.7* 2.7*    97*   CO2 14* 10*   BUN 44* 52*   CREATININE 3.8* 4.6*   GLUCOSE 146* 213*     Hepatic:  Recent Labs     03/17/22  0540   AST 43*   ALT 57*   BILITOT 0.4   ALKPHOS 75     Narrative   MRI BRAIN W WO CONTRAST 3/16/2022 12:00 PM   HISTORY: Lung cancer, nausea and vomiting   Comparison: MRI dated 12/7/2016     Technique: Multiplanar imaging of the brain was performed in a routine   fashion before and after the intravenous injection of gadolinium   contrast. 13 mL MultiHance IV contrast.   Findings:    12 mm right basal ganglia lacunar infarct in the region of the   internal capsule posterior limb. Advanced chronic small vessel   ischemic change. There is no intra-axial or extra-axial hemorrhage. No   visualized mass lesion or pathologic enhancement.  Normal size and   configuration of the ventricular system for patient age. The posterior   fossa structures are unremarkable. The pituitary gland and sella are   unremarkable. The major intracranial flow voids are preserved. The orbits are unremarkable. The paranasal sinuses and mastoids are   clear. Marrow signal in the calvarium and skull base appears normal.       Impression   Impression:   1. 1.2 cm acute right basal ganglia lacunar infarct in the region of   the internal capsule posterior limb. 2. Advanced chronic small vessel ischemic change. 3. No intracranial mass lesion or pathologic enhancement. Signed by Dr Tucker Organ:  1. Acute right basal ganglia stroke. A stroke in this area should not cause nausea and this occurred within the last 10 days so it does not seem related to that symptom. 2. Metabolic encephalopathy  3. Intractable nausea and emesis  4. Acute on chronic kidney injury  5. UTI  6. Metastatic lung adenocarcinoma  7. Pancytopenia   8. Hypokalemia, hyponatremia    PLAN:  1. Carotid US  2. Echocardiogram   3. Abx  4.  Symptomatic medications    Florence Jordan M.D.

## 2022-03-18 NOTE — PLAN OF CARE
Nutrition Problem #1: Inadequate oral intake  Intervention: Food and/or Nutrient Delivery: Continue Current Diet  Nutritional Goals: Pt will tolerate diet advance or needs with be met via PN.     Problem: Nutrition  Goal: Optimal nutrition therapy  Outcome: Ongoing

## 2022-03-18 NOTE — PROGRESS NOTES
[address], [address]  [place], [place] [address]  Phone [phone]     Neurology Progress Note  3/18/2022 9:50 AM  Information:   Patient Name: Ceasar Waters  :   1940  Age:   79 y.o. MRN:   [id number]  Account #:  [de-identified]  Admit Date:   3/12/2022  Today:  3/18/22     ADMIT DX:   Acute renal failure superimposed on stage 3b chronic kidney disease (HCC)    Subjective:     Ceasar Waters is a 79y.o. year old woman with a history of metastatic lung adenocarcinoma, acute on chronic kidney injury, and intractable nausea and emesis who was admitted 3/12 with the intractable nausea, generalized weakness, and worsening kidney function and was found to have an acute stroke on MRI head. Interval History:   She does not feel well today. She had emesis just after drinking water. Her kidney function is worsening. She has not had focal weakness or numbness. Her son says she has been confused.     Objective:     Past Medical History:  Past Medical History:   Diagnosis Date    Acid reflux     LORA (acute kidney injury) ([facility] [facility] 2018    LORA (acute kidney injury) ([facility] [address] [facility] 2018    HIGH CHOLESTEROL     Hypertension     Metastatic lung cancer (metastasis from lung to other site) [facility]) 2021    Metastatic lung cancer (metastasis from lung to other site) [facility]) 2021    Urinary incontinence     UTI (urinary tract infection)     Vertigo        Past Surgical History:   Procedure Laterality Date   Nando Gavin COLONOSCOPY      Dr Salina Mera 2019    Dr SARAH Juarez-Diverticular disease-Tubular AP (-) dysplasia, 5 yr recall    HYSTERECTOMY      PORT SURGERY N/A 10/4/2021    SINGLE LUMEN PORT PLMT WITH ULTRASOUND AND FLURO performed by Dallin Enriquez MD at [facility]         Family History   Problem Relation Age of Onset    Cancer Mother     Colon Cancer Mother     Heart Disease Father     Stroke Brother     Colon Polyps Neg Hx     Esophageal Cancer Neg Hx     Liver Cancer Neg Hx     Liver Disease Neg Hx     Rectal Cancer Neg Hx     Stomach Cancer Neg Hx        Social History     Socioeconomic History    Marital status:      Spouse name: Isha Alfonso Number of children: Not on file    Years of education: Not on file    Highest education level: Not on file   Occupational History     Employer: RETIRED   Tobacco Use    Smoking status: Former Smoker     Packs/day: 0.50     Years: 40.00     Pack years: 20.00     Types: Cigarettes     Start date:      Quit date:      Years since quittin.2    Smokeless tobacco: Never Used   Vaping Use    Vaping Use: Never used   Substance and Sexual Activity    Alcohol use: No    Drug use: No    Sexual activity: Not on file   Other Topics Concern    Not on file   Social History Narrative    Not on file     Social Determinants of Health     Financial Resource Strain: Low Risk     Difficulty of Paying Living Expenses: Not hard at all   Food Insecurity: No Food Insecurity    Worried About [address] [place] [address] in the Last Year: Never true    Светлана of Food in the Last Year: Never true   Transportation Needs: No Transportation Needs    Lack of Transportation (Medical): No    Lack of Transportation (Non-Medical):  No   Physical Activity:     Days of Exercise per Week: Not on file    Minutes of Exercise per Session: Not on file   Stress:     Feeling of Stress : Not on file   Social Connections:     Frequency of Communication with Friends and Family: Not on file    Frequency of Social Gatherings with Friends and Family: Not on file    Attends Nondenominational Services: Not on file    Active Member of Clubs or Organizations: Not on file    Attends Club or Organization Meetings: Not on file    Marital Status: Not on file   Intimate Partner Violence:     Fear of Current or Ex-Partner: Not on file    Emotionally Abused: Not on file    Physically Abused: Not on file   Prem Self Sexually Abused: Not on file   Housing Stability:     Unable to Pay for Housing in the Last Year: Not on file    Number of Places Lived in the Last Year: Not on file    Unstable Housing in the Last Year: Not on file       Medications:   sodium bicarbonate infusion 150 mL/hr at 03/18/22 0454    sodium chloride        potassium bicarb-citric acid  20 mEq Oral Once    heparin (porcine)  5,000 Units SubCUTAneous 3 times per day    famotidine (PEPCID) injection  10 mg IntraVENous Daily    cefTRIAXone (ROCEPHIN) IV  1,000 mg IntraVENous Q24H    ondansetron  8 mg IntraVENous 3 times per day    dexamethasone  4 mg IntraVENous Q8H    sodium chloride flush  5-40 mL IntraVENous 2 times per day    allopurinol  100 mg Oral Daily    folic acid  1 mg Oral Daily       Diagnostic Studies:  Reviewed all labs/diagnositcs since last 24hrs:  Recent Results (from the past 24 hour(s))   POCT Glucose    Collection Time: 03/17/22 12:01 PM   Result Value Ref Range    POC Glucose 124 (H) 70 - 99 mg/dl    Performed on [employer]    POCT Glucose    Collection Time: 03/17/22  5:50 PM   Result Value Ref Range    POC Glucose 96 70 - 99 mg/dl    Performed on [employer]k    Urinalysis    Collection Time: 03/17/22  6:45 PM   Result Value Ref Range    Color, UA DARK YELLOW (A) Straw/Yellow    Clarity, UA Clear Clear    Glucose, Ur Negative Negative mg/dL    Bilirubin Urine SMALL (A) Negative    Ketones, Urine TRACE (A) Negative mg/dL    Specific Gravity, UA 1.029 1.005 - 1.030    Blood, Urine Negative Negative    pH, UA 5.5 5.0 - 8.0    Protein, UA 30 (A) Negative mg/dL    Urobilinogen, Urine 0.2 <2.0 E.U./dL    Nitrite, Urine Negative Negative    Leukocyte Esterase, Urine TRACE (A) Negative   Sodium, urine, random    Collection Time: 03/17/22  6:45 PM   Result Value Ref Range    Sodium, Ur <20.0 mmol/L   Microscopic Urinalysis    Collection Time: 03/17/22  6:45 PM   Result Value Ref Range    WBC, UA 3-5 (A) 0 - 5 /HPF    Epithelial Cells, UA 0-2 /HPF    Bacteria, UA None Seen (A) None Seen /HPF   POCT Glucose    Collection Time: 03/17/22  9:07 PM   Result Value Ref Range    POC Glucose 92 70 - 99 mg/dl    Performed on AccuChek    Magnesium    Collection Time: 03/18/22  5:55 AM   Result Value Ref Range    Magnesium 1.9 1.6 - 2.4 mg/dL   Comprehensive Metabolic Panel    Collection Time: 03/18/22  5:55 AM   Result Value Ref Range    Sodium 135 (L) 136 - 145 mmol/L    Potassium 3.0 (L) 3.5 - 5.0 mmol/L    Chloride 96 (L) 98 - 111 mmol/L    CO2 18 (L) 22 - 29 mmol/L    Anion Gap 21 (H) 7 - 19 mmol/L    Glucose 97 74 - 109 mg/dL    BUN 63 (H) 8 - 23 mg/dL    CREATININE 5.4 (H) 0.5 - 0.9 mg/dL    GFR Non-African American 8 (A) >60    GFR  9 (L) >59    Calcium 7.1 (L) 8.8 - 10.2 mg/dL    Total Protein 4.2 (L) 6.6 - 8.7 g/dL    Albumin 1.8 (L) 3.5 - 5.2 g/dL    Total Bilirubin 0.3 0.2 - 1.2 mg/dL    Alkaline Phosphatase 99 35 - 104 U/L    ALT 35 (H) 5 - 33 U/L    AST 23 5 - 32 U/L   CBC with Auto Differential    Collection Time: 03/18/22  6:43 AM   Result Value Ref Range    WBC 3.1 (L) 4.8 - 10.8 K/uL    RBC 3.45 (L) 4.20 - 5.40 M/uL    Hemoglobin 10.4 (L) 12.0 - 16.0 g/dL    Hematocrit 31.1 (L) 37.0 - 47.0 %    MCV 90.1 81.0 - 99.0 fL    MCH 30.1 27.0 - 31.0 pg    MCHC 33.4 33.0 - 37.0 g/dL    RDW 15.7 (H) 11.5 - 14.5 %    Platelets 72 (L) 199 - 400 K/uL    MPV 13.4 (H) 9.4 - 12.3 fL    PLATELET SLIDE REVIEW Adequate     Neutrophils % 68.0 (H) 50.0 - 65.0 %    Lymphocytes % 6.0 (L) 20.0 - 40.0 %    Monocytes % 12.0 (H) 0.0 - 10.0 %    Eosinophils % 0.0 0.0 - 5.0 %    Basophils % 0.0 0.0 - 1.0 %    Neutrophils Absolute 2.5 1.5 - 7.5 K/uL    Immature Granulocytes # 0.3 K/uL    Lymphocytes Absolute 0.2 (L) 1.1 - 4.5 K/uL    Monocytes Absolute 0.40 0.00 - 0.90 K/uL    Eosinophils Absolute 0.00 0.00 - 0.60 K/uL    Basophils Absolute 0.00 0.00 - 0.20 K/uL    Bands Relative 8 (H) 0 - 5 %    Metamyelocytes Relative 4 (A) %    Myelocyte Percent 2 (A) % Anisocytosis 1+ (A)     Poikilocytes 1+ (A)        Diet:  ADULT DIET; Clear Liquid    Examination:  Vitals: /66   Pulse 94   Temp 98.1 °F (36.7 °C) (Temporal)   Resp 22   Ht 5' 6\" (1.676 m)   Wt 154 lb 7 oz (70.1 kg)   SpO2 95%   BMI 24.93 kg/m²      Intake/Output Summary (Last 24 hours) at 3/18/2022 0950  Last data filed at 3/18/2022 0558  Gross per 24 hour   Intake 5251.32 ml   Output 275 ml   Net 4976.32 ml     General appearance:  She is an elderly ill appearing woman who appears mildly distressed. HEENT: Head: Normal, normocephalic, atraumatic. Neck:no adenopathy, no carotid bruit, no JVD, supple, symmetrical, trachea midline and thyroid not enlarged, symmetric, no tenderness/mass/nodules  Lungs: rhonchi bilaterally  Heart: regular rate and rhythm, S1, S2 normal, no murmur, click, rub or gallop  Extremities: extremities normal, atraumatic, no cyanosis or edema  Neurologic:  Awake, oriented to [facility] and 2022. No dysarthria. Speech is fluent. EOMI, PERRL, VFF. No facial weakness. No gross weakness or ataxia. Assessment:   1. Acute right basal ganglia stroke. A stroke in this area should not cause nausea and this occurred within the last 10 days so it does not seem related to that symptom. 2.         Metabolic encephalopathy  3. Intractable nausea and emesis  4. Acute on chronic kidney injury  5. UTI  6. Metastatic lung adenocarcinoma  7. Pancytopenia   8. Hypokalemia, hyponatremia  Son and rest of family are considering hemodialysis or not. They may just choose Hospice care. Under these circumstances, will hold off on stroke work up. The stroke seems to be the least of her problems. Plan:   1. Hold off on stroke work up under the circumstances  2. Family to decide on HD  3. Symptomatic, comfort care  4. Neurology is signing off. Call if needed. Discharge planning:    To be determined    Reviewed treatment plans with the patient and/or family. 35 minutes spent in face to face interaction and coordination of care.      Electronically signed by Jackie White MD on 3/18/2022 at 9:50 AM

## 2022-03-18 NOTE — PROGRESS NOTES
Medical oncology progress note    Albaro Minipavel  1940  ROOM: 425      Subjective: Continues to be weak. Resumption of nausea issues. Diarrhea is better. 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 She developed nausea vomiting and diarrhea on 2/10/2022 that has been getting progressively worse. She has not had this side effect with any of her previous  chemotherapy treatments. I received a call in the middle of the night regarding these symptoms and instructed her to be evaluated in the ED for possible admission. Octavio Garcia was seen in the ED at [facility] on 3/12/2022 and admitted with nausea vomiting diarrhea dehydration neutropenia for management. Oncology consultation called in continuity of care. 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 · Intolerant to Trabecta (capmatinib) due to liver toxicity  · LLL DVT on noninvasive study 1/20/2022, on Eliquis     Palliative XRT to the left femur and left hip was initiated on 10/6/2021 for a total dose of 3000 cGy over 10 treatment fractions, completed 10/19/2021     Primary induction systemic chemotherapy cycle #1 of Carboplatin AUC of 5/Alimta 500 mg/m²/Keytruda on 10/7/2021. She tolerated cycle #1 of Carboplatin AUC of 5/Alimta 500 mg/m²/Keytruda very poorly. [facility] NGS sequencing reported a positive MET (exon 14 skipping) mutation on 10/19/2021. Decision was made on 10/28/2021 to begin capmatinib (trabecta) 400 mg PO BID.    Bree Eid (capmatinib) 400 mg PO BID was initiated on 11/22/2021     Trabecta (capmatinib) had been reduced to 200 mg/day at one time, then increased to 200 mg BID. Trabecta (capmatinib) was discontinued at the end of December 2021 due to elevated LFTs and elevated creatinine. Tavo Feliciano was rechallenged with decreased dosages of carboplatin (AUC of 3) Alimta 250 mg/M2 and Keytruda 200 mg on 21-day cycle. Cycle #1 of carboplatin (AUC of 3) Alimta 250 mg/M2 and Keytruda 200 mg 1/25/2022  She tolerated cycle #1 with minimal side effects. Tavo Feliciano received cycle #3 of this lower dose treatment with carboplatin (AUC of 3) Alimta 250 mg/M2 and Keytruda 200 mg on 3/8/2022. TARGET LESIONS:  · 4.1 x 3.6 cm spiculated superior segment RLL lung mass consistent with the primary  · MET+ (Exon 14 Skipping)  · Right hilar adenopathy. · Nondisplaced fracture at the left acetabulum and at the root of the left superior pubic ramus. · Expansile lytic lesion involving the more distal shaft of the left femur with extraosseous extension     TUMOR HISTORY:  Tavo Feliciano was seen in initial oncology consultation on 9/20/2021 referred by Dr Alcides Freeman with a nondisplaced fracture of the left superior pubic ramus, expansile lytic lesion of the distal third shaft of the left femur and a 4.1 x 3.6 x 3.5 cm  lung mass suspicious for primary malignancy with metastasis. Tavo Feliciano had an office visit with PCP, Padmini Santos on 8/20/21 with complaints of left hip pain radiating from groin down left thigh. Over the next several weeks, the pain progressed down the leg to the distal left thigh causing difficulty walking, utilizing a cane, and eventually a wheelchair. She denies any trauma. Plain film imaging was ordered and orthopaedic referral was made. XR HIP 2-3 VW W PELVIS LEFT on 8/30/2021 at [facility] documented:  · No acute bony abnormality.   · Mild acetabular and femoral head spurring on the left side     XR FEMUR LEFT (MIN 2 VIEWS) 8/30/2021 at [facility] documented:  · No acute bony abnormality. Orthopaedic consultation with Dr Cata Shoemaker on 9/7/2021 to address left hip and groin pain over 1 month period, ambulating with a walker. Denies any injury and describes a stabbing pain going down her left leg when standing. MRI PELVIS on 9/8/2021 at [facility] documented:  Along the  Helder-medial aspect of the left acetabulum and at the root of the left superior pubic ramus, there is definite evidence of a nondisplaced fracture. It should be noted that there is expansion of the cortex in this region and some kshijsskcgsb-ti-wklxtwjto STIR signal with diminished T1 signal in the marrow at this location, as well as possible expansion of the cortex. The degree of marrow edema surrounding the fracture site is very abruptly cut off between normal signal, and I am suspicious that there may be an underlying bone lesion with pathologic fracture rather than a simple fracture. Dr. Sinan Birch called me with the above information desiring consultation. Further imaging was recommended including a CT scan of the chest abdomen and pelvis. MRI LEFT FEMUR WO CONTRAST on 9/14/21 at [facility] documented: There is an expansile lytic lesion involving the more distal shaft of the left femur at the juncture of the proximal two-thirds with the distal one-third. This is creating permeative changes within the more lateral cortex of the femur at this level and on T1 sequencing there appears to be extraosseous extension of the process. There is surrounding edema associated with this lesion. Given the permeative appearance of the more lateral cortex I feel this patient would certainly be at a high risk for impending pathologic fracture. No additional lesions are present.      CT CHEST WO CONTRAST DIAGNOSTIC on  9/9/2021 at hospitals documented:  · 4.1 x 3.6 cm spiculated superior segment right lower lobe lung mass suspicious for primary malignancy. · Questionable right hilar adenopathy. · Emphysema. CT ABDOMEN PELVIS WO CONTRAST on 9/9/2021 at [facility] documented:  · Nonenhanced imaging of the abdomen and pelvis reduces assessment of the visceral organs. No convincing intra-abdominal or pelvic metastasis. Probable left renal cyst. Colonic diverticulosis. · Left perineural sacral cyst. No pathologic fractures. Appointment Note from 9/21/2021 visit to Dr. Jaydon Jim:   She was set up an appointment for Left cephalomedullary nailing of her left pathologic femur fracture to protect her femur. Plan to biopsy  From distal femoral lesion including a small sample of bone and tissue. A cephalomedullary nailing of the left pathologic femoral shaft fracture and open biopsy of a left femoral shaft bone and soft tissue mass was performed by Dr. Belgica Samuel on 9/23/2021  Pathology reported to:  · Soft tissue and bone, left femur, excision:Hemorrhagic bone and soft tissue. · Bone, left femur, reamings:Metastatic poorly differentiated carcinoma. 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 RECOMMENDATION AND PLAN by Dr. Howie Alexander on 9/28/2021:  Diagnosis is stage IV metastatic poorly differentiated carcinoma with sarcomatoid features. · I called and spoke to Dr. Lisa Ryan regarding radiation therapy for pain control and stabilization of the left femur and left hip, he will be seeing her immediately after leaving this clinic today, 9/28/2021 to get started.   · Treatment will not be curable but rather palliative and Roz Schwab desires systemic therapy  · [place] [id number] requested to look for actionable mutations  · Pathology specimen from 9/23/2021 requested to be sent to [facility] for NGS, MSI and PD-L1 testing  · DANNA/Marlon Hardy Final general surgery for port placement    · PET scan  · Cycle #1 of chemotherapy with Carboplatin AUC of 5/Alimta 500 mg/m²/Keytruda standard dosing  began on 10/7/2021,      Initial consultation with Dr. Rusty Vogt on 9/28/2021 for consideration of palliative radiation therapy for pain control and stabilization of the left femur and left hip. Dr. Pancho Paz recommended to treat the left leg with palliative radiation therapy for an anticipated a dose of 3000 cGy over 10 fractions, final course pending completion of planning. Palliative XRT to the left femur and left hip was initiated on 10/6/2021 for a total dose of 3000 cGy over 10 treatment fractions, completed 10/19/2021     Port-A-Cath placed to right IJ on 10/4/2021 by Dr. Chaitanya Garay     Primary induction systemic chemotherapy cycle #1 of Carboplatin AUC of 5/Alimta 500 mg/m²/Keytruda on 10/7/2021. She tolerated cycle #1 of Carboplatin AUC of 5/Alimta 500 mg/m²/Keytruda very poorly. PET CT SKULL BASE TO MID THIGH 10/12/2021 :  · 2.9 x 5.2LI hypermetabolic mass in the superior segment of the right lower lobe, maximum SUV of 20.8   · 1.7 cm posterior right hilum there is a separate hypermetabolic mass,most likely posterior right hilar neoplastic        Adenopathy  · Lytic lesion with abnormal hypermetabolism within the anterior left acetabulum/left distal superior pubic ramus compatible with a metastatic lesion  · Intense hypermetabolism associated with the known lesion within the distal left femur, only partially visualized in the field-of-view obtained. [facility] NGS sequencing reported a positive MET (exon 14 skipping) mutation on 10/19/2021. An actionable MET mutation was documented after delivery of cycle #1 of chemoimmunotherapy. She tolerated cycle #1 of Carboplatin AUC of 5/Alimta 500 mg/m²/Keytruda very poorly.      Decision was made on 10/28/2021 to begin capmatinib (trabecta) 400 mg BID. Trabecta (capmatinib) had been reduced to 200 mg/day at one time, and more recently increased to 200 mg BID. Trabecta (capmatinib) was discontinued at the end of December 2021 due to elevated LFTs and elevated creatinine. US LIVER at [address] on 1/14/2022 :  · Heterogeneous liver echotexture without a discrete mass. · Previous cholecystectomy. No biliary ductal dilatation is identified. · Right-sided renal cysts including a complex septated cyst at the lateral right mid kidney measuring up to 1.3 cm. Krishna Dalal and her family were concerned about the fact that since starting Trabecta (capmatinib) she had probably been off of the medication is much as she has been on it due to increased LFTs and creatinine. Her LFTs and creatinine normalized over about a 3-week period of time of being off of Trabecta (capmatinib). I believe she has failed a trial of Trabecta (capmatinib) and other alternatives need to be considered. Plan will be to rechallenge with decreased dosages of carboplatin (AUC of 3) Alimta 250 mg/M2 and Keytruda 200 mg on 21-day cycle. Cycle #1 of carboplatin (AUC of 3) Alimta 250 mg/M2 and Keytruda 200 mg, was delivered on 1/25/2022     Krishna Dalal has a son Maya Minaya, who lives in [facility], and a son Amy Booth who lives in [place] who frequently accompany her to the clinic.        TREATMENT SUMMARY:  · Palliative XRT to the left femur and left hip was initiated on 10/6/2021 for a total dose of 3000 cGy over 10 treatment fractions, completed 10/19/2021  · Cycle #1 of palliative chemotherapy with Carboplatin AUC of 5/Alimta 500 mg/m²/Keytruda was started on 10/7/2021  · Capmatinib (trabecta) 400 mg BID was started on 11/22/2021 and discontinued at the end of December 2021 due to elevated LFTs and creatinine  · Cycle #1 of carboplatin (AUC of 3) Alimta 250 mg/M2 and Keytruda 200 mg, was delivered on 1/25/2022         Current Facility-Administered Medications   Medication Dose Route Frequency Provider Last Rate Last Admin    heparin (porcine) injection 5,000 Units  5,000 Units SubCUTAneous 3 times per day Michael Fritz PA-C   5,000 Units at 03/18/22 0505    famotidine (PEPCID) 10 mg in sodium chloride (PF) 10 mL injection  10 mg IntraVENous Daily Shayan Odonnell MD   10 mg at 03/17/22 0842    promethazine (PHENERGAN) 25 mg in sodium chloride 0.9 % 50 mL IVPB  25 mg IntraVENous Q6H PRN Michael Fritz PA-C   Stopped at 03/17/22 0340    sodium bicarbonate 100 mEq in sodium chloride 0.45 % 1,000 mL infusion   IntraVENous Continuous Shayan Odonnell  mL/hr at 03/18/22 0454 Rate Verify at 03/18/22 0454    cefTRIAXone (ROCEPHIN) 1,000 mg in sterile water 10 mL IV syringe  1,000 mg IntraVENous Q24H Shayan Odonnell MD   1,000 mg at 03/17/22 0843    HYDROmorphone HCl PF (DILAUDID) injection 0.5 mg  0.5 mg IntraVENous Q3H PRN Shayan Odonnell MD   0.5 mg at 03/16/22 1239    ondansetron (ZOFRAN) injection 8 mg  8 mg IntraVENous 3 times per day Michael Fritz PA-C   8 mg at 03/18/22 0506    dexamethasone (DECADRON) injection 4 mg  4 mg IntraVENous Q8H Riley Richards PA-C   4 mg at 03/18/22 0506    [Held by provider] diphenoxylate-atropine (LOMOTIL) 2.5-0.025 MG per tablet 2 tablet  2 tablet Oral 4x Daily Michael Fritz PA-C   2 tablet at 03/16/22 2123    ipratropium-albuterol (DUONEB) nebulizer solution 1 ampule  1 ampule Inhalation Q4H PRN Jonny Cooney MD        sodium chloride flush 0.9 % injection 5-40 mL  5-40 mL IntraVENous 2 times per day Carolann Montenegro MD   10 mL at 03/17/22 2145    sodium chloride flush 0.9 % injection 5-40 mL  5-40 mL IntraVENous PRN Carolann Montenegro MD        0.9 % sodium chloride infusion  25 mL IntraVENous PRN Carolann Montenegro MD        polyethylene glycol (GLYCOLAX) packet 17 g  17 g Oral Daily PRN Carolann Montenegro MD        acetaminophen (TYLENOL) tablet 650 mg  650 mg Oral Q6H PRN Carolann Montenegro, MD        Or    acetaminophen (TYLENOL) suppository 650 mg  650 mg Rectal Q6H PRN Meliza Coe MD        loperamide (IMODIUM) capsule 2 mg  2 mg Oral 4x Daily PRN Meliza Coe MD   2 mg at 03/16/22 0930    allopurinol (ZYLOPRIM) tablet 100 mg  100 mg Oral Daily Meliza Coe MD   100 mg at 47/32/58 7084    folic acid (FOLVITE) tablet 1 mg  1 mg Oral Daily Meliza Coe MD   1 mg at 03/15/22 1127    HYDROcodone-acetaminophen (NORCO)  MG per tablet 1 tablet  1 tablet Oral Q6H PRN Meliza Coe MD   1 tablet at 03/16/22 0930         Allergies: No Known Allergies      Vitals:    Vitals:    03/18/22 0524   BP: 115/66   Pulse: 94   Resp: 22   Temp: 98.1 °F (36.7 °C)   SpO2: 95%       Physical Exam   Constitutional: Still mildly confused. Head: Normocephalic and atraumatic. Nose: Nose normal.    Eyes:   No scleral icterus. Neck: Normal range of motion. Neck supple. No JVD. No appreciable thyromegaly. Cardiovascular: Tachy 90 - regular or friction rub. Pulmonary/Chest: Effort normal and breath sounds normal. No respiratory distress. No wheezes. Abdominal: Abdomen mildly tender, not acute. No rebound, positive bowel sounds. Musculoskeletal: 1+ LLE edema  Neurological:Lethargic  Skin: Skin is warm and dry. No rash noted. No erythema. No pallor.            DATA:    Labs:  General Labs:  CBC:   Lab Results   Component Value Date    WBC 3.7 (L) 03/17/2022    HGB 12.3 03/17/2022    HCT 38.1 03/17/2022    MCV 93.4 03/17/2022     (L) 03/17/2022     Lab Results   Component Value Date    NEUTROABS 2.8 03/17/2022         CMP:    Lab Results   Component Value Date     (L) 03/18/2022    K 3.0 (L) 03/18/2022    CL 96 (L) 03/18/2022    CO2 18 (L) 03/18/2022    BUN 63 (H) 03/18/2022    CREATININE 5.4 (H) 03/18/2022    GLUCOSE 97 03/18/2022    CALCIUM 7.1 (L) 03/18/2022    PROT 4.2 (L) 03/18/2022    LABALBU 1.8 (L) 03/18/2022    BILITOT 0.3 03/18/2022 ALKPHOS 99 03/18/2022    AST 23 03/18/2022    ALT 35 (H) 03/18/2022    LABGLOM 8 (A) 03/18/2022    GFRAA 9 (L) 03/18/2022    AGRATIO 2.4 (H) 12/10/2021    GLOB 2.5 03/08/2022        Narrative   MRI BRAIN W WO CONTRAST 3/16/2022 12:00 PM   HISTORY: Lung cancer, nausea and vomiting   Comparison: MRI dated 12/7/2016     Technique: Multiplanar imaging of the brain was performed in a routine   fashion before and after the intravenous injection of gadolinium   contrast. 13 mL MultiHance IV contrast.   Findings:    12 mm right basal ganglia lacunar infarct in the region of the   internal capsule posterior limb. Advanced chronic small vessel   ischemic change. There is no intra-axial or extra-axial hemorrhage. No   visualized mass lesion or pathologic enhancement. Normal size and   configuration of the ventricular system for patient age. The posterior   fossa structures are unremarkable. The pituitary gland and sella are   unremarkable. The major intracranial flow voids are preserved. The orbits are unremarkable. The paranasal sinuses and mastoids are   clear. Marrow signal in the calvarium and skull base appears normal.       Impression   Impression:   1. 1.2 cm acute right basal ganglia lacunar infarct in the region of   the internal capsule posterior limb. 2. Advanced chronic small vessel ischemic change. 3. No intracranial mass lesion or pathologic enhancement. Signed by Dr Cindi Vazquez     Narrative   EXAMINATION: US RENAL COMPLETE 3/16/2022 4:28 PM   HISTORY: US RENAL COMPLETE 3/16/2022 2:45 PM   HISTORY: LORA   COMPARISON: None     TECHNIQUE: Multiple longitudinal and transverse realtime sonographic   images of the kidneys and urinary bladder are obtained. FINDINGS:    The right kidney measures 4.5 x 3.8 x 8.2 cm. The right kidney is   small in size, but normal shape, contour and position. The cortical   thickness is normal, with preservation of the corticomedullary   differentiation.  2 cysts are present. One is 1.8 x 1.7 x 1.5 cm. A   second is 1.1 x 1.2 x 1.4 cm. The central echo complex is compact with   no evidence for hydronephrosis. No nephrolithiasis or abnormal   perinephric fluid collections are seen. No hydroureter. The left kidney measures 3.4 x 3.4 x 8.5 cm. The left kidney is small,   with preservation of the corticomedullary differentiation. A   hypoechoic cyst is present. This is 3.9 x 3.3 x 3.4 cm in the mid left   kidney. The central echo complex is compact with no evidence for   hydronephrosis. No nephrolithiasis or abnormal perinephric fluid   collections are seen. No hydroureter. Scanning through the pelvis reveals the bladder to be moderately   distended with anechoic urine. The wall thickness and contour are   normal. There is no surrounding ascites. Impression   1. Bilateral renal cysts are noted as described above. 2. The renal contours are small. 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 Cycle #2 chemotherapy 2/15/2022    Oncology consultation called in continuity of care. Admission CBC on 3/12/2022 has a WBC of 0.9 with 55% lymphocytes and an ANC of 0.5. Hemoglobin is 12.4 with a platelet count of 676,675.     Status post Neulasta 6 mg SQ on 3/13/2022  COVID-19 rapid testing on 3/13/2022 is NEGATIVE  Urine cx 3/13/2022 8000 CFU/mL strep agalactiae  Blood cx 3/13/2022 -  no growth to date    Rocephin initiated 3/13/2022 AM      #Neutropenia secondary to chemotherapy  Status post Neulasta 6 mg SQ on 3/13/2022        ANC 4.8 on 3/16/2022    #Nausea vomiting diarrhea    Ara Valentine developed nausea vomiting and diarrhea on 3/10/2022 that has been getting progressively worse. She has not had this side effect with any of her previous  chemotherapy treatments. I received a call in the middle of the night regarding these symptoms and instructed her to be evaluated in the ED for possible admission. Bear Williamson was seen in the ED at [address] [place] [address] on 3/12/2022 and admitted with nausea vomiting diarrhea dehydration neutropenia for management. CT abdomen and pelvis with contrast 3/13/2022  · Thickening and edema in the wall of the small bowel. Most likely  · this represents enteritis. · Diverticulosis  · Cholecystectomy. MRI brain with and without 3/16/2022  1. 1.2 cm acute right basal ganglia lacunar infarct in the region of  the internal capsule posterior limb. 2. Advanced chronic small vessel ischemic change. 3. No intracranial mass lesion or pathologic enhancement. PLAN  IV fluids and electrolyte monitoring will continue. Lomotil scheduled, Imodium as needed  Zofran/dexamethasone scheduled  Phenergan  Continuing supportive care      #Acute right basal ganglia lacunar infarct    MRI brain with and without 3/16/2022  1. 1.2 cm acute right basal ganglia lacunar infarct in the region of  the internal capsule posterior limb. 2. Advanced chronic small vessel ischemic change. 3. No intracranial mass lesion or pathologic enhancement. Dr. Coby Shea note reviewed. Not usually because of nausea/vomiting. Carotid studies  2D echo      #Normocytic anemia         #Subacute LLE popliteal DVT , J 1/20/2022  -Was previously on Eliquis 5 mg p.o. twice daily - d/c with worsening renal fxn  -heparin 5000 subcu every 8 hours      #CKD stage IIIB  Creatinine baseline1. 1        Renal u/s 3/16/2022  · Bilateral renal cysts are noted as described above. · The renal contours are small.   · No Hydro    Suspect related to volume depletion from nausea, vomiting, diarrhea and component IV contrast nephropathy from CT 3/13/2022      1/2 NS H2CO3 150 cc/h  Dr. Meghna Woodard following    Anticipate HDhopefully short course    #Hypokalemia        K runs per hospitalist    Plan  Continue antibiotics  IVF  Lomotil changed to as needed  Zofran/dex scheduled  Phenergan prn  Hypokalemiaas per hospitalist  heparin 5000 subcu every 8 hours          oRmi Vila PA-C    03/18/22  6:26 AM   Physician's attestation/substantial contribution:  I, Dr Alonzo More, independently performed an evaluation on Russ Essex. I have reviewed relevant medical information/data to include but not limited to medication list, relevant appropriate labs and imaging when applicable. I reviewed other physician's notes, ancillary services and nurses assessment. I have reviewed the above documentation completed by the Nurse Practitioner or Physician Assistant. Please see my additional contributions to the history of present illness, physical examination, and assessment/medical decision-making that reflect my findings and impressions. I have seen and examined the patient and the key elements of all parts of the encounter have been performed by me. I agree with the assessment and plan as outlined by the ARNP/PA. Subjective-still feeling nauseated. Diarrhea resolved  Objectiveill-appearing  Assessment/plan:  LORAlikely a combination of dehydration and contrast-induced nephropathy. Continue IV fluid. Nephrology considering dialysis if no improvement. She has baseline CKD stage III. Lung cancerstatus post chemotherapy, palliative. Acute strokefollowed by neurology.   Alonzo More MD

## 2022-03-18 NOTE — PROGRESS NOTES
Nephrology ([facility]) Progress Note      Patient:  Graciela Roldan  YOB: 1940  Date of Service: 3/18/2022  MRN: [id number]   Acct: [de-identified]   Primary Care Physician: Mandeep Lange MD  Advance Directive: Full Code  Admit Date: 3/12/2022       Hospital Day: 5  Referring Provider: Mell Madrid MD    Patient independently seen and examined, Chart, Consults, Notes, Operative notes, Labs, Cardiology, and Radiology studies reviewed as available. Chief complaint: Abnormal labs. Subjective:  Graciela Roldan is a 79 y.o. female for whom we were consulted for evaluation and treatment of acute kidney injury/chronic kidney disease. Patient has stage IIIb chronic kidney disease baseline due to hypertensive nephrosclerosis. She follows Dr. Sylvia Moreno in the office. Presented with increasing nausea vomiting and diarrhea for the last several days. On admission her serum creatinine 1.5 mg. Patient also had intravenous contrast dye with CT scan of the abdomen. Serum creatinine is steadily going up. Her creatinine is now 3.8 mg and nephrology is consulted. Patient has history of stage IV lung cancer with metastatic disease to the bone and currently receiving chemotherapy. She has been sick lately due to chemo and has been throwing up. Hospital course markable for IV fluid administration and has no improvement of renal function. Her serum creatinine is worsened today. This morning patient is complaining of profound fatigue, lack of energy and tiredness. Her renal function continues to deteriorate. Her son is at the bedside. Allergies:  Patient has no known allergies.     Medicines:  Current Facility-Administered Medications   Medication Dose Route Frequency Provider Last Rate Last Admin    diphenoxylate-atropine (LOMOTIL) 2.5-0.025 MG per tablet 1 tablet  1 tablet Oral 4x Daily PRN Sara Levo, PA-C        potassium bicarb-citric acid (EFFER-K) effervescent tablet 20 mEq  20 mEq Oral Once Angela Pierre MD        heparin (porcine) injection 5,000 Units  5,000 Units SubCUTAneous 3 times per day Angela Pierre MD   5,000 Units at 03/18/22 0505    famotidine (PEPCID) 10 mg in sodium chloride (PF) 10 mL injection  10 mg IntraVENous Daily Angela Pierre MD   10 mg at 03/17/22 0842    promethazine (PHENERGAN) 25 mg in sodium chloride 0.9 % 50 mL IVPB  25 mg IntraVENous Q6H PRN Ariel Plata PA-C   Stopped at 03/17/22 0340    sodium bicarbonate 100 mEq in sodium chloride 0.45 % 1,000 mL infusion   IntraVENous Continuous Angela Pierre  mL/hr at 03/18/22 0454 Rate Verify at 03/18/22 0454    cefTRIAXone (ROCEPHIN) 1,000 mg in sterile water 10 mL IV syringe  1,000 mg IntraVENous Q24H Angela Pierre MD   1,000 mg at 03/17/22 0843    HYDROmorphone HCl PF (DILAUDID) injection 0.5 mg  0.5 mg IntraVENous Q3H PRN Angela Pierre MD   0.5 mg at 03/16/22 1239    ondansetron (ZOFRAN) injection 8 mg  8 mg IntraVENous 3 times per day Ariel Plata PA-C   8 mg at 03/18/22 0506    dexamethasone (DECADRON) injection 4 mg  4 mg IntraVENous Q8H Riley Richards PA-C   4 mg at 03/18/22 0506    ipratropium-albuterol (DUONEB) nebulizer solution 1 ampule  1 ampule Inhalation Q4H PRN Caryle Ghee, MD        sodium chloride flush 0.9 % injection 5-40 mL  5-40 mL IntraVENous 2 times per day Aracelis Arvizu MD   10 mL at 03/17/22 2145    sodium chloride flush 0.9 % injection 5-40 mL  5-40 mL IntraVENous PRN Aracelis Arvizu MD        0.9 % sodium chloride infusion  25 mL IntraVENous PRN Aracelis Arvizu MD        polyethylene glycol (GLYCOLAX) packet 17 g  17 g Oral Daily PRN Aracelis Arvizu MD        acetaminophen (TYLENOL) tablet 650 mg  650 mg Oral Q6H PRN Aracelis Arvizu MD        Or    acetaminophen (TYLENOL) suppository 650 mg  650 mg Rectal Q6H PRN Aracelis Arvizu MD        loperamide (IMODIUM) capsule 2 mg  2 mg Oral 4x Daily PRN Alaina Jules MD   2 mg at 03/16/22 0930    allopurinol (ZYLOPRIM) tablet 100 mg  100 mg Oral Daily Alaina Julse MD   100 mg at 51/03/19 4653    folic acid (FOLVITE) tablet 1 mg  1 mg Oral Daily Alaina Jules MD   1 mg at 03/15/22 1127    HYDROcodone-acetaminophen (Silvia Dupes)  MG per tablet 1 tablet  1 tablet Oral Q6H PRN Alaina Jules MD   1 tablet at 03/16/22 0930       Past Medical History:  Past Medical History:   Diagnosis Date    Acid reflux     LORA [facility]acute kidney [facility]) ([facility] [address] 7/28/2018    LORA (acute kidney injury) ([facility] [address] 7/28/2018    HIGH CHOLESTEROL     Hypertension     Metastatic lung cancer (metastasis from lung to other site) [facility]) 9/28/2021    Metastatic lung cancer (metastasis from lung to other site) [facility]) 9/28/2021    Urinary incontinence     UTI (urinary tract infection)     Vertigo        Past Surgical History:  Past Surgical History:   Procedure Laterality Date   Lanis Poster COLONOSCOPY      Dr Isidro Yang 6/24/2019    Dr SARAH Juarez-Diverticular disease-Tubular AP (-) dysplasia, 5 yr recall    HYSTERECTOMY      PORT SURGERY N/A 10/4/2021    SINGLE LUMEN PORT PLMT WITH ULTRASOUND AND FLURO performed by Beverley Levy MD at [address] [place] [address]        Family History  Family History   Problem Relation Age of Onset    Cancer Mother     Colon Cancer Mother     Heart Disease Father     Stroke Brother     Colon Polyps Neg Hx     Esophageal Cancer Neg Hx     Liver Cancer Neg Hx     Liver Disease Neg Hx     Rectal Cancer Neg Hx     Stomach Cancer Neg Hx        Social History  Social History     Socioeconomic History    Marital status:       Spouse name: Dylan Morrissey Number of children: Not on file    Years of education: Not on file    Highest education level: Not on file   Occupational History     Employer: RETIRED   Tobacco Use    Smoking status: Former Smoker Packs/day: 0.50     Years: 40.00     Pack years: 20.00     Types: Cigarettes     Start date: 55     Quit date:      Years since quittin.2    Smokeless tobacco: Never Used   Vaping Use    Vaping Use: Never used   Substance and Sexual Activity    Alcohol use: No    Drug use: No    Sexual activity: Not on file   Other Topics Concern    Not on file   Social History Narrative    Not on file     Social Determinants of Health     Financial Resource Strain: Low Risk     Difficulty of Paying Living Expenses: Not hard at all   Food Insecurity: No Food Insecurity    Worried About [facility] in the Last Year: Never true    [facility] in the Last Year: Never true   Transportation Needs: No Transportation Needs    Lack of Transportation (Medical): No    Lack of Transportation (Non-Medical):  No   Physical Activity:     Days of Exercise per Week: Not on file    Minutes of Exercise per Session: Not on file   Stress:     Feeling of Stress : Not on file   Social Connections:     Frequency of Communication with Friends and Family: Not on file    Frequency of Social Gatherings with Friends and Family: Not on file    Attends Shinto Services: Not on file    Active Member of [address] [place] [address] or Organizations: Not on file    Attends Club or Organization Meetings: Not on file    Marital Status: Not on file   Intimate Partner Violence:     Fear of Current or Ex-Partner: Not on file    Emotionally Abused: Not on file    Physically Abused: Not on file    Sexually Abused: Not on file   Housing Stability:     Unable to Pay for Housing in the Last Year: Not on file    Number of [place] in the Last Year: Not on file    Unstable Housing in the Last Year: Not on file         Review of Systems:  History obtained from chart review and the patient  General ROS: No fever or chills  Respiratory ROS: No cough, shortness of breath, wheezing  Cardiovascular ROS: No chest pain or palpitations  Gastrointestinal ROS: positive for - nausea/vomiting  Genito-Urinary ROS: No dysuria or hematuria  Musculoskeletal ROS: No joint pain or swelling   14 point ROS reviewed with the patient and negative except as noted above and in the HPI unless unable to obtain. Objective:  Patient Vitals for the past 24 hrs:   BP Temp Temp src Pulse Resp SpO2   03/18/22 0524 115/66 98.1 °F (36.7 °C) Temporal 94 22 95 %   03/17/22 2341 (!) 119/52 97 °F (36.1 °C)  100 24 98 %   03/17/22 1652 129/66 98.1 °F (36.7 °C) Temporal 99  92 %   03/17/22 1203 116/66 96.8 °F (36 °C) Temporal 100  95 %       Intake/Output Summary (Last 24 hours) at 3/18/2022 1009  Last data filed at 3/18/2022 0558  Gross per 24 hour   Intake 5251.32 ml   Output 275 ml   Net 4976.32 ml     General: awake/alert    HEENT: Normocephalic atraumatic head  Neck: Supple with no JVD or carotid bruits. Chest:  clear to auscultation bilaterally  CVS: regular rate and rhythm  Abdominal: soft, nontender, normal bowel sounds  Extremities: Pedal edema. Skin: warm and dry without rash      Labs:  BMP:   Recent Labs     03/16/22  0600 03/17/22  0540 03/18/22  0555    130* 135*   K 2.7* 2.7* 3.0*    97* 96*   CO2 14* 10* 18*   BUN 44* 52* 63*   CREATININE 3.8* 4.6* 5.4*   CALCIUM 6.9* 7.2* 7.1*     CBC:   Recent Labs     03/16/22  0600 03/17/22  0540 03/18/22  0643   WBC 7.3 3.7* 3.1*   HGB 12.5 12.3 10.4*   HCT 38.6 38.1 31.1*   MCV 94.6 93.4 90.1    124* 72*     LIVER PROFILE:   Recent Labs     03/17/22  0540 03/18/22  0555   AST 43* 23   ALT 57* 35*   BILITOT 0.4 0.3   ALKPHOS 75 99     PT/INR: No results for input(s): PROTIME, INR in the last 72 hours. APTT: No results for input(s): APTT in the last 72 hours. BNP:  No results for input(s): BNP in the last 72 hours. Ionized Calcium:No results for input(s): IONCA in the last 72 hours.   Magnesium:  Recent Labs     03/16/22  0600 03/17/22  0540 03/18/22  0555   MG 1.6 2.1 1.9     Phosphorus:No results for input(s): PHOS in the last 72 hours. HgbA1C: No results for input(s): LABA1C in the last 72 hours. Hepatic:   Recent Labs     03/17/22  0540 03/18/22  0555   ALKPHOS 75 99   ALT 57* 35*   AST 43* 23   PROT 4.2* 4.2*   BILITOT 0.4 0.3   LABALBU 2.1* 1.8*     Lactic Acid:   No results for input(s): LACTA in the last 72 hours. Troponin: No results for input(s): CKTOTAL, CKMB, TROPONINT in the last 72 hours. ABGs: No results for input(s): PH, PCO2, PO2, HCO3, O2SAT in the last 72 hours. CRP:  No results for input(s): CRP in the last 72 hours. Sed Rate:  No results for input(s): SEDRATE in the last 72 hours. Cultures:   No results for input(s): CULTURE in the last 72 hours. No results for input(s): BC, Lennox Konig in the last 72 hours. No results for input(s): CXSURG in the last 72 hours. Radiology reports as per the Radiologist  Radiology: CT ABDOMEN PELVIS W IV CONTRAST Additional Contrast? None    Result Date: 3/13/2022  EXAMINATION: CT ABDOMEN PELVIS W IV CONTRAST 3/13/2022 8:37 AM HISTORY: CT ABDOMEN PELVIS W IV CONTRAST 3/13/2022 12:02 AM HISTORY: COMPARISON: September 9, 2021. DLP: 959 mGy cm Automated exposure control was also utilized to decrease patient radiation dose TECHNIQUE: Following the intravenous administration of contrast, helical CT tomographic images of the abdomen and pelvis were acquired. Coronal reformatted images were also provided for review. FINDINGS: The lung bases and base of the heart are unremarkable. LIVER: No focal liver lesion. The hepatic vasculature is patent. BILIARY SYSTEM: Surgical clips are present from previous cholecystectomy. Vanna Khadijah PANCREAS: No focal pancreatic lesion. SPLEEN: Unremarkable. KIDNEYS AND ADRENALS: Adrenal glands are visualized. The renal contours are normal in size shape and position. Bilateral renal cysts are present similar to September 9, 2021. The largest on the left is 37 mm. In the upper pole of the right kidney the largest cyst is 2 cm. . The ureters are decompressed and normal in appearance. RETROPERITONEUM: No mass, lymphadenopathy or hemorrhage. GI TRACT: There is thickening of the wall of the distal small bowel. This is most likely enteritis. . Diverticulosis is present. There is no obvious diverticulitis. . OTHER: There is no mesenteric mass, lymphadenopathy or fluid collection. Scattered calcifications are present in the abdominal aorta and iliac arteries. . The osseous structures and soft tissues demonstrate no worrisome lesions. PELVIS: No mass lesion, fluid collection or significant lymphadenopathy is seen in the pelvis. The urinary bladder is normal in appearance. 1. Thickening and edema in the wall of the small bowel. Most likely this represents enteritis. 2. Diverticulosis 3. Cholecystectomy. These images initially reviewed by stat rad 3:55 AM. Signed by Dr Mani Willard   1. Acute kidney injury/stage III. 2.  Acute tubular necrosis. 3.  Stage IIIb chronic kidney disease baseline. 4.  Hypokalemia  5. Metabolic acidemia. 6.  Hypocalcemia. 7.  Stage IV lung cancer. 8.  Hyponatremia. 9.  Gastroenteritis. Plan:  1. Continue IV fluid. 2.  Overall prognosis looks very poor. Long discussion with son, explained poor prognosis and discouraged dialysis. However he will get back to me after talking to his rest of the siblings.         Kerry Loving MD  03/18/22  10:09 AM

## 2022-03-18 NOTE — PROGRESS NOTES
Victor Hugo with Hospice to meet with family at 0.      Electronically signed by Chiquita Milton RN on 3/18/2022 at 1:38 PM

## 2022-03-18 NOTE — PROGRESS NOTES
Nurse to call report to hospice at 1800.    Electronically signed by Rachel Harmon RN on 3/18/2022 at 4:43 PM

## 2022-03-18 NOTE — DISCHARGE SUMMARY
Discharge Summary    NAME: Radu Gonzalez  :  1940  MRN:  [id number]    Admit date:  3/12/2022  Discharge date:  3/18/2022    Advance Directive: Full Code    Consults: nephrology  Hematology/oncology  Neurology  Palliative care  Hospice    Primary Care Physician:  Rubi Siddiqi MD    Discharge Diagnoses:  Principal Problem:    Acute renal failure superimposed on stage 3b chronic kidney disease ([facility] [address]  Active Problems:    Dehydration    Nausea and vomiting    diarrhea    Acute stroke of basal ganglia (HCC)    Hypertension    Metastatic lung cancer (metastasis from lung to other site) [facility])    Deep vein thrombosis (DVT) of proximal lower extremity (HCC)    Neutropenia ([facility] [address]    E. coli UTI    Group B streptococcal UTI    Hypokalemia      Significant Diagnostic Studies:   CT ABDOMEN PELVIS W IV CONTRAST Additional Contrast? None    Result Date: 3/13/2022  EXAMINATION: CT ABDOMEN PELVIS W IV CONTRAST 3/13/2022 8:37 AM HISTORY: CT ABDOMEN PELVIS W IV CONTRAST 3/13/2022 12:02 AM HISTORY: COMPARISON: 2021. DLP: 959 mGy cm Automated exposure control was also utilized to decrease patient radiation dose TECHNIQUE: Following the intravenous administration of contrast, helical CT tomographic images of the abdomen and pelvis were acquired. Coronal reformatted images were also provided for review. FINDINGS: The lung bases and base of the heart are unremarkable. LIVER: No focal liver lesion. The hepatic vasculature is patent. BILIARY SYSTEM: Surgical clips are present from previous cholecystectomy. Rawleigh Billing PANCREAS: No focal pancreatic lesion. SPLEEN: Unremarkable. KIDNEYS AND ADRENALS: Adrenal glands are visualized. The renal contours are normal in size shape and position. Bilateral renal cysts are present similar to 2021. The largest on the left is 37 mm. In the upper pole of the right kidney the largest cyst is 2 cm. . The ureters are decompressed and normal in appearance.  RETROPERITONEUM: No mass, lymphadenopathy or hemorrhage. GI TRACT: There is thickening of the wall of the distal small bowel. This is most likely enteritis. . Diverticulosis is present. There is no obvious diverticulitis. . OTHER: There is no mesenteric mass, lymphadenopathy or fluid collection. Scattered calcifications are present in the abdominal aorta and iliac arteries. . The osseous structures and soft tissues demonstrate no worrisome lesions. PELVIS: No mass lesion, fluid collection or significant lymphadenopathy is seen in the pelvis. The urinary bladder is normal in appearance. 1. Thickening and edema in the wall of the small bowel. Most likely this represents enteritis. 2. Diverticulosis 3. Cholecystectomy. These images initially reviewed by stat rad 3:55 AM. Signed by Dr Shamar Jackson      Pertinent Labs:   CBC:   Recent Labs     03/16/22  0600 03/17/22  0540 03/18/22  0643   WBC 7.3 3.7* 3.1*   HGB 12.5 12.3 10.4*    124* 72*     BMP:    Recent Labs     03/16/22  0600 03/17/22  0540 03/18/22  0555    130* 135*   K 2.7* 2.7* 3.0*    97* 96*   CO2 14* 10* 18*   BUN 44* 52* 63*   CREATININE 3.8* 4.6* 5.4*   GLUCOSE 146* 213* 97           Hospital Course:     Patient with a history of stage IV lung cancer was admitted 3/13 for significant diarrhea, poor oral intake, nausea, vomiting and dehydration. She was started on empiric abx and IV hydration. Her C. Diff came back negative. Urine culture speciated E. coli and group B strep, treated with Rocephin, which was susceptible on culture. Patient developed acute renal failure related to dehydration from GI losses, followed by development of ATN. increased IV fluids with addition of bicarb in setting of worsening metabolic acidosis. Nephrology consulted. Developed hypocalcemia and hypokalemia with electrolytes repleted. Also noted to have basal ganglia infarct on imaging.   Despite ongoing IV fluids and supportive management, renal function continued to decline with resultant encephalopathy likely uremic. Overall poor prognosis, which was explained by [address], Hem/Onc and Nephrology services. Nephrology did not recommend dialysis in this situation and family members (including sons, primary decision makers) all in agreement with proceeding with comfort measures and transfer to Hospice after further discussion with Hospice service and myself.                Physical Exam:  Vital Signs: BP (!) 103/58   Pulse 97   Temp 97.2 °F (36.2 °C) (Temporal)   Resp 22   Ht 5' 6\" (1.676 m)   Wt 154 lb 7 oz (70.1 kg)   SpO2 93%   BMI 24.93 kg/m²   General: Fatigued/ill-appearing, no distress  HEENT: NC/AT, no scleral icterus  Cardiovascular: Normal rate, pulses 2+ and equal  Respiratory: Normal effort, no wheezes or rales  Abdomen: Soft, nontender, bowel sounds present  Neurologic: lethargic but arousable, confused  Extremities: No clubbing or cyanosis  Musculoskeletal: No deformities  Skin: Warm and dry             Disposition: Patient will be discharged with readmission to inpatient hospice. Time spent on discharge 32 minutes.      Signed:  Anthony Borrego MD  3/18/2022 2:20 PM

## 2022-03-18 NOTE — PROGRESS NOTES
Comprehensive Nutrition Assessment    Type and Reason for Visit:  Initial,RD Nutrition Re-Screen/LOS,NPO/Clear Liquid    Nutrition Recommendations/Plan:   Recommend consider PN to meet nutritional needs if aggressive intervention desired. Nutrition Assessment:  NPO/CL day 5. Pt nutritionally compromised AEB clear liquid diet X 5 days since admission and poor tolerance of clears r/t N/V. Aware family deciding course of treatment. If aggressive intervention desired, recommend initiate PN to prevent further nutrition compromise. Will continue to monitor clinical course. Malnutrition Assessment:  Malnutrition Status: At risk for malnutrition (Comment)    Context:  Acute Illness     Findings of the 6 clinical characteristics of malnutrition:  Energy Intake:  7 - 50% or less of estimated energy requirements for 5 or more days  Weight Loss:  No significant weight loss     Body Fat Loss:  Unable to assess     Muscle Mass Loss:  Unable to assess    Fluid Accumulation:  No significant fluid accumulation     Strength:  Not Performed    Estimated Daily Nutrient Needs:  Energy (kcal):  0672-0840 kcals/day; Weight Used for Energy Requirements:  Current (25-30 kcals/kg)     Protein (g):  70-98 kcals/day; Weight Used for Protein Requirements:  Current (1.0-1.4 g/kg)        Fluid (ml/day):  1181-1851 mL/fluid/day; Method Used for Fluid Requirements:  1 ml/kcal      Nutrition Related Findings:  ULICES/N/V/D      Current Nutrition Therapies:    ADULT DIET; Clear Liquid    Anthropometric Measures:  · Height: 5' 6\" (167.6 cm)  · Current Body Weight: 154 lb (69.9 kg)   · Admission Body Weight: 254 lb (115.2 kg)    · Usual Body Weight: 153 lb (69.4 kg) (12/15/2021)     · Ideal Body Weight: 130 lbs  · BMI: 24.9   · BMI Categories: Overweight (BMI 25.0-29. 9)       Nutrition Diagnosis:   · Inadequate oral intake related to altered GI function,acute injury/trauma as evidenced by NPO or clear liquid status due to medical condition,nausea,vomiting,diarrhea    Nutrition Interventions:   Food and/or Nutrient Delivery:  Continue Current Diet  Nutrition Education/Counseling:  Education not indicated   Coordination of Nutrition Care:  Continue to monitor while inpatient    Goals:  Pt will tolerate diet advance or needs with be met via PN. Nutrition Monitoring and Evaluation:   Food/Nutrient Intake Outcomes:  Diet Advancement/Tolerance  Physical Signs/Symptoms Outcomes:  Biochemical Data,Nutrition Focused Physical Findings,Diarrhea,Nausea or Vomiting,Weight     Discharge Planning:     Too soon to determine     Electronically signed by Aryan Hoang MS, RD, LD on 3/18/22 at 1:11 PM CDT    Contact: [phone]

## 2022-03-19 PROBLEM — E43 SEVERE PROTEIN-CALORIE MALNUTRITION (HCC): Status: ACTIVE | Noted: 2022-01-01

## 2022-03-19 PROBLEM — C34.31: Status: ACTIVE | Noted: 2022-01-01

## 2022-03-19 PROBLEM — D61.818 PANCYTOPENIA (HCC): Status: ACTIVE | Noted: 2022-01-01

## 2022-03-19 PROBLEM — R11.2 INTRACTABLE NAUSEA AND VOMITING: Status: ACTIVE | Noted: 2022-01-01

## 2022-03-19 NOTE — PROGRESS NOTES
Patient transported to hospice.    Electronically signed by Tiffanie Middleton RN on 3/18/2022 at 9:07 PM

## 2022-03-21 PROBLEM — J18.9 PNEUMONIA: Status: RESOLVED | Noted: 2018-07-28 | Resolved: 2022-01-01

## 2022-03-21 PROBLEM — E87.29 METABOLIC ACIDOSIS, INCREASED ANION GAP (IAG): Status: RESOLVED | Noted: 2018-07-28 | Resolved: 2022-01-01

## 2022-03-21 NOTE — TELEPHONE ENCOUNTER
Pt son Conner Recio called and stated pt passed away this morning. I have cx her appts with our office.

## 2022-03-21 NOTE — PROGRESS NOTES
Physician Progress Note      Charlie Love  [facility] #:                  [id number]  :                       1940  ADMIT DATE:       3/12/2022 11:20 PM  [address] [place] DATE:        3/18/2022 9:07 PM  RESPONDING  PROVIDER #:        Haylie Stephen          QUERY TEXT:    Pt admitted with nausea, vomiting, and diarrhea. Pt noted to have lung cancer   and developed symptoms after second round of chemotherapy. If possible, please   document in progress notes and discharge summary the relationship, if any,   between diarrhea and chemotherapy. The medical record reflects the following:  Risk Factors: Lung cancer on chemotherapy  Clinical Indicators: Progressively worsening nausea, vomiting, and diarrhea   after second round of chemotherapy. CT Abd, \"Thickening and edema in the wall   of the small bowel. Most likely this represents enteritis. \"  Treatment: Oncology consult, Lomotil QID & PRN, Imodium QID PRN, Flagyl 500 mg   IV q 8 hr., 0.9% NS bolus total 3,250 ml then 75 ml/hr.  ml IV bolus,   Decadron 4 mg IV q 8 hr.,Pepcid 10 mg IV daily, Reglan 5 mg IV QID. CT Abd. Options provided:  -- Diarrhea due to chemotherapy  -- Diarrhea unrelated to chemotherapy  -- Other - I will add my own diagnosis  -- Disagree - Not applicable / Not valid  -- Disagree - Clinically unable to determine / Unknown  -- Refer to Clinical Documentation Reviewer    PROVIDER RESPONSE TEXT:    This patient has diarrhea, unrelated to chemotherapy.     Query created by: Matt Smith on 3/21/2022 9:49 AM      Electronically signed by:  Haylie Stephen 3/21/2022 12:00 PM

## (undated) DEVICE — Device

## (undated) DEVICE — CHLORAPREP 26ML ORANGE

## (undated) DEVICE — ENDO KIT,LOURDES HOSPITAL: Brand: MEDLINE INDUSTRIES, INC.

## (undated) DEVICE — GLV SURG TRIUMPH MICRO PF LTX 7.5 STRL

## (undated) DEVICE — SUTURE MCRYL SZ 4-0 L18IN ABSRB UD L19MM PS-2 3/8 CIR PRIM Y496G

## (undated) DEVICE — PENCL ES MEGADINE EZ/CLEAN BUTN W/HOLSTR 10FT

## (undated) DEVICE — SKIN MARKER,REGULAR TIP WITH RULER: Brand: DEVON

## (undated) DEVICE — ANTIBACTERIAL UNDYED BRAIDED (POLYGLACTIN 910), SYNTHETIC ABSORBABLE SUTURE: Brand: COATED VICRYL

## (undated) DEVICE — C-ARM: Brand: UNBRANDED

## (undated) DEVICE — GLV SURG SENSICARE W/ALOE PF LF 7 STRL

## (undated) DEVICE — MAJOR BSIN SETUP PK

## (undated) DEVICE — CVR UNIV C/ARM

## (undated) DEVICE — INTENDED FOR TISSUE SEPARATION, AND OTHER PROCEDURES THAT REQUIRE A SHARP SURGICAL BLADE TO PUNCTURE OR CUT.: Brand: BARD-PARKER ® CARBON RIB-BACK BLADES

## (undated) DEVICE — DRSNG WND SIL OPTIFOAM GENTLE BRDR ADHS W/SA 4X4IN

## (undated) DEVICE — OPTIFOAM GENTLE SA, POSTOP, 4X8: Brand: MEDLINE

## (undated) DEVICE — DRP C/ARMOR

## (undated) DEVICE — GLV SURG DERMASSURE GRN LF PF 8.0

## (undated) DEVICE — PK EXTRM 30

## (undated) DEVICE — 4-PORT MANIFOLD: Brand: NEPTUNE 2

## (undated) DEVICE — GLV SURG SENSICARE W/ALOE PF LF 6.5 STRL

## (undated) DEVICE — SUTURE VCRL SZ 3-0 L27IN ABSRB UD L26MM SH 1/2 CIR J416H

## (undated) DEVICE — ADHESIVE SKIN CLSR 0.7ML TOP DERMBND ADV

## (undated) DEVICE — 9165 UNIVERSAL PATIENT PLATE: Brand: 3M™

## (undated) DEVICE — GLV SURG BIOGEL LTX PF 7 1/2

## (undated) DEVICE — SUTURE PDS II SZ 2-0 L27IN ABSRB UD FS-1 L24MM 3/8 CIR REV Z443H

## (undated) DEVICE — SYS CLS SKIN PREMIERPRO EXOFINFUSION 22CM

## (undated) DEVICE — CONMED GOLDLINE ELECTROSURGICAL HANDPIECE, HAND CONTROLLED WITH BLADE ELECTRODE, BUTTON SWITCH, SAFETY HOLSTER AND 10 FT (3 M) CABLE: Brand: CONMED GOLDLINE

## (undated) DEVICE — PACK,UNIVERSAL,NO GOWNS: Brand: MEDLINE

## (undated) DEVICE — PK TURNOVER RM ADV